# Patient Record
Sex: FEMALE | Race: WHITE | NOT HISPANIC OR LATINO | Employment: OTHER | URBAN - METROPOLITAN AREA
[De-identification: names, ages, dates, MRNs, and addresses within clinical notes are randomized per-mention and may not be internally consistent; named-entity substitution may affect disease eponyms.]

---

## 2017-01-30 ENCOUNTER — TRANSCRIBE ORDERS (OUTPATIENT)
Dept: GENERAL RADIOLOGY | Facility: HOSPITAL | Age: 52
End: 2017-01-30

## 2017-01-30 ENCOUNTER — HOSPITAL ENCOUNTER (OUTPATIENT)
Dept: GENERAL RADIOLOGY | Facility: HOSPITAL | Age: 52
Discharge: HOME | End: 2017-01-30
Admitting: FAMILY MEDICINE

## 2017-01-30 DIAGNOSIS — M54.2 NECK PAIN: Primary | ICD-10-CM

## 2017-01-30 PROCEDURE — 72072 X-RAY EXAM THORAC SPINE 3VWS: CPT

## 2017-01-30 PROCEDURE — 72100 X-RAY EXAM L-S SPINE 2/3 VWS: CPT

## 2017-01-30 PROCEDURE — 72040 X-RAY EXAM NECK SPINE 2-3 VW: CPT

## 2017-03-02 ENCOUNTER — OFFICE VISIT (OUTPATIENT)
Dept: PSYCHIATRY | Facility: CLINIC | Age: 52
End: 2017-03-02

## 2017-03-02 DIAGNOSIS — F33.2 SEVERE EPISODE OF RECURRENT MAJOR DEPRESSIVE DISORDER, WITHOUT PSYCHOTIC FEATURES (HCC): Primary | ICD-10-CM

## 2017-03-02 DIAGNOSIS — F41.9 ANXIETY DISORDER, UNSPECIFIED TYPE: ICD-10-CM

## 2017-03-02 PROCEDURE — 90791 PSYCH DIAGNOSTIC EVALUATION: CPT | Performed by: SOCIAL WORKER

## 2017-03-06 NOTE — PROGRESS NOTES
Subjective   Patient ID: Zhane Burnham is a 51 y.o. female is being seen for consultation today at the request of MARCE Mireles.  Patient was born in [facility] and raised in [facility].  Patient currently lives in [facility].  Patient is nonworking currently and working on disability.  Patient reports she has been working on disability for the past 2 years.  Patient is .  Patient has no children.  Patient has 2 dogs.  Patient is working on selling her home and moving to [facility].    Chief Complaint: Depression, anxiety, memory loss, word finding, lack of concentration and trouble staying on task.    History of Present Illness: patient reports a long history of depression that started in 1998 as evidenced by feelings of helplessness and hopelessness, depressed mood, tearfulness, lack of energy, lack of interest and isolation.  Patient rates depression on a 1-10 scale with 10 being the worst currently a 8.  Patient denies SI and HI.  Patient reports that her marriage was a trigger for her depression that she was working 3 jobs and her  would not work.  Patient is currently .  Patient discussed a long history of anxiety as evidenced by excessive worry, increased heart rate, flight of ideas, sweating and feeling the need to escape situations at times.  Patient rates anxiety on a 1-10 scale with 10 being the worst a 7.  Patient is currently trying to short sell her house which is causing increased stress and anxiety.  Patient discussed health conditions such as rheumatoid and osteoarthritis, lupus and fibromyalgia that causes daily pain and fatigue that interferes with her depression and anxiety.  Patient denies substance abuse.  Patient denies perceptual disturbances.  Patient was let go or asked to resign from the [employer] due to not being able to Through with her job duties from numerous medical appointments and patient is trying to get disability.       The following portions of the patient's history were reviewed and updated as appropriate: allergies, current medications, past family history, past medical history, past social history, past surgical history and problem list.    Past Psych History: Patient denies past psychiatric hospitalizations.  Patient denies past suicide attempts.  Patient reports past psychiatric medications as Cymbalta, Zoloft, Librax, Prozac.  Patient reports history of outpatient treatment has behavioral health and [facility] in [facility] on [address].    Substance Use History: patient has a social drinker couple times a year.  Patient denies all other substance use    Medical History: patient discussed fibromyalgia, rheumatoid and osteoarthritis, lupus, pain related to scoliosis, patient wears glasses, hearing loss of background noises, frequent urination,  Past Medical History   Diagnosis Date   • Abnormal ECG    • Fatigue    • Fibromyalgia    • Hearing loss    • Hyperlipidemia    • Hypertension    • Lower extremity edema    • Osteoarthritis         Past Surgical History   Procedure Laterality Date   • Knee arthroscopy Left 06/2016   • Breast reconstruction     • Mastectomy Bilateral 01/31/2013     bilat   • Hysterectomy     • Tonsillectomy     • Breast biopsy  12/27/2012       Medications:   Current Outpatient Prescriptions:   •  azelastine (ASTELIN) 0.1 % nasal spray, BID PRN, Disp: , Rfl:   •  BIOTIN PO, Take  by mouth 2 (Two) Times a Day. 10,000mcg QD , Disp: , Rfl:   •  busPIRone (BUSPAR) 15 MG tablet, 1 BID, Disp: , Rfl:   •  Cholecalciferol (VITAMIN D3) 2000 UNITS capsule, Daily., Disp: , Rfl: 0  •  cyclobenzaprine (FLEXERIL) 10 MG tablet, 1 TID PRN, Disp: , Rfl:   •  diphenhydrAMINE (BENADRYL) 25 mg capsule, Take 25 mg by mouth at night as needed for itching., Disp: , Rfl:   •  DULoxetine (CYMBALTA) 60 MG capsule, 1 BID, Disp: , Rfl:   •  fentaNYL (DURAGESIC) 50 MCG/HR patch, 1 patch q 72 hours, Disp: , Rfl:   •   fluconazole (DIFLUCAN) 150 MG tablet, 2 (Two) Times a Day., Disp: , Rfl: 1  •  fludrocortisone 0.1 MG tablet, Take 1/2 tablet daily., Disp: 15 tablet, Rfl: 11  •  fluticasone (FLONASE) 50 MCG/ACT nasal spray, As Needed., Disp: , Rfl: 3  •  gabapentin (NEURONTIN) 300 MG capsule, 1 TID, Disp: , Rfl:   •  lidocaine (XYLOCAINE) 5 % ointment, APPLY TO THE AFFECTED AREA(S) UP TO TWO GRAMS FOUR TIMES DAILY, Disp: , Rfl: 0  •  loratadine (CLARITIN) 10 MG tablet, 1 QD, Disp: , Rfl:   •  meloxicam (MOBIC) 15 MG tablet, 1 QD, Disp: , Rfl:   •  Omeprazole 20 MG tablet delayed-release, 2 (Two) Times a Day., Disp: , Rfl: 0  •  oxyCODONE-acetaminophen (PERCOCET)  MG per tablet, As Needed. 1 QD, Disp: , Rfl:   •  predniSONE (DELTASONE) 10 MG tablet, Take 10 mg by mouth As Needed. Use as directed , Disp: , Rfl:   •  simvastatin (ZOCOR) 40 MG tablet, 1 QHS, Disp: , Rfl:   •  traZODone (DESYREL) 100 MG tablet, TAKE ONE TABLET BY MOUTH AT BEDTIME, Disp: , Rfl: 3    Allergies   Allergen Reactions   • Celebrex [Celecoxib] Swelling   • Red Dye    • Shellfish-Derived Products        Review of Systems    Family History patient reports that dad with bipolar disorder and mother with depression  Family History   Problem Relation Age of Onset   • Heart disease Mother    • Heart failure Mother    • Heart disease Father    • Heart attack Father    • No Known Problems Sister    • No Known Problems Brother    • Hypertension Other        Social History: Patient denies any learning or behavior problems during childhood.  Patient reports she took over household duties around the age of 11 years of age.  Patient's parents  at 6 years of age.  Patient reports her mother raised her and saw her dad on weekends.  Patient reports her mother was mentally ill and unstable at times and would threaten to kill herself when she felt overwhelmed.  Patient overall feels that she had a good healthy childhood and has good memories minus her there's  instability. patient believes in a higher power in her higher power as God.  Patient goes to a [employer] in [place].  Patient was raised [employer]ism.  Patient has been supporting herself financially by taking out her correction and living off of that.  Patient's work history consists of [employer] for 10 years and that was the longest job held.  Patient denies serving in the  or ever being arrested.  Patient does not know what she likes to do but has a few friends that she is helping them cook and clean.  Patient is not sexually active and her sexual preferences man and identifies herself as heterosexual.  Patient has 1 biological brother and a half-brother and a half-sister.  Patient's brother and her have been in a legal brown since 2008 over her mother's home.      Objective   Mental Status Exam  Hygiene:  good  Dress:  casual  Attitude:  Cooperative  Motor Activity:  Appropriate  Speech:  Normal  Mood:  anxious and depressed  Affect:  depressed and anxious  Thought Processes:  Goal directed  Thought Content:  normal  Suicidal Thoughts:  denies  Homicidal Thoughts:  denies  Crisis Safety Plan: yes, to come to the emergency room.  Hallucinations:  denies      Strengths: Motivated for treatment    Weaknesses:Lack of motivation, Unemployed and Poor coping skills    Anxiety, depression, legal problems, pain problems and unresolved grief or loss      Short term goals: Develop rapport and encourage compliance with treatment plan and follow ups.  The patient to contact this office, call 911 or present to the nearest emergency room should suicidal or homicidal ideations occur.    Long term goals: The patient will have complete cessation of symptoms and be able to function at optimal levels without the need for continued treatment..      Lab Review:   not applicable  Assessment/Plan   Diagnoses and all orders for this visit:    Severe episode of recurrent major depressive disorder, without  psychotic features    Generalized anxiety disorder    Return in about 3 weeks (around 3/23/2017), or if symptoms worsen or fail to improve.    Plan: The patient will be seen at least monthly for outpatient psychotherapy sessions and follow all recommendations. The patient will follow safety plan if suicidal or homicidal ideations occur. The patient will make appointment with Shelly Thornton to be assessed for medication management to assist with symptoms of depression and anxiety.  Will work with therapist on coping skills using solution focused and CB.

## 2017-04-10 ENCOUNTER — OFFICE VISIT (OUTPATIENT)
Dept: PSYCHIATRY | Facility: CLINIC | Age: 52
End: 2017-04-10

## 2017-04-10 VITALS — BODY MASS INDEX: 39.71 KG/M2 | WEIGHT: 253 LBS | HEIGHT: 67 IN

## 2017-04-10 DIAGNOSIS — F51.05 INSOMNIA DUE TO MENTAL CONDITION: ICD-10-CM

## 2017-04-10 DIAGNOSIS — G89.29 OTHER CHRONIC PAIN: ICD-10-CM

## 2017-04-10 DIAGNOSIS — F41.9 ANXIETY DISORDER, UNSPECIFIED TYPE: ICD-10-CM

## 2017-04-10 DIAGNOSIS — F33.2 SEVERE EPISODE OF RECURRENT MAJOR DEPRESSIVE DISORDER, WITHOUT PSYCHOTIC FEATURES (HCC): Primary | ICD-10-CM

## 2017-04-10 PROCEDURE — 99214 OFFICE O/P EST MOD 30 MIN: CPT | Performed by: NURSE PRACTITIONER

## 2017-04-10 RX ORDER — LISINOPRIL 5 MG/1
TABLET ORAL
Refills: 3 | COMMUNITY
Start: 2017-03-22 | End: 2017-06-21

## 2017-04-10 RX ORDER — BUSPIRONE HYDROCHLORIDE 15 MG/1
15 TABLET ORAL 3 TIMES DAILY
Qty: 90 TABLET | Refills: 2 | Status: SHIPPED | OUTPATIENT
Start: 2017-04-10 | End: 2017-06-01 | Stop reason: SDUPTHER

## 2017-04-10 RX ORDER — BUPROPION HYDROCHLORIDE 100 MG/1
TABLET, EXTENDED RELEASE ORAL
Qty: 60 TABLET | Refills: 0 | Status: SHIPPED | OUTPATIENT
Start: 2017-04-10 | End: 2017-05-06 | Stop reason: SDUPTHER

## 2017-04-10 NOTE — PROGRESS NOTES
Subjective    Zhane Burnham is a 51 y.o. female is being seen for consultation today at the request of MARCE Mireles.  Patient was born in [facility] and raised in [facility]. Patient currently lives in [facility]note 3]. Patient is nonworking currently and working on disability. Patient reports she has been working on disability for the past 2 years. Patient is . Patient has no children. Patient has 2 dogs. Patient is working on selling her home and moving to [facility].    Chief Complaint:    History of Present Illness patient reports a long history of depression that started in 1998 as evidenced by feelings of helplessness and hopelessness, depressed mood, tearfulness, lack of energy, lack of interest and isolation. Patient rates depression on a 1-10 scale with 10 being the worst currently a 8. Patient denies SI and HI. Patient reports that her marriage was a trigger for her depression that she was working 3 jobs and her  would not work. Patient is currently . Patient discussed a long history of anxiety as evidenced by excessive worry, increased heart rate, flight of ideas, sweating and feeling the need to escape situations at times. Patient rates anxiety on a 1-10 scale with 10 being the worst a 7. Patient is currently trying to short sell her house which is causing increased stress and anxiety. Patient discussed health conditions such as rheumatoid and osteoarthritis, lupus and fibromyalgia that causes daily pain and fatigue that interferes with her depression and anxiety. Patient denies substance abuse. Patient denies perceptual disturbances. Denies OCD, justus, PTSD sx's.  Patient was let go or asked to resign from the [employer] due to not being able to follow through with her job duties from numerous medical appointments.  Patient is trying to get disability this is her third time. She has a lot of physical complaints.       Stressors she reports  "are physical chronic pain, major financial, filing for bankruptsy. She does have contract on her current house and moved into rental in [place].      Past Psych History: Patient denies past psychiatric hospitalizations. Patient denies past suicide attempts. Patient reports past psychiatric medications as  Zoloft, Librax, Prozac. Is currently on buspar 15mg BID and Cymbalta 120mg PO total daily, she has been on both for years.  Patient reports history of outpatient treatment has Behavioral Health and [facility] in [facility] on [address].      Substance Use History: patient has a social drink couple times a year. Patient denies all other substance use, quit smoking past three years.     (Scales based on 0 - 10 with 10 being the worst)    UDS today + opiates is prescribed  STEPHANIE: no red flags    The following portions of the patient's history were reviewed and updated as appropriate: allergies, current medications, past family history, past medical history, past social history, past surgical history and problem list.    Review of Systems   Constitutional: Positive for fatigue.   HENT: Negative.    Eyes: Negative.    Respiratory: Negative.    Cardiovascular: Positive for palpitations (seeing a cardiologist and working on B/P management with PCP).   Gastrointestinal: Negative.    Endocrine: Positive for cold intolerance.   Genitourinary: Negative.    Musculoskeletal: Positive for arthralgias, back pain, joint swelling and neck pain.   Skin: Negative.    Allergic/Immunologic: Positive for food allergies (some red dyes in food).   Neurological: Positive for light-headedness.   Hematological: Negative.    Psychiatric/Behavioral: Positive for dysphoric mood and sleep disturbance. The patient is nervous/anxious.    denies fever, cough, s/s’s of infection, denies GI/ problems, denies new medical issues     Objective   Physical Exam  Height 67\" (170.2 cm), weight 253 lb (115 kg).    Allergies   Allergen Reactions   • " Celebrex [Celecoxib] Swelling   • Red Dye    • Shellfish-Derived Products      MEDICAL HISTORY:   postmenopausal (surgical cause (stage I triple negative node-negative left breast cancer, originally diagnosed 12/27/12  Osteoarthritis     Fibromyalgia     Fatigue     Hearing loss     Hypertension     Hyperlipidemia     Abnormal ECG     Lower extremity edema       Surgical History          KNEE ARTHROSCOPY 06/2016 Left   BREAST RECONSTRUCTION     MASTECTOMY 01/31/2013 Bilateral   bilat     HYSTERECTOMY     TONSILLECTOMY     BREAST BIOPSY     SENTINAL LYMPHNODE NEGATIVE,  1/31/2013    Current Medications:   Current Outpatient Prescriptions   Medication Sig Dispense Refill   • azelastine (ASTELIN) 0.1 % nasal spray BID PRN     • BIOTIN PO Take  by mouth 2 (Two) Times a Day. 10,000mcg QD      • buPROPion SR (WELLBUTRIN SR) 100 MG 12 hr tablet Take one in am daily x 14 days then increase to one in am and one at 4pm daily 60 tablet 0   • busPIRone (BUSPAR) 15 MG tablet Take 1 tablet by mouth 3 (Three) Times a Day. 1 BID 90 tablet 2   • Cholecalciferol (VITAMIN D3) 2000 UNITS capsule Daily.  0   • cyclobenzaprine (FLEXERIL) 10 MG tablet 1 TID PRN     • diphenhydrAMINE (BENADRYL) 25 mg capsule Take 25 mg by mouth at night as needed for itching.     • DULoxetine (CYMBALTA) 60 MG capsule 1 BID     • fentaNYL (DURAGESIC) 50 MCG/HR patch 1 patch q 72 hours     • fluconazole (DIFLUCAN) 150 MG tablet 2 (Two) Times a Day.  1   • fludrocortisone 0.1 MG tablet Take 1/2 tablet daily. 15 tablet 11   • fluticasone (FLONASE) 50 MCG/ACT nasal spray As Needed.  3   • gabapentin (NEURONTIN) 300 MG capsule 1 TID     • lidocaine (XYLOCAINE) 5 % ointment APPLY TO THE AFFECTED AREA(S) UP TO TWO GRAMS FOUR TIMES DAILY  0   • lisinopril (PRINIVIL,ZESTRIL) 5 MG tablet TAKE ONE TABLET BY MOUTH EVERY DAY  3   • loratadine (CLARITIN) 10 MG tablet 1 QD     • meloxicam (MOBIC) 15 MG tablet 1 QD     • Omeprazole 20 MG tablet delayed-release 2 (Two)  Times a Day.  0   • oxyCODONE-acetaminophen (PERCOCET)  MG per tablet As Needed. 1 QD     • predniSONE (DELTASONE) 10 MG tablet Take 10 mg by mouth As Needed. Use as directed      • simvastatin (ZOCOR) 40 MG tablet 1 QHS     • traZODone (DESYREL) 100 MG tablet TAKE ONE TABLET BY MOUTH AT BEDTIME  3     No current facility-administered medications for this visit.        Mental Status Exam:   Appearance: appropriate  Hygiene:   good  Cooperation:  Cooperative  Eye Contact:  Good  Psychomotor Behavior:  Appropriate  Mood:  Dysthymic, anxiety  Affect:  Appropriate  Hopelessness: Denies  Speech:  Normal  Thought Process:  Linear  Thought Content:  Normal  Suicidal:  None  Homicidal:  None  Hallucinations:  None  Delusion:  None  Memory:  Intact  Orientation:  Person, Place, Time and Situation  Reliability:  fair  Insight:  Fair  Judgement:  Fair  Impulse Control:  Fair  Estimated Intelligence: average range   Physical/Medical Issues:  No     Assessment/Plan   Diagnoses and all orders for this visit:    Severe episode of recurrent major depressive disorder, without psychotic features    Anxiety disorder, unspecified type    Insomnia due to mental condition    Other chronic pain    Other orders  -     buPROPion SR (WELLBUTRIN SR) 100 MG 12 hr tablet; Take one in am daily x 14 days then increase to one in am and one at 4pm daily  -     busPIRone (BUSPAR) 15 MG tablet; Take 1 tablet by mouth 3 (Three) Times a Day. 1 BID      Will continue Cymbalta  Continue Trazodone  Buspar 15mg increase TID   Wellbutrin 100mg Po once a day in morning x 14 days then increase to twice one in am and one at 4pm and then see in 4 weeks.   ·   Encouraged continuation of therapy. Reframing negative thoughts about world and herself, poor body image. Work on increasing healthy social support.   A psychological evaluation was conducted in order to assess past and current level of functioning. Areas assessed included, but were not limited to:  perception of social support, perception of ability to face and deal with challenges in life (positive functioning), anxiety symptoms, depressive symptoms, perspective on beliefs/belief system, coping skills for stress, intelligence level,  Therapeutic rapport was established. Interventions conducted today were geared towards incorporating medication management along with support for continued therapy. Education was also provided as to the med management with this provider and what to expect in subsequent sessions.    We discussed risks, benefits, and side effects of the above medications and the patient was agreeable with the plan. Patient was educated on the importance of compliance with treatment and follow-up appointments.     Instructed to call for questions or concerns and return early if necessary. Crisis plan reviewed including going to the Emergency department.    Return in about 4 weeks (around 5/8/2017) for Next scheduled follow up.

## 2017-04-27 ENCOUNTER — OFFICE VISIT (OUTPATIENT)
Dept: CARDIOLOGY | Facility: CLINIC | Age: 52
End: 2017-04-27

## 2017-04-27 VITALS
SYSTOLIC BLOOD PRESSURE: 90 MMHG | HEART RATE: 90 BPM | DIASTOLIC BLOOD PRESSURE: 64 MMHG | WEIGHT: 253.8 LBS | BODY MASS INDEX: 40.79 KG/M2 | HEIGHT: 66 IN

## 2017-04-27 DIAGNOSIS — I95.1 ORTHOSTATIC HYPOTENSION: Primary | ICD-10-CM

## 2017-04-27 PROCEDURE — 99213 OFFICE O/P EST LOW 20 MIN: CPT | Performed by: INTERNAL MEDICINE

## 2017-04-27 NOTE — PROGRESS NOTES
Zhane Burnham  1965  [phone]      04/27/2017    Lynn Aceves MD    Chief Complaint   Patient presents with   • Dizziness     Problem List:    1. abnormal ECG 8/11/2016: shows possible inferior MI  2. Hypotension   3. Rheumatoid arthritis   4. Osteoarthritis   5. Breat cancer, triple negative    A. S/p double mastectomy   6. Asthma (childhood)  7. Surgical Hx:    A. Tonsillectomy    B. Hysterectomy: 2008    C. Double mastectomy    D. Reconstructive breast surgery    E. Knee surgery 6/21/16    Allergies   Allergen Reactions   • Celebrex [Celecoxib] Swelling   • Red Dye    • Shellfish-Derived Products        Current Medications:      Current Outpatient Prescriptions:   •  azelastine (ASTELIN) 0.1 % nasal spray, BID PRN, Disp: , Rfl:   •  BIOTIN PO, Take  by mouth 2 (Two) Times a Day. 10,000mcg QD , Disp: , Rfl:   •  buPROPion SR (WELLBUTRIN SR) 100 MG 12 hr tablet, Take one in am daily x 14 days then increase to one in am and one at 4pm daily, Disp: 60 tablet, Rfl: 0  •  busPIRone (BUSPAR) 15 MG tablet, Take 1 tablet by mouth 3 (Three) Times a Day. 1 BID, Disp: 90 tablet, Rfl: 2  •  Cholecalciferol (VITAMIN D3) 2000 UNITS capsule, 3,000 Units Daily., Disp: , Rfl: 0  •  cyclobenzaprine (FLEXERIL) 10 MG tablet, 1 TID PRN, Disp: , Rfl:   •  diphenhydrAMINE (BENADRYL) 25 mg capsule, Take 25 mg by mouth at night as needed for itching., Disp: , Rfl:   •  DULoxetine (CYMBALTA) 60 MG capsule, 1 BID, Disp: , Rfl:   •  fentaNYL (DURAGESIC) 50 MCG/HR patch, 1 patch q 72 hours, Disp: , Rfl:   •  fludrocortisone 0.1 MG tablet, Take 1/2 tablet daily., Disp: 15 tablet, Rfl: 11  •  fluticasone (FLONASE) 50 MCG/ACT nasal spray, As Needed., Disp: , Rfl: 3  •  gabapentin (NEURONTIN) 300 MG capsule, 1 TID, Disp: , Rfl:   •  lidocaine (XYLOCAINE) 5 % ointment, APPLY TO THE AFFECTED AREA(S) UP TO TWO GRAMS PRN, Disp: , Rfl: 0  •  lisinopril (PRINIVIL,ZESTRIL) 5 MG tablet, TAKE ONE TABLET BY MOUTH EVERY DAY, Disp: ,  "Rfl: 3  •  loratadine (CLARITIN) 10 MG tablet, 1 QD, Disp: , Rfl:   •  meloxicam (MOBIC) 15 MG tablet, 1 QD, Disp: , Rfl:   •  Omeprazole 20 MG tablet delayed-release, 2 (Two) Times a Day., Disp: , Rfl: 0  •  oxyCODONE-acetaminophen (PERCOCET)  MG per tablet, As Needed. 1 QD, Disp: , Rfl:   •  predniSONE (DELTASONE) 10 MG tablet, Take 10 mg by mouth As Needed. Use as directed , Disp: , Rfl:   •  simvastatin (ZOCOR) 40 MG tablet, 1 QHS, Disp: , Rfl:   •  traZODone (DESYREL) 100 MG tablet, TAKE ONE TABLET BY MOUTH AT BEDTIME, Disp: , Rfl: 3    HPI    Ms. Burnham presents today for 6 month follow up with complaints of dizziness, weakness, and lightheadedness.  Since she was here last her blood pressure had become elevated on the 0.5 of Florinef and had gotten as high as 150/99.  As a result Dr. Aceves added 5 mg of lisinopril.  She's been on that dose for approximately one month.  She recounts that she suffered a fall yesterday at 9-10 am after mowing her yard. She collapsed \"without any control of her body\", though she denies true syncope (she did not \"pass out\"). She is in significant pain and feels she may have fractured her ankle as a result of the fall. Patient denies chest pain, palpitations, shortness of breath, edema, PND, orthopnea, and syncope.     The following portions of the patient's history were reviewed and updated as appropriate: allergies, current medications and problem list.    Pertinent positives as listed in the HPI.  All other systems reviewed are negative.    Vitals:    04/27/17 1616   BP: 90/64   BP Location: Left arm   Patient Position: Sitting   Pulse: 90   Weight: 253 lb 12.8 oz (115 kg)   Height: 66\" (167.6 cm)       Physical Exam:    General: Alert and oriented  Neck: Jugular venous pressure is within normal limits. Carotids have normal upstrokes without bruits.   Cardiovascular: Heart has a nondisplaced focal PMI. Regular rate and rhythm without murmur, gallop or rub.  Lungs: " Clear without rales or wheezes. Equal expansion is noted.   Extremities: Show no edema.  Skin: warm and dry.  Neurologic: nonfocal    Diagnostic Data:    Procedures    Assessment:      ICD-10-CM ICD-9-CM   1. Orthostatic hypotension I95.1 458.0       Plan:    1. Take florinef 0.5 mg every other day.    2. Stop lisinopril.  3. Continue current medications.  4. F/up in 1 month or sooner if needed.    Scribed for Oumou Merrill MD by Makayla Dean. 4/27/2017  4:37 PM    I Oumou Merrill MD personally performed the services described in this documentation as scribed by the above individual in my presence, and it is both accurate and complete.    Oumou Merrill MD, FACC

## 2017-05-08 ENCOUNTER — OFFICE VISIT (OUTPATIENT)
Dept: PSYCHIATRY | Facility: CLINIC | Age: 52
End: 2017-05-08

## 2017-05-08 DIAGNOSIS — F41.9 ANXIETY DISORDER, UNSPECIFIED TYPE: ICD-10-CM

## 2017-05-08 DIAGNOSIS — F33.2 SEVERE EPISODE OF RECURRENT MAJOR DEPRESSIVE DISORDER, WITHOUT PSYCHOTIC FEATURES (HCC): Primary | ICD-10-CM

## 2017-05-08 DIAGNOSIS — F51.05 INSOMNIA DUE TO MENTAL CONDITION: ICD-10-CM

## 2017-05-08 PROCEDURE — 90834 PSYTX W PT 45 MINUTES: CPT | Performed by: SOCIAL WORKER

## 2017-05-08 RX ORDER — BUPROPION HYDROCHLORIDE 100 MG/1
100 TABLET, EXTENDED RELEASE ORAL 2 TIMES DAILY
Qty: 60 TABLET | Refills: 0 | Status: SHIPPED | OUTPATIENT
Start: 2017-05-08 | End: 2017-05-11 | Stop reason: SDUPTHER

## 2017-05-11 ENCOUNTER — OFFICE VISIT (OUTPATIENT)
Dept: PSYCHIATRY | Facility: CLINIC | Age: 52
End: 2017-05-11

## 2017-05-11 VITALS — HEIGHT: 66 IN | WEIGHT: 259 LBS | BODY MASS INDEX: 41.62 KG/M2

## 2017-05-11 DIAGNOSIS — F51.05 INSOMNIA DUE TO MENTAL CONDITION: ICD-10-CM

## 2017-05-11 DIAGNOSIS — F33.2 SEVERE EPISODE OF RECURRENT MAJOR DEPRESSIVE DISORDER, WITHOUT PSYCHOTIC FEATURES (HCC): Primary | ICD-10-CM

## 2017-05-11 DIAGNOSIS — F41.9 ANXIETY DISORDER, UNSPECIFIED TYPE: ICD-10-CM

## 2017-05-11 DIAGNOSIS — G89.29 OTHER CHRONIC PAIN: ICD-10-CM

## 2017-05-11 PROCEDURE — 99213 OFFICE O/P EST LOW 20 MIN: CPT | Performed by: NURSE PRACTITIONER

## 2017-05-11 RX ORDER — BUPROPION HYDROCHLORIDE 100 MG/1
TABLET, EXTENDED RELEASE ORAL
Qty: 60 TABLET | Refills: 1 | Status: SHIPPED | OUTPATIENT
Start: 2017-05-11 | End: 2017-07-03 | Stop reason: SDUPTHER

## 2017-05-11 RX ORDER — TRAZODONE HYDROCHLORIDE 100 MG/1
100 TABLET ORAL NIGHTLY
Qty: 30 TABLET | Refills: 3 | Status: SHIPPED | OUTPATIENT
Start: 2017-05-11 | End: 2017-12-13 | Stop reason: SDUPTHER

## 2017-05-23 ENCOUNTER — TRANSCRIBE ORDERS (OUTPATIENT)
Dept: ADMINISTRATIVE | Facility: HOSPITAL | Age: 52
End: 2017-05-23

## 2017-05-23 DIAGNOSIS — C50.919 MALIGNANT NEOPLASM OF OTHER SPECIFIED SITES OF FEMALE BREAST: Primary | ICD-10-CM

## 2017-06-01 RX ORDER — BUSPIRONE HYDROCHLORIDE 15 MG/1
TABLET ORAL
Qty: 90 TABLET | Refills: 0 | Status: SHIPPED | OUTPATIENT
Start: 2017-06-01 | End: 2017-07-26 | Stop reason: SDUPTHER

## 2017-06-05 ENCOUNTER — OFFICE VISIT (OUTPATIENT)
Dept: PSYCHIATRY | Facility: CLINIC | Age: 52
End: 2017-06-05

## 2017-06-05 DIAGNOSIS — F51.05 INSOMNIA DUE TO MENTAL CONDITION: ICD-10-CM

## 2017-06-05 DIAGNOSIS — F41.9 ANXIETY DISORDER, UNSPECIFIED TYPE: ICD-10-CM

## 2017-06-05 DIAGNOSIS — G89.29 OTHER CHRONIC PAIN: ICD-10-CM

## 2017-06-05 DIAGNOSIS — F33.2 SEVERE EPISODE OF RECURRENT MAJOR DEPRESSIVE DISORDER, WITHOUT PSYCHOTIC FEATURES (HCC): Primary | ICD-10-CM

## 2017-06-05 PROCEDURE — 90834 PSYTX W PT 45 MINUTES: CPT | Performed by: SOCIAL WORKER
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
## 2017-06-21 ENCOUNTER — OFFICE VISIT (OUTPATIENT)
Dept: CARDIOLOGY | Facility: CLINIC | Age: 52
End: 2017-06-21

## 2017-06-21 VITALS
BODY MASS INDEX: 39.76 KG/M2 | WEIGHT: 247.4 LBS | HEIGHT: 66 IN | DIASTOLIC BLOOD PRESSURE: 66 MMHG | SYSTOLIC BLOOD PRESSURE: 102 MMHG | HEART RATE: 91 BPM

## 2017-06-21 DIAGNOSIS — R60.0 BILATERAL EDEMA OF LOWER EXTREMITY: ICD-10-CM

## 2017-06-21 DIAGNOSIS — I95.1 ORTHOSTATIC HYPOTENSION: Primary | ICD-10-CM

## 2017-06-21 PROCEDURE — 99213 OFFICE O/P EST LOW 20 MIN: CPT | Performed by: NURSE PRACTITIONER

## 2017-06-21 RX ORDER — HYDROCHLOROTHIAZIDE 25 MG/1
25 TABLET ORAL DAILY
COMMUNITY
End: 2019-05-16

## 2017-06-21 RX ORDER — GUAIFENESIN 400 MG/1
400 TABLET ORAL
COMMUNITY
End: 2019-05-16
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
## 2017-07-05 RX ORDER — BUPROPION HYDROCHLORIDE 100 MG/1
TABLET, EXTENDED RELEASE ORAL
Qty: 60 TABLET | Refills: 2 | Status: SHIPPED | OUTPATIENT
Start: 2017-07-05 | End: 2017-09-12

## 2017-07-11 ENCOUNTER — DOCUMENTATION (OUTPATIENT)
Dept: ONCOLOGY | Facility: CLINIC | Age: 52
End: 2017-07-11

## 2017-07-11 ENCOUNTER — LAB (OUTPATIENT)
Dept: LAB | Facility: HOSPITAL | Age: 52
End: 2017-07-11

## 2017-07-11 ENCOUNTER — OFFICE VISIT (OUTPATIENT)
Dept: ONCOLOGY | Facility: CLINIC | Age: 52
End: 2017-07-11

## 2017-07-11 VITALS
WEIGHT: 248 LBS | RESPIRATION RATE: 20 BRPM | TEMPERATURE: 97.4 F | BODY MASS INDEX: 39.86 KG/M2 | HEART RATE: 100 BPM | OXYGEN SATURATION: 98 % | HEIGHT: 66 IN | SYSTOLIC BLOOD PRESSURE: 136 MMHG | DIASTOLIC BLOOD PRESSURE: 86 MMHG

## 2017-07-11 DIAGNOSIS — C50.212 MALIGNANT NEOPLASM OF UPPER-INNER QUADRANT OF LEFT FEMALE BREAST (HCC): Primary | ICD-10-CM

## 2017-07-11 DIAGNOSIS — C50.212 MALIGNANT NEOPLASM OF UPPER-INNER QUADRANT OF LEFT FEMALE BREAST (HCC): ICD-10-CM

## 2017-07-11 LAB
ALBUMIN SERPL-MCNC: 4.3 G/DL (ref 3.2–4.8)
ALBUMIN/GLOB SERPL: 1.6 G/DL (ref 1.5–2.5)
ALP SERPL-CCNC: 79 U/L (ref 25–100)
ALT SERPL W P-5'-P-CCNC: 19 U/L (ref 7–40)
ANION GAP SERPL CALCULATED.3IONS-SCNC: 8 MMOL/L (ref 3–11)
AST SERPL-CCNC: 27 U/L (ref 0–33)
BILIRUB SERPL-MCNC: 0.4 MG/DL (ref 0.3–1.2)
BUN BLD-MCNC: 18 MG/DL (ref 9–23)
BUN/CREAT SERPL: 18 (ref 7–25)
CALCIUM SPEC-SCNC: 10 MG/DL (ref 8.7–10.4)
CHLORIDE SERPL-SCNC: 103 MMOL/L (ref 99–109)
CO2 SERPL-SCNC: 31 MMOL/L (ref 20–31)
CREAT BLD-MCNC: 1 MG/DL (ref 0.6–1.3)
ERYTHROCYTE [DISTWIDTH] IN BLOOD BY AUTOMATED COUNT: 14.3 % (ref 11.3–14.5)
GFR SERPL CREATININE-BSD FRML MDRD: 59 ML/MIN/1.73
GLOBULIN UR ELPH-MCNC: 2.7 GM/DL
GLUCOSE BLD-MCNC: 68 MG/DL (ref 70–100)
HCT VFR BLD AUTO: 35.4 % (ref 34.5–44)
HGB BLD-MCNC: 11.5 G/DL (ref 11.5–15.5)
LYMPHOCYTES # BLD AUTO: 1.4 10*3/MM3 (ref 0.6–4.8)
LYMPHOCYTES NFR BLD AUTO: 23.2 % (ref 24–44)
MCH RBC QN AUTO: 26.6 PG (ref 27–31)
MCHC RBC AUTO-ENTMCNC: 32.4 G/DL (ref 32–36)
MCV RBC AUTO: 82.1 FL (ref 80–99)
MONOCYTES # BLD AUTO: 0.2 10*3/MM3 (ref 0–1)
MONOCYTES NFR BLD AUTO: 3.5 % (ref 0–12)
NEUTROPHILS # BLD AUTO: 4.4 10*3/MM3 (ref 1.5–8.3)
NEUTROPHILS NFR BLD AUTO: 73.3 % (ref 41–71)
PLATELET # BLD AUTO: 293 10*3/MM3 (ref 150–450)
PMV BLD AUTO: 7.4 FL (ref 6–12)
POTASSIUM BLD-SCNC: 3.8 MMOL/L (ref 3.5–5.5)
PROT SERPL-MCNC: 7 G/DL (ref 5.7–8.2)
RBC # BLD AUTO: 4.32 10*6/MM3 (ref 3.89–5.14)
SODIUM BLD-SCNC: 142 MMOL/L (ref 132–146)
WBC NRBC COR # BLD: 6 10*3/MM3 (ref 3.5–10.8)

## 2017-07-11 PROCEDURE — 36415 COLL VENOUS BLD VENIPUNCTURE: CPT

## 2017-07-11 PROCEDURE — 99213 OFFICE O/P EST LOW 20 MIN: CPT | Performed by: INTERNAL MEDICINE

## 2017-07-11 PROCEDURE — 85025 COMPLETE CBC W/AUTO DIFF WBC: CPT

## 2017-07-11 PROCEDURE — 80053 COMPREHEN METABOLIC PANEL: CPT | Performed by: INTERNAL MEDICINE

## 2017-07-11 NOTE — PROGRESS NOTES
To Whom It May Concern,    I have known this patient, Ms Catherine Burnham, for 4 years, due to her left breast cancer.  It is my understanding that she is applying for disability.    I saw her in the office just now.  Although I typically do not provide letters  for patients  supporting disability, other than those with active cancer and side effects due to their cancer and/or treatment, I am providing this letter in support of her request for disability.    In my observation today, the patient has significant cognitive compromise.  He has a great deal of difficulty maintaining a train of thought and I cannot imagine her being able to complete work related tasks when these are needed.  In addition, I think she is terribly physically limited in terms of her muscle strength and ability to repetitively complete a physical task after observing her here in the office.  In addition, her ability to ambulate is severely compromised.  She has had several falls in the last year or so associated with a chronic dis- equilibrium syndrome.    I realize the above is not highly specific, but I cannot imagine this woman seeking gainful employment.    Sincerely,  Juanpablo Bassett M.D.

## 2017-07-11 NOTE — PROGRESS NOTES
Chief Complaint   Follow-up postmenopausal stage I triple negative node-negative left breast cancer originally diagnosed December 2012    PROBLEM LIST   Patient Active Problem List   Diagnosis   • Hypertension   • Hyperlipidemia   • Abnormal ECG   • Lower extremity edema   • Breast cancer, left   • Rheumatoid arthritis   • Chronic pain syndrome   • Dizziness   • Orthostatic hypotension       HISTORY OF PRESENT ILLNESS:   Interim follow-up for this very nice 52-year-old woman who is getting close to 5 years from her diagnosis of breast cancer.    She continues to have difficulty with orthostatic hypotension.  Cardiology is seen her regarding this.  She's had several falls.  She's had no loss of consciousness.  She's had no head injuries.  She recently sold her house.  She is living in [place].  She has problems with memory retention.  She also has issues with chronic pain and with disequilibrium.  She gets very dizzy when she stands.  She uses a cane frequently when she ambulates.    Past Medical History, Past Surgical History, Social History, Family History have been reviewed and are without significant changes except as mentioned.      Medications:      Current Outpatient Prescriptions:   •  azelastine (ASTELIN) 0.1 % nasal spray, BID PRN, Disp: , Rfl:   •  BIOTIN PO, Take  by mouth 2 (Two) Times a Day. 10,000mcg QD , Disp: , Rfl:   •  buPROPion SR (WELLBUTRIN SR) 100 MG 12 hr tablet, TAKE ONE TABLET BY MOUTH TWICE DAILY NOT AFTER FOUR P.M., Disp: 60 tablet, Rfl: 2  •  busPIRone (BUSPAR) 15 MG tablet, TAKE ONE TABLET BY MOUTH THREE TIMES DAILY, Disp: 90 tablet, Rfl: 0  •  Cholecalciferol (VITAMIN D3) 2000 UNITS capsule, 3,000 Units Daily., Disp: , Rfl: 0  •  cyclobenzaprine (FLEXERIL) 10 MG tablet, As Needed., Disp: , Rfl:   •  diphenhydrAMINE (BENADRYL) 25 mg capsule, Take 25 mg by mouth Every Night., Disp: , Rfl:   •  DULoxetine (CYMBALTA) 60 MG capsule, 1 BID, Disp: , Rfl:   •  fentaNYL (DURAGESIC) 50 MCG/HR  patch, 1 patch q 72 hours, Disp: , Rfl:   •  fludrocortisone 0.1 MG tablet, Take 1/2 tablet daily. (Patient taking differently: Every Other Day. Take 1/2 tablet daily. ), Disp: 15 tablet, Rfl: 11  •  fluticasone (FLONASE) 50 MCG/ACT nasal spray, As Needed., Disp: , Rfl: 3  •  gabapentin (NEURONTIN) 300 MG capsule, 1 TID, Disp: , Rfl:   •  guaiFENesin (MUCUS RELIEF) 400 MG tablet, Take 400 mg by mouth Every 4 (Four) Hours., Disp: , Rfl:   •  hydrochlorothiazide (HYDRODIURIL) 25 MG tablet, Take 25 mg by mouth Daily., Disp: , Rfl:   •  lidocaine (XYLOCAINE) 5 % ointment, APPLY TO THE AFFECTED AREA(S) UP TO TWO GRAMS PRN, Disp: , Rfl: 0  •  loratadine (CLARITIN) 10 MG tablet, 1 QD, Disp: , Rfl:   •  meloxicam (MOBIC) 15 MG tablet, 1 QD, Disp: , Rfl:   •  Omeprazole 20 MG tablet delayed-release, 2 (Two) Times a Day., Disp: , Rfl: 0  •  oxyCODONE-acetaminophen (PERCOCET)  MG per tablet, As Needed. 1 QD, Disp: , Rfl:   •  predniSONE (DELTASONE) 10 MG tablet, Take 10 mg by mouth As Needed. Use as directed , Disp: , Rfl:   •  simvastatin (ZOCOR) 40 MG tablet, 1 QHS, Disp: , Rfl:   •  traZODone (DESYREL) 100 MG tablet, Take 1 tablet by mouth Every Night., Disp: 30 tablet, Rfl: 3    ALLERGIES:    Allergies   Allergen Reactions   • Celebrex [Celecoxib] Swelling   • Red Dye    • Shellfish-Derived Products        ROS:  Review of Systems   Constitutional: Negative for activity change and appetite change.        Overall suffers from chronic fatigue.   HENT: Negative for mouth sores, sinus pressure and voice change.         Has chronic intermittent headaches but these are not new   Eyes: Negative for visual disturbance.   Respiratory: Negative for shortness of breath.         Has chronic dyspnea on exertion   Cardiovascular: Negative for chest pain.   Gastrointestinal: Negative for abdominal pain and vomiting.   Genitourinary: Negative for dysuria.   Musculoskeletal: Negative for arthralgias and myalgias.        Has chronic  "muscle aches and pains as well as chronic joint aches and pains which have not changed   Skin: Negative for color change.   Neurological: Negative for dizziness, syncope and headaches.   Hematological: Negative for adenopathy.   Psychiatric/Behavioral: Negative for confusion, sleep disturbance and suicidal ideas. The patient is not nervous/anxious.         Chronic depressive symptoms about the same           Objective:    /86  Pulse 100  Temp 97.4 °F (36.3 °C)  Resp 20  Ht 66\" (167.6 cm)  Wt 248 lb (112 kg)  SpO2 98%  BMI 40.03 kg/m2    Physical Exam   Constitutional: She is oriented to person, place, and time. She appears well-developed and well-nourished. No distress.   Alert oriented and in no acute distress.  In talking with her, she really has a hard time maintaining her train of thought.  This is not new but seems a little bit more pronounced today than it did 8 months ago.   HENT:   Head: Normocephalic.   Mouth/Throat: Oropharynx is clear and moist.   Eyes: Conjunctivae and EOM are normal. No scleral icterus.   Neck: Normal range of motion. Neck supple. No thyromegaly present.   Cardiovascular: Normal rate, regular rhythm and normal heart sounds.    No murmur heard.  Pulmonary/Chest: Effort normal and breath sounds normal. She has no wheezes. She has no rales.   Bilateral breast implants in place with good cosmesis.  No worrisome mass palpable.    Abdominal: Soft. Bowel sounds are normal. She exhibits no distension and no mass. There is no tenderness.   Musculoskeletal: She exhibits no edema or tenderness.   She gets up and down from the exam table in a very very guarded fashion area and I don't have to help her but she cannot walk more than 8 or 10 steps without having to stop and seemingly maintain her balance   Lymphadenopathy:     She has no cervical adenopathy.   Neurological: She is alert and oriented to person, place, and time. She displays normal reflexes. No cranial nerve deficit. "   Reflexes blunted throughout.  In talking with her, she has a real problem maintaining her train of thought.  She does know where she is and she knows the day and the day.   Skin: Skin is warm and dry. No rash noted.   Psychiatric: She has a normal mood and affect. Judgment normal.   Vitals reviewed.             RECENT LABS:  Hematology WBC   Date Value Ref Range Status   07/11/2017 6.00 3.50 - 10.80 10*3/mm3 Final     Hemoglobin   Date Value Ref Range Status   07/11/2017 11.5 11.5 - 15.5 g/dL Final     Hematocrit   Date Value Ref Range Status   07/11/2017 35.4 34.5 - 44.0 % Final     MCV   Date Value Ref Range Status   07/11/2017 82.1 80.0 - 99.0 fL Final     RDW   Date Value Ref Range Status   07/11/2017 14.3 11.3 - 14.5 % Final     MPV   Date Value Ref Range Status   07/11/2017 7.4 6.0 - 12.0 fL Final     Platelets   Date Value Ref Range Status   07/11/2017 293 150 - 450 10*3/mm3 Final     Neutrophils, Absolute   Date Value Ref Range Status   07/11/2017 4.40 1.50 - 8.30 10*3/mm3 Final     Lymphocytes, Absolute   Date Value Ref Range Status   07/11/2017 1.40 0.60 - 4.80 10*3/mm3 Final     Monocytes, Absolute   Date Value Ref Range Status   07/11/2017 0.20 0.00 - 1.00 10*3/mm3 Final       Glucose   Date Value Ref Range Status   07/11/2017 68 (L) 70 - 100 mg/dL Final     Sodium   Date Value Ref Range Status   07/11/2017 142 132 - 146 mmol/L Final     Potassium   Date Value Ref Range Status   07/11/2017 3.8 3.5 - 5.5 mmol/L Final     CO2   Date Value Ref Range Status   07/11/2017 31.0 20.0 - 31.0 mmol/L Final     Chloride   Date Value Ref Range Status   07/11/2017 103 99 - 109 mmol/L Final     Anion Gap   Date Value Ref Range Status   07/11/2017 8.0 3.0 - 11.0 mmol/L Final     Creatinine   Date Value Ref Range Status   07/11/2017 1.00 0.60 - 1.30 mg/dL Final     BUN   Date Value Ref Range Status   07/11/2017 18 9 - 23 mg/dL Final     BUN/Creatinine Ratio   Date Value Ref Range Status   07/11/2017 18.0 7.0 - 25.0  Final     Calcium   Date Value Ref Range Status   07/11/2017 10.0 8.7 - 10.4 mg/dL Final     eGFR Non  Amer   Date Value Ref Range Status   07/11/2017 59 (L) >60 mL/min/1.73 Final     Alkaline Phosphatase   Date Value Ref Range Status   07/11/2017 79 25 - 100 U/L Final     Total Protein   Date Value Ref Range Status   07/11/2017 7.0 5.7 - 8.2 g/dL Final     ALT (SGPT)   Date Value Ref Range Status   07/11/2017 19 7 - 40 U/L Final     AST (SGOT)   Date Value Ref Range Status   07/11/2017 27 0 - 33 U/L Final     Total Bilirubin   Date Value Ref Range Status   07/11/2017 0.4 0.3 - 1.2 mg/dL Final     Albumin   Date Value Ref Range Status   07/11/2017 4.30 3.20 - 4.80 g/dL Final     Globulin   Date Value Ref Range Status   07/11/2017 2.7 gm/dL Final     A/G Ratio   Date Value Ref Range Status   07/11/2017 1.6 1.5 - 2.5 g/dL Final          Perf Status: 1    Assessment/Plan    Diagnoses and all orders for this visit:    Malignant neoplasm of upper-inner quadrant of left female breast      Patient has no evidence of disease recurrence.    [place] status patient's performance status is severely affected by her apparent cardiac condition that has led her  to suffer from symptomatic orthostatic hypotension.    I'll plan on seeing her back in about 6 months.  I told her that I would dictate a separate letter supporting her T4 disability.  I cannot imagine in any way shape or form that this very nice woman can pursue any meaningful occupation      Juanpablo Bassett MD , 7/11/2017    CC:

## 2017-07-12 DIAGNOSIS — C50.912 MALIGNANT NEOPLASM OF LEFT FEMALE BREAST, UNSPECIFIED SITE OF BREAST: Primary | ICD-10-CM

## 2017-07-12 DIAGNOSIS — C50.212 MALIGNANT NEOPLASM OF UPPER-INNER QUADRANT OF LEFT FEMALE BREAST (HCC): ICD-10-CM

## 2017-07-13 ENCOUNTER — OFFICE VISIT (OUTPATIENT)
Dept: PSYCHIATRY | Facility: CLINIC | Age: 52
End: 2017-07-13

## 2017-07-13 DIAGNOSIS — F51.05 INSOMNIA DUE TO MENTAL CONDITION: ICD-10-CM

## 2017-07-13 DIAGNOSIS — F33.1 MAJOR DEPRESSIVE DISORDER, RECURRENT EPISODE, MODERATE (HCC): ICD-10-CM

## 2017-07-13 DIAGNOSIS — F41.9 ANXIETY DISORDER, UNSPECIFIED TYPE: Primary | ICD-10-CM

## 2017-07-13 DIAGNOSIS — G89.29 OTHER CHRONIC PAIN: ICD-10-CM

## 2017-07-13 PROCEDURE — 90834 PSYTX W PT 45 MINUTES: CPT | Performed by: SOCIAL WORKER

## 2017-07-17 NOTE — PROGRESS NOTES
"    PROGRESS NOTE  Data:  Zhane Burnham came in 7/13/17 for her regularly scheduled therapy session, with Rosemary Pacheco LCSW .  Pt. Reports depression is about the same     (Scales based on 0 - 10 with 10 being the worst)  Depression: 7.5 Anxiety: 7.5   Distress: 7 Sleep:    Tasks Completed on Time: 4 Mood: 4   Number of Panic Attacks: 0 Appetite: 7   [id number]  Patient started individual psychotherapy session by reporting \"I brought boomer today\" and \"I have to go to the house today and work\" and \"its been a month a half and I'm still getting stuff out\". patient discussed the new owners of her house has split her remaining belongings and states \"they told me I took to long\" and \"that they are not going to let me have my stuff out\". Patient discussed that they had agreed to  her belongings but have backed out. Patient discussed being upset and discouraged and hope she can make some phone calls today to see if she can work with them and states \"Im going to ask them to help me still\". Patient discussed she was supposed to being going on vacation to her friends parents anniversary party and stats \"my friend called me and told me that I couldn't go now because of the bed bugs\". Patient discussed that not going has increased her depression for a few weeks but has leveled out now. Patient discussed on July 28th she has her disability hearing and on August 3rd bankruptcy hearing. Patient discussed her knees hurt her and really unable to do what she has been doing at the house and states \"my pain has been bad\". Patient discussed gathering information for her bankruptcy.     Clinical Maneuvering/Intervention:   Assisted patient in processing above session content; acknowledged and normalized patient’s thoughts, feelings, and concerns. Assisted patient in processing her feelings of depression and anxiety and overwhelmed by trying to move her belonging out of previous house, still having bed bugs and not being able to " go on the trip with her friends due to bed bugs.  Assisted patient in processing thoughts and feelings increased pain due to medical conditions i.e. fibromyalgia and arthritis, i depression, the process of bankruptcy and disability hearing.  Assisted patient in processing her thoughts and feelings on having to move out of her home. Encouraged pt the importance of keeping all appointments and taking medications as prescribed and calling with any questions or concerns.  Assisted patient in understanding the importance of seeing the interdisciplinary team for continuity of care. Attempted to build rapport and trust with patient during psychotherapy session and reviewed safety plan for patient to call her friend, present to the ER or call 911 is SI or HI occurs. Patient denies SI and HI.  Patient is agreeable to her safety plan. Encouraged patient to contact the new owner of the house to see if there is anyway she can get the suff out of her home and if not call her . Explained to be careful because safety comes first and the house has been closed on so to abide by their request and handle things legally. Encouraged patient to work to get the bed bugs out of her current residence that she can feel comfortable in her new space and patient explained she will make that a priority once her previous house is finished. Encouraged patient to ask for help moving by calling churches and friends to get help since patient feels she lacks support during this time of moving and transitioning.     Allowed patient to freely discuss issues without interruption or judgment. Provided safe, confidential environment to facilitate the development of positive therapeutic relationship and encourage open, honest communication. Assisted patient in identifying risk factors which would indicate the need for higher level of care including thoughts to harm self or others and/or self-harming behavior and encouraged patient to contact this  office, call 911, or present to the nearest emergency room should any of these events occur. Discussed crisis intervention services and means to access.  Patient adamantly and convincingly denies current suicidal or homicidal ideation or perceptual disturbance.    Assessment     Diagnoses and all orders for this visit:    Anxiety disorder, unspecified type    Insomnia due to mental condition    Other chronic pain    Major depressive disorder, recurrent episode, moderate      Patient presents for session on time, clean and casually dressed with depressed/anxious mood and congruent affect. No evidence of intoxication, withdrawal, or perceptual disturbance. Association’s intact, abstraction intact. Thought process is linear and logical. Speech is clear and coherent. Patient is oriented to person, place, and time. Attention and concentration fair. Insight and judgment fair. Patient reports no current suicidal or homicidal ideation. Patient appears cooperative and agreeable to treatment and appears to begin to develop rapport. Patient does not appear to be malingering.          Mental Status Exam  Hygiene:  good  Dress:  casual  Attitude:  Cooperative  Motor Activity:  Appropriate  Speech:  Normal  Mood:  depressed and anxious  Affect:  depressed and anxious  Thought Processes:  Goal directed  Thought Content:  normal  Suicidal Thoughts:  denies  Homicidal Thoughts:  denies  Crisis Safety Plan: yes, to come to the emergency room.  Hallucinations:  denies    Patient's Support Network Includes:  extended family and friends    Plan     Patient will have at least monthly outpatient psychotherapy sessions and pharmacotherapy as scheduled. Patient to continue to be assessed by Shelly SCHNEIDER or Dr. Rodriguez for medication management.  Patient will adhere to medication regimen as prescribed and report any side effects. Patient will contact this office, call 911 or present to the nearest emergency room should suicidal or  homicidal ideations occur. Provide Cognitive Behavioral Therapy and Solution Focused Therapy to improve functioning, maintain stability, and avoid decompensation and the need for higher level of care.          Return in about 3 weeks (around 8/3/2017), or if symptoms worsen or fail to improve.

## 2017-07-26 RX ORDER — BUSPIRONE HYDROCHLORIDE 15 MG/1
15 TABLET ORAL 2 TIMES DAILY
Qty: 60 TABLET | Refills: 2 | Status: SHIPPED | OUTPATIENT
Start: 2017-07-26 | End: 2017-09-19 | Stop reason: SDUPTHER

## 2017-08-10 ENCOUNTER — OFFICE VISIT (OUTPATIENT)
Dept: PSYCHIATRY | Facility: CLINIC | Age: 52
End: 2017-08-10

## 2017-08-10 DIAGNOSIS — F51.05 INSOMNIA DUE TO MENTAL CONDITION: ICD-10-CM

## 2017-08-10 DIAGNOSIS — F41.9 ANXIETY DISORDER, UNSPECIFIED TYPE: Primary | ICD-10-CM

## 2017-08-10 DIAGNOSIS — G89.29 OTHER CHRONIC PAIN: ICD-10-CM

## 2017-08-10 DIAGNOSIS — F33.2 SEVERE EPISODE OF RECURRENT MAJOR DEPRESSIVE DISORDER, WITHOUT PSYCHOTIC FEATURES (HCC): ICD-10-CM

## 2017-08-10 PROCEDURE — 90834 PSYTX W PT 45 MINUTES: CPT | Performed by: SOCIAL WORKER

## 2017-08-10 NOTE — PROGRESS NOTES
"    PROGRESS NOTE  Data:  Zhane Burnham came in 8/10/17 for her regularly scheduled therapy session, with Rosemary Pacheco LCSW .  Pt. Reports depression has worsened   (Scales based on 0 - 10 with 10 being the worst)  Depression: 8 Anxiety: 8   Distress: 9 Sleep: Interrupted    Tasks Completed on Time: Poor due to depressed mood and pain  Mood: Sad and overwhelmed    Number of Panic Attacks: 0 Appetite: Good    [id number]  Patient started individual psychotherapy session by reporting \"I had the stomach bug last week\", not feeling good today\", \"my  has been backed up at the house and taking me 2 days to clean after to plumbers came to fix it\".  Patient discussed she missed her appointment with Heidy SCHNEIDER and was tearful about missing that appointment on this past Monday.  Patient discussed she was discharged by her pain management doctor by stating \"my pain management doctor dismissed me because I missed an appointment\".  Patient discussed she had her disability hearing and states \"the ruled in my favor but it has to go someone above so not for sure\".   patient discussed she was upset at her  and states \"my  hadn't even cracked my file and everyone could tell\"and  \"he said that I was tearful a lot but that's all he could say\".  Patient discussed the  had done his homework and reviewing of the case.  Patient discussed still need to file bankruptcy and states the \"first I need to get the money to file bankruptcy and to get the paperwork together\" .  Patient continues to discuss her estranged relationship with her brother whom lives in her mother's house stating \"I hired 3 different  and still nothing was done\"and \"we were supposed to split the house\".  patient asked if this therapist to contact her pain management physician that she is being referred to about getting a sooner appointment and this therapist explained that she should contact her primary care physician about her " "concern relating pain management.  Patient discussed she is using her walker more often due to pain and states \"I'm having to stretch my pain medication out so I'm hurting really bad\".  patient continues to grieve her mother and discussed her celebration of life/.  Patient says she can't get rid of the bed bugs in her home and her landlord paid for 3 treatments and will not pay for any more.  Patient states \"I don't have the money to do so about something at the store and it worked for a little while\".  Patient states \"the babies are still biting me\".     Clinical Maneuvering/Intervention:   Assisted patient in processing above session content; acknowledged and normalized patient’s thoughts, feelings, and concerns. Assisted patient in processing her feelings of depression and anxiety losing her pain management doctor by discharge, awaiting 6 weeks to be seen by her new pain management doctor and having to stretch out her medication and admitted pain management currently.assisted patient in processing her thoughts and feelings about still having bed bugs in her apartment/home.  Assisted patient in processing thoughts and feelings increased pain due to medical conditions i.e. fibromyalgia and arthritis,. Assisted patient in processing her thoughts and feelings of being able to get her final belongings out of the house since last session.   Encouraged pt the importance of keeping all appointments and taking medications as prescribed and calling with any questions or concerns.  Assisted patient in understanding the importance of seeing the interdisciplinary team for continuity of care. Attempted to build rapport and trust with patient during psychotherapy session and reviewed safety plan for patient to call her friend, present to the ER or call 911 is SI or HI occurs. Patient denies SI and HI.  Patient is agreeable to her safety plan. Encouraged patient to work to get the bed bugs out of her current residence " that she can feel comfortable in her new space.  educated on the importance of self-care and making attainable goals due to continued lack stressors, lack of progress with adequate self-care, bankruptcy, and packing and settling in her home and working to get rid of the bed bugs.  Patient made a goal with the help of this therapist to get outside every day even if it sitting on the porch or planting a flower to have time outside of her apartment/home.  Patient made a goal of starting to gather documentation for bankruptcy a few days a week to prepare for filing bankruptcy once she has enough money to apply.  Encouraged patient to spend one hour 2-3 days a week and packing to prevent increased pain and decompensation.  Encouraged patient to contact her landlord about the bedbugs and if he denies intent contact [facility] to see if there are resources to assist with bedbugs treatment.  Educated on the importance of not sleeping all day and only napping for short period time with an alarm clock set to prevent excessive sleeping.  Educated patient on putting all her appointments in her phone with an alarm to remember appointments as she has missed 2 appointments with this office.      Allowed patient to freely discuss issues without interruption or judgment. Provided safe, confidential environment to facilitate the development of positive therapeutic relationship and encourage open, honest communication. Assisted patient in identifying risk factors which would indicate the need for higher level of care including thoughts to harm self or others and/or self-harming behavior and encouraged patient to contact this office, call 911, or present to the nearest emergency room should any of these events occur. Discussed crisis intervention services and means to access.  Patient adamantly and convincingly denies current suicidal or homicidal ideation or perceptual disturbance.    Assessment     Diagnoses and all orders for this  visit:    Anxiety disorder, unspecified type    Insomnia due to mental condition    Other chronic pain    Severe episode of recurrent major depressive disorder, without psychotic features      Patient presents for session on time, clean and casually dressed with depressed/anxious mood and congruent affect. No evidence of intoxication, withdrawal, or perceptual disturbance. Association’s intact, abstraction intact. Thought process is linear and logical. Speech is clear and coherent. Patient is oriented to person, place, and time. Attention and concentration fair. Insight and judgment fair. Patient reports no current suicidal or homicidal ideation. Patient appears cooperative and agreeable to treatment and appears to begin to develop rapport. Patient does not appear to be malingering.          Mental Status Exam  Hygiene:  good  Dress:  casual  Attitude:  Cooperative  Motor Activity:  Appropriate  Speech:  Normal  Mood:  depressed and anxious  Affect:  depressed and anxious  Thought Processes:  Goal directed  Thought Content:  normal  Suicidal Thoughts:  denies  Homicidal Thoughts:  denies  Crisis Safety Plan: yes, to come to the emergency room.  Hallucinations:  denies    Patient's Support Network Includes:  extended family and friends    Plan     Patient will have at least monthly outpatient psychotherapy sessions and pharmacotherapy as scheduled. Patient to continue to be assessed by Shelly SCHNEIDER or Dr. Rodriguez for medication management.  Patient will adhere to medication regimen as prescribed and report any side effects. Patient will contact this office, call 911 or present to the nearest emergency room should suicidal or homicidal ideations occur. Provide Cognitive Behavioral Therapy and Solution Focused Therapy to improve functioning, maintain stability, and avoid decompensation and the need for higher level of care.          No Follow-up on file.

## 2017-09-12 ENCOUNTER — OFFICE VISIT (OUTPATIENT)
Dept: PSYCHIATRY | Facility: CLINIC | Age: 52
End: 2017-09-12

## 2017-09-12 VITALS — WEIGHT: 235 LBS | BODY MASS INDEX: 37.77 KG/M2 | HEIGHT: 66 IN

## 2017-09-12 DIAGNOSIS — G89.29 OTHER CHRONIC PAIN: ICD-10-CM

## 2017-09-12 DIAGNOSIS — F51.05 INSOMNIA DUE TO MENTAL CONDITION: ICD-10-CM

## 2017-09-12 DIAGNOSIS — F41.9 ANXIETY DISORDER, UNSPECIFIED TYPE: ICD-10-CM

## 2017-09-12 DIAGNOSIS — F33.1 MAJOR DEPRESSIVE DISORDER, RECURRENT EPISODE, MODERATE (HCC): Primary | ICD-10-CM

## 2017-09-12 PROCEDURE — 99213 OFFICE O/P EST LOW 20 MIN: CPT | Performed by: NURSE PRACTITIONER

## 2017-09-12 RX ORDER — BUPROPION HYDROCHLORIDE 300 MG/1
300 TABLET ORAL EVERY MORNING
Qty: 30 TABLET | Refills: 2 | Status: SHIPPED | OUTPATIENT
Start: 2017-09-12 | End: 2017-12-13 | Stop reason: SDUPTHER

## 2017-09-12 NOTE — PROGRESS NOTES
"      Subjective   Zhane Burnham is a 52 y.o. female who is here today for medication management follow up.    Chief Complaint: Diagnoses and all orders for this visit:    Major depressive disorder, recurrent episode, moderate    Anxiety disorder, unspecified type    Insomnia due to mental condition    Other chronic pain      History of Present Illness Patient reports she is doing some better. Tolerating medications. She is working on losing weight and working with friends who need some social support. Patient is also working on settling into her relocation of residence in [place] from [place]. She reports still having low motivation, some improvement but still having to really talk herself into action. She is sleeping and appetite is good. Denies SI/HI or AVH. She goes to therapy working on goals. Scores depression 5-6. Anxiety a 3-4.     (Scales based on 0 - 10 with 10 being the worst)        The following portions of the patient's history were reviewed and updated as appropriate: allergies, current medications, past family history, past medical history, past social history, past surgical history and problem list.    Review of Systemsdenies fever, cough, s/s’s of infection, denies GI/ problems, denies new medical issues     Objective   Physical Exam  Height 66\" (167.6 cm), weight 235 lb (107 kg).    Allergies   Allergen Reactions   • Celebrex [Celecoxib] Swelling   • Red Dye    • Shellfish-Derived Products        Current Medications:   Current Outpatient Prescriptions   Medication Sig Dispense Refill   • azelastine (ASTELIN) 0.1 % nasal spray BID PRN     • BIOTIN PO Take  by mouth 2 (Two) Times a Day. 10,000mcg QD      • buPROPion XL (WELLBUTRIN XL) 300 MG 24 hr tablet Take 1 tablet by mouth Every Morning. 30 tablet 2   • busPIRone (BUSPAR) 15 MG tablet Take 1 tablet by mouth 2 (Two) Times a Day. 60 tablet 2   • Cholecalciferol (VITAMIN D3) 2000 UNITS capsule 3,000 Units Daily.  0   • cyclobenzaprine (FLEXERIL) " 10 MG tablet As Needed.     • diphenhydrAMINE (BENADRYL) 25 mg capsule Take 25 mg by mouth Every Night.     • DULoxetine (CYMBALTA) 60 MG capsule 1 BID     • fentaNYL (DURAGESIC) 50 MCG/HR patch 1 patch q 72 hours     • fludrocortisone 0.1 MG tablet Take 1/2 tablet daily. (Patient taking differently: Every Other Day. Take 1/2 tablet daily. ) 15 tablet 11   • fluticasone (FLONASE) 50 MCG/ACT nasal spray As Needed.  3   • gabapentin (NEURONTIN) 300 MG capsule 1 TID     • guaiFENesin (MUCUS RELIEF) 400 MG tablet Take 400 mg by mouth Every 4 (Four) Hours.     • hydrochlorothiazide (HYDRODIURIL) 25 MG tablet Take 25 mg by mouth Daily.     • lidocaine (XYLOCAINE) 5 % ointment APPLY TO THE AFFECTED AREA(S) UP TO TWO GRAMS PRN  0   • loratadine (CLARITIN) 10 MG tablet 1 QD     • meloxicam (MOBIC) 15 MG tablet 1 QD     • Omeprazole 20 MG tablet delayed-release 2 (Two) Times a Day.  0   • oxyCODONE-acetaminophen (PERCOCET)  MG per tablet As Needed. 1 QD     • predniSONE (DELTASONE) 10 MG tablet Take 10 mg by mouth As Needed. Use as directed      • simvastatin (ZOCOR) 40 MG tablet 1 QHS     • traZODone (DESYREL) 100 MG tablet Take 1 tablet by mouth Every Night. 30 tablet 3     No current facility-administered medications for this visit.        Mental Status Exam:   Appearance: appropriate  Hygiene:   good  Cooperation:  Cooperative  Eye Contact:  Good  Psychomotor Behavior:  Appropriate  Mood:  within normal limits  Affect:  Appropriate  Hopelessness: Denies  Speech:  Normal  Thought Process:  Linear  Thought Content:  Normal  Suicidal:  None  Homicidal:  None  Hallucinations:  None  Delusion:  None  Memory:  Intact  Orientation:  Person, Place, Time and Situation  Reliability:  fair  Insight:  Fair  Judgement:  Fair  Impulse Control:  Fair  Estimated Intelligence: average range       Assessment/Plan   Diagnoses and all orders for this visit:    Major depressive disorder, recurrent episode, moderate    Anxiety disorder,  unspecified type    Insomnia due to mental condition    Other chronic pain    Other orders  -     buPROPion XL (WELLBUTRIN XL) 300 MG 24 hr tablet; Take 1 tablet by mouth Every Morning.    Improvement in mood however   Increase Wellbutrin XL 300mg PO one QAM for residual depression and it will be easier taking once a day instead of twice  Cont therapy  Cont buspar and cymbalta she gets through her PCP    We discussed risks, benefits, and side effects of the above medications and the patient was agreeable with the plan. Patient was educated on the importance of compliance with treatment and follow-up appointments.     Instructed to call for questions or concerns and return early if necessary. Crisis plan reviewed including going to the Emergency department.    Return in about 3 months (around 12/12/2017).

## 2017-09-14 ENCOUNTER — OFFICE VISIT (OUTPATIENT)
Dept: PSYCHIATRY | Facility: CLINIC | Age: 52
End: 2017-09-14

## 2017-09-14 DIAGNOSIS — G89.29 OTHER CHRONIC PAIN: ICD-10-CM

## 2017-09-14 DIAGNOSIS — F51.05 INSOMNIA DUE TO MENTAL CONDITION: ICD-10-CM

## 2017-09-14 DIAGNOSIS — F41.9 ANXIETY DISORDER, UNSPECIFIED TYPE: ICD-10-CM

## 2017-09-14 DIAGNOSIS — F33.1 MAJOR DEPRESSIVE DISORDER, RECURRENT EPISODE, MODERATE (HCC): Primary | ICD-10-CM

## 2017-09-14 PROCEDURE — 90832 PSYTX W PT 30 MINUTES: CPT | Performed by: SOCIAL WORKER

## 2017-09-14 NOTE — PROGRESS NOTES
"    PROGRESS NOTE  Data:[id number]  Zhane Burnham came in 9/12/17 for her regularly scheduled therapy session, with Rosemary Pacheco LCSW .  Pt. Reports depression is unchanged.   (Scales based on 0 - 10 with 10 being the worst)  Depression: 8 Anxiety: 8   Distress: 8 Sleep: Interrupted    Tasks Completed on Time: Poor due to depressed mood and pain  Mood: Sad and overwhelmed    Number of Panic Attacks: 0 Appetite: Good      Patient started individual psychotherapy session by reporting \"sorry I am late\".  Patient discussed she was late due to having a pain management appointment in [facility].  Patient discussed she has found a new pain management doctor and states \"he's name is Dr. Tellez\".  patient discussed she has been going to her friend Monse and her 's house a few days a week to get out of the house.  Patient engaged in feeling overwhelmed and unchanged depression.  Patient feels hopeful about having a new pain management doctor and finding relief with new pain control and states \"he is talking to me about of pain pump\".  patient discussed she has to see a pain psychologist Dr. Roberts.  Patient discussed she is still not unpacked removing in February stating \"I'm living out of baskets and boxes\".  Patient discussed she took Pat to a doctor's appointment yesterday in [facility].  Patient discussed she went to talk to [address] [employer] about the mold in her house and house affecting her health.  Patient states \"my friends tell me not to say too much because he may kick me out\".  Patient referring to her landlord.  Patient discussed her traumatic childhood and her mother threatening to commit suicide and states \"my mother would get a  knife or gun and threatened to commit suicide\".  Patient discussed having lupus and fibromyalgia and being a breast cancer survivor and how it is related to her depression and anxiety.  Patient discussed having no energy or motivation.  Patient denies SI or HI.  Patient " denies self-harm.      Clinical Maneuvering/Intervention:   Assisted patient in processing above session content; acknowledged and normalized patient’s thoughts, feelings, and concerns. Assisted patient in processing her feelings of depression and anxiety and pain related to health condition. A assisted patient in processing her thoughts and feelings of getting a new pain management doctor in [place] and then discussing a pain, during their initial visit today.Assisted patient in processing thoughts and feelings increased pain due to medical conditions i.e. fibromyalgia and arthritis and weather.  Encouraged pt the importance of keeping all appointments and taking medications as prescribed and calling with any questions or concerns.  Assisted patient in understanding the importance of seeing the interdisciplinary team for continuity of care. Attempted to build rapport and trust with patient during psychotherapy session and reviewed safety plan for patient to call her friend, present to the ER or call 911 is SI or HI occurs. Patient denies SI and HI.  Patient is agreeable to her safety plan. educated on the importance of self-care and making attainable goals due to continued lack stressors, lack of progress with adequate self-care, bankruptcy, and packing and settling in her home.  Patient made a goal with the help of this therapist to get outside every day even if it sitting on the porch and to continue this goal until weather turns cold.  Patient made a goal of starting to gather documentation for bankruptcy a few days a week to prepare for filing bankruptcy once she has enough money to apply and she hasn't started to do this but encouraged her to work towards this goal.  Made one new goal and patient is to spend 30 minutes a day that is timed unpacking a day until next session. Patient is agreeable.     Allowed patient to freely discuss issues without interruption or judgment. Provided safe, confidential  environment to facilitate the development of positive therapeutic relationship and encourage open, honest communication. Assisted patient in identifying risk factors which would indicate the need for higher level of care including thoughts to harm self or others and/or self-harming behavior and encouraged patient to contact this office, call 911, or present to the nearest emergency room should any of these events occur. Discussed crisis intervention services and means to access.  Patient adamantly and convincingly denies current suicidal or homicidal ideation or perceptual disturbance.    Assessment     Diagnoses and all orders for this visit:    Major depressive disorder, recurrent episode, moderate    Anxiety disorder, unspecified type    Insomnia due to mental condition    Other chronic pain      Patient presents for session on time, clean and casually dressed with depressed/anxious mood and congruent affect. No evidence of intoxication, withdrawal, or perceptual disturbance. Association’s intact, abstraction intact. Thought process is linear and logical. Speech is clear and coherent. Patient is oriented to person, place, and time. Attention and concentration fair. Insight and judgment fair. Patient reports no current suicidal or homicidal ideation. Patient appears cooperative and agreeable to treatment and appears to begin to develop rapport. Patient does not appear to be malingering.          Mental Status Exam  Hygiene:  good  Dress:  casual  Attitude:  Cooperative  Motor Activity:  Appropriate  Speech:  Normal  Mood:  depressed and anxious  Affect:  depressed and anxious  Thought Processes:  Goal directed  Thought Content:  normal  Suicidal Thoughts:  denies  Homicidal Thoughts:  denies  Crisis Safety Plan: yes, to come to the emergency room.  Hallucinations:  denies    Patient's Support Network Includes:  extended family and friends    Plan     Patient will have at least monthly outpatient psychotherapy  sessions and pharmacotherapy as scheduled. Patient to continue to be assessed by Shelly SCHNEIDER or Dr. Rodriguez for medication management.  Patient will adhere to medication regimen as prescribed and report any side effects. Patient will contact this office, call 911 or present to the nearest emergency room should suicidal or homicidal ideations occur. Provide Cognitive Behavioral Therapy and Solution Focused Therapy to improve functioning, maintain stability, and avoid decompensation and the need for higher level of care.          Return in about 3 weeks (around 10/5/2017), or if symptoms worsen or fail to improve.

## 2017-09-20 RX ORDER — BUSPIRONE HYDROCHLORIDE 15 MG/1
TABLET ORAL
Qty: 60 TABLET | Refills: 0 | Status: SHIPPED | OUTPATIENT
Start: 2017-09-20 | End: 2017-12-13 | Stop reason: SDUPTHER

## 2017-09-26 RX ORDER — BUPROPION HYDROCHLORIDE 100 MG/1
TABLET, EXTENDED RELEASE ORAL
Qty: 60 TABLET | Refills: 2 | OUTPATIENT
Start: 2017-09-26

## 2017-11-14 ENCOUNTER — OFFICE VISIT (OUTPATIENT)
Dept: PSYCHIATRY | Facility: CLINIC | Age: 52
End: 2017-11-14

## 2017-11-14 DIAGNOSIS — F51.05 INSOMNIA DUE TO MENTAL CONDITION: ICD-10-CM

## 2017-11-14 DIAGNOSIS — F33.1 MAJOR DEPRESSIVE DISORDER, RECURRENT EPISODE, MODERATE (HCC): Primary | ICD-10-CM

## 2017-11-14 DIAGNOSIS — G89.29 OTHER CHRONIC PAIN: ICD-10-CM

## 2017-11-14 DIAGNOSIS — F41.9 ANXIETY DISORDER, UNSPECIFIED TYPE: ICD-10-CM

## 2017-11-14 PROCEDURE — 90834 PSYTX W PT 45 MINUTES: CPT | Performed by: SOCIAL WORKER

## 2017-11-14 NOTE — PROGRESS NOTES
"Date of Service: November 14, 2017  Time In: 1120  Time Out: 1200      PROGRESS NOTE  Data:  Zhane Burnham is a 52 y.o. female who met with the undersigned for a regularly scheduled individual outpatient psychotherapy session at  [facility]. Patient starts psychotherapy session by reporting \"I had friends in from [place]\". Patient states \"Im hanging in there\". Patient discussed she went to [facility] on her way here to this appointment today.  Patient discussed today the pretty good day.  Patient discussed her dog has Cushing's disease and having to take it to the vet numerous times since finding that out.  Patient discussed she is spending time with her surrogate parents or that is what she refers to them as 3 mornings a week for 3-4 hours hoping them do things around the house and cooking for them to give her a little extra money and to get her out of the house.  Patient discussed she is still trying to read for enjoyment.  Patient discussed she has been exhausted and having fatigue.  Patient discussed she didn't get her social security disability but has to wait for 2 years before she gets Medicare so she is paying out of pocket for her health insurance.  Patient discussed that/she had to miss some appointments.  Patient discussed she has a $40 co-pay and needs to decrease her visits office due to budget.  Patient discussed her chronic pain and fibromyalgia.  Patient discussed that her pain doctor is wanting her to have a pain pump.  Patient discussed that causes fear and anxiety of having shots and having a procedure.     HPI: Depression, Anxiety, Medical conditions      Clinical Maneuvering/Intervention:  Assisted patient in processing above session content; acknowledged and normalized patient’s thoughts, feelings, and concerns. assisted patient in processing her feelings of depression and anxiety and pain related to health condition. assisted patient in processing her " "thoughts and feelings of weighing out the pros and cons of the pain pump that her pain mgmt doctor is recommending. Validated patients thoughts and feelings related to fears and anxiety of shots and pain that will come from the shots. Assisted patient in processing thoughts and feelings increased pain due to medical conditions i.e. fibromyalgia and arthritis.  Encouraged pt the importance of keeping all appointments and taking medications as prescribed and calling with any questions or concerns.  Assisted patient in understanding the importance of seeing the interdisciplinary team for continuity of care. Attempted to build rapport and trust with patient during psychotherapy session and reviewed safety plan for patient to call her friend, present to the ER or call 911 is SI or HI occurs. Patient denies SI and HI.  Patient is agreeable to her safety plan. educated on the importance of self-care and making attainable goals due to continued lack stressors, lack of progress with adequate self-care, bankruptcy, and packing and settling in her home.  Patient to continue to see her \"surrogate parents\" the 3 days a week, educated on types of relaxation skills and will practice in futures session to prepare for the pain pump to decrease stress and anxiety and hope decrease pain ratings. Patient to continue to unload 2 boxes 2-3 times a week. Educated on a holistic lifestyle and encouraged patient to ask PCP if there are certain foods to stay away from that cause inflammation or ask if she would benefit from a dietitian consult to find out what foods will help decrease inflammation and pain in her body.     Allowed patient to freely discuss issues without interruption or judgment. Provided safe, confidential environment to facilitate the development of positive therapeutic relationship and encourage open, honest communication. Assisted patient in identifying risk factors which would indicate the need for higher level of care " including thoughts to harm self or others and/or self-harming behavior and encouraged patient to contact this office, call 911, or present to the nearest emergency room should any of these events occur. Discussed crisis intervention services and means to access.  Patient adamantly and convincingly denies current suicidal or homicidal ideation or perceptual disturbance.    Assessment     Diagnoses and all orders for this visit:    Major depressive disorder, recurrent episode, moderate    Anxiety disorder, unspecified type    Insomnia due to mental condition    Other chronic pain               Mental Status Exam  Hygiene:  good  Dress:  casual  Attitude:  Cooperative  Motor Activity:  Appropriate  Speech:  Normal  Mood:  depressed  Affect:  depressed  Thought Processes:  Goal directed  Thought Content:  normal  Suicidal Thoughts:  denies  Homicidal Thoughts:  denies  Crisis Safety Plan: yes, to come to the emergency room.  Hallucinations:  denies    Patient's Support Network Includes:  extended family and friends    Progress toward goal: Not at goal    Functional Status: Moderate impairment     Prognosis: Fair with Ongoing Treatment     Plan         Patient will adhere to medication regimen as prescribed and report any side effects. Patient will contact this office, call 911 or present to the nearest emergency room should suicidal or homicidal ideations occur. Provide Cognitive Behavioral Therapy and Integrative Therapy to improve functioning, maintain stability, and avoid decompensation and the need for higher level of care.          Return in about 1 week (around 11/21/2017), or if symptoms worsen or fail to improve, for up to 6 weeks per pt due to increased copay and incurance .      This document signed by Rosemary Pacheco LCSW, November 14, 2017 12:29 PM

## 2017-12-13 ENCOUNTER — OFFICE VISIT (OUTPATIENT)
Dept: PSYCHIATRY | Facility: CLINIC | Age: 52
End: 2017-12-13

## 2017-12-13 VITALS — BODY MASS INDEX: 40.34 KG/M2 | WEIGHT: 251 LBS | HEIGHT: 66 IN

## 2017-12-13 DIAGNOSIS — G89.29 OTHER CHRONIC PAIN: ICD-10-CM

## 2017-12-13 DIAGNOSIS — F33.1 MAJOR DEPRESSIVE DISORDER, RECURRENT EPISODE, MODERATE (HCC): Primary | ICD-10-CM

## 2017-12-13 DIAGNOSIS — F51.05 INSOMNIA DUE TO MENTAL CONDITION: ICD-10-CM

## 2017-12-13 DIAGNOSIS — F41.9 ANXIETY DISORDER, UNSPECIFIED TYPE: ICD-10-CM

## 2017-12-13 PROCEDURE — 99214 OFFICE O/P EST MOD 30 MIN: CPT | Performed by: NURSE PRACTITIONER

## 2017-12-13 RX ORDER — DULOXETIN HYDROCHLORIDE 60 MG/1
60 CAPSULE, DELAYED RELEASE ORAL 2 TIMES DAILY
Qty: 60 CAPSULE | Refills: 2 | Status: SHIPPED | OUTPATIENT
Start: 2017-12-13 | End: 2018-02-15 | Stop reason: SDUPTHER

## 2017-12-13 RX ORDER — BUPROPION HYDROCHLORIDE 300 MG/1
300 TABLET ORAL EVERY MORNING
Qty: 30 TABLET | Refills: 2 | Status: SHIPPED | OUTPATIENT
Start: 2017-12-13 | End: 2018-02-15 | Stop reason: SDUPTHER

## 2017-12-13 RX ORDER — BUSPIRONE HYDROCHLORIDE 15 MG/1
TABLET ORAL
Qty: 90 TABLET | Refills: 2 | Status: SHIPPED | OUTPATIENT
Start: 2017-12-13 | End: 2018-02-15 | Stop reason: SDUPTHER

## 2017-12-13 RX ORDER — ARIPIPRAZOLE 2 MG/1
2 TABLET ORAL DAILY
Qty: 30 TABLET | Refills: 1 | Status: SHIPPED | OUTPATIENT
Start: 2017-12-13 | End: 2018-01-09 | Stop reason: SDUPTHER

## 2017-12-13 RX ORDER — TRAZODONE HYDROCHLORIDE 100 MG/1
100 TABLET ORAL NIGHTLY
Qty: 30 TABLET | Refills: 2 | Status: SHIPPED | OUTPATIENT
Start: 2017-12-13 | End: 2018-02-15 | Stop reason: SDUPTHER

## 2017-12-13 NOTE — PROGRESS NOTES
"      Subjective   Zhane Burnham is a 52 y.o. female who is here today for medication management follow up.    Chief Complaint: Diagnoses and all orders for this visit:    Major depressive disorder, recurrent episode, moderate    Anxiety disorder, unspecified type    Insomnia due to mental condition    Other chronic pain      History of Present Illness Patient reports by herself for f/u. She reports she has continued depression with poor energy, poor motivation and interest. She doesn't like where she lives and has mold wanting to move. She tries to socialize and once she forces herself to do it she is rewarded with good time but takes her \" a lot of effort to get myself to go\". She declines offers and will stay home alone for days. She does try to help a friend \"[place] Pat\" who is like a mom to her and Pat has some paralysis so she tries to help her. Patient will sleep too much some days and then feels cont tired. Depression scored a 7 without suicidal ideation. She feels anxious about her health and chronic pain. She got disability but then lost her medicaid because of it so has to pay for insurance and it doesn't want to pay for her Cymbalta but her pharm has worked something out for now. She adamantly denies AVH, SI/or HI. Denies illicit drugs. Denies adverse reaction from medications, and states she is compliant with taking them. Patient scores anxiety 5. She denies panic. She sleeps about 6-8 hours not always at one time during night, denies nightmares.     (Scales based on 0 - 10 with 10 being the worst)        The following portions of the patient's history were reviewed and updated as appropriate: allergies, current medications, past family history, past medical history, past social history, past surgical history and problem list.    Review of Systemsdenies fever, cough, s/s’s of infection, denies GI/ problems, denies new medical issues     Objective   Physical Exam  Height 167.6 cm (66\"), weight 114 kg " (251 lb).    Allergies   Allergen Reactions   • Celebrex [Celecoxib] Swelling   • Red Dye    • Shellfish-Derived Products        Current Medications:   Current Outpatient Prescriptions   Medication Sig Dispense Refill   • ARIPiprazole (ABILIFY) 2 MG tablet Take 1 tablet by mouth Daily. 30 tablet 1   • azelastine (ASTELIN) 0.1 % nasal spray BID PRN     • BIOTIN PO Take  by mouth 2 (Two) Times a Day. 10,000mcg QD      • buPROPion XL (WELLBUTRIN XL) 300 MG 24 hr tablet Take 1 tablet by mouth Every Morning. 30 tablet 2   • busPIRone (BUSPAR) 15 MG tablet Take one tablet three times a day 90 tablet 2   • Cholecalciferol (VITAMIN D3) 2000 UNITS capsule 3,000 Units Daily.  0   • cyclobenzaprine (FLEXERIL) 10 MG tablet As Needed.     • diphenhydrAMINE (BENADRYL) 25 mg capsule Take 25 mg by mouth Every Night.     • DULoxetine (CYMBALTA) 60 MG capsule Take 1 capsule by mouth 2 (Two) Times a Day. 60 capsule 2   • fentaNYL (DURAGESIC) 50 MCG/HR patch 1 patch q 72 hours     • fludrocortisone 0.1 MG tablet Take 1/2 tablet daily. (Patient taking differently: Every Other Day. Take 1/2 tablet daily. ) 15 tablet 11   • fluticasone (FLONASE) 50 MCG/ACT nasal spray As Needed.  3   • gabapentin (NEURONTIN) 300 MG capsule 1 TID     • guaiFENesin (MUCUS RELIEF) 400 MG tablet Take 400 mg by mouth Every 4 (Four) Hours.     • hydrochlorothiazide (HYDRODIURIL) 25 MG tablet Take 25 mg by mouth Daily.     • lidocaine (XYLOCAINE) 5 % ointment APPLY TO THE AFFECTED AREA(S) UP TO TWO GRAMS PRN  0   • loratadine (CLARITIN) 10 MG tablet 1 QD     • meloxicam (MOBIC) 15 MG tablet 1 QD     • Omeprazole 20 MG tablet delayed-release 2 (Two) Times a Day.  0   • oxyCODONE-acetaminophen (PERCOCET)  MG per tablet As Needed. 1 QD     • predniSONE (DELTASONE) 10 MG tablet Take 10 mg by mouth As Needed. Use as directed      • simvastatin (ZOCOR) 40 MG tablet 1 QHS     • traZODone (DESYREL) 100 MG tablet Take 1 tablet by mouth Every Night. 30 tablet 2      No current facility-administered medications for this visit.    reviewed most recent labs in [facility]    Ref Range & Units 5mo ago     Glucose 70 - 100 mg/dL 68 (L)   BUN 9 - 23 mg/dL 18   Creatinine 0.60 - 1.30 mg/dL 1.00   Sodium 132 - 146 mmol/L 142   Potassium 3.5 - 5.5 mmol/L 3.8   Chloride 99 - 109 mmol/L 103   CO2 20.0 - 31.0 mmol/L 31.0   Calcium 8.7 - 10.4 mg/dL 10.0   Total Protein 5.7 - 8.2 g/dL 7.0   Albumin 3.20 - 4.80 g/dL 4.30   ALT (SGPT) 7 - 40 U/L 19   AST (SGOT) 0 - 33 U/L 27   Alkaline Phosphatase 25 - 100 U/L 79   Total Bilirubin 0.3 - 1.2 mg/dL 0.4   eGFR Non African Amer >60 mL/min/1.73 59 (L)   Globulin gm/dL 2.7   A/G Ratio 1.5 - 2.5 g/dL 1.6   BUN/Creatinine Ratio 7.0 - 25.0 18.0   Anion Gap 3.0 - 11.0 mmol/L 8.0   Resulting Agency  [facility]   Narrative   National Kid[employer] Guidelines    Stage     Description        GFR  1         Normal or High     90+  2         Mild decrease      60-89  3         Moderate decrease  30-59  4         Severe decrease    15-29  5         Kidney failure     <15      Specimen Collected: 07/11/17 10:27 AM   Last Resulted: 07/11/17 10:59 AM         WBC 3.50 - 10.80 10*3/mm3 6.00 5.90R 4.50R 4.28R 5.60R 5.35R 5.10R    RBC 3.89 - 5.14 10*6/mm3 4.32 4.60R 4.33R 4.73R 4.72R 4.68R 4.85R    Hemoglobin 11.5 - 15.5 g/dL 11.5 12.6 12.4 13.2 13.6 13.0 13.2    Hematocrit 34.5 - 44.0 % 35.4 38.3 37.8 41.3 40.4 39.9 40.0    RDW 11.3 - 14.5 % 14.3 14.0 14.1  15.0 (H)  15.7 (H)    MCV 80.0 - 99.0 fL 82.1 83.2 87.3 87.3 85.5 85.3 82.6    MCH 27.0 - 31.0 pg 26.6 (L) 27.3 28.7 27.9 28.7 27.8 27.3    MCHC 32.0 - 36.0 g/dL 32.4 32.9 32.9 32.0 33.6 32.6 33.0    MPV 6.0 - 12.0 fL 7.4 7.0R 7.4R  7.5R  6.6R    Platelets 150 - 450 10*3/mm3 293 315R 270R 282R 313R 310R 291R    Neutrophil % 41.0 - 71.0 % 73.3 (H) 64.3 67.0  62.0  75.4 (H)    Lymphocyte % 24.0 - 44.0 % 23.2 (L) 28.7 28.4  33.1  21.3 (L)    Monocyte % 0.0 - 12.0 % 3.5 7.0 4.6  4.9  3.3    Neutrophils, Absolute  1.50 - 8.30 10*3/mm3 4.40 3.80R 3.00R  3.50R  3.80R    Lymphocytes, Absolute 0.60 - 4.80 10*3/mm3 1.40 1.70R 1.30R  1.90R  1.10R    Monocytes, Absolute 0.00 - 1.00 10*3/mm3 0.20 0.40R 0.20R  0.30R  0.20R   Resulting Agency  [facility]  [facility]  [facility]  [facility]  [facility]      Specimen Collected: 07/11/17 10:27 AM Last Resulted: 07/11/17 10:33 AM              R=Reference range differs from displayed range            Mental Status Exam:   Appearance: appropriate  Hygiene:   good  Cooperation:  Cooperative  Eye Contact:  Good  Psychomotor Behavior:  Appropriate  Mood:  within normal limits  Affect:  Appropriate  Hopelessness: Denies  Speech:  Normal  Thought Process:  Linear  Thought Content:  Normal  Suicidal:  None  Homicidal:  None  Hallucinations:  None  Delusion:  None  Memory:  Intact  Orientation:  Person, Place, Time and Situation  Reliability:  fair  Insight:  Fair  Judgement:  Fair  Impulse Control:  Fair  Estimated Intelligence: average range   Physical/Medical Issues:  No     Assessment/Plan   Diagnoses and all orders for this visit:    Major depressive disorder, recurrent episode, moderate    Anxiety disorder, unspecified type    Insomnia due to mental condition    Other chronic pain    Other orders  -     busPIRone (BUSPAR) 15 MG tablet; Take one tablet three times a day  -     DULoxetine (CYMBALTA) 60 MG capsule; Take 1 capsule by mouth 2 (Two) Times a Day.  -     traZODone (DESYREL) 100 MG tablet; Take 1 tablet by mouth Every Night.  -     buPROPion XL (WELLBUTRIN XL) 300 MG 24 hr tablet; Take 1 tablet by mouth Every Morning.  -     ARIPiprazole (ABILIFY) 2 MG tablet; Take 1 tablet by mouth Daily.    25 minutes face to face   Pt has continued persistent depression rating it a 7 without SI/HI or AVH    Cont buspar 15mg TID for anxiety  Cont Cymbalta for depression 60mg BID  Cont trazodone 100mg nightly  for sleep  Cont Wellbutrin XL 300mg daily for depression  ADD: Abilify  2mg PO one QD for persistent residual depression   We discussed risks, benefits, and side effects of the above medications and the patient was agreeable with the plan. Patient was educated on the importance of compliance with treatment and follow-up appointments.     To cont therapy with Rosemary MENDEZW   Discussed mental health navigator in ED if she has unsafe thoughts, thoughts of suicide to go there or may call here during open hours.  Discussed with pt practicing reframing of negative thoughts and goals she is working on, supportive, motivational interviewing performed.   Patient has chronic pain, discussed energy, and physical abilities and tolerance.     Allowed patient to freely discuss issues without interruption or judgment. Provided safe, confidential environment to facilitate the development of positive therapeutic relationship and encourage open, honest communication. Assisted patient in identifying risk factors which would indicate the need for higher level of care including thoughts to harm self or others and/or self-harming behavior and encouraged patient to contact this office, call 911, or present to the nearest emergency room should any of these events occur. Discussed crisis intervention services and means to access.  Patient adamantly and convincingly denies current suicidal or homicidal ideation or perceptual disturbance.    Reviewed most recent labs in [facility] from July this year, will contact Dr. Aceves's office and get most recent from her, if no TSH will order     Controlled substance prescriptions are either  printed off for patient, telephoned in  or ordered through [facility] by this provider  Instructed to call for questions or concerns and return early if necessary. Crisis plan reviewed including going to the Emergency department.    Return in about 4 weeks (around 1/10/2018).

## 2018-01-09 ENCOUNTER — OFFICE VISIT (OUTPATIENT)
Dept: PSYCHIATRY | Facility: CLINIC | Age: 53
End: 2018-01-09

## 2018-01-09 DIAGNOSIS — F51.05 INSOMNIA DUE TO MENTAL CONDITION: ICD-10-CM

## 2018-01-09 DIAGNOSIS — F41.9 ANXIETY DISORDER, UNSPECIFIED TYPE: ICD-10-CM

## 2018-01-09 DIAGNOSIS — F33.1 MAJOR DEPRESSIVE DISORDER, RECURRENT EPISODE, MODERATE (HCC): Primary | ICD-10-CM

## 2018-01-09 PROCEDURE — 99213 OFFICE O/P EST LOW 20 MIN: CPT | Performed by: NURSE PRACTITIONER

## 2018-01-09 RX ORDER — ARIPIPRAZOLE 5 MG/1
5 TABLET ORAL DAILY
Qty: 30 TABLET | Refills: 2 | Status: SHIPPED | OUTPATIENT
Start: 2018-01-09 | End: 2018-05-17 | Stop reason: SDUPTHER

## 2018-01-09 RX ORDER — ARIPIPRAZOLE 2 MG/1
TABLET ORAL
Qty: 30 TABLET | Refills: 0 | Status: SHIPPED | OUTPATIENT
Start: 2018-01-09 | End: 2018-01-09 | Stop reason: SDUPTHER

## 2018-01-09 NOTE — PROGRESS NOTES
"    Subjective   Zhane Burnham is a 52 y.o. female who is here today for medication management follow up.    Chief Complaint:   Major depressive disorder, recurrent episode, moderate    Anxiety disorder, unspecified type    Insomnia due to mental condition      History of Present Illness Patient presents by herself for f/u. She reports better holidays then last year and tolerating adjuvant Abilify 2mg but not feeling much improvement in mood, rating depression 6 with poor motivation, interest, ability to truly enjoy \"things\". Patient is not isolating, denies SI or AVH. She does socialize and has a dog she cares for and friend. She is sleeping and denies panic but does worry and can stress over finances, a house she rents that has mold rates anxiety 3-4.  Denies adverse effects from medications. She reports compliance with medications.     (Scales based on 0 - 10 with 10 being the worst)        The following portions of the patient's history were reviewed and updated as appropriate: allergies, current medications, past family history, past medical history, past social history, past surgical history and problem list.    Review of Systemsdenies fever, cough, s/s’s of infection, denies GI/ problems, denies new medical issues     Objective   Physical Exam  There were no vitals taken for this visit.    Allergies   Allergen Reactions   • Celebrex [Celecoxib] Swelling   • Red Dye    • Shellfish-Derived Products        Current Medications:   Current Outpatient Prescriptions   Medication Sig Dispense Refill   • ARIPiprazole (ABILIFY) 5 MG tablet Take 1 tablet by mouth Daily. 30 tablet 2   • azelastine (ASTELIN) 0.1 % nasal spray BID PRN     • BIOTIN PO Take  by mouth 2 (Two) Times a Day. 10,000mcg QD      • buPROPion XL (WELLBUTRIN XL) 300 MG 24 hr tablet Take 1 tablet by mouth Every Morning. 30 tablet 2   • busPIRone (BUSPAR) 15 MG tablet Take one tablet three times a day 90 tablet 2   • Cholecalciferol (VITAMIN D3) 2000 " UNITS capsule 3,000 Units Daily.  0   • cyclobenzaprine (FLEXERIL) 10 MG tablet As Needed.     • diphenhydrAMINE (BENADRYL) 25 mg capsule Take 25 mg by mouth Every Night.     • DULoxetine (CYMBALTA) 60 MG capsule Take 1 capsule by mouth 2 (Two) Times a Day. 60 capsule 2   • fentaNYL (DURAGESIC) 50 MCG/HR patch 1 patch q 72 hours     • fludrocortisone 0.1 MG tablet Take 1/2 tablet daily. (Patient taking differently: Every Other Day. Take 1/2 tablet daily. ) 15 tablet 11   • fluticasone (FLONASE) 50 MCG/ACT nasal spray As Needed.  3   • gabapentin (NEURONTIN) 300 MG capsule 1 TID     • guaiFENesin (MUCUS RELIEF) 400 MG tablet Take 400 mg by mouth Every 4 (Four) Hours.     • hydrochlorothiazide (HYDRODIURIL) 25 MG tablet Take 25 mg by mouth Daily.     • lidocaine (XYLOCAINE) 5 % ointment APPLY TO THE AFFECTED AREA(S) UP TO TWO GRAMS PRN  0   • loratadine (CLARITIN) 10 MG tablet 1 QD     • meloxicam (MOBIC) 15 MG tablet 1 QD     • Omeprazole 20 MG tablet delayed-release 2 (Two) Times a Day.  0   • oxyCODONE-acetaminophen (PERCOCET)  MG per tablet As Needed. 1 QD     • predniSONE (DELTASONE) 10 MG tablet Take 10 mg by mouth As Needed. Use as directed      • simvastatin (ZOCOR) 40 MG tablet 1 QHS     • traZODone (DESYREL) 100 MG tablet Take 1 tablet by mouth Every Night. 30 tablet 2     No current facility-administered medications for this visit.        Mental Status Exam:   Appearance: appropriate  Hygiene:   good  Cooperation:  Cooperative  Eye Contact:  Good  Psychomotor Behavior:  Appropriate  Mood:  within normal limits  Affect:  Appropriate  Hopelessness: Denies  Speech:  Normal  Thought Process:  Linear  Thought Content:  Normal  Suicidal:  None  Homicidal:  None  Hallucinations:  None  Delusion:  None  Memory:  Intact  Orientation:  Person, Place, Time and Situation  Reliability:  fair  Insight:  Fair  Judgement:  Fair  Impulse Control:  Fair  Estimated Intelligence: average range   Physical/Medical Issues:   No     Assessment/Plan   Diagnoses and all orders for this visit:    Major depressive disorder, recurrent episode, moderate    Anxiety disorder, unspecified type    Insomnia due to mental condition    Other orders  -     ARIPiprazole (ABILIFY) 5 MG tablet; Take 1 tablet by mouth Daily.    tolerating Abilify 2mg but still has residual depression, will increase to 5mg daily  Cont buspar for anxiety  Wellbutrin XL for depression   Cymbalta for depression (SNRI)    · Cont therapy      We discussed risks, benefits, and side effects of the above medications and the patient was agreeable with the plan. Patient was educated on the importance of compliance with treatment and follow-up appointments.   Controlled substance prescriptions are either  printed off for patient, telephoned in  or ordered through [facility] by this provider  Instructed to call for questions or concerns and return early if necessary. Crisis plan reviewed including going to the Emergency department.    Return in about 4 weeks (around 2/6/2018).         Please note that portions of this note were completed with a voice recognition program.  Efforts were made to edit dictation, but occasionally words are mistranscribed.

## 2018-01-16 ENCOUNTER — LAB (OUTPATIENT)
Dept: LAB | Facility: HOSPITAL | Age: 53
End: 2018-01-16

## 2018-01-16 ENCOUNTER — OFFICE VISIT (OUTPATIENT)
Dept: ONCOLOGY | Facility: CLINIC | Age: 53
End: 2018-01-16

## 2018-01-16 VITALS
SYSTOLIC BLOOD PRESSURE: 173 MMHG | WEIGHT: 254 LBS | HEIGHT: 66 IN | DIASTOLIC BLOOD PRESSURE: 72 MMHG | BODY MASS INDEX: 40.82 KG/M2 | TEMPERATURE: 97.9 F | HEART RATE: 105 BPM | RESPIRATION RATE: 20 BRPM

## 2018-01-16 DIAGNOSIS — C50.912 MALIGNANT NEOPLASM OF LEFT FEMALE BREAST (HCC): ICD-10-CM

## 2018-01-16 DIAGNOSIS — C50.912 MALIGNANT NEOPLASM OF LEFT FEMALE BREAST, UNSPECIFIED ESTROGEN RECEPTOR STATUS, UNSPECIFIED SITE OF BREAST (HCC): Primary | ICD-10-CM

## 2018-01-16 LAB
ALBUMIN SERPL-MCNC: 4.6 G/DL (ref 3.2–4.8)
ALBUMIN/GLOB SERPL: 1.7 G/DL (ref 1.5–2.5)
ALP SERPL-CCNC: 95 U/L (ref 25–100)
ALT SERPL W P-5'-P-CCNC: 27 U/L (ref 7–40)
ANION GAP SERPL CALCULATED.3IONS-SCNC: 6 MMOL/L (ref 3–11)
AST SERPL-CCNC: 28 U/L (ref 0–33)
BILIRUB SERPL-MCNC: 0.4 MG/DL (ref 0.3–1.2)
BUN BLD-MCNC: 16 MG/DL (ref 9–23)
BUN/CREAT SERPL: 20 (ref 7–25)
CALCIUM SPEC-SCNC: 9.7 MG/DL (ref 8.7–10.4)
CHLORIDE SERPL-SCNC: 101 MMOL/L (ref 99–109)
CO2 SERPL-SCNC: 31 MMOL/L (ref 20–31)
CREAT BLD-MCNC: 0.8 MG/DL (ref 0.6–1.3)
ERYTHROCYTE [DISTWIDTH] IN BLOOD BY AUTOMATED COUNT: 14.9 % (ref 11.3–14.5)
GFR SERPL CREATININE-BSD FRML MDRD: 76 ML/MIN/1.73
GLOBULIN UR ELPH-MCNC: 2.7 GM/DL
GLUCOSE BLD-MCNC: 87 MG/DL (ref 70–100)
HCT VFR BLD AUTO: 41.8 % (ref 34.5–44)
HGB BLD-MCNC: 13.3 G/DL (ref 11.5–15.5)
LYMPHOCYTES # BLD AUTO: 1.5 10*3/MM3 (ref 0.6–4.8)
LYMPHOCYTES NFR BLD AUTO: 24.1 % (ref 24–44)
MCH RBC QN AUTO: 26.6 PG (ref 27–31)
MCHC RBC AUTO-ENTMCNC: 31.8 G/DL (ref 32–36)
MCV RBC AUTO: 83.7 FL (ref 80–99)
MONOCYTES # BLD AUTO: 0.5 10*3/MM3 (ref 0–1)
MONOCYTES NFR BLD AUTO: 8.1 % (ref 0–12)
NEUTROPHILS # BLD AUTO: 4.1 10*3/MM3 (ref 1.5–8.3)
NEUTROPHILS NFR BLD AUTO: 67.8 % (ref 41–71)
PLATELET # BLD AUTO: 387 10*3/MM3 (ref 150–450)
PMV BLD AUTO: 6.6 FL (ref 6–12)
POTASSIUM BLD-SCNC: 4.6 MMOL/L (ref 3.5–5.5)
PROT SERPL-MCNC: 7.3 G/DL (ref 5.7–8.2)
RBC # BLD AUTO: 5 10*6/MM3 (ref 3.89–5.14)
SODIUM BLD-SCNC: 138 MMOL/L (ref 132–146)
WBC NRBC COR # BLD: 6.1 10*3/MM3 (ref 3.5–10.8)

## 2018-01-16 PROCEDURE — 80053 COMPREHEN METABOLIC PANEL: CPT

## 2018-01-16 PROCEDURE — 99212 OFFICE O/P EST SF 10 MIN: CPT | Performed by: INTERNAL MEDICINE

## 2018-01-16 PROCEDURE — 85025 COMPLETE CBC W/AUTO DIFF WBC: CPT

## 2018-01-16 PROCEDURE — 36415 COLL VENOUS BLD VENIPUNCTURE: CPT

## 2018-01-16 NOTE — PROGRESS NOTES
Chief Complaint   Follow-up postmenopausal stage I triple negative node-negative left breast cancer originally diagnosed in December 2012    PROBLEM LIST   Patient Active Problem List   Diagnosis   • Hypertension   • Hyperlipidemia   • Abnormal ECG   • Lower extremity edema   • Breast cancer, left   • Rheumatoid arthritis   • Chronic pain syndrome   • Dizziness   • Orthostatic hypotension       HISTORY OF PRESENT ILLNESS:   Just over 5 years out from diagnosis.  She's doing well in general.  She has some discomfort underlying the medial aspect of both breasts prostheses that's fleeting and nonprogressive in terms of severity or frequency.  It might happen every week or so.  She has underlying fibromyalgia and wonders if this might be related to that.  She's had no fevers.  She's had no cough or shortness of breath at all.    Past Medical History, Past Surgical History, Social History, Family History have been reviewed and are without significant changes except as mentioned.      Medications:      Current Outpatient Prescriptions:   •  Cholecalciferol (VITAMIN D3) 2000 UNITS capsule, 3,000 Units Daily., Disp: , Rfl: 0  •  ARIPiprazole (ABILIFY) 5 MG tablet, Take 1 tablet by mouth Daily., Disp: 30 tablet, Rfl: 2  •  azelastine (ASTELIN) 0.1 % nasal spray, BID PRN, Disp: , Rfl:   •  BIOTIN PO, Take  by mouth 2 (Two) Times a Day. 10,000mcg QD , Disp: , Rfl:   •  buPROPion XL (WELLBUTRIN XL) 300 MG 24 hr tablet, Take 1 tablet by mouth Every Morning., Disp: 30 tablet, Rfl: 2  •  busPIRone (BUSPAR) 15 MG tablet, Take one tablet three times a day, Disp: 90 tablet, Rfl: 2  •  cyclobenzaprine (FLEXERIL) 10 MG tablet, As Needed., Disp: , Rfl:   •  diphenhydrAMINE (BENADRYL) 25 mg capsule, Take 25 mg by mouth Every Night., Disp: , Rfl:   •  DULoxetine (CYMBALTA) 60 MG capsule, Take 1 capsule by mouth 2 (Two) Times a Day., Disp: 60 capsule, Rfl: 2  •  fentaNYL (DURAGESIC) 50 MCG/HR patch, 1 patch q 72 hours, Disp: , Rfl:   •   fludrocortisone 0.1 MG tablet, Take 1/2 tablet daily. (Patient taking differently: Every Other Day. Take 1/2 tablet daily. ), Disp: 15 tablet, Rfl: 11  •  fluticasone (FLONASE) 50 MCG/ACT nasal spray, As Needed., Disp: , Rfl: 3  •  gabapentin (NEURONTIN) 300 MG capsule, 1 TID, Disp: , Rfl:   •  guaiFENesin (MUCUS RELIEF) 400 MG tablet, Take 400 mg by mouth Every 4 (Four) Hours., Disp: , Rfl:   •  hydrochlorothiazide (HYDRODIURIL) 25 MG tablet, Take 25 mg by mouth Daily., Disp: , Rfl:   •  lidocaine (XYLOCAINE) 5 % ointment, APPLY TO THE AFFECTED AREA(S) UP TO TWO GRAMS PRN, Disp: , Rfl: 0  •  loratadine (CLARITIN) 10 MG tablet, 1 QD, Disp: , Rfl:   •  meloxicam (MOBIC) 15 MG tablet, 1 QD, Disp: , Rfl:   •  Omeprazole 20 MG tablet delayed-release, 2 (Two) Times a Day., Disp: , Rfl: 0  •  oxyCODONE-acetaminophen (PERCOCET)  MG per tablet, 4 (Four) Times a Day. 1 QD, Disp: , Rfl:   •  predniSONE (DELTASONE) 10 MG tablet, Take 10 mg by mouth As Needed. Use as directed , Disp: , Rfl:   •  simvastatin (ZOCOR) 40 MG tablet, 1 QHS, Disp: , Rfl:   •  traZODone (DESYREL) 100 MG tablet, Take 1 tablet by mouth Every Night., Disp: 30 tablet, Rfl: 2    ALLERGIES:    Allergies   Allergen Reactions   • Celebrex [Celecoxib] Swelling   • Red Dye    • Shellfish-Derived Products        ROS:  Review of Systems   Constitutional: Negative for activity change and appetite change.        Energy level remains poor compared to prechemotherapy standards   HENT: Negative for mouth sores, sinus pressure and voice change.    Eyes: Negative for visual disturbance.   Respiratory: Negative for shortness of breath.    Cardiovascular: Negative for chest pain.   Gastrointestinal: Negative for abdominal pain and vomiting.   Genitourinary: Negative for dysuria.   Musculoskeletal: Negative for arthralgias and myalgias.        She remains symptomatic in terms of her neuropathic discomfort and her fibromyalgia   Skin: Negative for color change.  "  Neurological: Negative for dizziness, syncope and headaches.   Hematological: Negative for adenopathy.   Psychiatric/Behavioral: Negative for confusion, sleep disturbance and suicidal ideas. The patient is not nervous/anxious.            Objective:    /72 Comment: SUZIE  Pulse 105  Temp 97.9 °F (36.6 °C) (Temporal Artery )   Resp 20  Ht 167.6 cm (66\")  Wt 115 kg (254 lb)  BMI 41 kg/m2    Physical Exam   Constitutional: She is oriented to person, place, and time. She appears well-developed and well-nourished. No distress.   Alert oriented and in no distress at all   HENT:   Head: Normocephalic.   Mouth/Throat: Oropharynx is clear and moist.   Eyes: Conjunctivae and EOM are normal. No scleral icterus.   Neck: Normal range of motion. Neck supple. No thyromegaly present.   Cardiovascular: Normal rate, regular rhythm and normal heart sounds.    No murmur heard.  Pulmonary/Chest: Effort normal and breath sounds normal. She has no wheezes. She has no rales.   Bilateral prostheses in place with good symmetry.  Absolutely no elicitable tenderness or any palpable masses present whatsoever.  Her prostheses are easy to move when I examine her.   Abdominal: Soft. Bowel sounds are normal. She exhibits no distension and no mass. There is no tenderness.   Musculoskeletal: She exhibits no edema or tenderness.   Lymphadenopathy:     She has no cervical adenopathy.   Neurological: She is alert and oriented to person, place, and time. She displays normal reflexes. No cranial nerve deficit.   Skin: Skin is warm and dry. No rash noted.   Psychiatric: She has a normal mood and affect. Judgment normal.   Vitals reviewed.             RECENT LABS:  Hematology WBC   Date Value Ref Range Status   01/16/2018 6.10 3.50 - 10.80 10*3/mm3 Final     Hemoglobin   Date Value Ref Range Status   01/16/2018 13.3 11.5 - 15.5 g/dL Final     Hematocrit   Date Value Ref Range Status   01/16/2018 41.8 34.5 - 44.0 % Final     MCV   Date Value Ref " Range Status   01/16/2018 83.7 80.0 - 99.0 fL Final     RDW   Date Value Ref Range Status   01/16/2018 14.9 (H) 11.3 - 14.5 % Final     MPV   Date Value Ref Range Status   01/16/2018 6.6 6.0 - 12.0 fL Final     Platelets   Date Value Ref Range Status   01/16/2018 387 150 - 450 10*3/mm3 Final     Neutrophils, Absolute   Date Value Ref Range Status   01/16/2018 4.10 1.50 - 8.30 10*3/mm3 Final     Lymphocytes, Absolute   Date Value Ref Range Status   01/16/2018 1.50 0.60 - 4.80 10*3/mm3 Final     Monocytes, Absolute   Date Value Ref Range Status   01/16/2018 0.50 0.00 - 1.00 10*3/mm3 Final       Glucose   Date Value Ref Range Status   07/11/2017 68 (L) 70 - 100 mg/dL Final     Sodium   Date Value Ref Range Status   07/11/2017 142 132 - 146 mmol/L Final     Potassium   Date Value Ref Range Status   07/11/2017 3.8 3.5 - 5.5 mmol/L Final     CO2   Date Value Ref Range Status   07/11/2017 31.0 20.0 - 31.0 mmol/L Final     Chloride   Date Value Ref Range Status   07/11/2017 103 99 - 109 mmol/L Final     Anion Gap   Date Value Ref Range Status   07/11/2017 8.0 3.0 - 11.0 mmol/L Final     Creatinine   Date Value Ref Range Status   07/11/2017 1.00 0.60 - 1.30 mg/dL Final     BUN   Date Value Ref Range Status   07/11/2017 18 9 - 23 mg/dL Final     BUN/Creatinine Ratio   Date Value Ref Range Status   07/11/2017 18.0 7.0 - 25.0 Final     Calcium   Date Value Ref Range Status   07/11/2017 10.0 8.7 - 10.4 mg/dL Final     eGFR Non  Amer   Date Value Ref Range Status   07/11/2017 59 (L) >60 mL/min/1.73 Final     Alkaline Phosphatase   Date Value Ref Range Status   07/11/2017 79 25 - 100 U/L Final     Total Protein   Date Value Ref Range Status   07/11/2017 7.0 5.7 - 8.2 g/dL Final     ALT (SGPT)   Date Value Ref Range Status   07/11/2017 19 7 - 40 U/L Final     AST (SGOT)   Date Value Ref Range Status   07/11/2017 27 0 - 33 U/L Final     Total Bilirubin   Date Value Ref Range Status   07/11/2017 0.4 0.3 - 1.2 mg/dL Final      Albumin   Date Value Ref Range Status   07/11/2017 4.30 3.20 - 4.80 g/dL Final     Globulin   Date Value Ref Range Status   07/11/2017 2.7 gm/dL Final     A/G Ratio   Date Value Ref Range Status   07/11/2017 1.6 1.5 - 2.5 g/dL Final          Perf Status: 1    Assessment/Plan    Diagnoses and all orders for this visit:    Malignant neoplasm of left female breast, unspecified estrogen receptor status, unspecified site of breast      In terms of her breast cancer, the patient is doing well.  She has no obvious evidence of recurrence.  She's now just over 5 years out from diagnosis.  I last her she like to return in a year and she would like me to suspend follow-up at this point.      Juanpablo Bassett MD , 1/16/2018    CC:

## 2018-01-17 ENCOUNTER — OFFICE VISIT (OUTPATIENT)
Dept: CARDIOLOGY | Facility: CLINIC | Age: 53
End: 2018-01-17

## 2018-01-17 VITALS
HEIGHT: 66 IN | WEIGHT: 256 LBS | BODY MASS INDEX: 41.14 KG/M2 | SYSTOLIC BLOOD PRESSURE: 140 MMHG | DIASTOLIC BLOOD PRESSURE: 88 MMHG | HEART RATE: 104 BPM

## 2018-01-17 DIAGNOSIS — I95.1 ORTHOSTATIC HYPOTENSION: Primary | ICD-10-CM

## 2018-01-17 PROBLEM — I95.9 HYPOTENSION: Status: ACTIVE | Noted: 2017-06-21

## 2018-01-17 PROCEDURE — 99213 OFFICE O/P EST LOW 20 MIN: CPT | Performed by: NURSE PRACTITIONER

## 2018-01-17 NOTE — PROGRESS NOTES
Zhane Burnham  1965  [phone]      01/17/2018    Lynn Aceves MD    Chief Complaint: Hypotension      PROBLEM LIST:  1. Abnormal ECG 8/11/2016: shows possible inferior MI  2. Hypotension    A. Echocardiogram, 9/26/2016. EF 60%. Trace MR. Mild TR. No source for syncope or presyncope is seen.  3. Rheumatoid arthritis   4. Osteoarthritis   5. Breat cancer, triple negative    A. S/p double mastectomy   6. Asthma (childhood)  7. Surgical Hx:    A. Tonsillectomy    B. Hysterectomy: 2008    C. Double mastectomy    D. Reconstructive breast surgery    E. Knee surgery 6/21/16    Allergies   Allergen Reactions   • Celebrex [Celecoxib] Swelling   • Red Dye    • Shellfish-Derived Products        Current Medications:    Current Outpatient Prescriptions:   •  ARIPiprazole (ABILIFY) 5 MG tablet, Take 1 tablet by mouth Daily., Disp: 30 tablet, Rfl: 2  •  azelastine (ASTELIN) 0.1 % nasal spray, BID PRN, Disp: , Rfl:   •  BIOTIN PO, Take  by mouth 2 (Two) Times a Day. 10,000mcg QD , Disp: , Rfl:   •  buPROPion XL (WELLBUTRIN XL) 300 MG 24 hr tablet, Take 1 tablet by mouth Every Morning., Disp: 30 tablet, Rfl: 2  •  busPIRone (BUSPAR) 15 MG tablet, Take one tablet three times a day, Disp: 90 tablet, Rfl: 2  •  Cholecalciferol (VITAMIN D3) 2000 UNITS capsule, 3,000 Units Daily., Disp: , Rfl: 0  •  cyclobenzaprine (FLEXERIL) 10 MG tablet, As Needed., Disp: , Rfl:   •  diphenhydrAMINE (BENADRYL) 25 mg capsule, Take 25 mg by mouth Every Night., Disp: , Rfl:   •  DULoxetine (CYMBALTA) 60 MG capsule, Take 1 capsule by mouth 2 (Two) Times a Day., Disp: 60 capsule, Rfl: 2  •  fentaNYL (DURAGESIC) 50 MCG/HR patch, 1 patch q 72 hours, Disp: , Rfl:   •  fludrocortisone 0.1 MG tablet, Take 1/2 tablet daily. (Patient taking differently: Every Other Day. Take 1/2 tablet daily. ), Disp: 15 tablet, Rfl: 11  •  fluticasone (FLONASE) 50 MCG/ACT nasal spray, As Needed., Disp: , Rfl: 3  •  gabapentin (NEURONTIN) 300 MG capsule, 1  TID, Disp: , Rfl:   •  guaiFENesin (MUCUS RELIEF) 400 MG tablet, Take 400 mg by mouth Every 4 (Four) Hours., Disp: , Rfl:   •  hydrochlorothiazide (HYDRODIURIL) 25 MG tablet, Take 25 mg by mouth Daily., Disp: , Rfl:   •  lidocaine (XYLOCAINE) 5 % ointment, APPLY TO THE AFFECTED AREA(S) UP TO TWO GRAMS PRN, Disp: , Rfl: 0  •  loratadine (CLARITIN) 10 MG tablet, 1 QD, Disp: , Rfl:   •  meloxicam (MOBIC) 15 MG tablet, 1 QD, Disp: , Rfl:   •  Omeprazole 20 MG tablet delayed-release, 2 (Two) Times a Day., Disp: , Rfl: 0  •  oxyCODONE-acetaminophen (PERCOCET)  MG per tablet, 4 (Four) Times a Day. 1 QD, Disp: , Rfl:   •  predniSONE (DELTASONE) 10 MG tablet, Take 10 mg by mouth As Needed. Use as directed , Disp: , Rfl:   •  simvastatin (ZOCOR) 40 MG tablet, 1 QHS, Disp: , Rfl:   •  traZODone (DESYREL) 100 MG tablet, Take 1 tablet by mouth Every Night., Disp: 30 tablet, Rfl: 2    HPI  Zhane Burnham returns today for follow up with a history of an abnormal ECG and hypotension. Since last visit, patient has been doing well up until the past couple of weeks.  She states she has not been sleeping well due to worsening carpal tunnel pains despite wearing braces at night.  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  She does not check her blood pressures frequently at home but notes that yesterday at her oncologist office it was 173 systolic and today it was 140 systolic.  She continues to take only a half tablet of her Florinef every other day with that dose being today.  She complains of feeling fatigued with no energy and states she is just not feeling well.  She does seem a little flushed the face today but has no accompanying symptoms to make me think this may be flu related.  It seems that her blood pressure may be starting to run higher more than usual and have recommended that she cut her dose of Florinef either to once every  "third day or only on an as-needed basis.  She is going to start monitoring her blood pressures more frequently at home for this reason.  She denies any complaints of chest pain, shortness of breath, dyspnea on exertion, edema, palpitations, dizziness and syncope.    The following portions of the patient's history were reviewed and updated as appropriate: allergies, current medications and problem list.    Pertinent positives as listed in the HPI.  All other systems reviewed are negative.    Vitals:    01/17/18 1503   BP: 140/88   BP Location: Right arm   Patient Position: Sitting   Pulse: 104   Weight: 116 kg (256 lb)   Height: 167.6 cm (66\")       GENERAL: well-developed, well-nourished; in no acute distress.   NECK:  There is no jugular venous distention at 30°.  Carotid upstrokes are 2+ and  symmetrical without bruits.   LUNGS: Clear  to auscultation bilaterally without wheezing, rhonchi, or rales noted.   CARDIOVASCULAR: The heart has a regular rate with a normal S1 and S2. There is no murmur, gallop, rub, or click appreciated. The PMI is nondisplaced.   ABDOMEN: Soft and nontender  NEUROLOGICAL: Nonfocal; Alert and oriented  PERIPHERAL VASCULAR:  Posterior tibial and dorsalis pedis pulses are 2+ and symmetrical. There is no peripheral edema.   MUSCULOSKELETAL:  Normal ROM  SKIN:  Warm and dry  PSYCHIATRIC: normal mood and affect; behavior appropriate    Diagnostic Data:  Lab Results   Component Value Date    GLUCOSE 87 01/16/2018    BUN 16 01/16/2018    CREATININE 0.80 01/16/2018    EGFRIFNONA 76 01/16/2018    BCR 20.0 01/16/2018    K 4.6 01/16/2018    CO2 31.0 01/16/2018    CALCIUM 9.7 01/16/2018    ALBUMIN 4.60 01/16/2018    LABIL2 1.7 01/16/2018    AST 28 01/16/2018    ALT 27 01/16/2018     Lab Results   Component Value Date    WBC 6.10 01/16/2018    HGB 13.3 01/16/2018    HCT 41.8 01/16/2018    MCV 83.7 01/16/2018     01/16/2018       Procedures    Assessment:    ICD-10-CM ICD-9-CM   1. Orthostatic " hypotension-now seems to be more hypertensive I95.1 458.0       Plan:    1. Encouraged routine exercise regimen; 30 minutes per day, 4-5 days per week   2. Trial off of florinef and take only if systolic <100; or can take 1/2 tab every 3rd day  3. Continue all other current medications.  4. F/up in 12 months or sooner if needed.      Seen independently by JENNIE Rubi on  1/17/2018  4:51 PM

## 2018-02-15 ENCOUNTER — OFFICE VISIT (OUTPATIENT)
Dept: PSYCHIATRY | Facility: CLINIC | Age: 53
End: 2018-02-15

## 2018-02-15 VITALS — HEIGHT: 66 IN | BODY MASS INDEX: 40.82 KG/M2 | WEIGHT: 254 LBS

## 2018-02-15 DIAGNOSIS — F41.9 ANXIETY DISORDER, UNSPECIFIED TYPE: ICD-10-CM

## 2018-02-15 DIAGNOSIS — F33.1 MAJOR DEPRESSIVE DISORDER, RECURRENT EPISODE, MODERATE (HCC): Primary | ICD-10-CM

## 2018-02-15 DIAGNOSIS — F51.05 INSOMNIA DUE TO MENTAL CONDITION: ICD-10-CM

## 2018-02-15 DIAGNOSIS — G89.29 OTHER CHRONIC PAIN: ICD-10-CM

## 2018-02-15 PROCEDURE — 99213 OFFICE O/P EST LOW 20 MIN: CPT | Performed by: NURSE PRACTITIONER

## 2018-02-15 RX ORDER — BUSPIRONE HYDROCHLORIDE 15 MG/1
TABLET ORAL
Qty: 90 TABLET | Refills: 2 | Status: SHIPPED | OUTPATIENT
Start: 2018-02-15 | End: 2018-06-28 | Stop reason: SDUPTHER

## 2018-02-15 RX ORDER — DULOXETIN HYDROCHLORIDE 60 MG/1
CAPSULE, DELAYED RELEASE ORAL
Qty: 60 CAPSULE | Refills: 2 | Status: SHIPPED | OUTPATIENT
Start: 2018-02-15 | End: 2018-11-12 | Stop reason: SDUPTHER

## 2018-02-15 RX ORDER — BUPROPION HYDROCHLORIDE 300 MG/1
300 TABLET ORAL EVERY MORNING
Qty: 30 TABLET | Refills: 2 | Status: SHIPPED | OUTPATIENT
Start: 2018-02-15 | End: 2018-06-28 | Stop reason: SDUPTHER

## 2018-02-15 RX ORDER — TRAZODONE HYDROCHLORIDE 100 MG/1
100 TABLET ORAL NIGHTLY
Qty: 30 TABLET | Refills: 2 | Status: SHIPPED | OUTPATIENT
Start: 2018-02-15 | End: 2018-06-28 | Stop reason: SDUPTHER

## 2018-02-15 NOTE — PROGRESS NOTES
"    Subjective   Zhane Burnham is a 52 y.o. female who is here today for medication management follow up.    Chief Complaint: Diagnoses and all orders for this visit:    Major depressive disorder, recurrent episode, moderate    Anxiety disorder, unspecified type    Insomnia due to mental condition    Other chronic pain    Other orders  -     busPIRone (BUSPAR) 15 MG tablet; Take one tablet three times a day  -     traZODone (DESYREL) 100 MG tablet; Take 1 tablet by mouth Every Night.  -     buPROPion XL (WELLBUTRIN XL) 300 MG 24 hr tablet; Take 1 tablet by mouth Every Morning.  -     DULoxetine (CYMBALTA) 60 MG capsule; Take one capsule twice a day        History of Present Illness Patient presents for follow up. She reports fibromyalgia and  arthritis is causing pain. Patient rates pain an 8. patient is going to a pain management clinic in [place] Dr. Federico Tellez within [facility]. Patient discussed she had a section 8 inspection and states \"they found mold\" and discussed they will be coming monthly to see her improvement in unpacking boxes but her landlord resents it and blames her for the mold \"I had nothing to do with it\". Patient discussed she will be seeing a pain psychologist at the pain clinic since she is refusing a pain pump. Patient discussed she continues helping her friends 3 days a week and they are requesting more days, helps her neighbors by driving them to the stores and has a vacation planned to a beach in [place] in April with a group of friends. Denies adverse effects from medications. Scores anxiety a 4-5 because of pain she states and house mold issues, rates depression 3. She reports sleeping well and eating well on current med management.   (Scales based on 0 - 10 with 10 being the worst)        The following portions of the patient's history were reviewed and updated as appropriate: allergies, current medications, past family history, past medical history, past social " "history, past surgical history and problem list.    Review of Systemsdenies fever, cough, s/s’s of infection, denies GI/ problems, denies new medical issues     Objective   Physical Exam  Height 167.6 cm (66\"), weight 115 kg (254 lb).    Allergies   Allergen Reactions   • Celebrex [Celecoxib] Swelling   • Red Dye    • Shellfish-Derived Products         RECENT LABS:  Hematology       WBC   Date Value Ref Range Status   01/16/2018 6.10 3.50 - 10.80 10*3/mm3 Final            Hemoglobin   Date Value Ref Range Status   01/16/2018 13.3 11.5 - 15.5 g/dL Final            Hematocrit   Date Value Ref Range Status   01/16/2018 41.8 34.5 - 44.0 % Final            MCV   Date Value Ref Range Status   01/16/2018 83.7 80.0 - 99.0 fL Final            RDW   Date Value Ref Range Status   01/16/2018 14.9 (H) 11.3 - 14.5 % Final            MPV   Date Value Ref Range Status   01/16/2018 6.6 6.0 - 12.0 fL Final            Platelets   Date Value Ref Range Status   01/16/2018 387 150 - 450 10*3/mm3 Final            Neutrophils, Absolute   Date Value Ref Range Status   01/16/2018 4.10 1.50 - 8.30 10*3/mm3 Final            Lymphocytes, Absolute   Date Value Ref Range Status   01/16/2018 1.50 0.60 - 4.80 10*3/mm3 Final            Monocytes, Absolute   Date Value Ref Range Status   01/16/2018 0.50 0.00 - 1.00 10*3/mm3 Final         Glucose   Date Value Ref Range Status   07/11/2017 68 (L) 70 - 100 mg/dL Final            Sodium   Date Value Ref Range Status   07/11/2017 142 132 - 146 mmol/L Final            Potassium   Date Value Ref Range Status   07/11/2017 3.8 3.5 - 5.5 mmol/L Final            CO2   Date Value Ref Range Status   07/11/2017 31.0 20.0 - 31.0 mmol/L Final            Chloride   Date Value Ref Range Status   07/11/2017 103 99 - 109 mmol/L Final            Anion Gap   Date Value Ref Range Status   07/11/2017 8.0 3.0 - 11.0 mmol/L Final            Creatinine   Date Value Ref Range Status   07/11/2017 1.00 0.60 - 1.30 mg/dL Final "            BUN   Date Value Ref Range Status   07/11/2017 18 9 - 23 mg/dL Final            BUN/Creatinine Ratio   Date Value Ref Range Status   07/11/2017 18.0 7.0 - 25.0 Final            Calcium   Date Value Ref Range Status   07/11/2017 10.0 8.7 - 10.4 mg/dL Final            eGFR Non  Amer   Date Value Ref Range Status   07/11/2017 59 (L) >60 mL/min/1.73 Final            Alkaline Phosphatase   Date Value Ref Range Status   07/11/2017 79 25 - 100 U/L Final            Total Protein   Date Value Ref Range Status   07/11/2017 7.0 5.7 - 8.2 g/dL Final      ALT (SGPT)   Date Value Ref Range Status   07/11/2017 19 7 - 40 U/L Final            AST (SGOT)   Date Value Ref Range Status   07/11/2017 27 0 - 33 U/L Final            Total Bilirubin   Date Value Ref Range Status   07/11/2017 0.4 0.3 - 1.2 mg/dL Final            Albumin   Date Value Ref Range Status   07/11/2017 4.30 3.20 - 4.80 g/dL Final            Globulin   Date Value Ref Range Status   07/11/2017 2.7 gm/dL Final            A/G Ratio   Date Value Ref Range Status   07/11/2017 1.6 1.5 - 2.5 g/dL Final           Current Medications:   Current Outpatient Prescriptions   Medication Sig Dispense Refill   • ARIPiprazole (ABILIFY) 5 MG tablet Take 1 tablet by mouth Daily. 30 tablet 2   • azelastine (ASTELIN) 0.1 % nasal spray BID PRN     • BIOTIN PO Take  by mouth 2 (Two) Times a Day. 10,000mcg QD      • buPROPion XL (WELLBUTRIN XL) 300 MG 24 hr tablet Take 1 tablet by mouth Every Morning. 30 tablet 2   • busPIRone (BUSPAR) 15 MG tablet Take one tablet three times a day 90 tablet 2   • Cholecalciferol (VITAMIN D3) 2000 UNITS capsule 3,000 Units Daily.  0   • cyclobenzaprine (FLEXERIL) 10 MG tablet As Needed.     • diphenhydrAMINE (BENADRYL) 25 mg capsule Take 25 mg by mouth Every Night.     • DULoxetine (CYMBALTA) 60 MG capsule Take one capsule twice a day 60 capsule 2   • fentaNYL (DURAGESIC) 50 MCG/HR patch 1 patch q 72 hours     • fludrocortisone 0.1 MG  tablet Take 1/2 tablet daily. (Patient taking differently: Every Other Day. Take 1/2 tablet daily. ) 15 tablet 11   • fluticasone (FLONASE) 50 MCG/ACT nasal spray As Needed.  3   • gabapentin (NEURONTIN) 300 MG capsule 1 TID     • guaiFENesin (MUCUS RELIEF) 400 MG tablet Take 400 mg by mouth Every 4 (Four) Hours.     • hydrochlorothiazide (HYDRODIURIL) 25 MG tablet Take 25 mg by mouth Daily.     • lidocaine (XYLOCAINE) 5 % ointment APPLY TO THE AFFECTED AREA(S) UP TO TWO GRAMS PRN  0   • loratadine (CLARITIN) 10 MG tablet 1 QD     • meloxicam (MOBIC) 15 MG tablet 1 QD     • Omeprazole 20 MG tablet delayed-release 2 (Two) Times a Day.  0   • oxyCODONE-acetaminophen (PERCOCET)  MG per tablet 4 (Four) Times a Day. 1 QD     • predniSONE (DELTASONE) 10 MG tablet Take 10 mg by mouth As Needed. Use as directed      • simvastatin (ZOCOR) 40 MG tablet 1 QHS     • traZODone (DESYREL) 100 MG tablet Take 1 tablet by mouth Every Night. 30 tablet 2     No current facility-administered medications for this visit.      Appearance: appropriate  Hygiene:   good  Cooperation:  Cooperative  Eye Contact:  Good  Psychomotor Behavior:  Appropriate  Mood:  within normal limits  Affect:  Appropriate  Hopelessness: Denies  Speech:  Normal  Thought Process:  Linear  Thought Content:  Normal  Suicidal:  None  Homicidal:  None  Hallucinations:  None  Delusion:  None  Memory:  Intact  Orientation:  Person, Place, Time and Situation  Reliability:  fair  Insight:  Fair  Judgement:  Fair  Impulse Control:  Fair  Estimated Intelligence: average range   Physical/Medical Issues:  No     STEPHANIE REVIEWED NO RED FLAGS      Assessment/Plan   Diagnoses and all orders for this visit:    Major depressive disorder, recurrent episode, moderate    Anxiety disorder, unspecified type    Insomnia due to mental condition    Other chronic pain    Other orders  -     busPIRone (BUSPAR) 15 MG tablet; Take one tablet three times a day  -     traZODone (DESYREL)  100 MG tablet; Take 1 tablet by mouth Every Night.  -     buPROPion XL (WELLBUTRIN XL) 300 MG 24 hr tablet; Take 1 tablet by mouth Every Morning.  -     DULoxetine (CYMBALTA) 60 MG capsule; Take one capsule twice a day      Stable on current med management and therapy  Cont current medications   Trazodone 100mg  for sleep  Buspirone 15 mg TID for anxiety  Wellbutrin XL 300mg for depression   Cymbalta 60mg BID for depression anxiety and is on for fibromyalgia   Cont therapy for coping measures phase of life issues, chronic pain     We discussed risks, benefits, and side effects of the above medications and the patient was agreeable with the plan. Patient was educated on the importance of compliance with treatment and follow-up appointments.   Controlled substance prescriptions are either  printed off for patient, telephoned in  or ordered through [facility] by this provider  Instructed to call for questions or concerns and return early if necessary. Crisis plan reviewed including going to the Emergency department.    Return in about 3 months (around 5/15/2018).         Please note that portions of this note were completed with a voice recognition program.  Efforts were made to edit dictation, but occasionally words are mistranscribed.

## 2018-02-20 ENCOUNTER — OFFICE VISIT (OUTPATIENT)
Dept: PSYCHIATRY | Facility: CLINIC | Age: 53
End: 2018-02-20

## 2018-02-20 DIAGNOSIS — G89.29 OTHER CHRONIC PAIN: ICD-10-CM

## 2018-02-20 DIAGNOSIS — F33.1 MAJOR DEPRESSIVE DISORDER, RECURRENT EPISODE, MODERATE (HCC): Primary | ICD-10-CM

## 2018-02-20 DIAGNOSIS — F51.05 INSOMNIA DUE TO MENTAL CONDITION: ICD-10-CM

## 2018-02-20 DIAGNOSIS — F41.9 ANXIETY DISORDER, UNSPECIFIED TYPE: ICD-10-CM

## 2018-02-20 PROCEDURE — 90834 PSYTX W PT 45 MINUTES: CPT | Performed by: SOCIAL WORKER

## 2018-02-20 NOTE — PROGRESS NOTES
"Date of Service: February 20, 2018  Time In: 1100  Time Out: 1145      PROGRESS NOTE  Data:  Zhane Burnham is a 52 y.o. female who met with the undersigned for a regularly scheduled individual outpatient psychotherapy session at  [facility]. Patient starts psychotherapy session by reporting \"my fibro is acting up\" and discussed her arthritic is causing pain. Patient rates pain on a 1-10 scale with 10 being the worst a 8. patient is going to a pain management clinic in [place] Dr. Federico Tellez within [facility]. Patient discussed she had a section 8 inspection and states \"they found mold\" and discussed they will be coming monthly to see her improvement in unpacking boxes. Patient discussed she will be seeing a pain psychologist at the pain clinic since she is refusing a pain pump. Patient discussed she has been helping her friends Monse and Aram 3 days a week and they are requesting more days, helps her neighbors by driving them to the stores and has a vacation planned to a beach in [place] in April with a group of friends. Patient discussed she is looking into a getting a supplemental insurance as she will be transitioning to medicare soon.      HPI: Depression, Anxiety, Medical conditions. Depression 7.5 and Anxiety 8      Clinical Maneuvering/Intervention:  Assisted patient in processing above session content; acknowledged and normalized patient’s thoughts, feelings, and concerns. assisted patient in processing her feelings of depression and anxiety and pain related to health condition. assisted patient in processing her thoughts and feelings of weighing out the pros and cons of the pain pump that her pain mgmt doctor is recommending and has decided against it at this time and trying medication mgmt for pain and meeting with a pain psychologist. Validated patients thoughts and feelings related to her many worries and fears. Assisted patient in processing " "thoughts and feelings pain due to medical conditions i.e. fibromyalgia and arthritis.  Encouraged pt the importance of keeping all appointments and taking medications as prescribed and calling with any questions or concerns.  Assisted patient in understanding the importance of seeing the interdisciplinary team for continuity of care. Attempted to build rapport and trust with patient during psychotherapy session and reviewed safety plan for patient to call her friend, present to the ER or call 911 is SI or HI occurs. Patient denies SI and HI.  Patient is agreeable to her safety plan. educated on the importance of self-care and making attainable goals due to continued lack stressors, lack of progress with adequate self-care, bankruptcy, and packing and settling in her home.  Patient to continue to see her \"surrogate parents\" the 3 days a week and to not increase as she has to go through boxes at her house to meet inspection. Educated on balance  And encouraged her to not do so much for other that she doesn't have any energy to do for herself. Encouraged patient to think and journal on what is quality of life is for her and to continue to work on grounding and relaxation techniques.     Allowed patient to freely discuss issues without interruption or judgment. Provided safe, confidential environment to facilitate the development of positive therapeutic relationship and encourage open, honest communication. Assisted patient in identifying risk factors which would indicate the need for higher level of care including thoughts to harm self or others and/or self-harming behavior and encouraged patient to contact this office, call 911, or present to the nearest emergency room should any of these events occur. Discussed crisis intervention services and means to access.  Patient adamantly and convincingly denies current suicidal or homicidal ideation or perceptual disturbance.    Assessment     Diagnoses and all orders for " this visit:    Major depressive disorder, recurrent episode, moderate    Anxiety disorder, unspecified type    Insomnia due to mental condition    Other chronic pain             Mental Status Exam  Hygiene:  good  Dress:  casual  Attitude:  Cooperative  Motor Activity:  Appropriate  Speech:  Normal  Mood:  Sad and anxious  Affect:  Depressed and anxious  Thought Processes:  Goal directed  Thought Content:  normal  Suicidal Thoughts:  denies  Homicidal Thoughts:  denies  Crisis Safety Plan: yes, to come to the emergency room.  Hallucinations:  denies    Patient's Support Network Includes:  extended family and friends    Progress toward goal: Not at goal    Functional Status: Moderate impairment     Prognosis: Fair with Ongoing Treatment     Plan         Patient will adhere to medication regimen as prescribed and report any side effects. Patient will contact this office, call 911 or present to the nearest emergency room should suicidal or homicidal ideations occur. Provide Cognitive Behavioral Therapy and Integrative Therapy to improve functioning, maintain stability, and avoid decompensation and the need for higher level of care.          Return in about 2 weeks (around 3/6/2018), or if symptoms worsen or fail to improve, for patient wants it to be every 4 weeks while she is cleaning her home for inspection.      This document signed by Rosemary Pacheco LCSW, February 20, 2018 1:03 PM

## 2018-04-19 ENCOUNTER — OFFICE VISIT (OUTPATIENT)
Dept: PSYCHIATRY | Facility: CLINIC | Age: 53
End: 2018-04-19

## 2018-04-19 DIAGNOSIS — F51.05 INSOMNIA DUE TO MENTAL CONDITION: ICD-10-CM

## 2018-04-19 DIAGNOSIS — G89.29 OTHER CHRONIC PAIN: ICD-10-CM

## 2018-04-19 DIAGNOSIS — F33.1 MAJOR DEPRESSIVE DISORDER, RECURRENT EPISODE, MODERATE (HCC): Primary | ICD-10-CM

## 2018-04-19 DIAGNOSIS — F41.9 ANXIETY DISORDER, UNSPECIFIED TYPE: ICD-10-CM

## 2018-04-19 PROCEDURE — 90834 PSYTX W PT 45 MINUTES: CPT | Performed by: SOCIAL WORKER

## 2018-04-19 NOTE — PROGRESS NOTES
"Date of Service: April 19, 2018  Time In: 300  Time Out: 345      PROGRESS NOTE  Data:  Zhane Burnham is a 52 y.o. female who met with the undersigned for a regularly scheduled individual outpatient psychotherapy session at  [facility]. Patient starts psychotherapy session by reporting \"not feeling good today\". Patient engaged in a discussion that she feels bad mentally and physically and states \"its for multiple reasons\". patient discussed she wasn't able to pin point her life stressors. Patient explained she has had 2 other inspections for her home and they have been going well. Patient discussed early Saturday morning she is going to [place] with her girlfriends for 7 days. patient discussed she is hoping to have a yard sale to sell some of her unneeded belongings. patient discussed an incident where she is feeling bad about hurting a friends feelings.      HPI: Depression, Anxiety, Medical conditions. Depression 7 and Anxiety 7      Clinical Maneuvering/Intervention:  Assisted patient in processing above session content; acknowledged and normalized patient’s thoughts, feelings, and concerns. assisted patient in processing her feelings of depression and anxiety and pain related to health condition. assisted patient in processing her thoughts and feelings of going on vacation. Validated patients thoughts and feelings related to the incident with her friend and how she thought she hurt her feelings. Assisted patient in processing thoughts and feelings pain due to medical conditions i.e. fibromyalgia and arthritis.  Encouraged pt the importance of keeping all appointments and taking medications as prescribed and calling with any questions or concerns.  Assisted patient in understanding the importance of seeing the interdisciplinary team for continuity of care. Attempted to build rapport and trust with patient during psychotherapy session and reviewed safety plan for " patient to call her friend, present to the ER or call 911 is SI or HI occurs. Patient denies SI and HI.  Patient is agreeable to her safety plan. educated on the importance of self-care and making attainable goals due to continued lack stressors, lack of progress with adequate self-care, bankruptcy, and packing and settling in her home.  Encouraged patient to process this conversation with her friend and she did and to work on forgiveness. Encouraged patient to work on confidence and self esteem and self love exercise.     Allowed patient to freely discuss issues without interruption or judgment. Provided safe, confidential environment to facilitate the development of positive therapeutic relationship and encourage open, honest communication. Assisted patient in identifying risk factors which would indicate the need for higher level of care including thoughts to harm self or others and/or self-harming behavior and encouraged patient to contact this office, call 911, or present to the nearest emergency room should any of these events occur. Discussed crisis intervention services and means to access.  Patient adamantly and convincingly denies current suicidal or homicidal ideation or perceptual disturbance.    Assessment     Diagnoses and all orders for this visit:    Major depressive disorder, recurrent episode, moderate    Anxiety disorder, unspecified type    Insomnia due to mental condition    Other chronic pain               Mental Status Exam  Hygiene:  good  Dress:  casual  Attitude:  Cooperative  Motor Activity:  Appropriate  Speech:  Normal  Mood:  Sad and anxious  Affect:  Depressed and anxious  Thought Processes:  Goal directed  Thought Content:  normal  Suicidal Thoughts:  denies  Homicidal Thoughts:  denies  Crisis Safety Plan: yes, to come to the emergency room.  Hallucinations:  denies    Patient's Support Network Includes:  extended family and friends    Progress toward goal: Not at goal    Functional  Status: Moderate impairment     Prognosis: Fair with Ongoing Treatment     Plan         Patient will adhere to medication regimen as prescribed and report any side effects. Patient will contact this office, call 911 or present to the nearest emergency room should suicidal or homicidal ideations occur. Provide Cognitive Behavioral Therapy and Integrative Therapy to improve functioning, maintain stability, and avoid decompensation and the need for higher level of care.          Return in about 1 month (around 5/19/2018), or if symptoms worsen or fail to improve.      This document signed by Rosemary Pacheco LCSW, April 19, 2018 3:50 PM

## 2018-05-16 ENCOUNTER — OFFICE VISIT (OUTPATIENT)
Dept: PSYCHIATRY | Facility: CLINIC | Age: 53
End: 2018-05-16

## 2018-05-16 DIAGNOSIS — F41.9 ANXIETY DISORDER, UNSPECIFIED TYPE: ICD-10-CM

## 2018-05-16 DIAGNOSIS — G89.29 OTHER CHRONIC PAIN: ICD-10-CM

## 2018-05-16 DIAGNOSIS — F51.05 INSOMNIA DUE TO MENTAL CONDITION: ICD-10-CM

## 2018-05-16 DIAGNOSIS — F33.1 MAJOR DEPRESSIVE DISORDER, RECURRENT EPISODE, MODERATE (HCC): Primary | ICD-10-CM

## 2018-05-16 PROCEDURE — 90837 PSYTX W PT 60 MINUTES: CPT | Performed by: SOCIAL WORKER

## 2018-05-16 RX ORDER — ARIPIPRAZOLE 5 MG/1
TABLET ORAL
Qty: 30 TABLET | OUTPATIENT
Start: 2018-05-16

## 2018-05-17 ENCOUNTER — OFFICE VISIT (OUTPATIENT)
Dept: PSYCHIATRY | Facility: CLINIC | Age: 53
End: 2018-05-17

## 2018-05-17 VITALS — BODY MASS INDEX: 42.45 KG/M2 | WEIGHT: 263 LBS

## 2018-05-17 DIAGNOSIS — F41.9 ANXIETY DISORDER, UNSPECIFIED TYPE: ICD-10-CM

## 2018-05-17 DIAGNOSIS — F33.1 MAJOR DEPRESSIVE DISORDER, RECURRENT EPISODE, MODERATE (HCC): Primary | ICD-10-CM

## 2018-05-17 DIAGNOSIS — G89.29 OTHER CHRONIC PAIN: ICD-10-CM

## 2018-05-17 DIAGNOSIS — F51.05 INSOMNIA DUE TO MENTAL CONDITION: ICD-10-CM

## 2018-05-17 PROCEDURE — 99213 OFFICE O/P EST LOW 20 MIN: CPT | Performed by: NURSE PRACTITIONER

## 2018-05-17 RX ORDER — ARIPIPRAZOLE 5 MG/1
5 TABLET ORAL DAILY
Qty: 30 TABLET | Refills: 2 | Status: SHIPPED | OUTPATIENT
Start: 2018-05-17 | End: 2018-06-28 | Stop reason: SDUPTHER

## 2018-05-17 NOTE — PROGRESS NOTES
"    Subjective   Zhane Burnham is a 53 y.o. female who is here today for medication management follow up.    Chief Complaint:     Major depressive disorder, recurrent episode, moderate    Anxiety disorder, unspecified type    Insomnia due to mental condition    Other chronic pain    History of Present Illness Patient presents stating she has poor motivation, poor interest, doesn't have the drive to do things like she would like \"I just feel more depressed\" denies SI /HI  . Patient reports not sleeping as well and has chronic pain .  Denies adverse effects from medications. 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 Bought fabric to do quilting .   (Scales based on 0 - 10 with 10 being the worst)        The following portions of the patient's history were reviewed and updated as appropriate: allergies, current medications, past family history, past medical history, past social history, past surgical history and problem list.    Review of Systems denies fever, cough, s/s’s of infection, denies GI/ problems, denies new medical issues     Objective   Physical Exam  Weight 119 kg (263 lb).    Allergies   Allergen Reactions   • Celebrex [Celecoxib] Swelling   • Red Dye    • Shellfish-Derived Products        Current Medications:   Current Outpatient Prescriptions   Medication Sig Dispense Refill   • ARIPiprazole (ABILIFY) 5 MG tablet Take 1 tablet by mouth Daily. 30 tablet 2   • azelastine (ASTELIN) 0.1 % nasal spray BID PRN     • BIOTIN PO Take  by mouth 2 (Two) Times a Day. 10,000mcg QD      • buPROPion XL (WELLBUTRIN XL) 300 MG 24 hr tablet Take 1 tablet by mouth Every Morning. 30 tablet 2   • busPIRone (BUSPAR) 15 MG tablet Take one tablet three times a day 90 tablet 2   • Cholecalciferol (VITAMIN D3) 2000 UNITS capsule 3,000 Units Daily.  0   • cyclobenzaprine (FLEXERIL) 10 MG tablet As Needed.     • diphenhydrAMINE (BENADRYL) 25 mg capsule Take 25 mg by mouth " Every Night.     • DULoxetine (CYMBALTA) 60 MG capsule Take one capsule twice a day 60 capsule 2   • fentaNYL (DURAGESIC) 50 MCG/HR patch 1 patch q 72 hours     • fludrocortisone 0.1 MG tablet Take 1/2 tablet daily. (Patient taking differently: Every Other Day. Take 1/2 tablet daily. ) 15 tablet 11   • fluticasone (FLONASE) 50 MCG/ACT nasal spray As Needed.  3   • gabapentin (NEURONTIN) 300 MG capsule 1 TID     • guaiFENesin (MUCUS RELIEF) 400 MG tablet Take 400 mg by mouth Every 4 (Four) Hours.     • hydrochlorothiazide (HYDRODIURIL) 25 MG tablet Take 25 mg by mouth Daily.     • lidocaine (XYLOCAINE) 5 % ointment APPLY TO THE AFFECTED AREA(S) UP TO TWO GRAMS PRN  0   • loratadine (CLARITIN) 10 MG tablet 1 QD     • meloxicam (MOBIC) 15 MG tablet 1 QD     • Omeprazole 20 MG tablet delayed-release 2 (Two) Times a Day.  0   • oxyCODONE-acetaminophen (PERCOCET)  MG per tablet 4 (Four) Times a Day. 1 QD     • simvastatin (ZOCOR) 40 MG tablet 1 QHS     • traZODone (DESYREL) 100 MG tablet Take 1 tablet by mouth Every Night. 30 tablet 2     No current facility-administered medications for this visit.      Appearance: appropriate  Hygiene:   good  Cooperation:  Cooperative  Eye Contact:  Good  Psychomotor Behavior:  Appropriate  Mood:  Depression   Affect:  Appropriate  Hopelessness: Denies  Speech:  Normal  Thought Process:  Linear  Thought Content:  Normal  Concentration: Normal   Suicidal:  None  Homicidal:  None  Hallucinations:  None  Delusion:  None  Memory:  Intact  Orientation:  Person, Place, Time and Situation  Reliability:  fair  Insight:  Fair  Judgement:  Fair  Impulse Control:  Fair  Estimated Intelligence: average range   Physical/Medical Issues:  No     Assessment/Plan   Diagnoses and all orders for this visit:    Major depressive disorder, recurrent episode, moderate    Anxiety disorder, unspecified type    Insomnia due to mental condition    Other chronic pain    Other orders  -     ARIPiprazole  Patient evaluated for bleeding from right SC PermaCath insertion site bleeding. RN notified that patient with continuos oozing of blood from B/l arms after receiving heparin Sq.   Patent s/p HD today, Permacath site was bleeding POST DIALYSIS per dialysis report, hb trended down gradually from 06/01/17.   vitals stable, patient otherwise asymptomatic  heparin SQ d/cd  Compression devices ordered  cbc/pt/ptt stat and in am  Transfuse fo hb< 7.0  patient signed off by vascular per vascular team  vacular consult in am if bleeding continues  discussed with attending  Will continue to monitor  will update with primary team. (ABILIFY) 5 MG tablet; Take 1 tablet by mouth Daily.        IMPRESSION: increased depressive symptoms     Treatment Plan: stabilize mood, patient will stay out of the hospital and be at optimal level of functioning, take all medication as prescribed. Patient verbalized  understanding and agreement to plan.    ENCOURAGED TO TAKE TRAZODONE FOR SLEEP  CONT THERAPY  CONT OTHER MED MANAGEMENT   Abilify 5mg PO one QD  Cymbalta 60mg PO BID  Wellbutrin XL 300mg daiy  Buspar 15mg BID         We discussed risks, benefits, and side effects of the above medications and the patient was agreeable with the plan. Patient was educated on the importance of compliance with treatment and follow-up appointments.     Sleep hygiene reviewed, encouraged more whole foods, less processed foods, fruits   Coping skills reviewed and encouraged positive framing of thoughts    Assisted patient in processing above session content; acknowledged and normalized patient’s thoughts, feelings, and concerns.  Applied  positive coping skills and behavior management in session.  Allowed patient to freely discuss issues without interruption or judgment. Provided safe, confidential environment to facilitate the development of positive therapeutic relationship and encourage open, honest communication. Assisted patient in identifying risk factors which would indicate the need for higher level of care including thoughts to harm self or others and/or self-harming behavior and encouraged patient to contact this office, call 911, or present to the nearest emergency room should any of these events occur. Discussed crisis intervention services and means to access.  Patient adamantly and convincingly denies current suicidal or homicidal ideation or perceptual disturbance.    Instructed to call for questions or concerns and return early if necessary. Crisis plan reviewed including going to the Emergency department.    Return in about 8 weeks (around 7/12/2018).      Patient evaluated for bleeding from right SC PermaCath insertion site bleeding. RN notified that patient with continuos oozing of blood from B/l arms after receiving heparin Sq.   Patent s/p HD today, Permacath site was bleeding POST DIALYSIS per dialysis report, hb trended down gradually from 06/01/17.   vitals stable, patient otherwise asymptomatic  heparin SQ d/cd  Compression devices ordered  cbc/pt/ptt stat and in am  Transfuse fo hb< 7.0  patient signed off by vascular per vascular team  vacular consult in am if bleeding continues  Surgicel dressing applied    discussed with attending  Will continue to monitor  will update with primary team.

## 2018-05-17 NOTE — PROGRESS NOTES
"Date of Service: March 16, 2019  Time In: 303  Time Out: 355      PROGRESS NOTE  Data:  Zhane Burnham is a 53 y.o. female who met with the undersigned for a regularly scheduled individual outpatient psychotherapy session at  [facility]. Patient starts psychotherapy session by reporting \"vacation was good\", patient went on vacation with a group of friends recently.  Patient discussed the vacation went well besides a few friends not getting along the entire vacation.  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  Patient discussed increasing pain, tired and not feeling well today.  Patient discussed she has been helping her friends with caregiver duties.  Patient discussion episode of spending too much money and buying excessive amount fabric during a fabrics out.  Patient discussed working in her flower bed.    HPI: Depression, Anxiety, Medical conditions. Depression 7 and Anxiety 6      Clinical Maneuvering/Intervention:  Assisted patient in processing above session content; acknowledged and normalized patient’s thoughts, feelings, and concerns. assisted patient in processing her feelings of depression and anxiety and pain related to health condition. assisted patient in processing her thoughts and feelings of recent vacation. Validated patients thoughts and feelings related to her friends not getting along on vacation. Assisted patient in processing thoughts and feelings pain due to medical conditions i.e. fibromyalgia and arthritis.  Encouraged pt the importance of keeping all appointments and taking medications as prescribed and calling with any questions or concerns.  Assisted patient in understanding the importance of seeing the interdisciplinary team for continuity of care. Attempted to build rapport and trust with patient during psychotherapy session " and reviewed safety plan for patient to call her friend, present to the ER or call 911 is SI or HI occurs. Patient denies SI and HI.  Patient admits to caring for others but not caring for herself as much and not fitting herself as a high priority since made a goal for decreasing her time spent with others doing things for them spending more time doing nice things for herself to feel accomplished in goals and self-care.  Patient is to bring a schedule back to this therapist at next session    Allowed patient to freely discuss issues without interruption or judgment. Provided safe, confidential environment to facilitate the development of positive therapeutic relationship and encourage open, honest communication. Assisted patient in identifying risk factors which would indicate the need for higher level of care including thoughts to harm self or others and/or self-harming behavior and encouraged patient to contact this office, call 911, or present to the nearest emergency room should any of these events occur. Discussed crisis intervention services and means to access.  Patient adamantly and convincingly denies current suicidal or homicidal ideation or perceptual disturbance.    Assessment     Diagnoses and all orders for this visit:    Major depressive disorder, recurrent episode, moderate    Anxiety disorder, unspecified type    Insomnia due to mental condition    Other chronic pain               Mental Status Exam  Hygiene:  good  Dress:  casual  Attitude:  Cooperative  Motor Activity:  Appropriate  Speech:  Normal  Mood:  Sad and anxious  Affect:  Depressed and anxious  Thought Processes:  Goal directed  Thought Content:  normal  Suicidal Thoughts:  denies  Homicidal Thoughts:  denies  Crisis Safety Plan: yes, to come to the emergency room.  Hallucinations:  denies    Patient's Support Network Includes:  extended family and friends    Progress toward goal: Not at goal    Functional Status: Moderate impairment      Prognosis: Fair with Ongoing Treatment     Plan         Patient will adhere to medication regimen as prescribed and report any side effects. Patient will contact this office, call 911 or present to the nearest emergency room should suicidal or homicidal ideations occur. Provide Cognitive Behavioral Therapy and Integrative Therapy to improve functioning, maintain stability, and avoid decompensation and the need for higher level of care.          Return in about 2 weeks (around 5/30/2018), or if symptoms worsen or fail to improve.      This document signed by Rosemary Pacheco LCSW, May 17, 2018 8:23 AM

## 2018-06-28 ENCOUNTER — OFFICE VISIT (OUTPATIENT)
Dept: PSYCHIATRY | Facility: CLINIC | Age: 53
End: 2018-06-28

## 2018-06-28 VITALS — HEIGHT: 66 IN | BODY MASS INDEX: 43.71 KG/M2 | WEIGHT: 272 LBS

## 2018-06-28 DIAGNOSIS — F51.05 INSOMNIA DUE TO MENTAL CONDITION: ICD-10-CM

## 2018-06-28 DIAGNOSIS — G89.29 OTHER CHRONIC PAIN: ICD-10-CM

## 2018-06-28 DIAGNOSIS — F41.9 ANXIETY DISORDER, UNSPECIFIED TYPE: ICD-10-CM

## 2018-06-28 DIAGNOSIS — F33.1 MAJOR DEPRESSIVE DISORDER, RECURRENT EPISODE, MODERATE (HCC): Primary | ICD-10-CM

## 2018-06-28 PROCEDURE — 99213 OFFICE O/P EST LOW 20 MIN: CPT | Performed by: NURSE PRACTITIONER

## 2018-06-28 RX ORDER — TRAZODONE HYDROCHLORIDE 50 MG/1
TABLET ORAL
Qty: 30 TABLET | Refills: 2 | Status: SHIPPED | OUTPATIENT
Start: 2018-06-28 | End: 2018-09-15 | Stop reason: SDUPTHER

## 2018-06-28 RX ORDER — BUPROPION HYDROCHLORIDE 300 MG/1
300 TABLET ORAL EVERY MORNING
Qty: 30 TABLET | Refills: 2 | Status: SHIPPED | OUTPATIENT
Start: 2018-06-28 | End: 2018-09-15 | Stop reason: SDUPTHER

## 2018-06-28 RX ORDER — ARIPIPRAZOLE 5 MG/1
5 TABLET ORAL DAILY
Qty: 30 TABLET | Refills: 2 | Status: SHIPPED | OUTPATIENT
Start: 2018-06-28 | End: 2018-09-23 | Stop reason: SDUPTHER

## 2018-06-28 RX ORDER — BUSPIRONE HYDROCHLORIDE 15 MG/1
TABLET ORAL
Qty: 90 TABLET | Refills: 2 | Status: SHIPPED | OUTPATIENT
Start: 2018-06-28 | End: 2018-09-15 | Stop reason: SDUPTHER

## 2018-06-28 NOTE — PROGRESS NOTES
"  Subjective   Zhane Burnham is a 53 y.o. female who is here today for medication management follow up.    Chief Complaint: MDD, anxiety , insomnia     History of Present Illness Patient presents by herself for f/u. Patient reports she is doing well, helping her 'adoptive parents\" they are in their 80's and 90 in mornings getting the lady ready for her day. She states it helps her with meaning. She has her friends and is enjoying going out and sharing time with them. She has been having more difficulty with sleep, the trazodone makes her too tired for early mornings getting the lady ready so hasn't taken it. She likes it on weekends when she doesn't have to go over there so early. Appetite good, rates anxiety 3 and depression about the same.  Denies adverse effects from medications.   (Scales based on 0 - 10 with 10 being the worst)        The following portions of the patient's history were reviewed and updated as appropriate: allergies, current medications, past family history, past medical history, past social history, past surgical history and problem list.    Review of Systemsdenies fever, cough, s/s’s of infection, denies GI/ problems, denies new medical issues     Objective   Physical Exam  Height 167.6 cm (66\"), weight 123 kg (272 lb).    Allergies   Allergen Reactions   • Celebrex [Celecoxib] Swelling   • Red Dye    • Shellfish-Derived Products        Current Medications:   Current Outpatient Prescriptions   Medication Sig Dispense Refill   • ARIPiprazole (ABILIFY) 5 MG tablet Take 1 tablet by mouth Daily. 30 tablet 2   • azelastine (ASTELIN) 0.1 % nasal spray BID PRN     • BIOTIN PO Take  by mouth 2 (Two) Times a Day. 10,000mcg QD      • buPROPion XL (WELLBUTRIN XL) 300 MG 24 hr tablet Take 1 tablet by mouth Every Morning. 30 tablet 2   • busPIRone (BUSPAR) 15 MG tablet Take one tablet three times a day 90 tablet 2   • Cholecalciferol (VITAMIN D3) 2000 UNITS capsule 3,000 Units Daily.  0   • " cyclobenzaprine (FLEXERIL) 10 MG tablet As Needed.     • diphenhydrAMINE (BENADRYL) 25 mg capsule Take 25 mg by mouth Every Night.     • DULoxetine (CYMBALTA) 60 MG capsule Take one capsule twice a day 60 capsule 2   • fentaNYL (DURAGESIC) 50 MCG/HR patch 1 patch q 72 hours     • fludrocortisone 0.1 MG tablet Take 1/2 tablet daily. (Patient taking differently: Every Other Day. Take 1/2 tablet daily. ) 15 tablet 11   • fluticasone (FLONASE) 50 MCG/ACT nasal spray As Needed.  3   • gabapentin (NEURONTIN) 300 MG capsule 1 TID     • guaiFENesin (MUCUS RELIEF) 400 MG tablet Take 400 mg by mouth Every 4 (Four) Hours.     • hydrochlorothiazide (HYDRODIURIL) 25 MG tablet Take 25 mg by mouth Daily.     • lidocaine (XYLOCAINE) 5 % ointment APPLY TO THE AFFECTED AREA(S) UP TO TWO GRAMS PRN  0   • loratadine (CLARITIN) 10 MG tablet 1 QD     • meloxicam (MOBIC) 15 MG tablet 1 QD     • Omeprazole 20 MG tablet delayed-release 2 (Two) Times a Day.  0   • oxyCODONE-acetaminophen (PERCOCET)  MG per tablet 4 (Four) Times a Day. 1 QD     • simvastatin (ZOCOR) 40 MG tablet 1 QHS     • traZODone (DESYREL) 50 MG tablet Take 1/2 -1 tablet at bedtime for sleep 30 tablet 2     No current facility-administered medications for this visit.        Appearance: appropriate  Hygiene:   good  Cooperation:  Cooperative  Eye Contact:  Good  Psychomotor Behavior:  Appropriate  Mood:  within normal limits  Affect:  Appropriate  Hopelessness: Denies  Speech:  Normal  Thought Process:  Linear  Thought Content:  Normal  Concentration: Normal   Suicidal:  None  Homicidal:  None  Hallucinations:  None  Delusion:  None  Memory:  Intact  Orientation:  Person, Place, Time and Situation  Reliability:  fair  Insight:  Fair  Judgement:  Fair  Impulse Control:  Fair  Estimated Intelligence: average range   Physical/Medical Issues:  No     Assessment/Plan   Diagnoses and all orders for this visit:    Major depressive disorder, recurrent episode,  moderate    Anxiety disorder, unspecified type    Insomnia due to mental condition    Other chronic pain    Other orders  -     ARIPiprazole (ABILIFY) 5 MG tablet; Take 1 tablet by mouth Daily.  -     buPROPion XL (WELLBUTRIN XL) 300 MG 24 hr tablet; Take 1 tablet by mouth Every Morning.  -     busPIRone (BUSPAR) 15 MG tablet; Take one tablet three times a day  -     traZODone (DESYREL) 50 MG tablet; Take 1/2 -1 tablet at bedtime for sleep      IMPRESSION: stable  Refill med management,   Decreased the trazodone as 100 mg was too sedating when she woke up, will try on 50mg tablet 1/2 to 1 tab as needed for sleep      Treatment Plan: stabilize mood, patient will stay out of the hospital and be at optimal level of functioning, take all medication as prescribed. Patient verbalized  understanding and agreement to plan.      We discussed risks, benefits, and side effects of the above medications and the patient was agreeable with the plan. Patient was educated on the importance of compliance with treatment and follow-up appointments.     Sleep hygiene reviewed, encouraged more whole foods, less processed foods, fruits   Coping skills reviewed and encouraged positive framing of thoughts    Assisted patient in processing above session content; acknowledged and normalized patient’s thoughts, feelings, and concerns.  Applied  positive coping skills and behavior management in session.  Allowed patient to freely discuss issues without interruption or judgment. Provided safe, confidential environment to facilitate the development of positive therapeutic relationship and encourage open, honest communication. Assisted patient in identifying risk factors which would indicate the need for higher level of care including thoughts to harm self or others and/or self-harming behavior and encouraged patient to contact this office, call 911, or present to the nearest emergency room should any of these events occur. Discussed crisis  intervention services and means to access.  Patient adamantly and convincingly denies current suicidal or homicidal ideation or perceptual disturbance.    Instructed to call for questions or concerns and return early if necessary. Crisis plan reviewed including going to the Emergency department.    Return in about 3 months (around 9/28/2018).

## 2018-07-12 ENCOUNTER — OFFICE VISIT (OUTPATIENT)
Dept: PSYCHIATRY | Facility: CLINIC | Age: 53
End: 2018-07-12

## 2018-07-12 DIAGNOSIS — G89.29 OTHER CHRONIC PAIN: ICD-10-CM

## 2018-07-12 DIAGNOSIS — F51.05 INSOMNIA DUE TO MENTAL CONDITION: ICD-10-CM

## 2018-07-12 DIAGNOSIS — F41.9 ANXIETY DISORDER, UNSPECIFIED TYPE: ICD-10-CM

## 2018-07-12 DIAGNOSIS — F33.1 MAJOR DEPRESSIVE DISORDER, RECURRENT EPISODE, MODERATE (HCC): Primary | ICD-10-CM

## 2018-07-12 PROCEDURE — 90834 PSYTX W PT 45 MINUTES: CPT | Performed by: SOCIAL WORKER

## 2018-07-12 NOTE — PROGRESS NOTES
Date of Service: July 12, 2019  Time In: 230  Time Out: 310      PROGRESS NOTE  Data:  Zhane Burnham is a 53 y.o. female who met with the undersigned for a regularly scheduled individual outpatient psychotherapy session at  [facility]. Patient starts psychotherapy session by reporting she brought her dog to session today.  Patient discussed to still struggling with depression but has okay days.  Patient discussed having trouble financially and she pays a $40 co-pay for this office and any office visits within our clinic.  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  Patient states that she is trying to work outside in her yard when feeling able and being outdoors helps improve her mood.  Patient discussed chronic pain in seeing a pain management and discussed medication regimen that takes the edge off of her pain.  Patient discussed she is going out of town this weekend to [place] for 1 night with her group of friends.  Patient is that she is hoping that this helps improve her mood.  Patient says she tries to go visit her best friend's parents to get out of the house numerous times per week and takes the dog over there to visit him as well.    HPI: Depression, Anxiety, Medical conditions. Depression 7 and Anxiety 6      Clinical Maneuvering/Intervention:  Assisted patient in processing above session content; acknowledged and normalized patient’s thoughts, feelings, and concerns. assisted patient in processing her feelings of depression and anxiety and pain related to health condition. assisted patient in processing her thoughts and feelings passing her section 8 inspection.  Validated patients thoughts and feelings of realizing her dog/ is 15 years old and worries about it and aging. Assisted patient in  processing thoughts and feelings pain due to medical conditions i.e. fibromyalgia and arthritis.  Encouraged pt the importance of keeping all appointments and taking medications as prescribed and calling with any questions or concerns.  Assisted patient in understanding the importance of seeing the interdisciplinary team for continuity of care. Attempted to build rapport and trust with patient during psychotherapy session and reviewed safety plan for patient to call her friend, present to the ER or call 911 is SI or HI occurs. Patient denies SI and HI.  Patient admits to caring for others but not caring for herself as much and not fitting herself as a high priority since made a goal for decreasing her time spent with others doing things for them spending more time doing nice things for herself to feel accomplished in goals and self-care.  Educated on problem-solving skills using solution focused therapy.  Educated to work on balance in her life and focusing on balancing her well-being using mind body and spirit and trying to work on managing each part of her equally to promote healthy lifestyle.  Patient to continue to work on self-love exercises and revisited those from previous session.    Allowed patient to freely discuss issues without interruption or judgment. Provided safe, confidential environment to facilitate the development of positive therapeutic relationship and encourage open, honest communication. Assisted patient in identifying risk factors which would indicate the need for higher level of care including thoughts to harm self or others and/or self-harming behavior and encouraged patient to contact this office, call 911, or present to the nearest emergency room should any of these events occur. Discussed crisis intervention services and means to access.  Patient adamantly and convincingly denies current suicidal or homicidal ideation or perceptual disturbance.    Assessment     Diagnoses and all orders  for this visit:    Major depressive disorder, recurrent episode, moderate (CMS/HCC)    Anxiety disorder, unspecified type    Insomnia due to mental condition    Other chronic pain               Mental Status Exam  Hygiene:  good  Dress:  casual  Attitude:  Cooperative  Motor Activity:  Appropriate  Speech:  Normal  Mood:  Sad and anxious  Affect:  Depressed and anxious  Thought Processes:  Goal directed  Thought Content:  normal  Suicidal Thoughts:  denies  Homicidal Thoughts:  denies  Crisis Safety Plan: yes, to come to the emergency room.  Hallucinations:  denies    Patient's Support Network Includes:  extended family and friends    Progress toward goal: Not at goal    Functional Status: Moderate impairment     Prognosis: Fair with Ongoing Treatment     Plan         Patient will adhere to medication regimen as prescribed and report any side effects. Patient will contact this office, call 911 or present to the nearest emergency room should suicidal or homicidal ideations occur. Provide Cognitive Behavioral Therapy and Integrative Therapy to improve functioning, maintain stability, and avoid decompensation and the need for higher level of care.          Return in about 3 weeks (around 8/2/2018), or if symptoms worsen or fail to improve.      This document signed by Rosemary Pacheco LCSW, July 12, 2018 3:30 PM

## 2018-08-06 RX ORDER — ARIPIPRAZOLE 5 MG/1
TABLET ORAL
Qty: 30 TABLET | Refills: 0 | Status: SHIPPED | OUTPATIENT
Start: 2018-08-06 | End: 2018-11-12 | Stop reason: SDUPTHER

## 2018-09-06 ENCOUNTER — OFFICE VISIT (OUTPATIENT)
Dept: PSYCHIATRY | Facility: CLINIC | Age: 53
End: 2018-09-06

## 2018-09-06 DIAGNOSIS — G89.29 OTHER CHRONIC PAIN: ICD-10-CM

## 2018-09-06 DIAGNOSIS — F33.1 MAJOR DEPRESSIVE DISORDER, RECURRENT EPISODE, MODERATE (HCC): Primary | ICD-10-CM

## 2018-09-06 DIAGNOSIS — F51.05 INSOMNIA DUE TO MENTAL CONDITION: ICD-10-CM

## 2018-09-06 DIAGNOSIS — F41.9 ANXIETY DISORDER, UNSPECIFIED TYPE: ICD-10-CM

## 2018-09-06 PROCEDURE — 90837 PSYTX W PT 60 MINUTES: CPT | Performed by: SOCIAL WORKER
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
## 2018-09-17 RX ORDER — TRAZODONE HYDROCHLORIDE 50 MG/1
TABLET ORAL
Qty: 30 TABLET | Refills: 0 | Status: SHIPPED | OUTPATIENT
Start: 2018-09-17 | End: 2018-11-12 | Stop reason: SDUPTHER

## 2018-09-17 RX ORDER — BUPROPION HYDROCHLORIDE 300 MG/1
TABLET ORAL
Qty: 30 TABLET | Refills: 0 | Status: SHIPPED | OUTPATIENT
Start: 2018-09-17 | End: 2018-11-12 | Stop reason: SDUPTHER

## 2018-09-17 RX ORDER — BUSPIRONE HYDROCHLORIDE 15 MG/1
TABLET ORAL
Qty: 90 TABLET | Refills: 0 | Status: SHIPPED | OUTPATIENT
Start: 2018-09-17 | End: 2018-11-12 | Stop reason: SDUPTHER

## 2018-09-23 RX ORDER — ARIPIPRAZOLE 5 MG/1
TABLET ORAL
Qty: 30 TABLET | Refills: 0 | Status: SHIPPED | OUTPATIENT
Start: 2018-09-23 | End: 2018-11-12

## 2018-10-26 ENCOUNTER — TELEPHONE (OUTPATIENT)
Dept: ONCOLOGY | Facility: CLINIC | Age: 53
End: 2018-10-26

## 2018-10-26 NOTE — TELEPHONE ENCOUNTER
KENROY on Ms. Carla Jarrett .  Ms. Jarrett is listed as patients emergency contact.  The phone # listed for patient is answered by someone stating they do not know anyone listed by patients name.  I requested Ms. Jarrett ask patient to call me (Sofia) at our office if she would like to follow up with anther one of our physicians due to Dr. Bassett retiring.  Patients 1/22/19 appt is cancelled.

## 2018-11-12 ENCOUNTER — OFFICE VISIT (OUTPATIENT)
Dept: PSYCHIATRY | Facility: CLINIC | Age: 53
End: 2018-11-12

## 2018-11-12 VITALS — BODY MASS INDEX: 44.29 KG/M2 | WEIGHT: 275.6 LBS | HEIGHT: 66 IN

## 2018-11-12 DIAGNOSIS — F33.1 MAJOR DEPRESSIVE DISORDER, RECURRENT EPISODE, MODERATE (HCC): Primary | ICD-10-CM

## 2018-11-12 DIAGNOSIS — F41.1 GENERALIZED ANXIETY DISORDER: ICD-10-CM

## 2018-11-12 DIAGNOSIS — F51.05 INSOMNIA DUE TO MENTAL CONDITION: ICD-10-CM

## 2018-11-12 PROCEDURE — 99213 OFFICE O/P EST LOW 20 MIN: CPT | Performed by: NURSE PRACTITIONER

## 2018-11-12 RX ORDER — DULOXETIN HYDROCHLORIDE 60 MG/1
CAPSULE, DELAYED RELEASE ORAL
Qty: 60 CAPSULE | Refills: 2 | Status: SHIPPED | OUTPATIENT
Start: 2018-11-12 | End: 2019-02-14 | Stop reason: SDUPTHER

## 2018-11-12 RX ORDER — TRAZODONE HYDROCHLORIDE 50 MG/1
50 TABLET ORAL NIGHTLY PRN
Qty: 60 TABLET | Refills: 2 | Status: SHIPPED | OUTPATIENT
Start: 2018-11-12 | End: 2019-02-14 | Stop reason: SDUPTHER

## 2018-11-12 RX ORDER — BUPROPION HYDROCHLORIDE 300 MG/1
300 TABLET ORAL EVERY MORNING
Qty: 30 TABLET | Refills: 2 | Status: SHIPPED | OUTPATIENT
Start: 2018-11-12 | End: 2019-02-14 | Stop reason: SDUPTHER

## 2018-11-12 RX ORDER — BUSPIRONE HYDROCHLORIDE 15 MG/1
15 TABLET ORAL 3 TIMES DAILY
Qty: 90 TABLET | Refills: 2 | Status: SHIPPED | OUTPATIENT
Start: 2018-11-12 | End: 2019-02-14 | Stop reason: SDUPTHER

## 2018-11-12 RX ORDER — BUPROPION HYDROCHLORIDE 150 MG/1
150 TABLET ORAL DAILY
Qty: 30 TABLET | Refills: 2 | Status: SHIPPED | OUTPATIENT
Start: 2018-11-12 | End: 2019-02-14 | Stop reason: SDUPTHER

## 2018-11-12 RX ORDER — ARIPIPRAZOLE 5 MG/1
5 TABLET ORAL DAILY
Qty: 30 TABLET | Refills: 2 | Status: SHIPPED | OUTPATIENT
Start: 2018-11-12 | End: 2019-05-16

## 2018-12-20 ENCOUNTER — OFFICE VISIT (OUTPATIENT)
Dept: PSYCHIATRY | Facility: CLINIC | Age: 53
End: 2018-12-20

## 2018-12-20 VITALS — WEIGHT: 277 LBS | BODY MASS INDEX: 44.52 KG/M2 | HEIGHT: 66 IN

## 2018-12-20 DIAGNOSIS — F33.1 MAJOR DEPRESSIVE DISORDER, RECURRENT EPISODE, MODERATE (HCC): Primary | ICD-10-CM

## 2018-12-20 DIAGNOSIS — G89.29 OTHER CHRONIC PAIN: ICD-10-CM

## 2018-12-20 DIAGNOSIS — F41.1 GENERALIZED ANXIETY DISORDER: ICD-10-CM

## 2018-12-20 DIAGNOSIS — F51.05 INSOMNIA DUE TO MENTAL CONDITION: ICD-10-CM

## 2018-12-20 PROCEDURE — 99213 OFFICE O/P EST LOW 20 MIN: CPT | Performed by: NURSE PRACTITIONER

## 2018-12-20 NOTE — PROGRESS NOTES
"    Subjective   Zhane Burnham is a 53 y.o. female who is here today for medication management follow up.    Chief Complaint:     Major depressive disorder, recurrent episode, moderate     Generalized anxiety disorder    Insomnia due to mental condition    Other chronic pain      History of Present Illness Patient presents by herself reporting she believes the increase in Wellbutrin was helpful but she ran out and didn't  until today so feels some down. Will take a dose when she gets home, sleeping, eating well. Denies SI/HI AVH. She stays busy with friends and caring for elderly couple. Denies new issues, going to therapy  .  Denies adverse effects from medications.   (Scales based on 0 - 10 with 10 being the worst)        The following portions of the patient's history were reviewed and updated as appropriate: allergies, current medications, past family history, past medical history, past social history, past surgical history and problem list.    Review of Systemsdenies fever, cough, s/s’s of infection, denies GI/ problems, denies new medical issues     Objective   Physical Exam  Height 167.6 cm (66\"), weight 126 kg (277 lb).    Allergies   Allergen Reactions   • Celebrex [Celecoxib] Swelling   • Red Dye    • Shellfish-Derived Products        Current Medications:   Current Outpatient Medications   Medication Sig Dispense Refill   • ARIPiprazole (ABILIFY) 5 MG tablet Take 1 tablet by mouth Daily. 30 tablet 2   • azelastine (ASTELIN) 0.1 % nasal spray BID PRN     • BIOTIN PO Take  by mouth 2 (Two) Times a Day. 10,000mcg QD      • buPROPion XL (WELLBUTRIN XL) 150 MG 24 hr tablet Take 1 tablet by mouth Daily. Take with the 300mg bupropion xl 30 tablet 2   • buPROPion XL (WELLBUTRIN XL) 300 MG 24 hr tablet Take 1 tablet by mouth Every Morning. 30 tablet 2   • busPIRone (BUSPAR) 15 MG tablet Take 1 tablet by mouth 3 (Three) Times a Day. 90 tablet 2   • Cholecalciferol (VITAMIN D3) 2000 UNITS capsule 3,000 " Units Daily.  0   • cyclobenzaprine (FLEXERIL) 10 MG tablet As Needed.     • diphenhydrAMINE (BENADRYL) 25 mg capsule Take 25 mg by mouth Every Night.     • DULoxetine (CYMBALTA) 60 MG capsule Take one capsule twice a day 60 capsule 2   • fentaNYL (DURAGESIC) 50 MCG/HR patch 1 patch q 72 hours     • fludrocortisone 0.1 MG tablet Take 1/2 tablet daily. (Patient taking differently: Every Other Day. Take 1/2 tablet daily. ) 15 tablet 11   • fluticasone (FLONASE) 50 MCG/ACT nasal spray As Needed.  3   • gabapentin (NEURONTIN) 300 MG capsule 1 TID     • guaiFENesin (MUCUS RELIEF) 400 MG tablet Take 400 mg by mouth Every 4 (Four) Hours.     • hydrochlorothiazide (HYDRODIURIL) 25 MG tablet Take 25 mg by mouth Daily.     • lidocaine (XYLOCAINE) 5 % ointment APPLY TO THE AFFECTED AREA(S) UP TO TWO GRAMS PRN  0   • loratadine (CLARITIN) 10 MG tablet 1 QD     • meloxicam (MOBIC) 15 MG tablet 1 QD     • Omeprazole 20 MG tablet delayed-release 2 (Two) Times a Day.  0   • oxyCODONE-acetaminophen (PERCOCET)  MG per tablet 4 (Four) Times a Day. 1 QD     • simvastatin (ZOCOR) 40 MG tablet 1 QHS     • traZODone (DESYREL) 50 MG tablet Take 1 tablet by mouth At Night As Needed for Sleep. Take one to two tablets at bedtime as needed for sleep 60 tablet 2     No current facility-administered medications for this visit.        Appearance: appropriate  Hygiene:   good  Cooperation:  Cooperative  Eye Contact:  Good  Psychomotor Behavior:  No psychomotor agitation/retardation, No EPS, No motor tics  Mood:  within normal limits  Affect:  Appropriate  Hopelessness: Denies  Speech:  Normal  Thought Process:  Linear  Thought Content:  Normal  Concentration: Normal   Suicidal:  None  Homicidal:  None  Hallucinations:  None  Delusion:  None  Memory:  Intact  Orientation:  Person, Place, Time and Situation  Reliability:  fair  Insight:  Fair  Judgement:  Fair  Impulse Control:  Fair  Estimated Intelligence: average range      Assessment/Plan    Diagnoses and all orders for this visit:    Major depressive disorder, recurrent episode, moderate (CMS/HCC)    Generalized anxiety disorder    Insomnia due to mental condition    Other chronic pain      IMPRESSION: improved mood   PLAN:   Cont current med management and therapy   Cont Wellbutrin XL 450mg total , she ran out and didn't refill, got it refilled to day will restart it      We discussed risks, benefits, and side effects of the above medications and the patient was agreeable with the plan. Patient was educated on the importance of compliance with treatment and follow-up appointments.     Counseled patient regarding multimodal approach with healthy nutrition, healthy sleep, regular physical activity, social activities, counseling, and medications. Encouraged to practice lateral sleep position. Avoid alcohol and stimulants ie caffeine close to bedtime.      Coping skills reviewed and encouraged positive framing of thoughts    Assisted patient in processing above session content; acknowledged and normalized patient’s thoughts, feelings, and concerns.  Applied  positive coping skills and behavior management in session.  Allowed patient to freely discuss issues without interruption or judgment. Provided safe, confidential environment to facilitate the development of positive therapeutic relationship and encourage open, honest communication. Assisted patient in identifying risk factors which would indicate the need for higher level of care including thoughts to harm self or others and/or self-harming behavior and encouraged patient to contact this office, call 911, or present to the nearest emergency room should any of these events occur. Discussed crisis intervention services and means to access.  Patient adamantly and convincingly denies current suicidal or homicidal ideation or perceptual disturbance.    Treatment Plan: stabilize mood, patient will stay out of the hospital and be at optimal level of  functioning, take all medication as prescribed. Patient verbalized  understanding and agreement to plan.    Instructed to call for questions or concerns and return early if necessary. Crisis plan reviewed including going to the Emergency department.    Return in about 8 weeks (around 2/14/2019).

## 2019-02-08 RX ORDER — DULOXETIN HYDROCHLORIDE 60 MG/1
CAPSULE, DELAYED RELEASE ORAL
Qty: 60 CAPSULE | Refills: 2 | Status: CANCELLED | OUTPATIENT
Start: 2019-02-08

## 2019-02-14 ENCOUNTER — OFFICE VISIT (OUTPATIENT)
Dept: PSYCHIATRY | Facility: CLINIC | Age: 54
End: 2019-02-14

## 2019-02-14 VITALS — WEIGHT: 272 LBS | BODY MASS INDEX: 43.71 KG/M2 | HEIGHT: 66 IN

## 2019-02-14 DIAGNOSIS — G89.29 OTHER CHRONIC PAIN: ICD-10-CM

## 2019-02-14 DIAGNOSIS — F41.1 GENERALIZED ANXIETY DISORDER: ICD-10-CM

## 2019-02-14 DIAGNOSIS — F33.1 MAJOR DEPRESSIVE DISORDER, RECURRENT EPISODE, MODERATE (HCC): Primary | ICD-10-CM

## 2019-02-14 DIAGNOSIS — F51.05 INSOMNIA DUE TO MENTAL CONDITION: ICD-10-CM

## 2019-02-14 PROCEDURE — 99213 OFFICE O/P EST LOW 20 MIN: CPT | Performed by: NURSE PRACTITIONER

## 2019-02-14 RX ORDER — BUPROPION HYDROCHLORIDE 300 MG/1
300 TABLET ORAL EVERY MORNING
Qty: 30 TABLET | Refills: 5 | Status: SHIPPED | OUTPATIENT
Start: 2019-02-14 | End: 2019-05-16 | Stop reason: SDUPTHER

## 2019-02-14 RX ORDER — DULOXETIN HYDROCHLORIDE 60 MG/1
CAPSULE, DELAYED RELEASE ORAL
Qty: 60 CAPSULE | Refills: 5 | Status: SHIPPED | OUTPATIENT
Start: 2019-02-14 | End: 2019-09-24 | Stop reason: SDUPTHER

## 2019-02-14 RX ORDER — TRAZODONE HYDROCHLORIDE 50 MG/1
50 TABLET ORAL NIGHTLY PRN
Qty: 60 TABLET | Refills: 5 | Status: SHIPPED | OUTPATIENT
Start: 2019-02-14 | End: 2019-05-16 | Stop reason: SDUPTHER

## 2019-02-14 RX ORDER — BUSPIRONE HYDROCHLORIDE 15 MG/1
15 TABLET ORAL 3 TIMES DAILY
Qty: 90 TABLET | Refills: 5 | Status: SHIPPED | OUTPATIENT
Start: 2019-02-14 | End: 2019-05-16 | Stop reason: SDUPTHER

## 2019-02-14 RX ORDER — BUPROPION HYDROCHLORIDE 150 MG/1
150 TABLET ORAL DAILY
Qty: 30 TABLET | Refills: 5 | Status: SHIPPED | OUTPATIENT
Start: 2019-02-14 | End: 2019-05-16 | Stop reason: SDUPTHER

## 2019-02-14 NOTE — PROGRESS NOTES
"    Subjective   Zhane Burnham is a 53 y.o. female who is here today for medication management follow up.    Chief Complaint:    Major depressive disorder, recurrent episode, moderate (CMS/HCC)    Generalized anxiety disorder    Insomnia due to mental condition    Other chronic pain      History of Present Illness Patient presents by herself for f/u. She states she has a lot of chronic pain still and fibromyalgia and just not a good day. Otherwise, sleeping well, mood ok, gets with friends. Her energy is low when she hurts and looking forward to spring. She helps her neighbors who are legally blind. Anxiety about a 3.  .  Denies adverse effects from medications.   (Scales based on 0 - 10 with 10 being the worst)        The following portions of the patient's history were reviewed and updated as appropriate: allergies, current medications, past family history, past medical history, past social history, past surgical history and problem list.    Review of Systemsdenies fever, cough, s/s’s of infection, denies GI/ problems, denies new medical issues     Objective   Physical Exam  Height 167.6 cm (66\"), weight 123 kg (272 lb).    Allergies   Allergen Reactions   • Celebrex [Celecoxib] Swelling   • Red Dye    • Shellfish-Derived Products        Current Medications:   Current Outpatient Medications   Medication Sig Dispense Refill   • ARIPiprazole (ABILIFY) 5 MG tablet Take 1 tablet by mouth Daily. 30 tablet 2   • azelastine (ASTELIN) 0.1 % nasal spray BID PRN     • BIOTIN PO Take  by mouth 2 (Two) Times a Day. 10,000mcg QD      • buPROPion XL (WELLBUTRIN XL) 150 MG 24 hr tablet Take 1 tablet by mouth Daily. Take with the 300mg bupropion xl 30 tablet 5   • buPROPion XL (WELLBUTRIN XL) 300 MG 24 hr tablet Take 1 tablet by mouth Every Morning. 30 tablet 5   • busPIRone (BUSPAR) 15 MG tablet Take 1 tablet by mouth 3 (Three) Times a Day. 90 tablet 5   • Cholecalciferol (VITAMIN D3) 2000 UNITS capsule 3,000 Units Daily.  0 "   • cyclobenzaprine (FLEXERIL) 10 MG tablet As Needed.     • diphenhydrAMINE (BENADRYL) 25 mg capsule Take 25 mg by mouth Every Night.     • DULoxetine (CYMBALTA) 60 MG capsule Take one capsule twice a day 60 capsule 5   • fludrocortisone 0.1 MG tablet Take 1/2 tablet daily. (Patient taking differently: Every Other Day. Take 1/2 tablet daily. ) 15 tablet 11   • fluticasone (FLONASE) 50 MCG/ACT nasal spray As Needed.  3   • gabapentin (NEURONTIN) 300 MG capsule 1 TID     • guaiFENesin (MUCUS RELIEF) 400 MG tablet Take 400 mg by mouth Every 4 (Four) Hours.     • hydrochlorothiazide (HYDRODIURIL) 25 MG tablet Take 25 mg by mouth Daily.     • lidocaine (XYLOCAINE) 5 % ointment APPLY TO THE AFFECTED AREA(S) UP TO TWO GRAMS PRN  0   • loratadine (CLARITIN) 10 MG tablet 1 QD     • meloxicam (MOBIC) 15 MG tablet 1 QD     • Omeprazole 20 MG tablet delayed-release 2 (Two) Times a Day.  0   • oxyCODONE-acetaminophen (PERCOCET)  MG per tablet 4 (Four) Times a Day. 1 QD     • simvastatin (ZOCOR) 40 MG tablet 1 QHS     • traZODone (DESYREL) 50 MG tablet Take 1 tablet by mouth At Night As Needed for Sleep. Take one to two tablets at bedtime as needed for sleep 60 tablet 5     No current facility-administered medications for this visit.        Appearance: appropriate  Hygiene:   good  Cooperation:  Cooperative  Eye Contact:  Good  Psychomotor Behavior:  No psychomotor agitation/retardation, No EPS, No motor tics  Mood:  within normal limits  Affect:  Appropriate  Hopelessness: Denies  Speech:  Normal  Thought Process:  Linear  Thought Content:  Normal  Concentration: Normal   Suicidal:  None  Homicidal:  None  Hallucinations:  None  Delusion:  None  Memory:  Intact  Orientation:  Person, Place, Time and Situation  Reliability:  fair  Insight:  Fair  Judgement:  Fair  Impulse Control:  Fair  Estimated Intelligence: average range    Assessment/Plan   Diagnoses and all orders for this visit:    Major depressive disorder,  recurrent episode, moderate (CMS/HCC)    Generalized anxiety disorder    Insomnia due to mental condition    Other chronic pain    Other orders  -     traZODone (DESYREL) 50 MG tablet; Take 1 tablet by mouth At Night As Needed for Sleep. Take one to two tablets at bedtime as needed for sleep  -     DULoxetine (CYMBALTA) 60 MG capsule; Take one capsule twice a day  -     buPROPion XL (WELLBUTRIN XL) 150 MG 24 hr tablet; Take 1 tablet by mouth Daily. Take with the 300mg bupropion xl  -     buPROPion XL (WELLBUTRIN XL) 300 MG 24 hr tablet; Take 1 tablet by mouth Every Morning.  -     busPIRone (BUSPAR) 15 MG tablet; Take 1 tablet by mouth 3 (Three) Times a Day.        IMPRESSION: stable in light of chronic pain  PLAN:   Refill meds as stated above         We discussed risks, benefits, and side effects of the above medications and the patient was agreeable with the plan. Patient was educated on the importance of compliance with treatment and follow-up appointments.     Counseled patient regarding multimodal approach with healthy nutrition, healthy sleep, regular physical activity, social activities, counseling, and medications. Encouraged to practice lateral sleep position. Avoid alcohol and stimulants ie caffeine close to bedtime.      Coping skills reviewed and encouraged positive framing of thoughts    Assisted patient in processing above session content; acknowledged and normalized patient’s thoughts, feelings, and concerns.  Applied  positive coping skills and behavior management in session.  Allowed patient to freely discuss issues without interruption or judgment. Provided safe, confidential environment to facilitate the development of positive therapeutic relationship and encourage open, honest communication. Assisted patient in identifying risk factors which would indicate the need for higher level of care including thoughts to harm self or others and/or self-harming behavior and encouraged patient to contact  this office, call 911, or present to the nearest emergency room should any of these events occur. Discussed crisis intervention services and means to access.  Patient adamantly and convincingly denies current suicidal or homicidal ideation or perceptual disturbance.    Treatment Plan: stabilize mood, patient will stay out of the hospital and be at optimal level of functioning, take all medication as prescribed. Patient verbalized  understanding and agreement to plan.    Instructed to call for questions or concerns and return early if necessary. Crisis plan reviewed including going to the Emergency department.    Return in about 3 months (around 5/14/2019).

## 2019-05-16 ENCOUNTER — OFFICE VISIT (OUTPATIENT)
Dept: PSYCHIATRY | Facility: CLINIC | Age: 54
End: 2019-05-16

## 2019-05-16 VITALS — HEIGHT: 66 IN | WEIGHT: 273 LBS | BODY MASS INDEX: 43.87 KG/M2

## 2019-05-16 DIAGNOSIS — F41.1 GENERALIZED ANXIETY DISORDER: ICD-10-CM

## 2019-05-16 DIAGNOSIS — F33.1 MAJOR DEPRESSIVE DISORDER, RECURRENT EPISODE, MODERATE (HCC): Primary | ICD-10-CM

## 2019-05-16 PROCEDURE — 99214 OFFICE O/P EST MOD 30 MIN: CPT | Performed by: NURSE PRACTITIONER

## 2019-05-16 RX ORDER — TRAZODONE HYDROCHLORIDE 50 MG/1
50 TABLET ORAL NIGHTLY PRN
Qty: 60 TABLET | Refills: 5 | Status: SHIPPED | OUTPATIENT
Start: 2019-05-16 | End: 2020-09-29 | Stop reason: DRUGHIGH

## 2019-05-16 RX ORDER — BUPROPION HYDROCHLORIDE 150 MG/1
150 TABLET ORAL DAILY
Qty: 30 TABLET | Refills: 5 | Status: SHIPPED | OUTPATIENT
Start: 2019-05-16 | End: 2019-09-24 | Stop reason: SDUPTHER

## 2019-05-16 RX ORDER — BUSPIRONE HYDROCHLORIDE 15 MG/1
15 TABLET ORAL 3 TIMES DAILY
Qty: 90 TABLET | Refills: 5 | Status: SHIPPED | OUTPATIENT
Start: 2019-05-16 | End: 2019-09-09 | Stop reason: SDUPTHER

## 2019-05-16 RX ORDER — BUPROPION HYDROCHLORIDE 300 MG/1
300 TABLET ORAL EVERY MORNING
Qty: 30 TABLET | Refills: 5 | Status: SHIPPED | OUTPATIENT
Start: 2019-05-16 | End: 2019-09-24 | Stop reason: SDUPTHER

## 2019-05-16 RX ORDER — DULOXETIN HYDROCHLORIDE 60 MG/1
CAPSULE, DELAYED RELEASE ORAL
Qty: 60 CAPSULE | Refills: 5 | Status: SHIPPED | OUTPATIENT
Start: 2019-05-16 | End: 2020-09-16 | Stop reason: SDUPTHER

## 2019-05-16 RX ORDER — DICLOFENAC SODIUM 75 MG/1
75 TABLET, DELAYED RELEASE ORAL 2 TIMES DAILY
Refills: 1 | COMMUNITY
Start: 2019-05-02 | End: 2019-09-24

## 2019-05-16 RX ORDER — OMEPRAZOLE 40 MG/1
CAPSULE, DELAYED RELEASE ORAL
COMMUNITY
Start: 2019-05-15 | End: 2019-09-24

## 2019-05-16 RX ORDER — GABAPENTIN 400 MG/1
CAPSULE ORAL
Refills: 1 | COMMUNITY
Start: 2019-05-02 | End: 2019-10-14

## 2019-05-16 RX ORDER — ARIPIPRAZOLE 10 MG/1
10 TABLET ORAL DAILY
Qty: 30 TABLET | Refills: 0 | Status: SHIPPED | OUTPATIENT
Start: 2019-05-16 | End: 2019-09-24

## 2019-05-16 NOTE — PROGRESS NOTES
Zhane Burnham is a 54 y.o. female is here today for medication management follow-up.    Chief Complaint:      ICD-10-CM ICD-9-CM   1. Major depressive disorder, recurrent episode, moderate (CMS/HCC) F33.1 296.32   2. Generalized anxiety disorder F41.1 300.02       History of Present Illness:  Pt presents for follow up visit and medication management of depressive and anxiety symptoms. Pt reports daily depressive symptoms and caregiver stress related to here friend who is on hospice care. Denies anxiety and insomnia symptoms.    Pt reports the presence/absence of the following depressive symptoms: (+) depressed mood, (-) reduced appetite, (-) increased appetite, (-) poor concentration, (-) hopelessness, (-) worthlessness, (-) insomnia, (-) hypersomnia, (-) psychomotor agitation, (-) irritability, (+) anhedonia, (+) amotivation, (+) fatigue, (-) suicidal ideation, (-) suicidal plan, (-) suicidal intent, (-) recurrent thoughts about death, and (-) homicidal ideation.    The following portions of the patient's history were reviewed and updated as appropriate: allergies, current medications, past family history, past medical history, past social history, past surgical history and problem list.    Review of Systems;;  Review of Systems   Constitutional: Negative.  Negative for activity change, appetite change, unexpected weight gain and unexpected weight loss.   Respiratory: Negative.    Cardiovascular: Negative.  Negative for chest pain.   Gastrointestinal: Negative.  Negative for diarrhea, nausea and vomiting.   Musculoskeletal: Positive for arthralgias.   Skin: Negative for rash and bruise.   Neurological: Negative for dizziness, seizures, speech difficulty, headache and confusion.   Psychiatric/Behavioral: Positive for dysphoric mood and stress. Negative for agitation, behavioral problems, decreased concentration, hallucinations, self-injury, sleep disturbance, negative for hyperactivity and depressed mood. The  "patient is not nervous/anxious.        Physical Exam;;  Physical Exam  Height 167.6 cm (66\"), weight 124 kg (273 lb).    Current Medications;;    Current Outpatient Medications:   •  azelastine (ASTELIN) 0.1 % nasal spray, BID PRN, Disp: , Rfl:   •  BIOTIN PO, Take  by mouth 2 (Two) Times a Day. 10,000mcg QD , Disp: , Rfl:   •  buPROPion XL (WELLBUTRIN XL) 150 MG 24 hr tablet, Take 1 tablet by mouth Daily. Take with the 300mg bupropion xl, Disp: 30 tablet, Rfl: 5  •  buPROPion XL (WELLBUTRIN XL) 300 MG 24 hr tablet, Take 1 tablet by mouth Every Morning., Disp: 30 tablet, Rfl: 5  •  busPIRone (BUSPAR) 15 MG tablet, Take 1 tablet by mouth 3 (Three) Times a Day., Disp: 90 tablet, Rfl: 5  •  cyclobenzaprine (FLEXERIL) 10 MG tablet, As Needed., Disp: , Rfl:   •  diclofenac (VOLTAREN) 75 MG EC tablet, Take 75 mg by mouth 2 (Two) Times a Day., Disp: , Rfl: 1  •  DULoxetine (CYMBALTA) 60 MG capsule, Take one capsule twice a day, Disp: 60 capsule, Rfl: 5  •  loratadine (CLARITIN) 10 MG tablet, 1 QD, Disp: , Rfl:   •  omeprazole (priLOSEC) 40 MG capsule, , Disp: , Rfl:   •  oxyCODONE-acetaminophen (PERCOCET)  MG per tablet, 4 (Four) Times a Day. 1 QD, Disp: , Rfl:   •  simvastatin (ZOCOR) 40 MG tablet, 1 QHS, Disp: , Rfl:   •  traZODone (DESYREL) 50 MG tablet, Take 1 tablet by mouth At Night As Needed for Sleep. Take one to two tablets at bedtime as needed for sleep, Disp: 60 tablet, Rfl: 5  •  ARIPiprazole (ABILIFY) 10 MG tablet, Take 1 tablet by mouth Daily., Disp: 30 tablet, Rfl: 0  •  DULoxetine (CYMBALTA) 60 MG capsule, Take 2 capsules PO daily, Disp: 60 capsule, Rfl: 5  •  gabapentin (NEURONTIN) 400 MG capsule, TAKE ONE CAPSULE BY MOUTH EVERY MORNING AND EVERY EVENING AND TWO AT BEDTIME, Disp: , Rfl: 1    Lab Results:       Mental Status Exam:   Hygiene:   good  Cooperation:  Cooperative  Eye Contact:  Good  Psychomotor Behavior:  Appropriate  Mood:sad  Affect:  Appropriate  Hopelessness: Denies  Speech:  " Normal  Thought Process:  Goal directed  Thought Content:  Normal  Suicidal:  None  Homicidal:  None  Hallucinations:  None  Delusion:  None  Memory:  Intact  Orientation:  Person, place, time, and situation  Reliability:  good  Insight:  Good  Judgement:  Good  Impulse Control:  Good  Physical/Medical Issues:  No       Assessment Plan;;  Diagnoses and all orders for this visit:    Major depressive disorder, recurrent episode, moderate (CMS/HCC)  -     buPROPion XL (WELLBUTRIN XL) 150 MG 24 hr tablet; Take 1 tablet by mouth Daily. Take with the 300mg bupropion xl  -     buPROPion XL (WELLBUTRIN XL) 300 MG 24 hr tablet; Take 1 tablet by mouth Every Morning.  -     DULoxetine (CYMBALTA) 60 MG capsule; Take 2 capsules PO daily  -     ARIPiprazole (ABILIFY) 10 MG tablet; Take 1 tablet by mouth Daily.  -     traZODone (DESYREL) 50 MG tablet; Take 1 tablet by mouth At Night As Needed for Sleep. Take one to two tablets at bedtime as needed for sleep    Generalized anxiety disorder  -     busPIRone (BUSPAR) 15 MG tablet; Take 1 tablet by mouth 3 (Three) Times a Day.    Continue present medications    Increase Abilify     A psychological evaluation was conducted in order to assess past and current level of functioning. Areas assessed included, but were not limited to: perception of social support, perception of ability to face and deal with challenges in life (positive functioning), anxiety symptoms, depressive symptoms, perspective on beliefs/belief system, coping skills for stress, intelligence level,  Therapeutic rapport was established. Interventions conducted today were geared towards incorporating medication management along with support for continued therapy. Education was also provided as to the med management with this provider and what to expect in subsequent sessions.    We discussed risks, benefits,goals and side effects of the above medication and the patient was agreeable with the plan.Patient was educated on  the importance of compliance with treatment and follow-up appointments. Patient is aware to contact the [facility] with any worsening of symptoms. To call for questions or concerns and return early if necessary. Patent is agreeable to go to the Emergency Department or call 911 should they begin SI/HI.     Treatment Plan: stabilize mood,  patient will stay out of the hospital and be at optimal level of functioning, take all medication as prescribed. Patient verbalized  understanding and agreement to plan.    Return in about 4 weeks (around 6/13/2019).    Anat Shankar, DNP, APRN, PMHNP-BC, FNP-C

## 2019-08-05 RX ORDER — BUPROPION HYDROCHLORIDE 300 MG/1
TABLET ORAL
Qty: 30 TABLET | Refills: 5 | OUTPATIENT
Start: 2019-08-05

## 2019-09-05 RX ORDER — BUSPIRONE HYDROCHLORIDE 15 MG/1
TABLET ORAL
Qty: 90 TABLET | Refills: 5 | OUTPATIENT
Start: 2019-09-05

## 2019-09-09 DIAGNOSIS — F41.1 GENERALIZED ANXIETY DISORDER: ICD-10-CM

## 2019-09-09 RX ORDER — BUSPIRONE HYDROCHLORIDE 15 MG/1
15 TABLET ORAL 3 TIMES DAILY
Qty: 90 TABLET | Refills: 5 | Status: SHIPPED | OUTPATIENT
Start: 2019-09-09 | End: 2021-08-09

## 2019-09-09 RX ORDER — ARIPIPRAZOLE 5 MG/1
TABLET ORAL
Qty: 30 TABLET | Refills: 2 | OUTPATIENT
Start: 2019-09-09

## 2019-09-09 NOTE — TELEPHONE ENCOUNTER
Ray will you please check who has last seen pt , I am no longer in [place] and pts have been reassigned and most have now seen their new provider, This pt saw BRIANNA SCHNEIDER, thanks

## 2019-09-24 ENCOUNTER — OFFICE VISIT (OUTPATIENT)
Dept: INTERNAL MEDICINE | Facility: CLINIC | Age: 54
End: 2019-09-24

## 2019-09-24 VITALS
HEIGHT: 65 IN | WEIGHT: 277 LBS | OXYGEN SATURATION: 95 % | SYSTOLIC BLOOD PRESSURE: 126 MMHG | HEART RATE: 97 BPM | DIASTOLIC BLOOD PRESSURE: 84 MMHG | TEMPERATURE: 97.8 F | BODY MASS INDEX: 46.15 KG/M2

## 2019-09-24 DIAGNOSIS — I10 ESSENTIAL HYPERTENSION: ICD-10-CM

## 2019-09-24 DIAGNOSIS — F32.1 CURRENT MODERATE EPISODE OF MAJOR DEPRESSIVE DISORDER WITHOUT PRIOR EPISODE (HCC): ICD-10-CM

## 2019-09-24 DIAGNOSIS — Z12.11 ENCOUNTER FOR SCREENING COLONOSCOPY: ICD-10-CM

## 2019-09-24 DIAGNOSIS — M81.0 AGE-RELATED OSTEOPOROSIS WITHOUT CURRENT PATHOLOGICAL FRACTURE: ICD-10-CM

## 2019-09-24 DIAGNOSIS — M79.7 FIBROMYALGIA: ICD-10-CM

## 2019-09-24 DIAGNOSIS — Z76.89 ENCOUNTER TO ESTABLISH CARE: Primary | ICD-10-CM

## 2019-09-24 DIAGNOSIS — E78.2 MIXED HYPERLIPIDEMIA: ICD-10-CM

## 2019-09-24 DIAGNOSIS — K21.9 GASTROESOPHAGEAL REFLUX DISEASE WITHOUT ESOPHAGITIS: ICD-10-CM

## 2019-09-24 PROBLEM — I95.9 HYPOTENSION: Status: RESOLVED | Noted: 2017-06-21 | Resolved: 2019-09-24

## 2019-09-24 PROCEDURE — G0008 ADMIN INFLUENZA VIRUS VAC: HCPCS | Performed by: INTERNAL MEDICINE

## 2019-09-24 PROCEDURE — 90674 CCIIV4 VAC NO PRSV 0.5 ML IM: CPT | Performed by: INTERNAL MEDICINE

## 2019-09-24 PROCEDURE — 99204 OFFICE O/P NEW MOD 45 MIN: CPT | Performed by: INTERNAL MEDICINE

## 2019-09-24 RX ORDER — PANTOPRAZOLE SODIUM 40 MG/1
40 TABLET, DELAYED RELEASE ORAL DAILY
COMMUNITY
End: 2020-05-12 | Stop reason: SDUPTHER

## 2019-09-24 RX ORDER — HYDROCHLOROTHIAZIDE 25 MG/1
25 TABLET ORAL DAILY
COMMUNITY
End: 2020-02-13 | Stop reason: SDUPTHER

## 2019-09-24 RX ORDER — BUPROPION HYDROCHLORIDE 75 MG/1
75 TABLET ORAL DAILY
COMMUNITY
End: 2020-02-13

## 2019-09-24 RX ORDER — MELOXICAM 15 MG/1
15 TABLET ORAL DAILY
COMMUNITY
End: 2020-01-10

## 2019-09-24 NOTE — PROGRESS NOTES
Chief Complaint   Patient presents with   • Establish Care     with Dr. Vermeesch today. Pt states she's had burning with urination .     Michael Burnham is a 54 y.o. female.     Pt is here to be established today.   PMH of HTN, HLD, RA with chronic pain, GERD, depression, FM, DJD, allergies and history of breast cancer.   PSH, FH, SH, meds and allergies reviewed.   HTN/HLD-  Her BP is well controlled today.  She does not check it at home.  She denies CP, palpitations.  She does have some SOA usually at home and is concerned she has mold.  She does have some wheezing.  She does have some edema.  Admits she eats a lot of salt.  She does take her zocor every PM.  She does not go out to eat often, she tends to snack rather than cook.  She does eat fried and fast food.  She does not exercise, says she has poor balance  RA/DJD/FM- she has DJD in left knee.  She has been told she has RA but is not on any meds for this.  She has seen Dr Merino in the past and was given mobic.  She goes to pain clinic, DR Tellez,  and is on gabapentin for her FM and is on percocet for scoliosis and DDD of her back.   GERD-  She has been on protonix and it works fair, but she takes it with all other meds  Depression-  She is seeing a psychiatrist and therapist for this.  Her doctor is working on med adjustments at this time.  The trazodone helps her sleep  Allergies- not too bothersome for her  H/O breast cancer- she has had bilateral mastectomy and she had chemo.  She has had partial reconstruction, no nipple construction  HCM- colonoscopy several yrs ago-she had no polyps.  Last mammogram 2012.  It is time for a pap.  It is time for flu shot today.  She got Hep A #1 last yr.   She is currently complaining of burning on urination.         The following portions of the patient's history were reviewed and updated as appropriate: allergies, current medications, past family history, past medical history, past social history, past  "surgical history and problem list.    Review of Systems   Constitutional: Positive for fatigue.   HENT: Positive for hearing loss and trouble swallowing.    Eyes: Positive for itching.   Respiratory: Positive for shortness of breath and wheezing.    Cardiovascular: Positive for leg swelling.   Gastrointestinal: Positive for constipation.        Occasional GERD symptoms, however patient does not take Protonix properly   Endocrine: Negative.    Genitourinary: Positive for dysuria and frequency.   Musculoskeletal: Positive for arthralgias, back pain and myalgias.   Allergic/Immunologic: Positive for environmental allergies.   Neurological: Positive for dizziness, weakness and numbness.   Hematological: Bruises/bleeds easily.   Psychiatric/Behavioral: Positive for dysphoric mood and sleep disturbance. The patient is nervous/anxious.        Objective   /84   Pulse 97   Temp 97.8 °F (36.6 °C)   Ht 165.1 cm (65\")   Wt 126 kg (277 lb)   SpO2 95%   BMI 46.10 kg/m²   Body mass index is 46.1 kg/m².  Physical Exam   Constitutional: She is oriented to person, place, and time. She appears well-developed and well-nourished. No distress.   Very pleasant female who appears older than her stated age, she is in no obvious distress today   HENT:   Head: Normocephalic and atraumatic.   Right Ear: External ear normal.   Left Ear: External ear normal.   Mouth/Throat: Oropharynx is clear and moist.   Tympanic membranes normal   Eyes: Conjunctivae and EOM are normal. Pupils are equal, round, and reactive to light. Right eye exhibits no discharge. Left eye exhibits no discharge.   Neck: Normal range of motion. Neck supple. No thyromegaly present.   Cardiovascular: Normal rate, regular rhythm, normal heart sounds and intact distal pulses.   No murmur heard.  No carotid bruits   Pulmonary/Chest: Effort normal and breath sounds normal. No respiratory distress. She has no wheezes. She has no rales.   Abdominal: Soft. Bowel sounds are " normal. She exhibits no distension and no mass. There is no tenderness.   Obesity limits exam, however no palpable masses or hepatosplenomegaly noted   Musculoskeletal: Normal range of motion. She exhibits no edema.   Lymphadenopathy:     She has no cervical adenopathy.   Neurological: She is alert and oriented to person, place, and time. She displays normal reflexes. No cranial nerve deficit. Coordination normal.   Psychiatric: Her behavior is normal. Judgment and thought content normal.   Slightly flat affect noted, however overall mood was good with no obvious depression or anxiety   Nursing note and vitals reviewed.      Assessment/Plan   Zhane Burnham is here today and the following problems have been addressed:      Zhane was seen today for establish care.    Diagnoses and all orders for this visit:    Encounter to establish care  -     CBC & Differential  -     Comprehensive Metabolic Panel  -     Lipid Panel With / Chol / HDL Ratio  -     TSH  -     Vitamin D 25 Hydroxy    Essential hypertension  -     CBC & Differential  -     Comprehensive Metabolic Panel    Mixed hyperlipidemia  -     Comprehensive Metabolic Panel  -     Lipid Panel With / Chol / HDL Ratio    Gastroesophageal reflux disease without esophagitis  -     CBC & Differential    Current moderate episode of major depressive disorder without prior episode (CMS/HCC)  -     TSH    Age-related osteoporosis without current pathological fracture  -     Vitamin D 25 Hydroxy    Encounter for screening colonoscopy  -     Ambulatory Referral to General Surgery    Fibromyalgia    Other orders  -     Flucelvax Quad=>4Years ([id number])    labs as noted  FLu vaccine today  Refer to Dr Mejia for colonoscopy  Explained to patient Protonix should be taken with water only half hour before all other medications and food  Continue to see psychiatrist and counselor for underlying depression disorder  Continue to see pain clinic for underlying fibromyalgia and  degenerative arthritis  Recommend second hepatitis A vaccine be done at health department  Follow heart healthy/low cholesterol diet  Avoid processed/fried foods/fast food  Exercise as tolerated up to 30 minutes 5 days a week  Take all medications as prescribed  Patient was unable to provide urinalysis today due to dysuria symptoms.  Told her to come back with urine sample tomorrow if possible    Return to clinic in 2 months for breast and Pap exam    45 minutes of face-to-face time spent with patient today, 30 minutes spent reviewing history and discussing plan of care with patient today        Please note that portions of this note were completed with a voice recognition program.  Efforts were made to edit dictation, but occasionally words are mistranscribed.

## 2019-09-25 ENCOUNTER — TELEPHONE (OUTPATIENT)
Dept: SURGERY | Facility: CLINIC | Age: 54
End: 2019-09-25

## 2019-09-25 LAB
25(OH)D3+25(OH)D2 SERPL-MCNC: 23.3 NG/ML (ref 30–100)
ALBUMIN SERPL-MCNC: 4.4 G/DL (ref 3.5–5.2)
ALBUMIN/GLOB SERPL: 1.8 G/DL
ALP SERPL-CCNC: 89 U/L (ref 39–117)
ALT SERPL-CCNC: 36 U/L (ref 1–33)
AST SERPL-CCNC: 29 U/L (ref 1–32)
BASOPHILS # BLD AUTO: 0.04 10*3/MM3 (ref 0–0.2)
BASOPHILS NFR BLD AUTO: 0.7 % (ref 0–1.5)
BILIRUB SERPL-MCNC: 0.4 MG/DL (ref 0.2–1.2)
BUN SERPL-MCNC: 16 MG/DL (ref 6–20)
BUN/CREAT SERPL: 18.2 (ref 7–25)
CALCIUM SERPL-MCNC: 9.4 MG/DL (ref 8.6–10.5)
CHLORIDE SERPL-SCNC: 94 MMOL/L (ref 98–107)
CHOLEST SERPL-MCNC: 203 MG/DL (ref 0–200)
CHOLEST/HDLC SERPL: 2.45 {RATIO}
CO2 SERPL-SCNC: 29.1 MMOL/L (ref 22–29)
CREAT SERPL-MCNC: 0.88 MG/DL (ref 0.57–1)
EOSINOPHIL # BLD AUTO: 0.13 10*3/MM3 (ref 0–0.4)
EOSINOPHIL NFR BLD AUTO: 2.1 % (ref 0.3–6.2)
ERYTHROCYTE [DISTWIDTH] IN BLOOD BY AUTOMATED COUNT: 14.3 % (ref 12.3–15.4)
GLOBULIN SER CALC-MCNC: 2.5 GM/DL
GLUCOSE SERPL-MCNC: 90 MG/DL (ref 65–99)
HCT VFR BLD AUTO: 38.5 % (ref 34–46.6)
HDLC SERPL-MCNC: 83 MG/DL (ref 40–60)
HGB BLD-MCNC: 12.1 G/DL (ref 12–15.9)
IMM GRANULOCYTES # BLD AUTO: 0.03 10*3/MM3 (ref 0–0.05)
IMM GRANULOCYTES NFR BLD AUTO: 0.5 % (ref 0–0.5)
LDLC SERPL CALC-MCNC: 100 MG/DL (ref 0–100)
LYMPHOCYTES # BLD AUTO: 1.8 10*3/MM3 (ref 0.7–3.1)
LYMPHOCYTES NFR BLD AUTO: 29.5 % (ref 19.6–45.3)
MCH RBC QN AUTO: 25.9 PG (ref 26.6–33)
MCHC RBC AUTO-ENTMCNC: 31.4 G/DL (ref 31.5–35.7)
MCV RBC AUTO: 82.4 FL (ref 79–97)
MONOCYTES # BLD AUTO: 0.41 10*3/MM3 (ref 0.1–0.9)
MONOCYTES NFR BLD AUTO: 6.7 % (ref 5–12)
NEUTROPHILS # BLD AUTO: 3.7 10*3/MM3 (ref 1.7–7)
NEUTROPHILS NFR BLD AUTO: 60.5 % (ref 42.7–76)
NRBC BLD AUTO-RTO: 0 /100 WBC (ref 0–0.2)
PLATELET # BLD AUTO: 293 10*3/MM3 (ref 140–450)
POTASSIUM SERPL-SCNC: 3.7 MMOL/L (ref 3.5–5.2)
PROT SERPL-MCNC: 6.9 G/DL (ref 6–8.5)
RBC # BLD AUTO: 4.67 10*6/MM3 (ref 3.77–5.28)
SODIUM SERPL-SCNC: 137 MMOL/L (ref 136–145)
TRIGL SERPL-MCNC: 99 MG/DL (ref 0–150)
TSH SERPL DL<=0.005 MIU/L-ACNC: 0.94 UIU/ML (ref 0.27–4.2)
VLDLC SERPL CALC-MCNC: 19.8 MG/DL
WBC # BLD AUTO: 6.11 10*3/MM3 (ref 3.4–10.8)

## 2019-09-26 ENCOUNTER — CLINICAL SUPPORT (OUTPATIENT)
Dept: INTERNAL MEDICINE | Facility: CLINIC | Age: 54
End: 2019-09-26

## 2019-09-26 ENCOUNTER — PREP FOR SURGERY (OUTPATIENT)
Dept: OTHER | Facility: HOSPITAL | Age: 54
End: 2019-09-26

## 2019-09-26 DIAGNOSIS — Z12.11 SCREENING FOR COLON CANCER: Primary | ICD-10-CM

## 2019-09-26 DIAGNOSIS — R30.0 DYSURIA: Primary | ICD-10-CM

## 2019-09-26 LAB
BILIRUB BLD-MCNC: NEGATIVE MG/DL
CLARITY, POC: ABNORMAL
COLOR UR: ABNORMAL
GLUCOSE UR STRIP-MCNC: NEGATIVE MG/DL
KETONES UR QL: NEGATIVE
LEUKOCYTE EST, POC: NEGATIVE
NITRITE UR-MCNC: NEGATIVE MG/ML
PH UR: 6 [PH] (ref 5–8)
PROT UR STRIP-MCNC: NEGATIVE MG/DL
RBC # UR STRIP: NEGATIVE /UL
SP GR UR: 1.02 (ref 1–1.03)
UROBILINOGEN UR QL: NORMAL

## 2019-09-26 PROCEDURE — 81003 URINALYSIS AUTO W/O SCOPE: CPT | Performed by: INTERNAL MEDICINE

## 2019-09-26 RX ORDER — POLYETHYLENE GLYCOL 1450
POWDER (GRAM) MISCELLANEOUS
Qty: 250 G | Refills: 0 | Status: SHIPPED | OUTPATIENT
Start: 2019-09-26 | End: 2020-01-10

## 2019-09-26 NOTE — TELEPHONE ENCOUNTER
"Pt scheduled @  on 10/16/2019.PRESCREENING FOR OPEN ACCESS SCHEDULING    Zhane Burnham, 1965  [id number]    09/26/19    If, the patient answers yes to any of the following questions the provider will be informed prior to scheduling open access for approval and documented in the chart.    [x]  Yes  [] No    1. Have you ever had a colonoscopy in the past?      When:  \"a long time ago.:      Where:       Polyps or other: yes    []  Yes  [x] No    2. Family history of colon cancer?      Relation:       Age of onset:       Do you currently have any of the following?    []  Yes  [x] No  Rectal bleeding, if so, how long?     []  Yes  [x] No  Abdominal pain, if so, how long?    []  Yes  [x] No  Constipation, if so, how long?    []  Yes  [x] No  Diarrhea, if so, how long?    []  Yes  [x] No  Weight loss, is so, how much?    [] Yes  [x] No  Small caliber stool, if so, how long?      Have you ever had any of the following conditions?    [] Yes  [x] No  Heart attack?      When?       Last cardiac workup?     Blood thinners?    [] Yes  [x] No   Lung problems, asthma or COPD?  [] Yes  [x] No  Oxygen required?       [] Yes  [x] No  Stroke?     [] Yes  [x] No  Have you ever had a reaction to anesthesia?           "

## 2019-10-01 PROBLEM — Z12.11 SCREENING FOR COLON CANCER: Status: ACTIVE | Noted: 2019-10-01

## 2019-10-14 RX ORDER — GABAPENTIN 600 MG/1
800 TABLET ORAL 2 TIMES DAILY
COMMUNITY
End: 2023-02-23

## 2019-10-15 ENCOUNTER — TELEPHONE (OUTPATIENT)
Dept: SURGERY | Facility: CLINIC | Age: 54
End: 2019-10-15

## 2019-10-15 NOTE — TELEPHONE ENCOUNTER
Called the patient to remind them of an upcoming appointment with general surgery. I was able to reach to reach the patient. Left voice message to call office and confirm procedure.

## 2019-10-16 ENCOUNTER — HOSPITAL ENCOUNTER (OUTPATIENT)
Facility: HOSPITAL | Age: 54
Setting detail: HOSPITAL OUTPATIENT SURGERY
Discharge: HOME | End: 2019-10-16
Attending: SURGERY | Admitting: SURGERY

## 2019-10-16 ENCOUNTER — ANESTHESIA EVENT (OUTPATIENT)
Dept: GASTROENTEROLOGY | Facility: HOSPITAL | Age: 54
End: 2019-10-16

## 2019-10-16 ENCOUNTER — ANESTHESIA (OUTPATIENT)
Dept: GASTROENTEROLOGY | Facility: HOSPITAL | Age: 54
End: 2019-10-16

## 2019-10-16 VITALS
BODY MASS INDEX: 44.15 KG/M2 | SYSTOLIC BLOOD PRESSURE: 136 MMHG | RESPIRATION RATE: 18 BRPM | OXYGEN SATURATION: 98 % | DIASTOLIC BLOOD PRESSURE: 79 MMHG | HEART RATE: 84 BPM | TEMPERATURE: 97.9 F | WEIGHT: 265 LBS | HEIGHT: 65 IN

## 2019-10-16 PROCEDURE — 25010000002 PROPOFOL 200 MG/20ML EMULSION: Performed by: NURSE ANESTHETIST, CERTIFIED REGISTERED

## 2019-10-16 PROCEDURE — S0260 H&P FOR SURGERY: HCPCS | Performed by: SURGERY

## 2019-10-16 RX ORDER — SODIUM CHLORIDE, SODIUM LACTATE, POTASSIUM CHLORIDE, CALCIUM CHLORIDE 600; 310; 30; 20 MG/100ML; MG/100ML; MG/100ML; MG/100ML
1000 INJECTION, SOLUTION INTRAVENOUS CONTINUOUS
Status: DISCONTINUED | OUTPATIENT
Start: 2019-10-16 | End: 2019-10-16 | Stop reason: HOSPADM

## 2019-10-16 RX ORDER — MAGNESIUM HYDROXIDE 1200 MG/15ML
LIQUID ORAL AS NEEDED
Status: DISCONTINUED | OUTPATIENT
Start: 2019-10-16 | End: 2019-10-16 | Stop reason: HOSPADM

## 2019-10-16 RX ORDER — PROPOFOL 10 MG/ML
INJECTION, EMULSION INTRAVENOUS AS NEEDED
Status: DISCONTINUED | OUTPATIENT
Start: 2019-10-16 | End: 2019-10-16 | Stop reason: SURG

## 2019-10-16 RX ORDER — SODIUM CHLORIDE 0.9 % (FLUSH) 0.9 %
10 SYRINGE (ML) INJECTION AS NEEDED
Status: DISCONTINUED | OUTPATIENT
Start: 2019-10-16 | End: 2019-10-16 | Stop reason: HOSPADM

## 2019-10-16 RX ORDER — KETAMINE HYDROCHLORIDE 50 MG/ML
INJECTION, SOLUTION, CONCENTRATE INTRAMUSCULAR; INTRAVENOUS AS NEEDED
Status: DISCONTINUED | OUTPATIENT
Start: 2019-10-16 | End: 2019-10-16 | Stop reason: SURG

## 2019-10-16 RX ADMIN — SODIUM CHLORIDE, POTASSIUM CHLORIDE, SODIUM LACTATE AND CALCIUM CHLORIDE 1000 ML: 600; 310; 30; 20 INJECTION, SOLUTION INTRAVENOUS at 08:52

## 2019-10-16 RX ADMIN — PROPOFOL 100 MG: 10 INJECTION, EMULSION INTRAVENOUS at 09:26

## 2019-10-16 RX ADMIN — KETAMINE HYDROCHLORIDE 10 MG: 50 INJECTION, SOLUTION INTRAMUSCULAR; INTRAVENOUS at 09:26

## 2019-10-16 RX ADMIN — PROPOFOL 50 MG: 10 INJECTION, EMULSION INTRAVENOUS at 09:31

## 2019-10-16 NOTE — DISCHARGE INSTRUCTIONS
Rest today  No pushing,pulling,tugging,heavy lifting, or strenuous activity   No major decision making,driving,or drinking alcoholic beverages for 24 hours due to the sedation you received  Always use good hand hygiene/washing technique  No driving on pain medication.    To assist you in voiding:  Drink plenty of fluids  Listen to running water while attempting to void.    If you are unable to urinate and you have an uncomfortable urge to void or it has been   6 hours since you were discharged, return to the Emergency Room.

## 2019-10-16 NOTE — ANESTHESIA POSTPROCEDURE EVALUATION
Patient: Zhane Burnham    Procedure Summary     Date:  10/16/19 Room / Location:  [facility] / [facility]    Anesthesia Start:  0920 Anesthesia Stop:  0937    Procedure:  COLONOSCOPY (N/A ) Diagnosis:       Screening for colon cancer      (Screening for colon cancer [Z12.11])    Surgeon:  Ck Mejia MD Provider:  Norman Gaona CRNA    Anesthesia Type:  MAC ASA Status:  3          Anesthesia Type: MAC  Last vitals  BP   136/79 (10/16/19 1008)   Temp   97.9 °F (36.6 °C) (10/16/19 0943)   Pulse   84 (10/16/19 1008)   Resp   18 (10/16/19 1008)     SpO2   98 % (10/16/19 1008)     Post Anesthesia Care and Evaluation    Patient location during evaluation: bedside  Patient participation: complete - patient participated  Level of consciousness: awake and alert  Pain score: 0  Pain management: satisfactory to patient  Airway patency: patent  Anesthetic complications: No anesthetic complications  PONV Status: none  Cardiovascular status: acceptable and hemodynamically stable  Respiratory status: acceptable  Hydration status: acceptable

## 2019-10-16 NOTE — ANESTHESIA PREPROCEDURE EVALUATION
Anesthesia Evaluation     Patient summary reviewed and Nursing notes reviewed   history of anesthetic complications: PONV  NPO Solid Status: > 8 hours  NPO Liquid Status: > 8 hours           Airway   Mallampati: I  TM distance: >3 FB  Neck ROM: full  no difficulty expected  Dental - normal exam     Pulmonary - normal exam   (+) asthma,   Cardiovascular - normal exam    (+) hypertension, valvular problems/murmurs murmur, hyperlipidemia,       Neuro/Psych  (+) headaches, psychiatric history Anxiety and Depression,     GI/Hepatic/Renal/Endo    (+) morbid obesity, GERD well controlled,      Musculoskeletal     (+) arthralgias, chronic pain, myalgias,   Abdominal    Substance History - negative use     OB/GYN negative ob/gyn ROS         Other   (+) arthritis   history of cancer                  Anesthesia Plan    ASA 3     MAC     intravenous induction   Anesthetic plan, all risks, benefits, and alternatives have been provided, discussed and informed consent has been obtained with: patient.

## 2019-10-16 NOTE — H&P
Reason for Consultation:  Screening colonoscopy / hx of colon polyps    Chief complaint :  Screening colonoscopy \  hx of colon polyps    SUBJECTIVE:    History of present illness:  I did see the patient today as a consultation for evaluation and treatment of a need for screening colonoscopy.  There are no other complaints of other than a previous history of colon polyps.    Review of Systems:    Review of Systems - General ROS: negative for - chills, fatigue, fever, hot flashes, malaise or night sweats  Psychological ROS: negative for - behavioral disorder, disorientation, hallucinations, hostility or mood swings  ENT ROS: negative for - nasal polyps, oral lesions, sinus pain, sneezing or sore throat  Breast ROS: negative for - galactorrhea or new or changing breast lumps  Respiratory ROS: negative for - hemoptysis, orthopnea, pleuritic pain, sputum changes or stridor  Cardiovascular ROS: negative for - dyspnea on exertion, edema, irregular heartbeat, murmur, orthopnea, palpitations or rapid heart rate  Gastrointestinal ROS: negative for - change in stools, gas/bloating, hematemesis, melena or stool incontinence.  Genito-Urinary ROS: negative for - dysuria, genital ulcers, nocturia or pelvic pain  Musculoskeletal ROS: negative for - gait disturbance or muscle pain  Neurological ROS: negative for - dizziness, gait disturbance, memory loss, numbness/tingling or seizures      Allergies:  Allergies   Allergen Reactions   • Celebrex [Celecoxib] Swelling   • Red Dye Anaphylaxis     Reported reaction started with rash/itching and progressed to swelling    • Shellfish-Derived Products Anaphylaxis       Medications:    Current Facility-Administered Medications:   •  lactated ringers infusion 1,000 mL, 1,000 mL, Intravenous, Continuous, Ck Mejia MD, Last Rate: 25 mL/hr at 10/16/19 0852, 1,000 mL at 10/16/19 0852    History:  Past Medical History:   Diagnosis Date   • Abnormal ECG    • Acid reflux    • Anemia    •  "Anxiety    • Asthma     reported \"childhood\"   • Body piercing     ears   • Breast cancer (CMS/HCC)     Cancer of left breast, patient had a bilateral mastectomy   • Chronic bronchitis (CMS/HCC)    • Colon polyp    • Constipation    • Depression    • Dysphagia     Reported difficulty swallowing pills   • Elevated cholesterol    • Fatigue    • Fibromyalgia    • Fractures     Bilateral arms and left ankle, multiple toes   • H/O cardiovascular stress test     Patient reported done apx. 2004 and that all was wnl's at that time   • Headache    • Hearing loss     Patient reported difficulty only when there is a lot of back ground noise. No use of hearing devices.    • Heart murmur    • History of bronchitis    • History of pneumonia    • History of transfusion     Reported no reaction to transfusion   • Hyperlipidemia    • Hypertension    • Lower extremity edema    • Migraines    • Osteoarthritis    • Osteoporosis    • Ovarian cyst    • Peptic ulceration    • PONV (postoperative nausea and vomiting)    • Restless leg    • Seasonal allergies    • Urinary tract infection    • Wears glasses        Past Surgical History:   Procedure Laterality Date   • BREAST BIOPSY Left 12/27/2012   • BREAST RECONSTRUCTION Bilateral    • COLONOSCOPY     • ENDOSCOPY     • EXTERNAL EAR SURGERY Left     Extraction of an extra lobe    • HYSTERECTOMY     • KNEE ARTHROSCOPY Left 06/2016    Patient reported \"bone tumor removed\"   • MASTECTOMY Bilateral 01/31/2013    bilat   • TONSILLECTOMY     • WISDOM TOOTH EXTRACTION         Family History   Problem Relation Age of Onset   • Heart disease Mother    • Heart failure Mother    • Arthritis Mother    • Diabetes Mother    • Heart attack Mother    • Hyperlipidemia Mother    • Mental illness Mother    • Obesity Mother    • Osteoporosis Mother    • Stroke Mother    • Thyroid disease Mother    • Heart disease Father    • Heart attack Father    • Hypertension Father    • Hyperlipidemia Father    • Mental " illness Father    • Osteoporosis Father    • Stroke Father    • No Known Problems Sister    • No Known Problems Brother    • Hypertension Other    • Ovarian cancer Paternal Aunt    • Breast cancer Paternal Aunt    • Tuberculosis Paternal Grandmother        Social History     Tobacco Use   • Smoking status: Never Smoker   • Smokeless tobacco: Never Used   Substance Use Topics   • Alcohol use: Yes     Comment: light occasional use, no history of abuse reported   • Drug use: No        OBJECTIVE:    Vital Signs   Temp:  [98.9 °F (37.2 °C)] 98.9 °F (37.2 °C)  Heart Rate:  [95] 95  Resp:  [17] 17  BP: (133)/(98) 133/98    Physical Exam:     General Appearance:    Alert, cooperative, in no acute distress   Head:    Normocephalic, without obvious abnormality, atraumatic   Eyes:            Lids and lashes normal, conjunctivae and sclerae normal, no   icterus, no pallor, corneas clear, PERRLA   Ears:    Ears appear intact with no abnormalities noted   Throat:   No oral lesions, no thrush, oral mucosa moist   Neck:   No adenopathy, supple, trachea midline, no thyromegaly, no   carotid bruit, no JVD   Back:     No kyphosis present, no scoliosis present, no skin lesions,      erythema or scars, no tenderness to percussion or                   palpation,   range of motion normal   Lungs:     Clear to auscultation,respirations regular, even and                  unlabored    Heart:    Regular rhythm and normal rate, normal S1 and S2, no            murmur, no gallop, no rub, no click   Chest Wall:    No abnormalities observed   Abdomen:     Normal bowel sounds, no masses, no organomegaly, soft        non-tender, non-distended, no guarding, there is no evidence of tenderness   Extremities:   Moves all extremities well, no edema, no cyanosis, no             redness   Pulses:   Pulses palpable and equal bilaterally   Skin:   No bleeding, bruising or rash   Lymph nodes:   No palpable adenopathy   Neurologic:   Cranial nerves 2 - 12  grossly intact, sensation intact, DTR       present and equal bilaterally           ASSESSMENT/PLAN:    Screening colonoscopy  History of colon polyps      I did have a detailed and extensive discussion with the patient in the office today.  The full risks and benefits of operative versus nonoperative intervention were discussed with the paient, they understand, agree, and wish to proceed with the surgical treatment plan of colonoscopy.      Ck Mejia MD

## 2020-01-10 ENCOUNTER — OFFICE VISIT (OUTPATIENT)
Dept: INTERNAL MEDICINE | Facility: CLINIC | Age: 55
End: 2020-01-10

## 2020-01-10 VITALS
WEIGHT: 273 LBS | SYSTOLIC BLOOD PRESSURE: 132 MMHG | TEMPERATURE: 97.6 F | OXYGEN SATURATION: 98 % | HEART RATE: 103 BPM | BODY MASS INDEX: 45.48 KG/M2 | HEIGHT: 65 IN | DIASTOLIC BLOOD PRESSURE: 90 MMHG

## 2020-01-10 DIAGNOSIS — R68.89 FLU-LIKE SYMPTOMS: Primary | ICD-10-CM

## 2020-01-10 DIAGNOSIS — I10 ESSENTIAL HYPERTENSION: ICD-10-CM

## 2020-01-10 DIAGNOSIS — R10.9 RIGHT FLANK PAIN: ICD-10-CM

## 2020-01-10 DIAGNOSIS — J40 BRONCHITIS: ICD-10-CM

## 2020-01-10 LAB
BILIRUB BLD-MCNC: ABNORMAL MG/DL
CLARITY, POC: CLEAR
COLOR UR: ABNORMAL
EXPIRATION DATE: NORMAL
FLUAV AG NPH QL: NEGATIVE
FLUBV AG NPH QL: NEGATIVE
GLUCOSE UR STRIP-MCNC: NEGATIVE MG/DL
INTERNAL CONTROL: NORMAL
KETONES UR QL: ABNORMAL
LEUKOCYTE EST, POC: NEGATIVE
Lab: NORMAL
NITRITE UR-MCNC: NEGATIVE MG/ML
PH UR: 6 [PH] (ref 5–8)
PROT UR STRIP-MCNC: ABNORMAL MG/DL
RBC # UR STRIP: NEGATIVE /UL
SP GR UR: 1.02 (ref 1–1.03)
UROBILINOGEN UR QL: NORMAL

## 2020-01-10 PROCEDURE — 99214 OFFICE O/P EST MOD 30 MIN: CPT | Performed by: NURSE PRACTITIONER

## 2020-01-10 PROCEDURE — 87804 INFLUENZA ASSAY W/OPTIC: CPT | Performed by: NURSE PRACTITIONER

## 2020-01-10 PROCEDURE — 81003 URINALYSIS AUTO W/O SCOPE: CPT | Performed by: NURSE PRACTITIONER

## 2020-01-10 RX ORDER — PREDNISONE 20 MG/1
20 TABLET ORAL DAILY
Qty: 7 TABLET | Refills: 0 | Status: SHIPPED | OUTPATIENT
Start: 2020-01-10 | End: 2020-01-17

## 2020-01-10 RX ORDER — DOXYCYCLINE HYCLATE 100 MG/1
100 CAPSULE ORAL 2 TIMES DAILY
Qty: 20 CAPSULE | Refills: 0 | Status: SHIPPED | OUTPATIENT
Start: 2020-01-10 | End: 2020-01-20

## 2020-01-10 RX ORDER — FLUCONAZOLE 150 MG/1
150 TABLET ORAL ONCE
Qty: 1 TABLET | Refills: 0 | Status: SHIPPED | OUTPATIENT
Start: 2020-01-10 | End: 2020-01-10

## 2020-01-10 NOTE — PROGRESS NOTES
"Date: 01/10/2020    Name: Zhane Burnham  : 1965    Chief Complaint:   Chief Complaint   Patient presents with   • Generalized Body Aches     fever, fatigue, fell Thursday night and hit head       HPI:  Zhane Burnham is a 54 y.o. female presents with fever, body aches, cough for almost a week.  She fell the night before symptoms started, hit her head against a humidifier. No loss of consciousness after fall.   She had a \"goose egg\" on the right side of her forehead for a few days after her fall.  She has a h/o fibromyalgia, has been working on home improvement projects for the past 2 weeks and is not sure if body aches are due to fall, fibromyalgia, increased physical activity or a combination of the three.  Her cough has been productive for the past 5 days, with yellow/brown sputum.  She has also noted a sore throat, headache, wheezing, SOA, chills, decreased appetite, bilateral earache, increased fatigue.  She has taken OTC benadryl & cough/cold remedies, which have not been effective.  She has been exposed to a friend with similar symptoms in the past week.    Additionally reports right lower abdominal pain that radiates into her groin and right lower back.  Denies change in bowel habits, dysuria, frequent or urgent urination, decrease in urine output.  She has had similar pain in the past.  Pain today is more intense than before, rates it as 8/10 & describes pain as deep ache that becomes sharp with movement.    She is currently being treated for HTN, takes hctz 25 mg daily without side effects.  Does not monitor BP at home.  Does not eat a heart healthy diet, nor is she regularly physically active.  Denies chest pain, orthopnea, palpitations, lower extremity edema, weakness, visual disturbances or confusion.    History:  The following portions of the patient's history were reviewed and updated as appropriate: allergies, current medications, past medical history, family history, surgical history, social " "history and problem list.     ROS:  Review of Systems   Constitutional: Negative for diaphoresis.   HENT: Positive for voice change. Negative for congestion, sneezing, tinnitus and trouble swallowing.    Eyes: Negative for photophobia and visual disturbance.   Respiratory: Negative for wheezing.    Genitourinary: Negative for difficulty urinating, hematuria and pelvic pressure.   Skin: Negative for pallor and rash.       VS:  Vitals:    01/10/20 1116   BP: 132/90   Pulse: 103   Temp: 97.6 °F (36.4 °C)   TempSrc: Temporal   SpO2: 98%   Weight: 124 kg (273 lb)   Height: 165.1 cm (65\")     Body mass index is 45.43 kg/m².    PE:  Physical Exam   Constitutional: She is oriented to person, place, and time. She appears well-developed and well-nourished. She appears ill.   HENT:   Head: Normocephalic.   Right Ear: Tympanic membrane, external ear and ear canal normal.   Left Ear: Tympanic membrane, external ear and ear canal normal.   Nose: Mucosal edema present. No sinus tenderness.   Mouth/Throat: Uvula is midline and mucous membranes are normal. Posterior oropharyngeal erythema present.   Eyes: Pupils are equal, round, and reactive to light. Conjunctivae are normal.   Neck: Normal range of motion. Neck supple.   Cardiovascular: Regular rhythm, normal heart sounds and intact distal pulses. Tachycardia present.   Pulmonary/Chest: Effort normal. She has wheezes. She has rhonchi. She has no rales.   Abdominal: Soft. Bowel sounds are normal. There is tenderness in the right lower quadrant and suprapubic area. There is no rigidity, no guarding, no CVA tenderness, no tenderness at McBurney's point and negative Hays's sign.   Lymphadenopathy:     She has no cervical adenopathy.   Neurological: She is alert and oriented to person, place, and time. She has normal strength. No sensory deficit. Coordination and gait normal.   Cranial nerves II through XII normal   Skin: Skin is warm. Capillary refill takes less than 2 seconds. "   Purple ecchymoses right upper eyelid. Right forehead tender to palpation, very slight edema noted immediately above right eyebrow.   Psychiatric: Her speech is normal and behavior is normal. Her mood appears anxious.     Office Visit on 01/10/2020   Component Date Value Ref Range Status   • Rapid Influenza A Ag 01/10/2020 Negative  Negative Final   • Rapid Influenza B Ag 01/10/2020 Negative  Negative Final   • Internal Control 01/10/2020 Passed  Passed Final   • Lot Number 01/10/2020 [id number]   Final   • Expiration Date 01/10/2020 12/11/2021   Final   • Color 01/10/2020 Sydney  Yellow, Straw, Dark Yellow, Sydney Final   • Clarity, UA 01/10/2020 Clear  Clear Final   • Specific Gravity  01/10/2020 1.025  1.005 - 1.030 Final   • pH, Urine 01/10/2020 6.0  5.0 - 8.0 Final   • Leukocytes 01/10/2020 Negative  Negative Final   • Nitrite, UA 01/10/2020 Negative  Negative Final   • Protein, POC 01/10/2020 Trace* Negative mg/dL Final   • Glucose, UA 01/10/2020 Negative  Negative, 1000 mg/dL (3+) mg/dL Final   • Ketones, UA 01/10/2020 15 mg/dL* Negative Final   • Urobilinogen, UA 01/10/2020 Normal  Normal Final   • Bilirubin 01/10/2020 1 mg/dL* Negative Final   • Blood, UA 01/10/2020 Negative  Negative Final        Assessment/Plan:  Zhane was seen today for generalized body aches.    Diagnoses and all orders for this visit:    Flu-like symptoms  -     POC Influenza A / B  Essential hypertension        - Follow heart healthy diet.  Advised to reduce daily sodium intake to < 1500 mg per day.  Avoid processed & fast foods.        - Exercise as tolerated, with a goal of 30 minutes of moderate exercise most days, when acute illness resolved.         - Take medications as prescribed.  Right flank pain  -     POCT urinalysis dipstick, automated; Future  -     POCT urinalysis dipstick, automated  Bronchitis  -     doxycycline (VIBRAMYCIN) 100 MG capsule; Take 1 capsule by mouth 2 (Two) Times a Day for 10 days.  -     predniSONE  (DELTASONE) 20 MG tablet; Take 1 tablet by mouth Daily for 7 days.  - Rest, prn, for the next 3-5 days  - Drink plenty of clear, decaffeinated fluids, as tolerated.  Other orders  -     fluconazole (DIFLUCAN) 150 MG tablet; Take 1 tablet by mouth 1 (One) Time for 1 dose. Patient reports a history of vaginal yeast infections with all antibiotics and has required diflucan in the past. Patient requested dose of diflucan to have on hand.  Advised to use over-the-counter Monistat cream prior to using Diflucan.    Return for Next scheduled follow up.

## 2020-01-20 ENCOUNTER — TELEPHONE (OUTPATIENT)
Dept: INTERNAL MEDICINE | Facility: CLINIC | Age: 55
End: 2020-01-20

## 2020-01-20 RX ORDER — LORATADINE 10 MG/1
10 TABLET ORAL DAILY
Qty: 90 TABLET | Refills: 3 | Status: SHIPPED | OUTPATIENT
Start: 2020-01-20 | End: 2020-12-21 | Stop reason: SDUPTHER

## 2020-01-20 NOTE — TELEPHONE ENCOUNTER
Pt called and stated that pharmacy was to fax a request for loratadine (CLARITIN) 10 MG tablet. Pt called to verify that the request was received.

## 2020-01-21 RX ORDER — SIMVASTATIN 40 MG
40 TABLET ORAL NIGHTLY
Qty: 90 TABLET | Refills: 1 | Status: SHIPPED | OUTPATIENT
Start: 2020-01-21 | End: 2020-04-23 | Stop reason: SDUPTHER

## 2020-02-13 ENCOUNTER — OFFICE VISIT (OUTPATIENT)
Dept: INTERNAL MEDICINE | Facility: CLINIC | Age: 55
End: 2020-02-13

## 2020-02-13 VITALS
BODY MASS INDEX: 45.15 KG/M2 | DIASTOLIC BLOOD PRESSURE: 80 MMHG | WEIGHT: 271 LBS | HEIGHT: 65 IN | OXYGEN SATURATION: 96 % | TEMPERATURE: 97.3 F | HEART RATE: 98 BPM | SYSTOLIC BLOOD PRESSURE: 122 MMHG

## 2020-02-13 DIAGNOSIS — Z00.00 ANNUAL PHYSICAL EXAM: Primary | ICD-10-CM

## 2020-02-13 DIAGNOSIS — K21.9 GASTROESOPHAGEAL REFLUX DISEASE WITHOUT ESOPHAGITIS: ICD-10-CM

## 2020-02-13 DIAGNOSIS — F32.1 CURRENT MODERATE EPISODE OF MAJOR DEPRESSIVE DISORDER WITHOUT PRIOR EPISODE (HCC): ICD-10-CM

## 2020-02-13 DIAGNOSIS — E78.2 MIXED HYPERLIPIDEMIA: ICD-10-CM

## 2020-02-13 DIAGNOSIS — M17.12 ARTHRITIS OF LEFT KNEE: ICD-10-CM

## 2020-02-13 DIAGNOSIS — I10 ESSENTIAL HYPERTENSION: ICD-10-CM

## 2020-02-13 PROBLEM — Z76.89 ENCOUNTER TO ESTABLISH CARE: Status: RESOLVED | Noted: 2019-09-24 | Resolved: 2020-02-13

## 2020-02-13 PROCEDURE — 99396 PREV VISIT EST AGE 40-64: CPT | Performed by: INTERNAL MEDICINE

## 2020-02-13 RX ORDER — HYDROCHLOROTHIAZIDE 25 MG/1
25 TABLET ORAL DAILY
Qty: 90 TABLET | Refills: 1 | Status: SHIPPED | OUTPATIENT
Start: 2020-02-13 | End: 2020-07-20 | Stop reason: SDUPTHER

## 2020-02-13 NOTE — PROGRESS NOTES
Subjective   History of Present Illness    Zhane Burnham is a 54 y.o. female who presents for annual exam.  PMH of HTN, HLD, GERD, FM, RA, OP, breast cancer, depression.  Her BP is well controlled today.   Her last labs in September were all good except low vit D.  She has had a hysterectomy in  due to endometriosis.   She continues to see her psychiatrist and therapist.   She is on duloxetine, buspar and trazodone.  She uses her buspar 2-3 times a day.   She got a dog last night to help her for support. She continues to see DR Tellez at pain clinic and says her pain is about 50-60% controlled. She is complaining of left knee DJD, but right is bad too.  She was told she may need left TKR.  She wants to see Dr Bowser as her last ortho doc is retired.   She sees eye doctor annually.  Sees dentist twice a yr.   She wears her seatbelt.  Does not text and drive.    Had her colonoscopy last yr.  Does not need mammogram anymore.     Obstetric History:      Menstrual History:     No LMP recorded (lmp unknown). Patient has had a hysterectomy.        Sexual History:     Not active    Current contraception: none  History of abnormal Pap smear: no    Perform regular self breast exam: no  Family history of uterine or ovarian cancer: yes - paternal GM  Family History of cervical cancer: no  Family History of colon cancer/colon polyps: no  History of abnormal mammogram: no  Family history of breast cancer: yes - paternal aunt  History of abnormal lipids: no    The following portions of the patient's history were reviewed and updated as appropriate: allergies, current medications, past family history, past medical history, past social history, past surgical history and problem list.    Review of Systems   Constitutional: Negative for activity change, appetite change and unexpected weight change.   HENT: Negative.    Eyes: Negative for visual disturbance.   Respiratory: Positive for shortness of breath.     Cardiovascular: Negative for chest pain, palpitations and leg swelling.   Gastrointestinal: Negative for abdominal pain.   Endocrine: Negative.    Genitourinary: Negative.    Musculoskeletal: Positive for arthralgias and myalgias.   Skin: Negative.    Allergic/Immunologic: Negative.    Neurological: Negative for headaches.   Psychiatric/Behavioral: Positive for sleep disturbance. Negative for dysphoric mood. The patient is not nervous/anxious.        Objective   Physical Exam   Constitutional: She is oriented to person, place, and time. She appears well-developed and well-nourished.   Pleasant female, appears her age, NAD   HENT:   Head: Normocephalic and atraumatic.   Right Ear: External ear normal.   Left Ear: External ear normal.   Mouth/Throat: Oropharynx is clear and moist.   TMs normal bilaterally   Eyes: Pupils are equal, round, and reactive to light. EOM are normal.   Neck: Normal range of motion. Neck supple.   Cardiovascular: Normal rate, regular rhythm, normal heart sounds and intact distal pulses.   No murmur heard.  No carotid bruits   Pulmonary/Chest: Effort normal and breath sounds normal. Right breast exhibits skin change. Right breast exhibits no tenderness. Left breast exhibits skin change. Left breast exhibits no tenderness.   Bilateral mastectomy, no palpable masses, well healed surgical scars/changes vertically at midline breast, absent nipples, palpable implants        Abdominal: Soft. Bowel sounds are normal. She exhibits no distension and no mass. There is no tenderness.   Genitourinary: Vagina normal. No vaginal discharge found.   Genitourinary Comments: No uterus/cervix due to hysterectomy   Musculoskeletal: She exhibits no edema.   Lymphadenopathy:     She has no cervical adenopathy.   Neurological: She is alert and oriented to person, place, and time. She displays normal reflexes. No cranial nerve deficit.   Skin: No rash noted.   Psychiatric: She has a normal mood and affect. Her  "behavior is normal. Judgment and thought content normal.   Nursing note and vitals reviewed.      /80   Pulse 98   Temp 97.3 °F (36.3 °C)   Ht 165.1 cm (65\")   Wt 123 kg (271 lb)   LMP  (LMP Unknown)   SpO2 96%   BMI 45.10 kg/m²         Zhane was seen today for annual exam.    Diagnoses and all orders for this visit:    Annual physical exam    Essential hypertension    Mixed hyperlipidemia    Arthritis of left knee  -     Ambulatory Referral to Orthopedic Surgery    Gastroesophageal reflux disease without esophagitis    Current moderate episode of major depressive disorder without prior episode (CMS/[facility])    Other orders  -     hydroCHLOROthiazide (HYDRODIURIL) 25 MG tablet; Take 1 tablet by mouth Daily.      Recommend annual eye doctor appointment, or as necessary  See your dentist twice a year.  Recommend that you brush teeth twice daily and floss minimum of once daily  Recommend regular seatbelt use  Do not text or use phone while driving  Will contact with Pap results when available-Pap in future unless she becomes sexually active, she will only need pelvic exam every 5 years  Recommend monthly self-breast exam  Refer to ortho for left knee pain  Continue to see psychiatrist for depression and insomnia  Her GERD is controlled with OTC meds  Follow heart healthy/low salt diet  Avoid processed foods  Monitor blood pressure as discussed  Exercise as tolerated up to 30 minutes 5 days per week  Take all medications as prescribed        Return in about 6 months (around 8/13/2020) for Next scheduled follow up.      Marilyn K. Vermeesch, MD      "

## 2020-04-23 RX ORDER — CYCLOBENZAPRINE HCL 10 MG
10 TABLET ORAL 3 TIMES DAILY PRN
Qty: 30 TABLET | Refills: 1 | Status: SHIPPED | OUTPATIENT
Start: 2020-04-23 | End: 2020-05-12 | Stop reason: SDUPTHER

## 2020-04-23 RX ORDER — SIMVASTATIN 40 MG
40 TABLET ORAL NIGHTLY
Qty: 90 TABLET | Refills: 1 | Status: SHIPPED | OUTPATIENT
Start: 2020-04-23 | End: 2020-12-21 | Stop reason: SDUPTHER

## 2020-04-23 NOTE — TELEPHONE ENCOUNTER
Pt requesting med refill on cyclobenzaprine (FLEXERIL) 10 MG tablet simvastatin (ZOCOR) 40 MG tablet to be sent to [facility] [place] [place] - [facility] Sherrie [facility] [phone] [facility] [phone]

## 2020-05-12 RX ORDER — PANTOPRAZOLE SODIUM 40 MG/1
40 TABLET, DELAYED RELEASE ORAL DAILY
Qty: 90 TABLET | Refills: 1 | Status: SHIPPED | OUTPATIENT
Start: 2020-05-12 | End: 2020-09-29 | Stop reason: ALTCHOICE

## 2020-05-12 RX ORDER — CYCLOBENZAPRINE HCL 10 MG
TABLET ORAL
Qty: 90 TABLET | Refills: 0 | Status: SHIPPED | OUTPATIENT
Start: 2020-05-12 | End: 2020-07-07 | Stop reason: SDUPTHER

## 2020-05-12 RX ORDER — CYCLOBENZAPRINE HCL 10 MG
10 TABLET ORAL 3 TIMES DAILY PRN
Qty: 30 TABLET | Refills: 1 | Status: SHIPPED | OUTPATIENT
Start: 2020-05-12 | End: 2020-05-12 | Stop reason: SDUPTHER

## 2020-05-12 NOTE — TELEPHONE ENCOUNTER
Protonix not filled by you before. Ok to send in?    Pt wants 90 tablets for 30 days worth of flexeril, ok to send in?

## 2020-05-12 NOTE — TELEPHONE ENCOUNTER
You may call in Protonix and Flexeril.  Please tell patient to try only a half a tab of Flexeril 3 times daily as it often works just as well as a full tablet.

## 2020-05-12 NOTE — TELEPHONE ENCOUNTER
Patient is calling requesting refills for her cyclobenzaprine (Flexeril) 10mg and pantoprazole. Patient stated that her cyclobenzaprine is to be taken 3x a day and her last prescription there was only a quantity of 30 which is not enough it needs to be 90 tablets for a month. Please advise    Confirmed pharmacy  [facility]

## 2020-07-07 RX ORDER — CYCLOBENZAPRINE HCL 10 MG
TABLET ORAL
Qty: 90 TABLET | Refills: 0 | Status: SHIPPED | OUTPATIENT
Start: 2020-07-07 | End: 2020-08-26 | Stop reason: SDUPTHER

## 2020-07-20 RX ORDER — HYDROCHLOROTHIAZIDE 25 MG/1
25 TABLET ORAL DAILY
Qty: 90 TABLET | Refills: 1 | Status: SHIPPED | OUTPATIENT
Start: 2020-07-20 | End: 2020-12-21 | Stop reason: SDUPTHER

## 2020-08-13 ENCOUNTER — OFFICE VISIT (OUTPATIENT)
Dept: INTERNAL MEDICINE | Facility: CLINIC | Age: 55
End: 2020-08-13

## 2020-08-13 VITALS
HEART RATE: 96 BPM | SYSTOLIC BLOOD PRESSURE: 128 MMHG | DIASTOLIC BLOOD PRESSURE: 70 MMHG | TEMPERATURE: 97.1 F | OXYGEN SATURATION: 97 %

## 2020-08-13 DIAGNOSIS — M79.7 FIBROMYALGIA: ICD-10-CM

## 2020-08-13 DIAGNOSIS — R53.82 CHRONIC FATIGUE: ICD-10-CM

## 2020-08-13 DIAGNOSIS — I10 ESSENTIAL HYPERTENSION: Primary | ICD-10-CM

## 2020-08-13 DIAGNOSIS — C50.912 MALIGNANT NEOPLASM OF LEFT FEMALE BREAST, UNSPECIFIED ESTROGEN RECEPTOR STATUS, UNSPECIFIED SITE OF BREAST (HCC): ICD-10-CM

## 2020-08-13 DIAGNOSIS — M06.9 RHEUMATOID ARTHRITIS INVOLVING MULTIPLE SITES, UNSPECIFIED RHEUMATOID FACTOR PRESENCE: ICD-10-CM

## 2020-08-13 DIAGNOSIS — M81.0 AGE-RELATED OSTEOPOROSIS WITHOUT CURRENT PATHOLOGICAL FRACTURE: ICD-10-CM

## 2020-08-13 DIAGNOSIS — K21.9 GASTROESOPHAGEAL REFLUX DISEASE WITHOUT ESOPHAGITIS: ICD-10-CM

## 2020-08-13 DIAGNOSIS — E78.2 MIXED HYPERLIPIDEMIA: ICD-10-CM

## 2020-08-13 DIAGNOSIS — F32.1 CURRENT MODERATE EPISODE OF MAJOR DEPRESSIVE DISORDER WITHOUT PRIOR EPISODE (HCC): ICD-10-CM

## 2020-08-13 PROCEDURE — 99214 OFFICE O/P EST MOD 30 MIN: CPT | Performed by: INTERNAL MEDICINE

## 2020-08-13 NOTE — PROGRESS NOTES
Chief Complaint   Patient presents with   • Follow-up     6 months for depressive disorder, GERD, HLD, and HTN.      Subjective   Zhane Burnham is a 55 y.o. female.     Here today for follow-up of HTN, HLD, GERD, fibromyalgia, RA, OP, history of breast cancer, depression.  HTN/HLD-  Her BP is well controlled today.  No recent CP, SOA, edema or palpitations.    GERD- she misses protonix on occasion and does have some GERD sxs.  FM/RA/OP-  Her left knee is bothersome and she is going to have a TKR.  She has seen Dr Bowser for this.  She remains on gabapentin for her FM and knee pain and it is very helpful for her pain.  She takes her vit D regularly.  She tries to get plenty of calcium in her diet.   She sees Dr Tellez for her pain management of DDD in her back.   Depression-she remains on duloxetine and BuSpar with good control of symptoms.  She has been caring for a few elderly people and this helps her feel better.  Otherwise she would just sit at home.   History of breast cancer- her oncologist retired and she has not had follow up for a few yrs.  She was diagnosed in 2012.  She would like to see an oncologist  HCM-colonoscopy 2019.  She has had her hepatitis A vaccines but no shingles vaccine.       The following portions of the patient's history were reviewed and updated as appropriate: allergies, current medications, past family history, past medical history, past social history, past surgical history and problem list.    Review of Systems   Constitutional: Positive for fatigue. Negative for activity change, appetite change and unexpected weight change.   Eyes: Negative for visual disturbance.   Respiratory: Negative for shortness of breath.    Cardiovascular: Negative for chest pain, palpitations and leg swelling.   Gastrointestinal: Negative for abdominal pain.        Frequent GERD symptoms   Musculoskeletal: Positive for arthralgias.   Neurological: Negative for headaches.   Psychiatric/Behavioral:  Negative for dysphoric mood and sleep disturbance. The patient is not nervous/anxious.        Objective   /70   Pulse 96   Temp 97.1 °F (36.2 °C)   LMP  (LMP Unknown)   SpO2 97%   There is no height or weight on file to calculate BMI.  Physical Exam   Constitutional: She is oriented to person, place, and time. She appears well-developed and well-nourished. No distress.   Pleasant female, appears older than her age, wearing a mask, no distress, morbidly obese   HENT:   Head: Normocephalic and atraumatic.   Right Ear: External ear normal.   Left Ear: External ear normal.   Eyes: Pupils are equal, round, and reactive to light. Conjunctivae and EOM are normal.   Neck: Normal range of motion. Neck supple. No thyromegaly present.   Cardiovascular: Normal rate, regular rhythm, normal heart sounds and intact distal pulses.   No murmur heard.  No carotid bruits   Pulmonary/Chest: Effort normal and breath sounds normal. No respiratory distress. She has no wheezes.   Abdominal: Soft. Bowel sounds are normal. She exhibits no distension. There is no tenderness.   Obesity limits exam   Musculoskeletal: Normal range of motion. She exhibits no edema.   Back pain noted with changes in position from lying to sitting   Lymphadenopathy:     She has no cervical adenopathy.   Neurological: She is alert and oriented to person, place, and time. No cranial nerve deficit. Coordination normal.   Psychiatric: She has a normal mood and affect. Her behavior is normal. Judgment and thought content normal.   Nursing note and vitals reviewed.      Assessment/Plan   Zhane Burnham is here today and the following problems have been addressed:      Zhane was seen today for follow-up.    Diagnoses and all orders for this visit:    Essential hypertension  -     CBC & Differential  -     Comprehensive Metabolic Panel    Mixed hyperlipidemia  -     Comprehensive Metabolic Panel  -     Lipid Panel With / Chol / HDL Ratio    Gastroesophageal  reflux disease without esophagitis    Rheumatoid arthritis involving multiple sites, unspecified rheumatoid factor presence (CMS/HCC)    Age-related osteoporosis without current pathological fracture    Current moderate episode of major depressive disorder without prior episode (CMS/HCC)  -     TSH    Fibromyalgia    Malignant neoplasm of left female breast, unspecified estrogen receptor status, unspecified site of breast (CMS/[facility])  -     Ambulatory Referral to Oncology    Chronic fatigue  -     Vitamin B12        Follow heart healthy/low salt diet  Avoid processed foods  Monitor blood pressure as discussed  Exercise as tolerated up to 150 minutes per week  Take all medications as prescribed  Labs as noted  Refer to oncologist for follow-up of breast cancer per patient request  Continue duloxetine and BuSpar for underlying depression  Follow-up with rheumatologist as needed for underlying rheumatoid arthritis  She sees pain clinic for her chronic back pain and fibromyalgia  Continue vitamin D and calcium for osteoporosis  Encourage compliance with Protonix as she is complaining of worsening GERD symptoms and does not take her medication as directed    Return in about 6 months (around 2/13/2021) for Annual.      Marilyn K. Vermeesch, MD      Please note that portions of this note were completed with a voice recognition program.  Efforts were made to edit dictation, but occasionally words are mistranscribed.

## 2020-08-18 LAB
ALBUMIN SERPL-MCNC: 4.8 G/DL (ref 3.5–5.2)
ALBUMIN/GLOB SERPL: 2.4 G/DL
ALP SERPL-CCNC: 80 U/L (ref 39–117)
ALT SERPL-CCNC: 36 U/L (ref 1–33)
AST SERPL-CCNC: 28 U/L (ref 1–32)
BASOPHILS # BLD AUTO: 0.04 10*3/MM3 (ref 0–0.2)
BASOPHILS NFR BLD AUTO: 0.6 % (ref 0–1.5)
BILIRUB SERPL-MCNC: 0.4 MG/DL (ref 0–1.2)
BUN SERPL-MCNC: 11 MG/DL (ref 6–20)
BUN/CREAT SERPL: 11.6 (ref 7–25)
CALCIUM SERPL-MCNC: 9.8 MG/DL (ref 8.6–10.5)
CHLORIDE SERPL-SCNC: 100 MMOL/L (ref 98–107)
CHOLEST SERPL-MCNC: 219 MG/DL (ref 0–200)
CHOLEST/HDLC SERPL: 3.65 {RATIO}
CO2 SERPL-SCNC: 29.8 MMOL/L (ref 22–29)
CREAT SERPL-MCNC: 0.95 MG/DL (ref 0.57–1)
EOSINOPHIL # BLD AUTO: 0.07 10*3/MM3 (ref 0–0.4)
EOSINOPHIL NFR BLD AUTO: 1.1 % (ref 0.3–6.2)
ERYTHROCYTE [DISTWIDTH] IN BLOOD BY AUTOMATED COUNT: 14.1 % (ref 12.3–15.4)
GLOBULIN SER CALC-MCNC: 2 GM/DL
GLUCOSE SERPL-MCNC: 106 MG/DL (ref 65–99)
HCT VFR BLD AUTO: 42.1 % (ref 34–46.6)
HDLC SERPL-MCNC: 60 MG/DL (ref 40–60)
HGB BLD-MCNC: 14.1 G/DL (ref 12–15.9)
IMM GRANULOCYTES # BLD AUTO: 0.02 10*3/MM3 (ref 0–0.05)
IMM GRANULOCYTES NFR BLD AUTO: 0.3 % (ref 0–0.5)
LDLC SERPL CALC-MCNC: 129 MG/DL (ref 0–100)
LYMPHOCYTES # BLD AUTO: 1.43 10*3/MM3 (ref 0.7–3.1)
LYMPHOCYTES NFR BLD AUTO: 21.9 % (ref 19.6–45.3)
MCH RBC QN AUTO: 28.2 PG (ref 26.6–33)
MCHC RBC AUTO-ENTMCNC: 33.5 G/DL (ref 31.5–35.7)
MCV RBC AUTO: 84.2 FL (ref 79–97)
MONOCYTES # BLD AUTO: 0.32 10*3/MM3 (ref 0.1–0.9)
MONOCYTES NFR BLD AUTO: 4.9 % (ref 5–12)
NEUTROPHILS # BLD AUTO: 4.66 10*3/MM3 (ref 1.7–7)
NEUTROPHILS NFR BLD AUTO: 71.2 % (ref 42.7–76)
NRBC BLD AUTO-RTO: 0 /100 WBC (ref 0–0.2)
PLATELET # BLD AUTO: 316 10*3/MM3 (ref 140–450)
POTASSIUM SERPL-SCNC: 5 MMOL/L (ref 3.5–5.2)
PROT SERPL-MCNC: 6.8 G/DL (ref 6–8.5)
RBC # BLD AUTO: 5 10*6/MM3 (ref 3.77–5.28)
SODIUM SERPL-SCNC: 141 MMOL/L (ref 136–145)
TRIGL SERPL-MCNC: 152 MG/DL (ref 0–150)
TSH SERPL DL<=0.005 MIU/L-ACNC: 0.36 UIU/ML (ref 0.27–4.2)
VIT B12 SERPL-MCNC: 1450 PG/ML (ref 211–946)
VLDLC SERPL CALC-MCNC: 30.4 MG/DL
WBC # BLD AUTO: 6.54 10*3/MM3 (ref 3.4–10.8)

## 2020-08-26 RX ORDER — CYCLOBENZAPRINE HCL 10 MG
TABLET ORAL
Qty: 90 TABLET | Refills: 0 | Status: SHIPPED | OUTPATIENT
Start: 2020-08-26 | End: 2020-09-29 | Stop reason: SDUPTHER

## 2020-08-26 NOTE — TELEPHONE ENCOUNTER
Caller: Zhane Burnham    Relationship: Self    Best call back number: [phone]    Medication needed:   Requested Prescriptions     Pending Prescriptions Disp Refills   • cyclobenzaprine (FLEXERIL) 10 MG tablet 90 tablet 0     Sig: Take 1/2-1 tablet three times daily as needed for muscle spasms.       When do you need the refill by: TODAY    What details did the patient provide when requesting the medication: SHE IS COMPLETELY OUT    Does the patient have less than a 3 day supply:  [x] Yes  [] No    What is the patient's preferred pharmacy:    [facility] [place], [place] - [address] [place] [address] [phone] [facility] [phone]

## 2020-09-16 DIAGNOSIS — F33.1 MAJOR DEPRESSIVE DISORDER, RECURRENT EPISODE, MODERATE (HCC): ICD-10-CM

## 2020-09-16 NOTE — TELEPHONE ENCOUNTER
Caller: Zhane Burnham    Relationship: Self    Best call back number: [phone]       Medication needed:   Requested Prescriptions     Pending Prescriptions Disp Refills   • DULoxetine (Cymbalta) 60 MG capsule 60 capsule 5     Sig: Take 2 capsules PO daily       When do you need the refill by: 09/16/20     What details did the patient provide when requesting the medication: PATIENT IS OUT OF MEDICATION.     Does the patient have less than a 3 day supply:  [x] Yes  [] No    What is the patient's preferred pharmacy: [facility] [address] [phone] [facility] [phone]

## 2020-09-17 RX ORDER — DULOXETIN HYDROCHLORIDE 60 MG/1
CAPSULE, DELAYED RELEASE ORAL
Qty: 60 CAPSULE | Refills: 5 | Status: SHIPPED | OUTPATIENT
Start: 2020-09-17 | End: 2020-12-21 | Stop reason: SDUPTHER

## 2020-09-24 NOTE — PROGRESS NOTES
"  Subjective     PROBLEM LIST:  1. [id number] triple negative invasive ductal carcinoma of the left breast  A) bilateral mastectomy 1/31/13.  Pathology showed multifocal poorly differentiated IDC, largest tumor measuring 1.7 cm.  0/3 SLN involved.    B) received adjuvant chemotherapy with TAC x 6 cycles  2. Depression  3. GERD  4. Hypertension  5. Hyperlipidemia  6. LEIF  7. Migraines  8. Rheumatoid arthritis    CHIEF COMPLAINT: history of breast cancer      HISTORY OF PRESENT ILLNESS:  The patient is a 55 y.o. female, referred for a history of left sided triple negative breast cancer.  She received adjuvant chemotherapy with Dr. Bassett and last saw him in January 2018.      She reports that she has been feeling pretty tired recently.  She has struggled her whole life with depression and her mood has been down recently.  She has anxiety about the cancer.  She says she has a lot of confidence issues about her body because she has some asymmetry in her breast reconstruction.  She is interested in having nipple reconstruction as well.  She had labs recently with her primary care provider and brought these with her for review today.  She previously saw psychiatrist but this person left the area and she has not seen anybody specifically since then.    REVIEW OF SYSTEMS:  A 14 point review of systems was performed and is negative except as noted above.    Past Medical History:   Diagnosis Date   • Abnormal ECG    • Acid reflux    • Anemia    • Anxiety    • Asthma     reported \"childhood\"   • Body piercing     ears   • Breast cancer (CMS/HCC)     Cancer of left breast, patient had a bilateral mastectomy   • Chronic bronchitis (CMS/HCC)    • Colon polyp    • Constipation    • Depression    • Dysphagia     Reported difficulty swallowing pills   • Elevated cholesterol    • Fatigue    • Fibromyalgia    • Fractures     Bilateral arms and left ankle, multiple toes   • H/O cardiovascular stress test     Patient reported done apx. " 2004 and that all was wnl's at that time   • Headache    • Hearing loss     Patient reported difficulty only when there is a lot of back ground noise. No use of hearing devices.    • Heart murmur    • History of bronchitis    • History of pneumonia    • History of transfusion     Reported no reaction to transfusion   • Hyperlipidemia    • Hypertension    • Lower extremity edema    • Migraines    • Osteoarthritis    • Osteoporosis    • Ovarian cyst    • Peptic ulceration    • PONV (postoperative nausea and vomiting)    • Restless leg    • Seasonal allergies    • Urinary tract infection    • Wears glasses            Current Outpatient Medications on File Prior to Visit   Medication Sig Dispense Refill   • BIOTIN PO Take 10,000 mcg by mouth Daily.     • busPIRone (BUSPAR) 15 MG tablet Take 1 tablet by mouth 3 (Three) Times a Day. 90 tablet 5   • cyclobenzaprine (FLEXERIL) 10 MG tablet Take 1/2-1 tablet three times daily as needed for muscle spasms. 90 tablet 0   • DULoxetine (Cymbalta) 60 MG capsule Take 2 capsules PO daily 60 capsule 5   • etodolac (LODINE) 400 MG tablet TAKE 1/2 TO 1 TABLET BY MOUTH EVERY 8 HOURS     • gabapentin (NEURONTIN) 600 MG tablet Take 600 mg by mouth 4 (Four) Times a Day. 600mg every morning, 600mg with evening meal and 1200mg HS      • hydroCHLOROthiazide (HYDRODIURIL) 25 MG tablet Take 1 tablet by mouth Daily. 90 tablet 1   • loratadine (CLARITIN) 10 MG tablet Take 1 tablet by mouth Daily. 90 tablet 3   • Multiple Vitamin (MULTI-VITAMIN PO) Take  by mouth.     • oxyCODONE-acetaminophen (PERCOCET)  MG per tablet Take 1 tablet by mouth Every 6 (Six) Hours As Needed for Moderate Pain .     • simvastatin (ZOCOR) 40 MG tablet Take 1 tablet by mouth Every Night. 90 tablet 1   • traZODone (DESYREL) 100 MG tablet Take 100 mg by mouth Every Night.     • [DISCONTINUED] CALCIUM-VITAMIN D PO Take  by mouth.     • [DISCONTINUED] diclofenac (VOLTAREN) 75 MG EC tablet Take 75 mg by mouth Every 12  "(Twelve) Hours.     • [DISCONTINUED] Ginkgo Biloba (GINKOBA PO) Take  by mouth.     • [DISCONTINUED] Lactobacillus (PROBIOTIC ACIDOPHILUS PO) Take 1 tablet by mouth Daily.     • [DISCONTINUED] pantoprazole (PROTONIX) 40 MG EC tablet Take 1 tablet by mouth Daily. 90 tablet 1   • [DISCONTINUED] traZODone (DESYREL) 50 MG tablet Take 1 tablet by mouth At Night As Needed for Sleep. Take one to two tablets at bedtime as needed for sleep 60 tablet 5     No current facility-administered medications on file prior to visit.        Allergies   Allergen Reactions   • Celebrex [Celecoxib] Swelling   • Red Dye Anaphylaxis     Reported reaction started with rash/itching and progressed to swelling    • Shellfish-Derived Products Anaphylaxis       Past Surgical History:   Procedure Laterality Date   • BREAST BIOPSY Left 12/27/2012   • BREAST RECONSTRUCTION Bilateral    • COLONOSCOPY     • COLONOSCOPY N/A 10/16/2019    Procedure: COLONOSCOPY;  Surgeon: Ck Mejia MD;  Location: [facility] ENDOSCOPY;  Service: Gastroenterology   • ENDOSCOPY     • EXTERNAL EAR SURGERY Left     Extraction of an extra lobe    • HYSTERECTOMY     • KNEE ARTHROSCOPY Left 06/2016    Patient reported \"bone tumor removed\"   • MASTECTOMY Bilateral 01/31/2013    bilat   • TONSILLECTOMY     • WISDOM TOOTH EXTRACTION         Social History     Socioeconomic History   • Marital status:      Spouse name: Not on file   • Number of children: Not on file   • Years of education: Not on file   • Highest education level: Not on file   Tobacco Use   • Smoking status: Never Smoker   • Smokeless tobacco: Never Used   Substance and Sexual Activity   • Alcohol use: Yes     Frequency: 2-4 times a month     Drinks per session: 1 or 2     Binge frequency: Never     Comment: light occasional use, no history of abuse reported   • Drug use: No   • Sexual activity: Defer       Family History   Problem Relation Age of Onset   • Heart disease Mother    • Heart failure Mother  " "  • Arthritis Mother    • Diabetes Mother    • Heart attack Mother    • Hyperlipidemia Mother    • Mental illness Mother    • Obesity Mother    • Osteoporosis Mother    • Stroke Mother    • Thyroid disease Mother    • Heart disease Father    • Heart attack Father    • Hypertension Father    • Hyperlipidemia Father    • Mental illness Father    • Osteoporosis Father    • Stroke Father    • No Known Problems Sister    • No Known Problems Brother    • Hypertension Other    • Ovarian cancer Paternal Aunt    • Breast cancer Paternal Aunt    • Tuberculosis Paternal Grandmother        Objective     /89   Pulse 105   Temp 96.8 °F (36 °C) (Temporal)   Resp 16   Ht 165.1 cm (65\")   Wt 122 kg (270 lb)   LMP  (LMP Unknown)   SpO2 98%   BMI 44.93 kg/m²   Performance Status: 0  General: well appearing female in no acute distress  Neuro: alert and oriented  HEENT: sclerae anicteric, oropharynx clear  Lymphatics: no cervical, supraclavicular, or axillary adenopathy  Chest: Status post bilateral mastectomy with implant reconstruction no palpable masses  Cardiovascular: regular rate and rhythm, no murmurs  Lungs: clear to auscultation bilaterally  Abdomen: soft, nontender, nondistended.  No palpable organomegaly  Extremities: no lower extremity edema  Skin: no rashes, lesions, bruising, or petechiae  Psych: mood and affect appropriate    Lab Results   Component Value Date    WBC 6.54 08/17/2020    HGB 14.1 08/17/2020    HCT 42.1 08/17/2020    MCV 84.2 08/17/2020     08/17/2020     Lab Results   Component Value Date    GLUCOSE 87 01/16/2018    BUN 11 08/17/2020    CREATININE 0.95 08/17/2020    EGFRIFNONA 62 08/17/2020    EGFRIFAFRI 75 08/17/2020    BCR 11.6 08/17/2020    K 5.0 08/17/2020    CO2 29.8 (H) 08/17/2020    CALCIUM 9.8 08/17/2020    PROTENTOTREF 6.8 08/17/2020    ALBUMIN 4.80 08/17/2020    LABIL2 2.4 08/17/2020    AST 28 08/17/2020    ALT 36 (H) 08/17/2020     Lab Results   Component Value Date    TSH " 0.360 08/17/2020     Lab Results   Component Value Date    [id number] 1,450 (H) 08/17/2020             Assessment/Plan     Zhane Burnham is a 55 y.o. year old female with a history of stage I triple negative left breast cancer, now about 7 years out from her diagnosis and treatment.    We discussed the the risk of cancer recurrence at this point is extremely low.  Most triple negative breast cancers will refer in the first 5 years after diagnosis.  Her exam is unremarkable and lab work is unremarkable as well.    In terms of her fatigue I suspect this may be related to depression which seems to be an ongoing issue for her.  I offered referral to psychiatry but she said she will discuss this with her primary care doctor.    She would like to see plastic surgery to discuss nipple reconstruction and potentially addressing the asymmetry in her reconstructed breast.  I will make a referral for this.    At this point I do not think she needs routine oncology follow-up.  I am happy to see her at any point in the future if needed.           Sujata Pepper MD    9/29/2020

## 2020-09-29 ENCOUNTER — CONSULT (OUTPATIENT)
Dept: ONCOLOGY | Facility: CLINIC | Age: 55
End: 2020-09-29

## 2020-09-29 VITALS
WEIGHT: 270 LBS | DIASTOLIC BLOOD PRESSURE: 89 MMHG | TEMPERATURE: 96.8 F | HEIGHT: 65 IN | SYSTOLIC BLOOD PRESSURE: 138 MMHG | OXYGEN SATURATION: 98 % | RESPIRATION RATE: 16 BRPM | BODY MASS INDEX: 44.98 KG/M2 | HEART RATE: 105 BPM

## 2020-09-29 DIAGNOSIS — C50.912 MALIGNANT NEOPLASM OF LEFT FEMALE BREAST, UNSPECIFIED ESTROGEN RECEPTOR STATUS, UNSPECIFIED SITE OF BREAST (HCC): Primary | ICD-10-CM

## 2020-09-29 PROCEDURE — 99204 OFFICE O/P NEW MOD 45 MIN: CPT | Performed by: INTERNAL MEDICINE

## 2020-09-29 RX ORDER — CYCLOBENZAPRINE HCL 10 MG
TABLET ORAL
Qty: 90 TABLET | Refills: 3 | Status: SHIPPED | OUTPATIENT
Start: 2020-09-29 | End: 2021-01-19 | Stop reason: SDUPTHER

## 2020-09-29 RX ORDER — DICLOFENAC SODIUM 75 MG/1
75 TABLET, DELAYED RELEASE ORAL EVERY 12 HOURS
COMMUNITY
Start: 2020-08-26 | End: 2020-09-29 | Stop reason: ALTCHOICE

## 2020-09-29 RX ORDER — TRAZODONE HYDROCHLORIDE 100 MG/1
100 TABLET ORAL NIGHTLY
COMMUNITY
End: 2021-04-09 | Stop reason: SDUPTHER

## 2020-09-29 RX ORDER — ETODOLAC 400 MG/1
TABLET, FILM COATED ORAL
COMMUNITY
Start: 2020-09-22 | End: 2021-09-09 | Stop reason: SDUPTHER

## 2020-12-01 ENCOUNTER — TELEPHONE (OUTPATIENT)
Dept: INTERNAL MEDICINE | Facility: CLINIC | Age: 55
End: 2020-12-01

## 2020-12-01 ENCOUNTER — HOSPITAL ENCOUNTER (OUTPATIENT)
Dept: GENERAL RADIOLOGY | Facility: HOSPITAL | Age: 55
Discharge: HOME | End: 2020-12-01
Admitting: INTERNAL MEDICINE

## 2020-12-01 ENCOUNTER — OFFICE VISIT (OUTPATIENT)
Dept: INTERNAL MEDICINE | Facility: CLINIC | Age: 55
End: 2020-12-01

## 2020-12-01 VITALS
HEART RATE: 108 BPM | OXYGEN SATURATION: 98 % | SYSTOLIC BLOOD PRESSURE: 128 MMHG | DIASTOLIC BLOOD PRESSURE: 80 MMHG | HEIGHT: 65 IN | TEMPERATURE: 97 F | WEIGHT: 263 LBS | BODY MASS INDEX: 43.82 KG/M2

## 2020-12-01 DIAGNOSIS — Z01.818 PRE-OP EXAM: Primary | ICD-10-CM

## 2020-12-01 DIAGNOSIS — M79.672 PAIN IN LEFT FOOT: ICD-10-CM

## 2020-12-01 DIAGNOSIS — R73.9 HYPERGLYCEMIA, UNSPECIFIED: ICD-10-CM

## 2020-12-01 DIAGNOSIS — R60.0 LOCALIZED EDEMA: ICD-10-CM

## 2020-12-01 DIAGNOSIS — Z01.818 PRE-OP EXAM: ICD-10-CM

## 2020-12-01 LAB
BILIRUB BLD-MCNC: ABNORMAL MG/DL
CLARITY, POC: CLEAR
COLOR UR: YELLOW
GLUCOSE UR STRIP-MCNC: NEGATIVE MG/DL
KETONES UR QL: NEGATIVE
LEUKOCYTE EST, POC: NEGATIVE
NITRITE UR-MCNC: NEGATIVE MG/ML
PH UR: 7 [PH] (ref 5–8)
PROT UR STRIP-MCNC: ABNORMAL MG/DL
RBC # UR STRIP: NEGATIVE /UL
SP GR UR: 1.01 (ref 1–1.03)
UROBILINOGEN UR QL: NORMAL

## 2020-12-01 PROCEDURE — 71046 X-RAY EXAM CHEST 2 VIEWS: CPT

## 2020-12-01 PROCEDURE — 93005 ELECTROCARDIOGRAM TRACING: CPT | Performed by: INTERNAL MEDICINE

## 2020-12-01 PROCEDURE — 99214 OFFICE O/P EST MOD 30 MIN: CPT | Performed by: INTERNAL MEDICINE

## 2020-12-01 PROCEDURE — 81003 URINALYSIS AUTO W/O SCOPE: CPT | Performed by: INTERNAL MEDICINE

## 2020-12-01 NOTE — TELEPHONE ENCOUNTER
Liz from [facility] called to follow up on the pre op clearance.      Ph [phone]  Fax [phone]

## 2020-12-01 NOTE — PROGRESS NOTES
"Michael Burnham is a 55 y.o. female who presents to the office today for a preoperative consultation at the request of surgeon Dr. Bowser who plans on performing left TKR on December 9. This consultation is requested for the specific conditions prompting preoperative evaluation for general medical clearance. Planned anesthesia: general. The patient has the following known anesthesia issues: none. Patients bleeding risk: no remote history of abnormal bleeding. Patient does not have objections to receiving blood products if needed.    The following portions of the patient's history were reviewed and updated as appropriate: allergies, current medications, past family history, past medical history, past social history, past surgical history and problem list.    Review of Systems  A comprehensive review of systems was negative except for: Musculoskeletal: positive for arthralgias, myalgias and stiff joints    Objective   /80   Pulse 108   Temp 97 °F (36.1 °C)   Ht 165.1 cm (65\")   Wt 119 kg (263 lb)   LMP  (LMP Unknown)   SpO2 98%   BMI 43.77 kg/m²   General appearance: alert, appears stated age and cooperative  Eyes: conjunctivae/corneas clear. PERRL, EOM's intact. Fundi benign.  Ears: normal TM's and external ear canals both ears  Neck: no adenopathy, no carotid bruit, supple, symmetrical, trachea midline and thyroid not enlarged, symmetric, no tenderness/mass/nodules  Lungs: clear to auscultation bilaterally  Heart: regular rate and rhythm, S1, S2 normal, no murmur, click, rub or gallop  Abdomen: soft, non-tender; bowel sounds normal; no masses,  no organomegaly  Extremities: extremities normal, atraumatic, no cyanosis or edema and Significant crepitus of knees bilaterally  Pulses: 2+ and symmetric  Lymph nodes: Cervical, supraclavicular, and axillary nodes normal.  Neurologic: Grossly normal      Cardiographics    ECG 12 Lead    Date/Time: 12/1/2020 2:24 PM  Performed by: Vermeesch, " Anabel YEH MD  Authorized by: Vermeesch, Marilyn K, MD   Comparison: compared with previous ECG from 9/26/2016  Similar to previous ECG  Rhythm: sinus rhythm  Rate: normal  Conduction: conduction normal  Q waves: III and aVF    ST Segments: ST segments normal  T Waves: T waves normal  QRS axis: normal  Other findings: poor R wave progression    Clinical impression: abnormal EKG  Comments: Inferior Q waves in lead III noted on prior EKG.  Mild LVH changes in V3 and V4.              Imaging  Chest x-ray: normal, see attached report    Lab Review   See attached    Assessment/Plan   55 y.o. female with planned surgery as above.    Known risk factors for perioperative complications: None    Difficulty with intubation is not anticipated.    Cardiac Risk Estimation: Low    Current medications which may produce withdrawal symptoms if withheld perioperatively: Duloxetine, gabapentin, percocet    1. Preoperative workup as follows : Labs, chest x-ray, EKG  2. Change in medication regimen before surgery: Discontinue all over-the-counter supplements as well as loading 1 week prior to surgery  3. Prophylaxis for cardiac events with perioperative beta-blockers: Not necessary  4. Invasive hemodynamic monitoring perioperatively: Strongly advised  5. Deep vein thrombosis prophylaxis postoperatively: Per surgical team  6. Surveillance for postoperative MI with ECG immediately postoperatively and on postoperative days 1 and 2 AND troponin levels 24 hours postoperatively and on day 4 or hospital discharge (whichever comes first): Advised if necessary

## 2020-12-02 LAB
ALBUMIN SERPL-MCNC: 4.6 G/DL (ref 3.5–5.2)
ALBUMIN/GLOB SERPL: 1.9 G/DL
ALP SERPL-CCNC: 90 U/L (ref 39–117)
ALT SERPL-CCNC: 29 U/L (ref 1–33)
APTT PPP: 27.1 SECONDS (ref 24.5–37.2)
AST SERPL-CCNC: 21 U/L (ref 1–32)
BASOPHILS # BLD AUTO: 0.04 10*3/MM3 (ref 0–0.2)
BASOPHILS NFR BLD AUTO: 0.7 % (ref 0–1.5)
BILIRUB SERPL-MCNC: 0.4 MG/DL (ref 0–1.2)
BUN SERPL-MCNC: 11 MG/DL (ref 6–20)
BUN/CREAT SERPL: 10.9 (ref 7–25)
CALCIUM SERPL-MCNC: 9.9 MG/DL (ref 8.6–10.5)
CHLORIDE SERPL-SCNC: 101 MMOL/L (ref 98–107)
CO2 SERPL-SCNC: 25.5 MMOL/L (ref 22–29)
CREAT SERPL-MCNC: 1.01 MG/DL (ref 0.57–1)
EOSINOPHIL # BLD AUTO: 0.08 10*3/MM3 (ref 0–0.4)
EOSINOPHIL NFR BLD AUTO: 1.3 % (ref 0.3–6.2)
ERYTHROCYTE [DISTWIDTH] IN BLOOD BY AUTOMATED COUNT: 14.2 % (ref 12.3–15.4)
GLOBULIN SER CALC-MCNC: 2.4 GM/DL
GLUCOSE SERPL-MCNC: 100 MG/DL (ref 65–99)
HBA1C MFR BLD: 5.5 % (ref 4.8–5.6)
HCT VFR BLD AUTO: 44.2 % (ref 34–46.6)
HGB BLD-MCNC: 14.7 G/DL (ref 12–15.9)
IMM GRANULOCYTES # BLD AUTO: 0.02 10*3/MM3 (ref 0–0.05)
IMM GRANULOCYTES NFR BLD AUTO: 0.3 % (ref 0–0.5)
INR PPP: 0.95 (ref 0.9–1.1)
LYMPHOCYTES # BLD AUTO: 1.24 10*3/MM3 (ref 0.7–3.1)
LYMPHOCYTES NFR BLD AUTO: 20.7 % (ref 19.6–45.3)
MCH RBC QN AUTO: 27.9 PG (ref 26.6–33)
MCHC RBC AUTO-ENTMCNC: 33.3 G/DL (ref 31.5–35.7)
MCV RBC AUTO: 84 FL (ref 79–97)
MONOCYTES # BLD AUTO: 0.32 10*3/MM3 (ref 0.1–0.9)
MONOCYTES NFR BLD AUTO: 5.3 % (ref 5–12)
NEUTROPHILS # BLD AUTO: 4.3 10*3/MM3 (ref 1.7–7)
NEUTROPHILS NFR BLD AUTO: 71.7 % (ref 42.7–76)
NRBC BLD AUTO-RTO: 0 /100 WBC (ref 0–0.2)
PLATELET # BLD AUTO: 323 10*3/MM3 (ref 140–450)
POTASSIUM SERPL-SCNC: 4.4 MMOL/L (ref 3.5–5.2)
PREALB SERPL-MCNC: 30 MG/DL (ref 10–36)
PROT SERPL-MCNC: 7 G/DL (ref 6–8.5)
PROTHROMBIN TIME: 13.1 SECONDS (ref 12–15.1)
RBC # BLD AUTO: 5.26 10*6/MM3 (ref 3.77–5.28)
SODIUM SERPL-SCNC: 138 MMOL/L (ref 136–145)
WBC # BLD AUTO: 6 10*3/MM3 (ref 3.4–10.8)

## 2020-12-02 NOTE — PROGRESS NOTES
Please attach copy of labs, chest x-ray, EKG to preop form and forward to Dr. Hernandez.  Thank you.  please let patient know her labs are all in acceptable range.

## 2020-12-21 DIAGNOSIS — F33.1 MAJOR DEPRESSIVE DISORDER, RECURRENT EPISODE, MODERATE (HCC): ICD-10-CM

## 2020-12-21 RX ORDER — HYDROCHLOROTHIAZIDE 25 MG/1
25 TABLET ORAL DAILY
Qty: 90 TABLET | Refills: 1 | Status: SHIPPED | OUTPATIENT
Start: 2020-12-21 | End: 2021-06-10 | Stop reason: SDUPTHER

## 2020-12-21 RX ORDER — SIMVASTATIN 40 MG
40 TABLET ORAL NIGHTLY
Qty: 90 TABLET | Refills: 1 | Status: SHIPPED | OUTPATIENT
Start: 2020-12-21 | End: 2021-06-10 | Stop reason: SDUPTHER

## 2020-12-21 RX ORDER — LORATADINE 10 MG/1
10 TABLET ORAL DAILY
Qty: 90 TABLET | Refills: 3 | Status: SHIPPED | OUTPATIENT
Start: 2020-12-21 | End: 2022-04-06 | Stop reason: SDUPTHER

## 2020-12-21 RX ORDER — DULOXETIN HYDROCHLORIDE 60 MG/1
CAPSULE, DELAYED RELEASE ORAL
Qty: 180 CAPSULE | Refills: 1 | Status: SHIPPED | OUTPATIENT
Start: 2020-12-21 | End: 2021-06-10 | Stop reason: SDUPTHER

## 2021-01-19 RX ORDER — CYCLOBENZAPRINE HCL 10 MG
TABLET ORAL
Qty: 90 TABLET | Refills: 3 | Status: SHIPPED | OUTPATIENT
Start: 2021-01-19 | End: 2021-07-16 | Stop reason: SDUPTHER

## 2021-02-17 ENCOUNTER — OFFICE VISIT (OUTPATIENT)
Dept: INTERNAL MEDICINE | Facility: CLINIC | Age: 56
End: 2021-02-17

## 2021-02-17 VITALS — HEIGHT: 65 IN | BODY MASS INDEX: 42.65 KG/M2 | WEIGHT: 256 LBS

## 2021-02-17 DIAGNOSIS — F32.1 CURRENT MODERATE EPISODE OF MAJOR DEPRESSIVE DISORDER WITHOUT PRIOR EPISODE (HCC): ICD-10-CM

## 2021-02-17 DIAGNOSIS — Z00.00 ENCOUNTER FOR MEDICARE ANNUAL WELLNESS EXAM: Primary | ICD-10-CM

## 2021-02-17 DIAGNOSIS — M06.9 RHEUMATOID ARTHRITIS INVOLVING MULTIPLE SITES, UNSPECIFIED WHETHER RHEUMATOID FACTOR PRESENT (HCC): ICD-10-CM

## 2021-02-17 DIAGNOSIS — M79.7 FIBROMYALGIA: ICD-10-CM

## 2021-02-17 DIAGNOSIS — I10 ESSENTIAL HYPERTENSION: ICD-10-CM

## 2021-02-17 DIAGNOSIS — M81.0 AGE-RELATED OSTEOPOROSIS WITHOUT CURRENT PATHOLOGICAL FRACTURE: ICD-10-CM

## 2021-02-17 DIAGNOSIS — E66.01 CLASS 3 SEVERE OBESITY DUE TO EXCESS CALORIES WITH SERIOUS COMORBIDITY AND BODY MASS INDEX (BMI) OF 40.0 TO 44.9 IN ADULT (HCC): ICD-10-CM

## 2021-02-17 DIAGNOSIS — E78.2 MIXED HYPERLIPIDEMIA: ICD-10-CM

## 2021-02-17 DIAGNOSIS — R06.83 SNORING: ICD-10-CM

## 2021-02-17 DIAGNOSIS — K21.9 GASTROESOPHAGEAL REFLUX DISEASE WITHOUT ESOPHAGITIS: ICD-10-CM

## 2021-02-17 PROBLEM — Z01.818 PRE-OP EXAM: Status: RESOLVED | Noted: 2020-12-01 | Resolved: 2021-02-17

## 2021-02-17 PROCEDURE — G0439 PPPS, SUBSEQ VISIT: HCPCS | Performed by: INTERNAL MEDICINE

## 2021-02-17 NOTE — PROGRESS NOTES
The ABCs of the Annual Wellness Visit  Subsequent Medicare Wellness Visit    Chief Complaint   Patient presents with   • Medicare Wellness-subsequent       Subjective   History of Present Illness:  Zhane Burnham is a 55 y.o. female who presents for a telemedicine video subsequent Medicare Wellness Visit.  PMH of HTN, HLD, GERD, breast cancer, depression, rheumatoid arthritis/fibromyalgia, DJD.  Last colonoscopy 2019.  No longer requires mammograms due to mastectomy.  Has had hepatitis A and B vaccines.  She is behind on Pap.  She has left TKR in December, she has been going to PT.  She had a fall about 3 weeks ago, she will see ortho for follow up.  She landed on her left knee and had some worse swelling. She denies any CP, SOA, palpitations.  She is having some GERD even with protonix, uses prn TUMS. She has some depression daily, is not currently seeing a counselor. She is not seeing a rheumatologist at this time, she used to see Dr Tomas.  Her oxygen level was low during her knee surgery and she has been told she snores.   Labs are currently up-to-date and are in acceptable range.  Patient did not obtain vital signs for me for this visit other than her weight.    HEALTH RISK ASSESSMENT    Recent Hospitalizations:  No hospitalization(s) within the last year.    Current Medical Providers:  Patient Care Team:  Vermeesch, Marilyn K, MD as PCP - General (Internal Medicine)  Juanpablo Bassett MD (Inactive) as Consulting Physician (Hematology and Oncology)    Smoking Status:  Social History     Tobacco Use   Smoking Status Never Smoker   Smokeless Tobacco Never Used       Alcohol Consumption:  Social History     Substance and Sexual Activity   Alcohol Use Yes   • Frequency: 2-4 times a month   • Drinks per session: 1 or 2   • Binge frequency: Never    Comment: light occasional use, no history of abuse reported       Depression Screen:   PHQ-2/PHQ-9 Depression Screening 2/15/2021   Little interest or pleasure in doing  things 0   Feeling down, depressed, or hopeless 1   Total Score 1       Fall Risk Screen:  TAHMINA Fall Risk Assessment was completed, and patient is at HIGH risk for falls. Assessment completed on:2/15/2021    Health Habits and Functional and Cognitive Screening:  Functional & Cognitive Status 2/17/2021   Do you have difficulty preparing food and eating? No   Do you have difficulty bathing yourself, getting dressed or grooming yourself? Yes   Do you have difficulty using the toilet? No   Do you have difficulty moving around from place to place? Yes   Do you have trouble with steps or getting out of a bed or a chair? Yes   Current Diet Well Balanced Diet   Dental Exam Up to date   Eye Exam Up to date   Exercise (times per week) 0 times per week   Current Exercise Activities Include None   Do you need help using the phone?  No   Are you deaf or do you have serious difficulty hearing?  No   Do you need help with transportation? No   Do you need help shopping? No   Do you need help preparing meals?  No   Do you need help with housework?  No   Do you need help with laundry? No   Do you need help taking your medications? No   Do you need help managing money? No   Do you ever drive or ride in a car without wearing a seat belt? No   Have you felt unusual stress, anger or loneliness in the last month? Yes   Who do you live with? Alone   If you need help, do you have trouble finding someone available to you? No   Do you have difficulty concentrating, remembering or making decisions? Yes         Does the patient have evidence of cognitive impairment? No    Asprin use counseling:Does not need ASA (and currently is not on it)    Age-appropriate Screening Schedule:  Refer to the list below for future screening recommendations based on patient's age, sex and/or medical conditions. Orders for these recommended tests are listed in the plan section. The patient has been provided with a written plan.    Health Maintenance   Topic Date  Due   • ZOSTER VACCINE (1 of 2) 04/22/2017   • INFLUENZA VACCINE  08/01/2020   • LIPID PANEL  08/17/2021   • PAP SMEAR  02/13/2023   • COLONOSCOPY  10/16/2029   • MAMMOGRAM  Discontinued   • TDAP/TD VACCINES  Discontinued          The following portions of the patient's history were reviewed and updated as appropriate: allergies, current medications, past family history, past medical history, past social history, past surgical history and problem list.    Outpatient Medications Prior to Visit   Medication Sig Dispense Refill   • BIOTIN PO Take 10,000 mcg by mouth Daily.     • busPIRone (BUSPAR) 15 MG tablet Take 1 tablet by mouth 3 (Three) Times a Day. 90 tablet 5   • cyclobenzaprine (FLEXERIL) 10 MG tablet Take 1/2-1 tablet three times daily as needed for muscle spasms. 90 tablet 3   • DULoxetine (Cymbalta) 60 MG capsule Take 2 capsules PO daily 180 capsule 1   • etodolac (LODINE) 400 MG tablet TAKE 1/2 TO 1 TABLET BY MOUTH EVERY 8 HOURS     • gabapentin (NEURONTIN) 600 MG tablet Take 600 mg by mouth 4 (Four) Times a Day. 600mg every morning, 600mg with evening meal and 1200mg HS      • hydroCHLOROthiazide (HYDRODIURIL) 25 MG tablet Take 1 tablet by mouth Daily. 90 tablet 1   • loratadine (CLARITIN) 10 MG tablet Take 1 tablet by mouth Daily. 90 tablet 3   • Multiple Vitamin (MULTI-VITAMIN PO) Take  by mouth.     • oxyCODONE-acetaminophen (PERCOCET)  MG per tablet Take 1 tablet by mouth Every 6 (Six) Hours As Needed for Moderate Pain .     • pantoprazole (PROTONIX) 40 MG EC tablet Take 40 mg by mouth Daily.     • simvastatin (ZOCOR) 40 MG tablet Take 1 tablet by mouth Every Night. 90 tablet 1   • traZODone (DESYREL) 100 MG tablet Take 100 mg by mouth Every Night.       No facility-administered medications prior to visit.        Patient Active Problem List   Diagnosis   • Hypertension   • Hyperlipidemia   • Abnormal ECG   • Lower extremity edema   • Breast cancer, left (CMS/HCC)   • Rheumatoid arthritis  "(CMS/[facility])   • Chronic pain syndrome   • Gastroesophageal reflux disease without esophagitis   • Current moderate episode of major depressive disorder without prior episode (CMS/[facility])   • Fibromyalgia   • Age-related osteoporosis without current pathological fracture   • Encounter for screening colonoscopy   • Screening for colon cancer   • Arthritis of left knee   • Chronic fatigue   • Encounter for Medicare annual wellness exam   • Snoring       Advanced Care Planning:  ACP discussion was held with the patient during this visit. Patient has an advance directive (not in EMR), copy requested.    Review of Systems   Constitutional: Negative.    HENT: Negative.    Eyes: Negative.    Respiratory: Positive for shortness of breath.    Cardiovascular: Negative.    Genitourinary: Positive for difficulty urinating.        She has to concentrate on emptying her bladder   Musculoskeletal: Positive for arthralgias, gait problem and joint swelling.   Skin: Negative.    Allergic/Immunologic: Negative.    Hematological: Negative.    Psychiatric/Behavioral: Positive for dysphoric mood.       Compared to one year ago, the patient feels her physical health is the same.  Compared to one year ago, the patient feels her mental health is the same.    Reviewed chart for potential of high risk medication in the elderly: yes  Reviewed chart for potential of harmful drug interactions in the elderly:yes    Objective         Vitals:    02/17/21 1645   Weight: 116 kg (256 lb)   Height: 165.1 cm (65\")       Body mass index is 42.6 kg/m².  Discussed the patient's BMI with her. The BMI is above average; BMI management plan is completed.    Physical Exam  Vitals signs and nursing note reviewed.   Constitutional:       General: She is not in acute distress.     Appearance: Normal appearance. She is obese. She is not ill-appearing.      Comments: Kind and pleasant female, appears her age and in no distress today   HENT:      Head: Normocephalic and " atraumatic.      Right Ear: External ear normal.      Left Ear: External ear normal.   Eyes:      General:         Right eye: No discharge.         Left eye: No discharge.      Extraocular Movements: Extraocular movements intact.   Neck:      Musculoskeletal: Normal range of motion.   Pulmonary:      Effort: Pulmonary effort is normal. No respiratory distress.   Neurological:      Mental Status: She is alert and oriented to person, place, and time.   Psychiatric:         Mood and Affect: Mood normal.         Behavior: Behavior normal.         Thought Content: Thought content normal.         Judgment: Judgment normal.      Comments: Appears slightly anxious, she answers questions appropriately         Lab Results   Component Value Date     (H) 12/01/2020    HGBA1C 5.50 12/01/2020        Assessment/Plan   Medicare Risks and Personalized Health Plan  CMS Preventative Services Quick Reference  Advance Directive Discussion  Cardiovascular risk  Depression/Dysphoria  Immunizations Discussed/Encouraged (specific immunizations; Shingrix )  Inactivity/Sedentary  Obesity/Overweight   Osteoprorosis Risk    The above risks/problems have been discussed with the patient.  Pertinent information has been shared with the patient in the After Visit Summary.  Follow up plans and orders are seen below in the Assessment/Plan Section.    Diagnoses and all orders for this visit:    1. Encounter for Medicare annual wellness exam (Primary)  -     DEXA Bone Density Axial; Future  Last Pap 1 year ago, due again in 2023  Encourage patient to get shingles vaccine, however she is signed up for Covid vaccine and I recommend she wait 4 weeks after last Covid vaccine before her shingles shot  Request copy of living will for chart    2. Essential hypertension  Follow heart healthy/low salt diet  Avoid processed foods  Monitor blood pressure as discussed  Exercise as tolerated up to 30 minutes 5 days per week  Take all medications as  prescribed    3. Mixed hyperlipidemia  Follow heart healthy/low cholesterol diet  Avoid processed/fried foods/fast food  Exercise as tolerated up to 30 minutes 5 days a week  Take all medications as prescribed    4. Gastroesophageal reflux disease without esophagitis  Continue pantoprazole for reflux, may supplement with Pepcid with evening meal as she is still having some symptoms  Encouraged her to avoid large meals, late night meals within 2 hours of sleep, spicy foods, excessive amounts of carbonated or caffeinated drinks    5. Current moderate episode of major depressive disorder without prior episode (CMS/[facility])  -     Ambulatory Referral to Psychology  Continue duloxetine for depression and BuSpar for anxiety.  Referred patient to counselor to help with mood disorder    6. Rheumatoid arthritis involving multiple sites, unspecified whether rheumatoid factor present (CMS/[facility])  -     Ambulatory Referral to Rheumatology  Patient is currently on Lodine, gabapentin for her underlying rheumatoid arthritis  Refer to Dr. Merino for ongoing treatment    7. Fibromyalgia  Continue duloxetine and Flexeril  Recommend regular exercise and stretching also    8. Age-related osteoporosis without current pathological fracture  -     DEXA Bone Density Axial; Future    9. Snoring  -     Ambulatory Referral to Sleep Medicine    10. Class 3 severe obesity due to excess calories with serious comorbidity and body mass index (BMI) of 40.0 to 44.9 in adult (CMS/[facility])  Patient's Body mass index is 42.6 kg/m². BMI is above normal parameters. Recommendations include: exercise counseling and nutrition counseling.      Follow Up:  Return in about 6 months (around 8/17/2021) for Next scheduled follow up.     An After Visit Summary and PPPS were given to the patient.

## 2021-03-01 RX ORDER — PANTOPRAZOLE SODIUM 40 MG/1
40 TABLET, DELAYED RELEASE ORAL DAILY
Qty: 90 TABLET | Refills: 1 | Status: SHIPPED | OUTPATIENT
Start: 2021-03-01 | End: 2021-08-09 | Stop reason: SDUPTHER

## 2021-03-01 NOTE — TELEPHONE ENCOUNTER
Caller: Burnham Jeremieru    Relationship: Self    Best call back number: [phone]    Medication needed:   Requested Prescriptions     Pending Prescriptions Disp Refills   • pantoprazole (PROTONIX) 40 MG EC tablet        Sig: Take 1 tablet by mouth Daily.       When do you need the refill by: 3/1/21    What details did the patient provide when requesting the medication:     Does the patient have less than a 3 day supply:  [x] Yes  [] No    What is the patient's preferred pharmacy: [facility] [address] [phone] [facility] [phone]

## 2021-03-03 ENCOUNTER — APPOINTMENT (OUTPATIENT)
Dept: BONE DENSITY | Facility: HOSPITAL | Age: 56
End: 2021-03-03

## 2021-03-09 ENCOUNTER — APPOINTMENT (OUTPATIENT)
Dept: BONE DENSITY | Facility: HOSPITAL | Age: 56
End: 2021-03-09

## 2021-03-09 DIAGNOSIS — M81.0 AGE-RELATED OSTEOPOROSIS WITHOUT CURRENT PATHOLOGICAL FRACTURE: ICD-10-CM

## 2021-03-09 DIAGNOSIS — Z00.00 ENCOUNTER FOR MEDICARE ANNUAL WELLNESS EXAM: ICD-10-CM

## 2021-03-09 PROCEDURE — 77080 DXA BONE DENSITY AXIAL: CPT

## 2021-03-09 NOTE — PROGRESS NOTES
Please tell patient bone density reveals osteopenia for which I recommend vitamin D 2000 units daily.

## 2021-03-25 ENCOUNTER — OFFICE VISIT (OUTPATIENT)
Dept: BEHAVIORAL HEALTH | Facility: CLINIC | Age: 56
End: 2021-03-25

## 2021-03-25 VITALS
OXYGEN SATURATION: 98 % | TEMPERATURE: 97.9 F | HEART RATE: 120 BPM | WEIGHT: 269.9 LBS | SYSTOLIC BLOOD PRESSURE: 140 MMHG | BODY MASS INDEX: 44.97 KG/M2 | HEIGHT: 65 IN | DIASTOLIC BLOOD PRESSURE: 90 MMHG

## 2021-03-25 DIAGNOSIS — M79.7 FIBROMYALGIA: ICD-10-CM

## 2021-03-25 DIAGNOSIS — F32.1 CURRENT MODERATE EPISODE OF MAJOR DEPRESSIVE DISORDER, UNSPECIFIED WHETHER RECURRENT (HCC): Primary | ICD-10-CM

## 2021-03-25 PROCEDURE — 90791 PSYCH DIAGNOSTIC EVALUATION: CPT | Performed by: COUNSELOR
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
## 2021-04-06 ENCOUNTER — OFFICE VISIT (OUTPATIENT)
Dept: NEUROLOGY | Facility: CLINIC | Age: 56
End: 2021-04-06

## 2021-04-06 VITALS
WEIGHT: 269.4 LBS | HEIGHT: 65 IN | OXYGEN SATURATION: 96 % | HEART RATE: 102 BPM | SYSTOLIC BLOOD PRESSURE: 120 MMHG | DIASTOLIC BLOOD PRESSURE: 80 MMHG | BODY MASS INDEX: 44.89 KG/M2 | TEMPERATURE: 97.1 F

## 2021-04-06 DIAGNOSIS — G47.00 INSOMNIA, UNSPECIFIED TYPE: ICD-10-CM

## 2021-04-06 DIAGNOSIS — R06.83 SNORING: ICD-10-CM

## 2021-04-06 DIAGNOSIS — K21.9 GASTROESOPHAGEAL REFLUX DISEASE, UNSPECIFIED WHETHER ESOPHAGITIS PRESENT: ICD-10-CM

## 2021-04-06 DIAGNOSIS — E78.5 DYSLIPIDEMIA: ICD-10-CM

## 2021-04-06 DIAGNOSIS — G47.19 EXCESSIVE DAYTIME SLEEPINESS: Primary | ICD-10-CM

## 2021-04-06 DIAGNOSIS — F39 MOOD DISORDER (HCC): ICD-10-CM

## 2021-04-06 DIAGNOSIS — I10 ESSENTIAL HYPERTENSION: ICD-10-CM

## 2021-04-06 PROCEDURE — 99213 OFFICE O/P EST LOW 20 MIN: CPT | Performed by: NURSE PRACTITIONER

## 2021-04-06 NOTE — PATIENT INSTRUCTIONS
Sleep Apnea  Sleep apnea affects breathing during sleep. It causes breathing to stop for a short time or to become shallow. It can also increase the risk of:  · Heart attack.  · Stroke.  · Being very overweight (obese).  · Diabetes.  · Heart failure.  · Irregular heartbeat.  The goal of treatment is to help you breathe normally again.  What are the causes?  There are three kinds of sleep apnea:  · Obstructive sleep apnea. This is caused by a blocked or collapsed airway.  · Central sleep apnea. This happens when the brain does not send the right signals to the muscles that control breathing.  · Mixed sleep apnea. This is a combination of obstructive and central sleep apnea.  The most common cause of this condition is a collapsed or blocked airway. This can happen if:  · Your throat muscles are too relaxed.  · Your tongue and tonsils are too large.  · You are overweight.  · Your airway is too small.  What increases the risk?  · Being overweight.  · Smoking.  · Having a small airway.  · Being older.  · Being male.  · Drinking alcohol.  · Taking medicines to calm yourself (sedatives or tranquilizers).  · Having family members with the condition.  What are the signs or symptoms?  · Trouble staying asleep.  · Being sleepy or tired during the day.  · Getting angry a lot.  · Loud snoring.  · Headaches in the morning.  · Not being able to focus your mind (concentrate).  · Forgetting things.  · Less interest in sex.  · Mood swings.  · Personality changes.  · Feelings of sadness (depression).  · Waking up a lot during the night to pee (urinate).  · Dry mouth.  · Sore throat.  How is this diagnosed?  · Your medical history.  · A physical exam.  · A test that is done when you are sleeping (sleep study). The test is most often done in a sleep lab but may also be done at home.  How is this treated?    · Sleeping on your side.  · Using a medicine to get rid of mucus in your nose (decongestant).  · Avoiding the use of alcohol,  medicines to help you relax, or certain pain medicines (narcotics).  · Losing weight, if needed.  · Changing your diet.  · Not smoking.  · Using a machine to open your airway while you sleep, such as:  ? An oral appliance. This is a mouthpiece that shifts your lower jaw forward.  ? A CPAP device. This device blows air through a mask when you breathe out (exhale).  ? An EPAP device. This has valves that you put in each nostril.  ? A BPAP device. This device blows air through a mask when you breathe in (inhale) and breathe out.  · Having surgery if other treatments do not work.  It is important to get treatment for sleep apnea. Without treatment, it can lead to:  · High blood pressure.  · Coronary artery disease.  · In men, not being able to have an erection (impotence).  · Reduced thinking ability.  Follow these instructions at home:  Lifestyle  · Make changes that your doctor recommends.  · Eat a healthy diet.  · Lose weight if needed.  · Avoid alcohol, medicines to help you relax, and some pain medicines.  · Do not use any products that contain nicotine or tobacco, such as cigarettes, e-cigarettes, and chewing tobacco. If you need help quitting, ask your doctor.  General instructions  · Take over-the-counter and prescription medicines only as told by your doctor.  · If you were given a machine to use while you sleep, use it only as told by your doctor.  · If you are having surgery, make sure to tell your doctor you have sleep apnea. You may need to bring your device with you.  · Keep all follow-up visits as told by your doctor. This is important.  Contact a doctor if:  · The machine that you were given to use during sleep bothers you or does not seem to be working.  · You do not get better.  · You get worse.  Get help right away if:  · Your chest hurts.  · You have trouble breathing in enough air.  · You have an uncomfortable feeling in your back, arms, or stomach.  · You have trouble talking.  · One side of your  body feels weak.  · A part of your face is hanging down.  These symptoms may be an emergency. Do not wait to see if the symptoms will go away. Get medical help right away. Call your local emergency services (911 in the [place] Do not drive yourself to the hospital.  Summary  · This condition affects breathing during sleep.  · The most common cause is a collapsed or blocked airway.  · The goal of treatment is to help you breathe normally while you sleep.  This information is not intended to replace advice given to you by your health care provider. Make sure you discuss any questions you have with your health care provider.  Document Revised: 10/04/2019 Document Reviewed: 08/13/2019  [employer] Patient Education © 2021 [employer]

## 2021-04-06 NOTE — PROGRESS NOTES
"     New Sleep Patient Office Visit      Patient Name: Zhane Burnham  : 1965   MRN: [id number]     Referring Physician: Vermeesch, Marilyn K, MD    Chief Complaint:    Chief Complaint   Patient presents with   • Consult     NP, in office to [facility] care for chriss. Patient c/o snorning.        History of Present Illness: Zhane Burnham is a 55 y.o. female who is here today to establish care with Sleep Medicine.  Sleep questionnaire reviewed.  She has excessive daytime sleepiness, she takes naps on some days, she is sleepy when she increases her sleep time, the time it takes her to fall asleep varies, she wakes up 1-2 times during the night and some times has difficulty falling back to sleep, it takes her a while to \"get going\" after awakening, she sleeps 6-9 hours per night, she snores, she experiences disturbed or restless sleep, she wakes up with a very dry mouth, she experiences sore throats in the mornings some times, she experiences an aching feeling/crawling sensation/urge to keep moving legs, leg movements interfere with her sleep, she sweats excessively during sleep, she grinds her teeth, she has pain that interferes with sleep.  She is taking Trazodone and Benadryl to help her fall asleep.  Additional risk factors- BMI 44, HTN, mood disorder, dyslipidemia, peripheral neuropathy, chronic back pain, insomnia.     [place] Score: 6    Subjective      Review of Systems:   Review of Systems   Constitutional: Positive for fatigue. Negative for fever, unexpected weight gain and unexpected weight loss.   HENT: Negative for hearing loss, sore throat, swollen glands, tinnitus and trouble swallowing.    Eyes: Negative for blurred vision, double vision, photophobia and visual disturbance.   Respiratory: Positive for wheezing. Negative for cough, chest tightness and shortness of breath.    Cardiovascular: Negative for chest pain, palpitations and leg swelling.   Gastrointestinal: Negative for constipation, " "diarrhea and nausea.   Endocrine: Negative for cold intolerance and heat intolerance.   Musculoskeletal: Negative for gait problem, neck pain and neck stiffness.   Skin: Negative for color change and rash.   Allergic/Immunologic: Negative for environmental allergies and food allergies.   Neurological: Negative for dizziness, syncope, facial asymmetry, speech difficulty, weakness, headache, memory problem and confusion.   Psychiatric/Behavioral: Negative for agitation, behavioral problems and depressed mood. The patient is not nervous/anxious.        Past Medical History:   Past Medical History:   Diagnosis Date   • Abnormal ECG    • Acid reflux    • Anemia    • Anxiety    • Asthma     reported \"childhood\"   • Body piercing     ears   • Breast cancer (CMS/HCC)     Cancer of left breast, patient had a bilateral mastectomy   • Chronic bronchitis (CMS/HCC)    • Colon polyp    • Constipation    • Depression    • Dysphagia     Reported difficulty swallowing pills   • Elevated cholesterol    • Fatigue    • Fibromyalgia    • Fractures     Bilateral arms and left ankle, multiple toes   • H/O cardiovascular stress test     Patient reported done apx. 2004 and that all was wnl's at that time   • Headache    • Hearing loss     Patient reported difficulty only when there is a lot of back ground noise. No use of hearing devices.    • Heart murmur    • History of bronchitis    • History of pneumonia    • History of transfusion     Reported no reaction to transfusion   • Hyperlipidemia    • Hypertension    • Lower extremity edema    • Migraines    • Osteoarthritis    • Osteoporosis    • Ovarian cyst    • Peptic ulceration    • PONV (postoperative nausea and vomiting)    • Restless leg    • Seasonal allergies    • Urinary tract infection    • Wears glasses        Past Surgical History:   Past Surgical History:   Procedure Laterality Date   • BREAST BIOPSY Left 12/27/2012   • BREAST RECONSTRUCTION Bilateral    • COLONOSCOPY     • " "COLONOSCOPY N/A 10/16/2019    Procedure: COLONOSCOPY;  Surgeon: Ck Mejia MD;  Location: [facility] ENDOSCOPY;  Service: Gastroenterology   • ENDOSCOPY     • EXTERNAL EAR SURGERY Left     Extraction of an extra lobe    • HYSTERECTOMY     • KNEE ARTHROSCOPY Left 06/2016    Patient reported \"bone tumor removed\"   • MASTECTOMY Bilateral 01/31/2013    bilat   • TONSILLECTOMY     • WISDOM TOOTH EXTRACTION         Family History:   Family History   Problem Relation Age of Onset   • Heart disease Mother    • Heart failure Mother    • Arthritis Mother    • Diabetes Mother    • Heart attack Mother    • Hyperlipidemia Mother    • Mental illness Mother    • Obesity Mother    • Osteoporosis Mother    • Stroke Mother    • Thyroid disease Mother    • Heart disease Father    • Heart attack Father    • Hypertension Father    • Hyperlipidemia Father    • Mental illness Father    • Osteoporosis Father    • Stroke Father    • No Known Problems Sister    • No Known Problems Brother    • Hypertension Other    • Ovarian cancer Paternal Aunt    • Breast cancer Paternal Aunt    • Tuberculosis Paternal Grandmother        Social History:   Social History     Socioeconomic History   • Marital status:      Spouse name: Not on file   • Number of children: Not on file   • Years of education: Not on file   • Highest education level: Not on file   Tobacco Use   • Smoking status: Never Smoker   • Smokeless tobacco: Never Used   Substance and Sexual Activity   • Alcohol use: Yes     Comment: light occasional use, no history of abuse reported   • Drug use: No   • Sexual activity: Defer       Medications:     Current Outpatient Medications:   •  BIOTIN PO, Take 10,000 mcg by mouth Daily., Disp: , Rfl:   •  busPIRone (BUSPAR) 15 MG tablet, Take 1 tablet by mouth 3 (Three) Times a Day., Disp: 90 tablet, Rfl: 5  •  cyclobenzaprine (FLEXERIL) 10 MG tablet, Take 1/2-1 tablet three times daily as needed for muscle spasms., Disp: 90 tablet, Rfl: " "3  •  DULoxetine (Cymbalta) 60 MG capsule, Take 2 capsules PO daily, Disp: 180 capsule, Rfl: 1  •  etodolac (LODINE) 400 MG tablet, TAKE 1/2 TO 1 TABLET BY MOUTH EVERY 8 HOURS, Disp: , Rfl:   •  gabapentin (NEURONTIN) 600 MG tablet, Take 600 mg by mouth 4 (Four) Times a Day. 600mg every morning, 600mg with evening meal and 1200mg HS , Disp: , Rfl:   •  hydroCHLOROthiazide (HYDRODIURIL) 25 MG tablet, Take 1 tablet by mouth Daily., Disp: 90 tablet, Rfl: 1  •  loratadine (CLARITIN) 10 MG tablet, Take 1 tablet by mouth Daily., Disp: 90 tablet, Rfl: 3  •  Multiple Vitamin (MULTI-VITAMIN PO), Take  by mouth., Disp: , Rfl:   •  oxyCODONE-acetaminophen (PERCOCET)  MG per tablet, Take 1 tablet by mouth Every 6 (Six) Hours As Needed for Moderate Pain ., Disp: , Rfl:   •  pantoprazole (PROTONIX) 40 MG EC tablet, Take 1 tablet by mouth Daily., Disp: 90 tablet, Rfl: 1  •  simvastatin (ZOCOR) 40 MG tablet, Take 1 tablet by mouth Every Night., Disp: 90 tablet, Rfl: 1  •  traZODone (DESYREL) 100 MG tablet, Take 100 mg by mouth Every Night., Disp: , Rfl:     Allergies:   Allergies   Allergen Reactions   • Celebrex [Celecoxib] Swelling   • Red Dye Anaphylaxis     Reported reaction started with rash/itching and progressed to swelling    • Shellfish-Derived Products Anaphylaxis   • Other Other (See Comments)     STOOL SOFTENERS - tingly feeling/does not feel right       Objective     Physical Exam:  Vital Signs:   Vitals:    04/06/21 1130   BP: 120/80   BP Location: Right arm   Patient Position: Sitting   Cuff Size: Adult   Pulse: 102   Temp: 97.1 °F (36.2 °C)   SpO2: 96%   Weight: 122 kg (269 lb 6.4 oz)   Height: 165.1 cm (65\")   PainSc:   5   PainLoc: Generalized     BMI: Body mass index is 44.83 kg/m².  Neck Circumference: 14 1/2    Physical Exam  Vitals and nursing note reviewed.   Constitutional:       General: She is not in acute distress.     Appearance: She is well-developed. She is obese. She is not diaphoretic.   KARISHMA: "      Head: Normocephalic and atraumatic.      Comments: Mallampati 2-3  Eyes:      Conjunctiva/sclera: Conjunctivae normal.      Pupils: Pupils are equal, round, and reactive to light.   Neck:      Thyroid: No thyroid mass or thyromegaly.      Vascular: Normal carotid pulses.      Trachea: Trachea normal.   Cardiovascular:      Rate and Rhythm: Normal rate and regular rhythm.      Heart sounds: Normal heart sounds. No murmur heard.   No friction rub. No gallop.    Pulmonary:      Effort: Pulmonary effort is normal. No respiratory distress.      Breath sounds: Normal breath sounds. No wheezing or rales.   Musculoskeletal:         General: Normal range of motion.   Skin:     General: Skin is warm and dry.      Findings: No rash.   Neurological:      Mental Status: She is alert and oriented to person, place, and time.   Psychiatric:         Mood and Affect: Mood normal.         Behavior: Behavior normal.         Thought Content: Thought content normal.         Judgment: Judgment normal.         Assessment / Plan      Assessment/Plan:   Diagnoses and all orders for this visit:    1. Excessive daytime sleepiness (Primary)  -     Polysomnography 4 or More Parameters; Future. May do home sleep study if too expensive.   - Printed patient education on LEIF provided today.   - Advised patient to avoid driving if sleepy.   - Encouraged weight loss with BMI goal of 24.     2. Insomnia, unspecified type  -     Polysomnography 4 or More Parameters; Future    3. Essential hypertension  -     Polysomnography 4 or More Parameters; Future    4. Dyslipidemia  -     Polysomnography 4 or More Parameters; Future    5. Mood disorder (CMS/HCC)    6. Gastroesophageal reflux disease, unspecified whether esophagitis present    7. BMI 40.0-44.9, adult (CMS/HCC)  -     Polysomnography 4 or More Parameters; Future    8. Snoring  -     Polysomnography 4 or More Parameters; Future       Follow Up:   Return in about 3 months (around 7/6/2021) for  F/U Obstructive Sleep Apnea.    I have advised the patient the need to continue the use of CPAP.  Gold standard for treatment of sleep apnea includes weight loss, use of cpap and avoidance of alcohol.  Untreated LEIF may increase the risk for development of hypertension, stroke, myocardial infarction, diabetes, cardiovascular disease, work-related issues and driving accidents. I have counseled and advised the patient to avoid driving or operating heavy/dangerous equipment if feeling drowsy.     JENNIE Zuluaga, FNP-C  [facility] Neurology and [facility] Medicine       Please note that portions of this note may have been completed with a voice recognition program. Efforts were made to edit the dictations, but occasionally words are mistranscribed.

## 2021-04-09 RX ORDER — TRAZODONE HYDROCHLORIDE 100 MG/1
100 TABLET ORAL NIGHTLY
Qty: 90 TABLET | Refills: 1 | Status: SHIPPED | OUTPATIENT
Start: 2021-04-09 | End: 2021-08-09 | Stop reason: SDUPTHER

## 2021-04-09 NOTE — TELEPHONE ENCOUNTER
Caller: Tej Gurinderkristi    Relationship: Self    Best call back number: [phone]     Medication needed:   Requested Prescriptions     Pending Prescriptions Disp Refills   • traZODone (DESYREL) 100 MG tablet       Sig: Take 1 tablet by mouth Every Night.       When do you need the refill by: TODAY    What additional details did the patient provide when requesting the medication: COMPLETELY OUT    Does the patient have less than a 3 day supply:  [x] Yes  [] No    What is the patient's preferred pharmacy: [facility] [address] [phone] [facility] [phone]

## 2021-04-15 ENCOUNTER — HOSPITAL ENCOUNTER (OUTPATIENT)
Dept: SLEEP MEDICINE | Facility: HOSPITAL | Age: 56
Discharge: HOME | End: 2021-04-15
Admitting: NURSE PRACTITIONER

## 2021-04-15 DIAGNOSIS — G47.00 INSOMNIA, UNSPECIFIED TYPE: ICD-10-CM

## 2021-04-15 DIAGNOSIS — I10 ESSENTIAL HYPERTENSION: ICD-10-CM

## 2021-04-15 DIAGNOSIS — E78.5 DYSLIPIDEMIA: ICD-10-CM

## 2021-04-15 DIAGNOSIS — R06.83 SNORING: ICD-10-CM

## 2021-04-15 DIAGNOSIS — G47.19 EXCESSIVE DAYTIME SLEEPINESS: ICD-10-CM

## 2021-04-15 PROCEDURE — 95810 POLYSOM 6/> YRS 4/> PARAM: CPT

## 2021-04-15 PROCEDURE — 95810 POLYSOM 6/> YRS 4/> PARAM: CPT | Performed by: INTERNAL MEDICINE

## 2021-05-24 ENCOUNTER — OFFICE VISIT (OUTPATIENT)
Dept: BEHAVIORAL HEALTH | Facility: CLINIC | Age: 56
End: 2021-05-24

## 2021-05-24 VITALS
BODY MASS INDEX: 46.08 KG/M2 | DIASTOLIC BLOOD PRESSURE: 82 MMHG | HEIGHT: 65 IN | WEIGHT: 276.6 LBS | SYSTOLIC BLOOD PRESSURE: 132 MMHG

## 2021-05-24 DIAGNOSIS — F33.1 MODERATE EPISODE OF RECURRENT MAJOR DEPRESSIVE DISORDER (HCC): Primary | ICD-10-CM

## 2021-05-24 DIAGNOSIS — M79.7 FIBROMYALGIA: ICD-10-CM

## 2021-05-24 PROCEDURE — 90834 PSYTX W PT 45 MINUTES: CPT | Performed by: COUNSELOR

## 2021-06-10 DIAGNOSIS — F33.1 MAJOR DEPRESSIVE DISORDER, RECURRENT EPISODE, MODERATE (HCC): ICD-10-CM

## 2021-06-10 RX ORDER — DULOXETIN HYDROCHLORIDE 60 MG/1
CAPSULE, DELAYED RELEASE ORAL
Qty: 180 CAPSULE | Refills: 0 | Status: SHIPPED | OUTPATIENT
Start: 2021-06-10 | End: 2021-08-09 | Stop reason: SDUPTHER

## 2021-06-10 RX ORDER — SIMVASTATIN 40 MG
40 TABLET ORAL NIGHTLY
Qty: 90 TABLET | Refills: 0 | Status: SHIPPED | OUTPATIENT
Start: 2021-06-10 | End: 2021-08-09 | Stop reason: SDUPTHER

## 2021-06-10 RX ORDER — HYDROCHLOROTHIAZIDE 25 MG/1
25 TABLET ORAL DAILY
Qty: 90 TABLET | Refills: 0 | Status: SHIPPED | OUTPATIENT
Start: 2021-06-10 | End: 2021-07-02 | Stop reason: SDUPTHER

## 2021-07-02 RX ORDER — HYDROCHLOROTHIAZIDE 25 MG/1
25 TABLET ORAL DAILY
Qty: 30 TABLET | Refills: 0 | Status: SHIPPED | OUTPATIENT
Start: 2021-07-02 | End: 2022-03-14

## 2021-07-12 ENCOUNTER — OFFICE VISIT (OUTPATIENT)
Dept: BEHAVIORAL HEALTH | Facility: CLINIC | Age: 56
End: 2021-07-12

## 2021-07-12 VITALS
HEIGHT: 65 IN | DIASTOLIC BLOOD PRESSURE: 80 MMHG | SYSTOLIC BLOOD PRESSURE: 124 MMHG | HEART RATE: 96 BPM | OXYGEN SATURATION: 99 % | WEIGHT: 275 LBS | BODY MASS INDEX: 45.82 KG/M2

## 2021-07-12 DIAGNOSIS — F33.1 MODERATE EPISODE OF RECURRENT MAJOR DEPRESSIVE DISORDER (HCC): Primary | ICD-10-CM

## 2021-07-12 DIAGNOSIS — F43.9 TRAUMA AND STRESSOR-RELATED DISORDER: ICD-10-CM

## 2021-07-12 DIAGNOSIS — M79.7 FIBROMYALGIA: ICD-10-CM

## 2021-07-12 PROCEDURE — 90834 PSYTX W PT 45 MINUTES: CPT | Performed by: COUNSELOR
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
## 2021-07-16 RX ORDER — CYCLOBENZAPRINE HCL 10 MG
TABLET ORAL
Qty: 90 TABLET | Refills: 3 | Status: SHIPPED | OUTPATIENT
Start: 2021-07-16 | End: 2021-11-23 | Stop reason: SDUPTHER

## 2021-07-16 NOTE — TELEPHONE ENCOUNTER
Caller: Zhane Burnham    Relationship: Self    Best call back number: [phone]    Medication needed:   Requested Prescriptions     Pending Prescriptions Disp Refills   • cyclobenzaprine (FLEXERIL) 10 MG tablet 90 tablet 3     Sig: Take 1/2-1 tablet three times daily as needed for muscle spasms.       What additional details did the patient provide when requesting the medication: PATIENT OUT OF MEDICATION.    Does the patient have less than a 3 day supply:  [x] Yes  [] No    What is the patient's preferred pharmacy: [facility] [address] [phone] [facility] [phone]

## 2021-08-09 ENCOUNTER — OFFICE VISIT (OUTPATIENT)
Dept: INTERNAL MEDICINE | Facility: CLINIC | Age: 56
End: 2021-08-09

## 2021-08-09 ENCOUNTER — OFFICE VISIT (OUTPATIENT)
Dept: BEHAVIORAL HEALTH | Facility: CLINIC | Age: 56
End: 2021-08-09

## 2021-08-09 VITALS
HEIGHT: 65 IN | OXYGEN SATURATION: 98 % | SYSTOLIC BLOOD PRESSURE: 124 MMHG | DIASTOLIC BLOOD PRESSURE: 68 MMHG | HEART RATE: 107 BPM | TEMPERATURE: 97 F | BODY MASS INDEX: 45.65 KG/M2 | WEIGHT: 274 LBS

## 2021-08-09 VITALS
HEIGHT: 65 IN | WEIGHT: 273 LBS | BODY MASS INDEX: 45.48 KG/M2 | DIASTOLIC BLOOD PRESSURE: 84 MMHG | SYSTOLIC BLOOD PRESSURE: 118 MMHG

## 2021-08-09 DIAGNOSIS — Z11.59 ENCOUNTER FOR HEPATITIS C SCREENING TEST FOR LOW RISK PATIENT: ICD-10-CM

## 2021-08-09 DIAGNOSIS — M06.9 RHEUMATOID ARTHRITIS INVOLVING MULTIPLE SITES, UNSPECIFIED WHETHER RHEUMATOID FACTOR PRESENT (HCC): ICD-10-CM

## 2021-08-09 DIAGNOSIS — M81.0 AGE-RELATED OSTEOPOROSIS WITHOUT CURRENT PATHOLOGICAL FRACTURE: ICD-10-CM

## 2021-08-09 DIAGNOSIS — M79.7 FIBROMYALGIA: ICD-10-CM

## 2021-08-09 DIAGNOSIS — I10 ESSENTIAL HYPERTENSION: Primary | ICD-10-CM

## 2021-08-09 DIAGNOSIS — F51.04 PSYCHOPHYSIOLOGICAL INSOMNIA: Primary | ICD-10-CM

## 2021-08-09 DIAGNOSIS — E78.2 MIXED HYPERLIPIDEMIA: ICD-10-CM

## 2021-08-09 DIAGNOSIS — F41.1 GAD (GENERALIZED ANXIETY DISORDER): ICD-10-CM

## 2021-08-09 DIAGNOSIS — F33.1 MAJOR DEPRESSIVE DISORDER, RECURRENT EPISODE, MODERATE (HCC): ICD-10-CM

## 2021-08-09 DIAGNOSIS — F32.1 CURRENT MODERATE EPISODE OF MAJOR DEPRESSIVE DISORDER WITHOUT PRIOR EPISODE (HCC): ICD-10-CM

## 2021-08-09 DIAGNOSIS — K21.9 GASTROESOPHAGEAL REFLUX DISEASE WITHOUT ESOPHAGITIS: ICD-10-CM

## 2021-08-09 PROCEDURE — 90792 PSYCH DIAG EVAL W/MED SRVCS: CPT | Performed by: NURSE PRACTITIONER

## 2021-08-09 PROCEDURE — 99214 OFFICE O/P EST MOD 30 MIN: CPT | Performed by: INTERNAL MEDICINE

## 2021-08-09 RX ORDER — SIMVASTATIN 40 MG
40 TABLET ORAL NIGHTLY
Qty: 90 TABLET | Refills: 1 | Status: SHIPPED | OUTPATIENT
Start: 2021-08-09 | End: 2022-05-26 | Stop reason: SDUPTHER

## 2021-08-09 RX ORDER — BUSPIRONE HYDROCHLORIDE 30 MG/1
30 TABLET ORAL 2 TIMES DAILY
Qty: 180 TABLET | Refills: 2 | Status: SHIPPED | OUTPATIENT
Start: 2021-08-09 | End: 2022-03-21 | Stop reason: SDUPTHER

## 2021-08-09 RX ORDER — TRAZODONE HYDROCHLORIDE 100 MG/1
100 TABLET ORAL NIGHTLY
Qty: 90 TABLET | Refills: 1 | Status: SHIPPED | OUTPATIENT
Start: 2021-08-09 | End: 2022-03-21 | Stop reason: SDUPTHER

## 2021-08-09 RX ORDER — ARIPIPRAZOLE 5 MG/1
TABLET ORAL
Qty: 30 TABLET | Refills: 1 | Status: SHIPPED | OUTPATIENT
Start: 2021-08-09 | End: 2021-09-08

## 2021-08-09 RX ORDER — PANTOPRAZOLE SODIUM 40 MG/1
40 TABLET, DELAYED RELEASE ORAL DAILY
Qty: 90 TABLET | Refills: 1 | Status: SHIPPED | OUTPATIENT
Start: 2021-08-09 | End: 2022-11-14 | Stop reason: SDUPTHER

## 2021-08-09 RX ORDER — DULOXETIN HYDROCHLORIDE 60 MG/1
CAPSULE, DELAYED RELEASE ORAL
Qty: 180 CAPSULE | Refills: 3 | Status: SHIPPED | OUTPATIENT
Start: 2021-08-09 | End: 2022-07-27 | Stop reason: SDUPTHER

## 2021-08-09 NOTE — PROGRESS NOTES
"Patient Name: Zhane Burnham  MRN: [id number]   :  1965     Referring Physician: Vermeesch, Marilyn K, MD    Chief Complaint:     ICD-10-CM ICD-9-CM   1. Psychophysiological insomnia  F51.04 307.42   2. Major depressive disorder, recurrent episode, moderate (CMS/HCC)  F33.1 296.32   3. MANISHA (generalized anxiety disorder)  F41.1 300.02       HPI:   Zhane Burnham is a 56 y.o. female who is here today for initial evaluation of Anxiety , Depression and Insomnia .  Patient struggling with anxiety depression and insomnia.  Patient states it takes several hours before she is able to fall asleep.  Patient has fibromyalgia and scoliosis.  Also has bulging disks.  A lot of nerve pain and on gabapentin for this.  Patient feels she is severely depressed.  Has no energy or motivation to do anything.  Patient states she worries all the time.  Patient states her home is in disarray.    Past Medical History:   Past Medical History:   Diagnosis Date   • Abnormal ECG    • Acid reflux    • Anemia    • Anxiety    • Asthma     reported \"childhood\"   • Body piercing     ears   • Breast cancer (CMS/HCC)     Cancer of left breast, patient had a bilateral mastectomy   • Chronic bronchitis (CMS/HCC)    • Colon polyp    • Constipation    • Depression    • Dysphagia     Reported difficulty swallowing pills   • Elevated cholesterol    • Fatigue    • Fibromyalgia    • Fractures     Bilateral arms and left ankle, multiple toes   • H/O cardiovascular stress test     Patient reported done apx.  and that all was wnl's at that time   • Headache    • Hearing loss     Patient reported difficulty only when there is a lot of back ground noise. No use of hearing devices.    • Heart murmur    • History of bronchitis    • History of pneumonia    • History of transfusion     Reported no reaction to transfusion   • Hyperlipidemia    • Hypertension    • Lower extremity edema    • Migraines    • Osteoarthritis    • Osteoporosis    • Ovarian cyst  " "  • Peptic ulceration    • PONV (postoperative nausea and vomiting)    • Restless leg    • Seasonal allergies    • Urinary tract infection    • Wears glasses        Past Surgical History:   Past Surgical History:   Procedure Laterality Date   • BREAST BIOPSY Left 12/27/2012   • BREAST RECONSTRUCTION Bilateral    • COLONOSCOPY     • COLONOSCOPY N/A 10/16/2019    Procedure: COLONOSCOPY;  Surgeon: Ck Mejia MD;  Location: [facility] ENDOSCOPY;  Service: Gastroenterology   • ENDOSCOPY     • EXTERNAL EAR SURGERY Left     Extraction of an extra lobe    • HYSTERECTOMY     • KNEE ARTHROSCOPY Left 06/2016    Patient reported \"bone tumor removed\"   • MASTECTOMY Bilateral 01/31/2013    bilat   • TONSILLECTOMY     • WISDOM TOOTH EXTRACTION         Social History:   Social History     Socioeconomic History   • Marital status:      Spouse name: Not on file   • Number of children: Not on file   • Years of education: Not on file   • Highest education level: Not on file   Tobacco Use   • Smoking status: Never Smoker   • Smokeless tobacco: Never Used   Substance and Sexual Activity   • Alcohol use: Yes     Comment: light occasional use, no history of abuse reported   • Drug use: No   • Sexual activity: Defer       Family History:  Family History   Problem Relation Age of Onset   • Heart disease Mother    • Heart failure Mother    • Arthritis Mother    • Diabetes Mother    • Heart attack Mother    • Hyperlipidemia Mother    • Mental illness Mother    • Obesity Mother    • Osteoporosis Mother    • Stroke Mother    • Thyroid disease Mother    • Heart disease Father    • Heart attack Father    • Hypertension Father    • Hyperlipidemia Father    • Mental illness Father    • Osteoporosis Father    • Stroke Father    • No Known Problems Sister    • No Known Problems Brother    • Hypertension Other    • Ovarian cancer Paternal Aunt    • Breast cancer Paternal Aunt    • Tuberculosis Paternal Grandmother        Allergy:  Allergies "   Allergen Reactions   • Celebrex [Celecoxib] Swelling   • Red Dye Anaphylaxis     Reported reaction started with rash/itching and progressed to swelling    • Shellfish-Derived Products Anaphylaxis   • Other Other (See Comments)     STOOL SOFTENERS - tingly feeling/does not feel right       Current Medications:   Current Outpatient Medications   Medication Sig Dispense Refill   • cyclobenzaprine (FLEXERIL) 10 MG tablet Take 1/2-1 tablet three times daily as needed for muscle spasms. 90 tablet 3   • DULoxetine (Cymbalta) 60 MG capsule Take 2 capsules PO daily. MUST HAVE APPOINTMENT FOR FURTHER REFILLS. 180 capsule 3   • etodolac (LODINE) 400 MG tablet TAKE 1/2 TO 1 TABLET BY MOUTH EVERY 8 HOURS     • gabapentin (NEURONTIN) 600 MG tablet Take 600 mg by mouth 4 (Four) Times a Day. 600mg every morning, 600mg with evening meal and 1200mg HS      • hydroCHLOROthiazide (HYDRODIURIL) 25 MG tablet Take 1 tablet by mouth Daily. MUST HAVE APPOINTMENT FOR FURTHER REFILLS. 30 tablet 0   • loratadine (CLARITIN) 10 MG tablet Take 1 tablet by mouth Daily. 90 tablet 3   • Multiple Vitamin (MULTI-VITAMIN PO) Take  by mouth.     • oxyCODONE-acetaminophen (PERCOCET)  MG per tablet Take 1 tablet by mouth Every 6 (Six) Hours As Needed for Moderate Pain .     • traZODone (DESYREL) 100 MG tablet Take 1 tablet by mouth Every Night. 90 tablet 1   • ARIPiprazole (Abilify) 5 MG tablet 1/2-1 po daily 30 tablet 1   • busPIRone (BUSPAR) 30 MG tablet Take 1 tablet by mouth 2 (two) times a day. 180 tablet 2   • pantoprazole (PROTONIX) 40 MG EC tablet Take 1 tablet by mouth Daily. 90 tablet 1   • simvastatin (ZOCOR) 40 MG tablet Take 1 tablet by mouth Every Night. 90 tablet 1     No current facility-administered medications for this visit.       Lab Results:   No visits with results within 3 Month(s) from this visit.   Latest known visit with results is:   Office Visit on 12/01/2020   Component Date Value Ref Range Status   • WBC 12/01/2020  6.00  3.40 - 10.80 10*3/mm3 Final   • RBC 12/01/2020 5.26  3.77 - 5.28 10*6/mm3 Final   • Hemoglobin 12/01/2020 14.7  12.0 - 15.9 g/dL Final   • Hematocrit 12/01/2020 44.2  34.0 - 46.6 % Final   • MCV 12/01/2020 84.0  79.0 - 97.0 fL Final   • MCH 12/01/2020 27.9  26.6 - 33.0 pg Final   • MCHC 12/01/2020 33.3  31.5 - 35.7 g/dL Final   • RDW 12/01/2020 14.2  12.3 - 15.4 % Final   • Platelets 12/01/2020 323  140 - 450 10*3/mm3 Final   • Neutrophil Rel % 12/01/2020 71.7  42.7 - 76.0 % Final   • Lymphocyte Rel % 12/01/2020 20.7  19.6 - 45.3 % Final   • Monocyte Rel % 12/01/2020 5.3  5.0 - 12.0 % Final   • Eosinophil Rel % 12/01/2020 1.3  0.3 - 6.2 % Final   • Basophil Rel % 12/01/2020 0.7  0.0 - 1.5 % Final   • Neutrophils Absolute 12/01/2020 4.30  1.70 - 7.00 10*3/mm3 Final   • Lymphocytes Absolute 12/01/2020 1.24  0.70 - 3.10 10*3/mm3 Final   • Monocytes Absolute 12/01/2020 0.32  0.10 - 0.90 10*3/mm3 Final   • Eosinophils Absolute 12/01/2020 0.08  0.00 - 0.40 10*3/mm3 Final   • Basophils Absolute 12/01/2020 0.04  0.00 - 0.20 10*3/mm3 Final   • Immature Granulocyte Rel % 12/01/2020 0.3  0.0 - 0.5 % Final   • Immature Grans Absolute 12/01/2020 0.02  0.00 - 0.05 10*3/mm3 Final   • nRBC 12/01/2020 0.0  0.0 - 0.2 /100 WBC Final   • Glucose 12/01/2020 100* 65 - 99 mg/dL Final   • BUN 12/01/2020 11  6 - 20 mg/dL Final   • Creatinine 12/01/2020 1.01* 0.57 - 1.00 mg/dL Final   • eGFR Non African Am 12/01/2020 57* >60 mL/min/1.73 Final   • eGFR African Am 12/01/2020 69  >60 mL/min/1.73 Final   • BUN/Creatinine Ratio 12/01/2020 10.9  7.0 - 25.0 Final   • Sodium 12/01/2020 138  136 - 145 mmol/L Final   • Potassium 12/01/2020 4.4  3.5 - 5.2 mmol/L Final   • Chloride 12/01/2020 101  98 - 107 mmol/L Final   • Total CO2 12/01/2020 25.5  22.0 - 29.0 mmol/L Final   • Calcium 12/01/2020 9.9  8.6 - 10.5 mg/dL Final   • Total Protein 12/01/2020 7.0  6.0 - 8.5 g/dL Final   • Albumin 12/01/2020 4.60  3.50 - 5.20 g/dL Final   • Globulin  12/01/2020 2.4  gm/dL Final   • A/G Ratio 12/01/2020 1.9  g/dL Final   • Total Bilirubin 12/01/2020 0.4  0.0 - 1.2 mg/dL Final   • Alkaline Phosphatase 12/01/2020 90  39 - 117 U/L Final   • AST (SGOT) 12/01/2020 21  1 - 32 U/L Final   • ALT (SGPT) 12/01/2020 29  1 - 33 U/L Final   • Hemoglobin A1C 12/01/2020 5.50  4.80 - 5.60 % Final    Comment: Hemoglobin A1C Ranges:  Increased Risk for Diabetes  5.7% to 6.4%  Diabetes                     >= 6.5%  Diabetic Goal                < 7.0%     • aPTT 12/01/2020 27.1  24.5 - 37.2 seconds Final   • INR 12/01/2020 0.95  0.90 - 1.10 Final    Comment: Suggested INR therapeutic range for stable oral  anticoagulant therapy:  Low Intensity therapy:   1.5-2.0  Moderate Intensity therapy:   2.0-3.0  High Intensity therapy:   2.5-4.0     • Protime 12/01/2020 13.1  12.0 - 15.1 Seconds Final   • Prealbumin 12/01/2020 30  10 - 36 mg/dL Final   • Color 12/01/2020 Yellow  Yellow, Straw, Dark Yellow, [place] Final   • Clarity, UA 12/01/2020 Clear  Clear Final   • Specific Gravity  12/01/2020 1.015  1.005 - 1.030 Final   • pH, Urine 12/01/2020 7.0  5.0 - 8.0 Final   • Leukocytes 12/01/2020 Negative  Negative Final   • Nitrite, UA 12/01/2020 Negative  Negative Final   • Protein, POC 12/01/2020 1+* Negative mg/dL Final   • Glucose, UA 12/01/2020 Negative  Negative, 1000 mg/dL (3+) mg/dL Final   • Ketones, UA 12/01/2020 Negative  Negative Final   • Urobilinogen, UA 12/01/2020 Normal  Normal Final   • Bilirubin 12/01/2020 Large (3+)* Negative Final   • Blood, UA 12/01/2020 Negative  Negative Final       Review of Symptoms:   Review of Systems   Constitutional: Negative for activity change, appetite change, fatigue, unexpected weight gain and unexpected weight loss.   Respiratory: Negative for shortness of breath and wheezing.    Gastrointestinal: Negative for constipation, diarrhea, nausea and vomiting.   Musculoskeletal: Negative for gait problem.   Skin: Negative for dry skin and rash.  "  Neurological: Negative for dizziness, speech difficulty, weakness, light-headedness, headache, memory problem and confusion.   Psychiatric/Behavioral: Positive for sleep disturbance, depressed mood and stress. Negative for agitation, behavioral problems, decreased concentration, dysphoric mood, hallucinations, self-injury, suicidal ideas and negative for hyperactivity. The patient is nervous/anxious.        Physical Exam:   Physical Exam  Vitals and nursing note reviewed.   Constitutional:       General: She is not in acute distress.     Appearance: She is well-developed. She is not diaphoretic.   HENT:      Head: Normocephalic and atraumatic.   Eyes:      Conjunctiva/sclera: Conjunctivae normal.   Cardiovascular:      Rate and Rhythm: Normal rate.   Pulmonary:      Effort: Pulmonary effort is normal. No respiratory distress.   Musculoskeletal:         General: Normal range of motion.      Cervical back: Full passive range of motion without pain and normal range of motion.   Skin:     General: Skin is warm and dry.   Neurological:      Mental Status: She is alert and oriented to person, place, and time.   Psychiatric:         Mood and Affect: Mood is anxious and depressed. Affect is not labile, blunt, angry or inappropriate.         Speech: Speech is not rapid and pressured or tangential.         Behavior: Behavior normal. Behavior is not agitated, slowed, aggressive, withdrawn, hyperactive or combative. Behavior is cooperative.         Thought Content: Thought content normal. Thought content is not paranoid or delusional. Thought content does not include homicidal or suicidal ideation. Thought content does not include homicidal or suicidal plan.         Judgment: Judgment normal.       Blood pressure 118/84, height 165.1 cm (65\"), weight 124 kg (273 lb), not currently breastfeeding.  Body mass index is 45.43 kg/m².     Mental Status Exam:   Appearance: appropriate  Hygiene:   good  Cooperation:  Cooperative  Eye " Contact:  Good  Psychomotor Behavior:  Appropriate  Mood:anxious and depressed  Affect:  Appropriate  Hopelessness: Denies  Speech:  Normal  Thought Process:  Goal directed  Thought Content:  Normal  Suicidal:  None  Homicidal:  None  Hallucinations:  None  Delusion:  None  Memory:  Intact  Orientation:  Person, Place, Time and Situation  Reliability:  good  Insight:  Good  Judgement:  Good  Impulse Control:  Good  Physical/Medical Issues:  No     PHQ-9 Depression Screening  Little interest or pleasure in doing things? 3   Feeling down, depressed, or hopeless? 3   Trouble falling or staying asleep, or sleeping too much? 3 (falling asleep)   Feeling tired or having little energy? 3   Poor appetite or overeating? 0   Feeling bad about yourself - or that you are a failure or have let yourself or your family down? 3   Trouble concentrating on things, such as reading the newspaper or watching television? 3   Moving or speaking so slowly that other people could have noticed? Or the opposite - being so fidgety or restless that you have been moving around a lot more than usual? 0   Thoughts that you would be better off dead, or of hurting yourself in some way? 0   PHQ-9 Total Score 18   If you checked off any problems, how difficult have these problems made it for you to do your work, take care of things at home, or get along with other people? Extremely dIfficult      Assessment/Plan:   Diagnoses and all orders for this visit:    1. Psychophysiological insomnia (Primary)  -     traZODone (DESYREL) 100 MG tablet; Take 1 tablet by mouth Every Night.  Dispense: 90 tablet; Refill: 1    2. Major depressive disorder, recurrent episode, moderate (CMS/HCC)  -     DULoxetine (Cymbalta) 60 MG capsule; Take 2 capsules PO daily. MUST HAVE APPOINTMENT FOR FURTHER REFILLS.  Dispense: 180 capsule; Refill: 3  -     ARIPiprazole (Abilify) 5 MG tablet; 1/2-1 po daily  Dispense: 30 tablet; Refill: 1    3. MANISHA (generalized anxiety  disorder)  -     DULoxetine (Cymbalta) 60 MG capsule; Take 2 capsules PO daily. MUST HAVE APPOINTMENT FOR FURTHER REFILLS.  Dispense: 180 capsule; Refill: 3  -     busPIRone (BUSPAR) 30 MG tablet; Take 1 tablet by mouth 2 (two) times a day.  Dispense: 180 tablet; Refill: 2    Patient has been on Cymbalta for many years.  Patient states it does not do much for her mood however it is hard to tell how much is helping her nerve pain.  We will increase BuSpar.  We will add Abilify.  Instructed to take half a pill for 2 weeks and if no improvement take a whole pill.  Patient verbalized understanding.    A psychological evaluation was conducted in order to assess past and current level of functioning. Areas assessed included, but were not limited to: perception of social support, perception of ability to face and deal with challenges in life (positive functioning), anxiety symptoms, depressive symptoms, perspective on beliefs/belief system, coping skills for stress, intelligence level,  Therapeutic rapport was established. Interventions conducted today were geared towards incorporating medication management along with support for continued therapy. Education was also provided as to the med management with this provider and what to expect in subsequent sessions.    We discussed risks, benefits,goals and side effects of the above medication and the patient was agreeable with the plan.Patient was educated on the importance of compliance with treatment and follow-up appointments. Patient is aware to contact the [facility] with any worsening of symptoms. To call for questions or concerns and return early if necessary. Patent is agreeable to go to the Emergency Department or call 911 should they begin SI/HI.     Treatment Plan:   Discussed risks, benefits, and alternatives of medication. Encouraged healthy habits (eating, exercise and sleep). Call if any questions or problems arise. Medication reconciled. Controlled  substance monitoring report reviewed. Provided psychoeducation.. Discussed coping strategies and current stressors. Set appropriate boundaries and limits for patient's well-being. Use distraction techniques to improve symptoms. Access support networks.      Return in about 4 weeks (around 9/6/2021) for Follow Up 30 min.    Freya Padilla, APRN

## 2021-08-09 NOTE — PROGRESS NOTES
Chief Complaint   Patient presents with   • Follow-up     for GERD, HLD, and HTN.     Subjective   Zhane Burnham is a 56 y.o. female.     Here today for follow-up of HTN, HLD, GERD, fibromyalgia, RA, OP, history of breast cancer, depression.  HTN/HLD-  Her BP is well controlled today.  Heart rate is slightly elevated today.  No recent CP, SOA, edema.  She did have palpitations one night that lasted for about 15 minutes.      GERD- she takes protonix and does have some GERD sxs.  Takes TUMS on and off  FM/RA/OP-she has had total knee replacement per Dr. Hernandez-she still has pain and swelling in knee. She fell 3 times after her surgery.  She sees Dr Tomas for her FM/RA.  She remains on gabapentin for her FM and feels it may help some.  She takes her vit D regularly.  She tries to get plenty of calcium in her diet.   She sees Dr Tellez for her pain management of DDD in her back.   Depression-she remains on duloxetine and BuSpar.  Her dose of buspar was increased to 30 mg twice a day and abilify was added today.  Her mood has not been so good lately.  She is taking trazodone 100 mg and two benadryl at night and still does not sleep well.    History of breast cancer-she saw Dr. Pepper last year and was referred to plastic surgeon for nipple reconstruction.  Dr. Pepper did not feel that she needed any further follow-up at that time  HCM-colonoscopy 2019.  Pap 2020.  DEXA 2021.  She has had her hepatitis A vaccines, COVID vaccine but no shingles vaccine.       The following portions of the patient's history were reviewed and updated as appropriate: allergies, current medications, past family history, past medical history, past social history, past surgical history and problem list.    Review of Systems   Constitutional: Negative for activity change, appetite change and unexpected weight change.   Eyes: Negative for visual disturbance.   Respiratory: Negative for shortness of breath.    Cardiovascular: Negative for  "chest pain, palpitations and leg swelling.   Gastrointestinal: Negative for abdominal pain.        Intermittent GERD symptoms   Genitourinary: Negative for hematuria.   Musculoskeletal: Positive for arthralgias. Negative for back pain.   Neurological: Negative for headaches.   Psychiatric/Behavioral: Positive for dysphoric mood and sleep disturbance. The patient is nervous/anxious.        Objective   /68   Pulse 107   Temp 97 °F (36.1 °C)   Ht 165.1 cm (65\")   Wt 124 kg (274 lb)   LMP  (LMP Unknown)   SpO2 98%   BMI 45.60 kg/m²   Body mass index is 45.6 kg/m².  Physical Exam  Vitals and nursing note reviewed.   Constitutional:       General: She is not in acute distress.     Appearance: Normal appearance. She is well-developed. She is obese. She is not ill-appearing.      Comments: Kind and pleasant female, appears stated age and in NAD today   HENT:      Head: Normocephalic and atraumatic.      Right Ear: External ear normal.      Left Ear: External ear normal.   Eyes:      General:         Right eye: No discharge.         Left eye: No discharge.      Extraocular Movements: Extraocular movements intact.      Conjunctiva/sclera: Conjunctivae normal.      Pupils: Pupils are equal, round, and reactive to light.   Neck:      Thyroid: No thyromegaly.      Vascular: No carotid bruit.      Comments: No thyromegaly or mass  Cardiovascular:      Rate and Rhythm: Normal rate and regular rhythm.      Pulses: Normal pulses.      Heart sounds: Normal heart sounds. No murmur heard.     Pulmonary:      Effort: Pulmonary effort is normal. No respiratory distress.      Breath sounds: Normal breath sounds. No wheezing.   Abdominal:      General: Bowel sounds are normal. There is no distension.      Palpations: Abdomen is soft.      Tenderness: There is no abdominal tenderness.   Musculoskeletal:         General: Normal range of motion.      Cervical back: Normal range of motion and neck supple.      Right lower leg: No " edema.      Left lower leg: No edema.   Lymphadenopathy:      Cervical: No cervical adenopathy.   Skin:     General: Skin is warm.      Findings: No rash.   Neurological:      General: No focal deficit present.      Mental Status: She is alert and oriented to person, place, and time. Mental status is at baseline.      Cranial Nerves: No cranial nerve deficit.      Motor: No weakness.      Coordination: Coordination normal.      Gait: Gait normal.   Psychiatric:         Mood and Affect: Mood normal.         Behavior: Behavior normal.         Thought Content: Thought content normal.         Judgment: Judgment normal.         Assessment/Plan   Zhane Burnham is here today and the following problems have been addressed:      Diagnoses and all orders for this visit:    1. Essential hypertension (Primary)  -     CBC & Differential  -     Comprehensive Metabolic Panel    2. Mixed hyperlipidemia  -     Comprehensive Metabolic Panel  -     Lipid Panel With / Chol / HDL Ratio    3. Gastroesophageal reflux disease without esophagitis  -     CBC & Differential    4. Current moderate episode of major depressive disorder without prior episode (CMS/HCC)    5. Age-related osteoporosis without current pathological fracture    6. Rheumatoid arthritis involving multiple sites, unspecified whether rheumatoid factor present (CMS/HCC)    7. Fibromyalgia    8. Encounter for hepatitis C screening test for low risk patient  -     Hepatitis C Antibody    Other orders  -     pantoprazole (PROTONIX) 40 MG EC tablet; Take 1 tablet by mouth Daily.  Dispense: 90 tablet; Refill: 1  -     simvastatin (ZOCOR) 40 MG tablet; Take 1 tablet by mouth Every Night.  Dispense: 90 tablet; Refill: 1        Follow heart healthy/low salt diet  Avoid processed foods  Monitor blood pressure as discussed  Exercise as tolerated up to 150 minutes per week  Take all medications as prescribed  Labs as noted  Depression and anxiety medications handled by psychiatrist,  Abilify added today to help with worsening depression and anxiety  Patient currently on trazodone for sleep issues, she also takes Flexeril at night  Follow-up with rheumatologist in regard to fibromyalgia and rheumatoid arthritis, she states that he will be resuming medication for her rheumatoid arthritis on next visit  Continue Protonix for GERD symptoms, avoid late night meals and spicy foods that trigger symptoms  Encouraged daily vitamin D due to underlying osteoporosis  Continue simvastatin for hyperlipidemia  Encourage shingles vaccine at local pharmacy    Return in about 6 months (around 2/9/2022) for Medicare Wellness.      Marilyn K. Vermeesch, MD      Please note that portions of this note were completed with a voice recognition program.  Efforts were made to edit dictation, but occasionally words are mistranscribed.

## 2021-08-23 ENCOUNTER — OFFICE VISIT (OUTPATIENT)
Dept: BEHAVIORAL HEALTH | Facility: CLINIC | Age: 56
End: 2021-08-23

## 2021-08-23 VITALS
SYSTOLIC BLOOD PRESSURE: 128 MMHG | WEIGHT: 279 LBS | BODY MASS INDEX: 46.48 KG/M2 | DIASTOLIC BLOOD PRESSURE: 80 MMHG | HEIGHT: 65 IN

## 2021-08-23 DIAGNOSIS — F33.0 MILD EPISODE OF RECURRENT MAJOR DEPRESSIVE DISORDER (HCC): Primary | ICD-10-CM

## 2021-08-23 PROCEDURE — 90832 PSYTX W PT 30 MINUTES: CPT | Performed by: COUNSELOR
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
## 2021-09-08 ENCOUNTER — OFFICE VISIT (OUTPATIENT)
Dept: BEHAVIORAL HEALTH | Facility: CLINIC | Age: 56
End: 2021-09-08

## 2021-09-08 VITALS
SYSTOLIC BLOOD PRESSURE: 110 MMHG | BODY MASS INDEX: 46.98 KG/M2 | DIASTOLIC BLOOD PRESSURE: 70 MMHG | HEIGHT: 65 IN | WEIGHT: 282 LBS

## 2021-09-08 DIAGNOSIS — F41.1 GAD (GENERALIZED ANXIETY DISORDER): ICD-10-CM

## 2021-09-08 DIAGNOSIS — F33.1 MAJOR DEPRESSIVE DISORDER, RECURRENT EPISODE, MODERATE (HCC): Primary | ICD-10-CM

## 2021-09-08 DIAGNOSIS — F51.04 PSYCHOPHYSIOLOGICAL INSOMNIA: ICD-10-CM

## 2021-09-08 PROCEDURE — 99213 OFFICE O/P EST LOW 20 MIN: CPT | Performed by: NURSE PRACTITIONER

## 2021-09-08 RX ORDER — ARIPIPRAZOLE 10 MG/1
10 TABLET ORAL DAILY
Qty: 30 TABLET | Refills: 1 | Status: SHIPPED | OUTPATIENT
Start: 2021-09-08 | End: 2021-11-08

## 2021-09-08 NOTE — PROGRESS NOTES
Patient Name: Zhane Burnham  MRN: [id number]   :  1965     Chief Complaint:      ICD-10-CM ICD-9-CM   1. Major depressive disorder, recurrent episode, moderate (CMS/HCC)  F33.1 296.32   2. Psychophysiological insomnia  F51.04 307.42   3. MANISHA (generalized anxiety disorder)  F41.1 300.02       History of Present Illness: Zhane Burnham is a 56 y.o. female is here today for medication management follow up.  Patient states she noticed more so initially there was an improvement in her mood and anxiety.  Patient states she does not notice it as much as initially.  Patient states she is not sure how much the Cymbalta is doing for her fibromyalgia either.  Patient sleeps well with her trazodone and at times she takes Benadryl with it.    The following portions of the patient's history were reviewed and updated as appropriate: allergies, current medications, past family history, past medical history, past social history, past surgical history and problem list.    Review of Systems;;  Review of Systems   Constitutional: Negative for activity change, appetite change, fatigue, unexpected weight gain and unexpected weight loss.   Respiratory: Negative for shortness of breath and wheezing.    Gastrointestinal: Negative for constipation, diarrhea, nausea and vomiting.   Musculoskeletal: Negative for gait problem.   Skin: Negative for dry skin and rash.   Neurological: Negative for dizziness, speech difficulty, weakness, light-headedness, headache, memory problem and confusion.   Psychiatric/Behavioral: Positive for depressed mood and stress. Negative for agitation, behavioral problems, decreased concentration, dysphoric mood, hallucinations, self-injury, sleep disturbance, suicidal ideas and negative for hyperactivity. The patient is nervous/anxious.        Physical Exam;;  Physical Exam  Vitals and nursing note reviewed.   Constitutional:       General: She is not in acute distress.     Appearance: She is well-developed.  "She is not diaphoretic.   HENT:      Head: Normocephalic and atraumatic.   Eyes:      Conjunctiva/sclera: Conjunctivae normal.   Cardiovascular:      Rate and Rhythm: Normal rate.   Pulmonary:      Effort: Pulmonary effort is normal. No respiratory distress.   Musculoskeletal:         General: Normal range of motion.      Cervical back: Full passive range of motion without pain and normal range of motion.   Skin:     General: Skin is warm and dry.   Neurological:      Mental Status: She is alert and oriented to person, place, and time.   Psychiatric:         Mood and Affect: Mood is anxious and depressed. Affect is not labile, blunt, angry or inappropriate.         Speech: Speech is not rapid and pressured or tangential.         Behavior: Behavior normal. Behavior is not agitated, slowed, aggressive, withdrawn, hyperactive or combative. Behavior is cooperative.         Thought Content: Thought content normal. Thought content is not paranoid or delusional. Thought content does not include homicidal or suicidal ideation. Thought content does not include homicidal or suicidal plan.         Judgment: Judgment normal.       Blood pressure 110/70, height 165.1 cm (65\"), weight 128 kg (282 lb), not currently breastfeeding.  Body mass index is 46.93 kg/m².    Current Medications;;    Current Outpatient Medications:   •  ARIPiprazole (ABILIFY) 10 MG tablet, Take 1 tablet by mouth Daily., Disp: 30 tablet, Rfl: 1  •  busPIRone (BUSPAR) 30 MG tablet, Take 1 tablet by mouth 2 (two) times a day., Disp: 180 tablet, Rfl: 2  •  cyclobenzaprine (FLEXERIL) 10 MG tablet, Take 1/2-1 tablet three times daily as needed for muscle spasms., Disp: 90 tablet, Rfl: 3  •  DULoxetine (Cymbalta) 60 MG capsule, Take 2 capsules PO daily. MUST HAVE APPOINTMENT FOR FURTHER REFILLS., Disp: 180 capsule, Rfl: 3  •  etodolac (LODINE) 400 MG tablet, TAKE 1/2 TO 1 TABLET BY MOUTH EVERY 8 HOURS, Disp: , Rfl:   •  gabapentin (NEURONTIN) 600 MG tablet, Take " 600 mg by mouth 4 (Four) Times a Day. 600mg every morning, 600mg with evening meal and 1200mg HS , Disp: , Rfl:   •  hydroCHLOROthiazide (HYDRODIURIL) 25 MG tablet, Take 1 tablet by mouth Daily. MUST HAVE APPOINTMENT FOR FURTHER REFILLS., Disp: 30 tablet, Rfl: 0  •  loratadine (CLARITIN) 10 MG tablet, Take 1 tablet by mouth Daily., Disp: 90 tablet, Rfl: 3  •  Multiple Vitamin (MULTI-VITAMIN PO), Take  by mouth., Disp: , Rfl:   •  oxyCODONE-acetaminophen (PERCOCET)  MG per tablet, Take 1 tablet by mouth Every 6 (Six) Hours As Needed for Moderate Pain ., Disp: , Rfl:   •  pantoprazole (PROTONIX) 40 MG EC tablet, Take 1 tablet by mouth Daily., Disp: 90 tablet, Rfl: 1  •  simvastatin (ZOCOR) 40 MG tablet, Take 1 tablet by mouth Every Night., Disp: 90 tablet, Rfl: 1  •  traZODone (DESYREL) 100 MG tablet, Take 1 tablet by mouth Every Night., Disp: 90 tablet, Rfl: 1    Lab Results:   No visits with results within 3 Month(s) from this visit.   Latest known visit with results is:   Office Visit on 12/01/2020   Component Date Value Ref Range Status   • WBC 12/01/2020 6.00  3.40 - 10.80 10*3/mm3 Final   • RBC 12/01/2020 5.26  3.77 - 5.28 10*6/mm3 Final   • Hemoglobin 12/01/2020 14.7  12.0 - 15.9 g/dL Final   • Hematocrit 12/01/2020 44.2  34.0 - 46.6 % Final   • MCV 12/01/2020 84.0  79.0 - 97.0 fL Final   • MCH 12/01/2020 27.9  26.6 - 33.0 pg Final   • MCHC 12/01/2020 33.3  31.5 - 35.7 g/dL Final   • RDW 12/01/2020 14.2  12.3 - 15.4 % Final   • Platelets 12/01/2020 323  140 - 450 10*3/mm3 Final   • Neutrophil Rel % 12/01/2020 71.7  42.7 - 76.0 % Final   • Lymphocyte Rel % 12/01/2020 20.7  19.6 - 45.3 % Final   • Monocyte Rel % 12/01/2020 5.3  5.0 - 12.0 % Final   • Eosinophil Rel % 12/01/2020 1.3  0.3 - 6.2 % Final   • Basophil Rel % 12/01/2020 0.7  0.0 - 1.5 % Final   • Neutrophils Absolute 12/01/2020 4.30  1.70 - 7.00 10*3/mm3 Final   • Lymphocytes Absolute 12/01/2020 1.24  0.70 - 3.10 10*3/mm3 Final   • Monocytes  Absolute 12/01/2020 0.32  0.10 - 0.90 10*3/mm3 Final   • Eosinophils Absolute 12/01/2020 0.08  0.00 - 0.40 10*3/mm3 Final   • Basophils Absolute 12/01/2020 0.04  0.00 - 0.20 10*3/mm3 Final   • Immature Granulocyte Rel % 12/01/2020 0.3  0.0 - 0.5 % Final   • Immature Grans Absolute 12/01/2020 0.02  0.00 - 0.05 10*3/mm3 Final   • nRBC 12/01/2020 0.0  0.0 - 0.2 /100 WBC Final   • Glucose 12/01/2020 100* 65 - 99 mg/dL Final   • BUN 12/01/2020 11  6 - 20 mg/dL Final   • Creatinine 12/01/2020 1.01* 0.57 - 1.00 mg/dL Final   • eGFR Non African Am 12/01/2020 57* >60 mL/min/1.73 Final   • eGFR African Am 12/01/2020 69  >60 mL/min/1.73 Final   • BUN/Creatinine Ratio 12/01/2020 10.9  7.0 - 25.0 Final   • Sodium 12/01/2020 138  136 - 145 mmol/L Final   • Potassium 12/01/2020 4.4  3.5 - 5.2 mmol/L Final   • Chloride 12/01/2020 101  98 - 107 mmol/L Final   • Total CO2 12/01/2020 25.5  22.0 - 29.0 mmol/L Final   • Calcium 12/01/2020 9.9  8.6 - 10.5 mg/dL Final   • Total Protein 12/01/2020 7.0  6.0 - 8.5 g/dL Final   • Albumin 12/01/2020 4.60  3.50 - 5.20 g/dL Final   • Globulin 12/01/2020 2.4  gm/dL Final   • A/G Ratio 12/01/2020 1.9  g/dL Final   • Total Bilirubin 12/01/2020 0.4  0.0 - 1.2 mg/dL Final   • Alkaline Phosphatase 12/01/2020 90  39 - 117 U/L Final   • AST (SGOT) 12/01/2020 21  1 - 32 U/L Final   • ALT (SGPT) 12/01/2020 29  1 - 33 U/L Final   • Hemoglobin A1C 12/01/2020 5.50  4.80 - 5.60 % Final    Comment: Hemoglobin A1C Ranges:  Increased Risk for Diabetes  5.7% to 6.4%  Diabetes                     >= 6.5%  Diabetic Goal                < 7.0%     • aPTT 12/01/2020 27.1  24.5 - 37.2 seconds Final   • INR 12/01/2020 0.95  0.90 - 1.10 Final    Comment: Suggested INR therapeutic range for stable oral  anticoagulant therapy:  Low Intensity therapy:   1.5-2.0  Moderate Intensity therapy:   2.0-3.0  High Intensity therapy:   2.5-4.0     • Protime 12/01/2020 13.1  12.0 - 15.1 Seconds Final   • Prealbumin 12/01/2020 30  10 -  36 mg/dL Final   • Color 12/01/2020 Yellow  Yellow, Straw, Dark Yellow, [place] Final   • Clarity, UA 12/01/2020 Clear  Clear Final   • Specific Gravity  12/01/2020 1.015  1.005 - 1.030 Final   • pH, Urine 12/01/2020 7.0  5.0 - 8.0 Final   • Leukocytes 12/01/2020 Negative  Negative Final   • Nitrite, UA 12/01/2020 Negative  Negative Final   • Protein, POC 12/01/2020 1+* Negative mg/dL Final   • Glucose, UA 12/01/2020 Negative  Negative, 1000 mg/dL (3+) mg/dL Final   • Ketones, UA 12/01/2020 Negative  Negative Final   • Urobilinogen, UA 12/01/2020 Normal  Normal Final   • Bilirubin 12/01/2020 Large (3+)* Negative Final   • Blood, UA 12/01/2020 Negative  Negative Final       Mental Status Exam:   Hygiene:   good  Cooperation:  Cooperative  Eye Contact:  Good  Psychomotor Behavior:  Appropriate  Mood:anxious and depressed  Affect:  Appropriate  Hopelessness: Denies  Speech:  Normal  Thought Process:  Goal directed  Thought Content:  Normal  Suicidal:  None  Homicidal:  None  Hallucinations:  None  Delusion:  None  Memory:  Intact  Orientation:  Person, Place, Time and Situation  Reliability:  good  Insight:  Good  Judgement:  Good  Impulse Control:  Good  Physical/Medical Issues:  No     PHQ-9 Depression Screening  Little interest or pleasure in doing things? 3   Feeling down, depressed, or hopeless? 2   Trouble falling or staying asleep, or sleeping too much? 1   Feeling tired or having little energy? 3   Poor appetite or overeating? 0   Feeling bad about yourself - or that you are a failure or have let yourself or your family down? 1   Trouble concentrating on things, such as reading the newspaper or watching television? 2   Moving or speaking so slowly that other people could have noticed? Or the opposite - being so fidgety or restless that you have been moving around a lot more than usual? 0   Thoughts that you would be better off dead, or of hurting yourself in some way? 0   PHQ-9 Total Score 12   If you checked off  any problems, how difficult have these problems made it for you to do your work, take care of things at home, or get along with other people? Extremely dIfficult        Assessment/Plan:  Diagnoses and all orders for this visit:    1. Major depressive disorder, recurrent episode, moderate (CMS/HCC) (Primary)  -     ARIPiprazole (ABILIFY) 10 MG tablet; Take 1 tablet by mouth Daily.  Dispense: 30 tablet; Refill: 1    2. Psychophysiological insomnia    3. MANISHA (generalized anxiety disorder)      We will increase Abilify to 10 mg daily to see if improvement will continue.  Due to finances we will follow-up in 2 months.    A psychological evaluation was conducted in order to assess past and current level of functioning. Areas assessed included, but were not limited to: perception of social support, perception of ability to face and deal with challenges in life (positive functioning), anxiety symptoms, depressive symptoms, perspective on beliefs/belief system, coping skills for stress, intelligence level,  Therapeutic rapport was established. Interventions conducted today were geared towards incorporating medication management along with support for continued therapy. Education was also provided as to the med management with this provider and what to expect in subsequent sessions.    We discussed risks, benefits,goals and side effects of the above medication and the patient was agreeable with the plan.Patient was educated on the importance of compliance with treatment and follow-up appointments. Patient is aware to contact the [facility] with any worsening of symptoms. To call for questions or concerns and return early if necessary. Patent is agreeable to go to the Emergency Department or call 911 should they begin SI/HI.     Treatment Plan:   Discussed risks, benefits, and alternatives of medication. Encouraged healthy habits (eating, exercise and sleep). Call if any questions or problems arise. Medication reconciled.  Controlled substance monitoring report reviewed. Provided psychoeducation.. Discussed coping strategies and current stressors. Set appropriate boundaries and limits for patient's well-being. Use distraction techniques to improve symptoms. Access support networks.      Return in about 2 months (around 11/8/2021) for Follow Up 15 min.    Freya Padilla, APRN

## 2021-09-09 ENCOUNTER — TELEPHONE (OUTPATIENT)
Dept: INTERNAL MEDICINE | Facility: CLINIC | Age: 56
End: 2021-09-09

## 2021-09-09 RX ORDER — ETODOLAC 400 MG/1
TABLET, FILM COATED ORAL
Qty: 90 TABLET | Refills: 1 | Status: SHIPPED | OUTPATIENT
Start: 2021-09-09

## 2021-09-09 NOTE — TELEPHONE ENCOUNTER
Caller: Zhane Burnham    Relationship: Self    Best call back number: [phone]    Medication needed:   Requested Prescriptions     Pending Prescriptions Disp Refills   • etodolac (LODINE) 400 MG tablet         When do you need the refill by: ASAP    What additional details did the patient provide when requesting the medication: PATIENT STATED THAT SHE WAS COMPLETELY OUT OF MEDICATION AND WOULD NEED REFILL ASAP    PLEASE ADVISE    Does the patient have less than a 3 day supply:  [x] Yes  [] No    What is the patient's preferred pharmacy: [facility] [address] [phone] [facility] [phone]

## 2021-09-29 ENCOUNTER — OFFICE VISIT (OUTPATIENT)
Dept: BEHAVIORAL HEALTH | Facility: CLINIC | Age: 56
End: 2021-09-29

## 2021-09-29 VITALS — HEIGHT: 65 IN | BODY MASS INDEX: 47.82 KG/M2 | WEIGHT: 287 LBS

## 2021-09-29 DIAGNOSIS — F33.0 MILD EPISODE OF RECURRENT MAJOR DEPRESSIVE DISORDER (HCC): Primary | ICD-10-CM

## 2021-09-29 DIAGNOSIS — M79.7 FIBROMYALGIA: ICD-10-CM

## 2021-09-29 PROCEDURE — 90834 PSYTX W PT 45 MINUTES: CPT | Performed by: COUNSELOR
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
## 2021-10-25 ENCOUNTER — TELEPHONE (OUTPATIENT)
Dept: BEHAVIORAL HEALTH | Facility: CLINIC | Age: 56
End: 2021-10-25

## 2021-10-25 ENCOUNTER — OFFICE VISIT (OUTPATIENT)
Dept: BEHAVIORAL HEALTH | Facility: CLINIC | Age: 56
End: 2021-10-25

## 2021-10-25 VITALS — BODY MASS INDEX: 45.65 KG/M2 | WEIGHT: 274 LBS | HEIGHT: 65 IN

## 2021-10-25 DIAGNOSIS — F33.1 MAJOR DEPRESSIVE DISORDER, RECURRENT EPISODE, MODERATE (HCC): Primary | ICD-10-CM

## 2021-10-25 DIAGNOSIS — M79.7 FIBROMYALGIA: ICD-10-CM

## 2021-10-25 PROCEDURE — 90834 PSYTX W PT 45 MINUTES: CPT | Performed by: COUNSELOR

## 2021-10-25 NOTE — TELEPHONE ENCOUNTER
Patient in for appointment with Magda today and requested I send message to see if the dose can be increased, states she is stressing out so much about getting things done that she is not getting them done because of the stress.

## 2021-11-02 ENCOUNTER — TELEPHONE (OUTPATIENT)
Dept: INTERNAL MEDICINE | Facility: CLINIC | Age: 56
End: 2021-11-02
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
## 2021-11-08 ENCOUNTER — OFFICE VISIT (OUTPATIENT)
Dept: BEHAVIORAL HEALTH | Facility: CLINIC | Age: 56
End: 2021-11-08

## 2021-11-08 VITALS — WEIGHT: 277 LBS | BODY MASS INDEX: 46.15 KG/M2 | HEIGHT: 65 IN

## 2021-11-08 DIAGNOSIS — F33.1 MAJOR DEPRESSIVE DISORDER, RECURRENT EPISODE, MODERATE (HCC): Primary | ICD-10-CM

## 2021-11-08 PROCEDURE — 99213 OFFICE O/P EST LOW 20 MIN: CPT | Performed by: NURSE PRACTITIONER

## 2021-11-09 ENCOUNTER — TELEPHONE (OUTPATIENT)
Dept: FAMILY MEDICINE CLINIC | Facility: CLINIC | Age: 56
End: 2021-11-09

## 2021-11-10 ENCOUNTER — PRIOR AUTHORIZATION (OUTPATIENT)
Dept: BEHAVIORAL HEALTH | Facility: CLINIC | Age: 56
End: 2021-11-10

## 2021-11-20 NOTE — PROGRESS NOTES
Patient Name: Zhane Burnham  MRN: [id number]   :  1965     Chief Complaint:      ICD-10-CM ICD-9-CM   1. Major depressive disorder, recurrent episode, moderate ([facility])  F33.1 296.32       History of Present Illness: Zhane Burnham is a 56 y.o. female is here today for medication management follow up. Pt states again it worked initially however stopped working. No energy. No desire to do anything.   The following portions of the patient's history were reviewed and updated as appropriate: allergies, current medications, past family history, past medical history, past social history, past surgical history and problem list.    Review of Systems;;  Review of Systems   Constitutional: Negative for activity change, appetite change, fatigue, unexpected weight gain and unexpected weight loss.   Respiratory: Negative for shortness of breath and wheezing.    Gastrointestinal: Negative for constipation, diarrhea, nausea and vomiting.   Musculoskeletal: Negative for gait problem.   Skin: Negative for dry skin and rash.   Neurological: Negative for dizziness, speech difficulty, weakness, light-headedness, headache, memory problem and confusion.   Psychiatric/Behavioral: Positive for depressed mood and stress. Negative for agitation, behavioral problems, decreased concentration, dysphoric mood, hallucinations, self-injury, sleep disturbance, suicidal ideas and negative for hyperactivity. The patient is nervous/anxious.        Physical Exam;;  Physical Exam  Vitals and nursing note reviewed.   Constitutional:       General: She is not in acute distress.     Appearance: She is well-developed. She is not diaphoretic.   HENT:      Head: Normocephalic and atraumatic.   Eyes:      Conjunctiva/sclera: Conjunctivae normal.   Cardiovascular:      Rate and Rhythm: Normal rate.   Pulmonary:      Effort: Pulmonary effort is normal. No respiratory distress.   Musculoskeletal:         General: Normal range of motion.      Cervical back:  "Full passive range of motion without pain and normal range of motion.   Skin:     General: Skin is warm and dry.   Neurological:      Mental Status: She is alert and oriented to person, place, and time.   Psychiatric:         Mood and Affect: Mood is anxious and depressed. Affect is not labile, blunt, angry or inappropriate.         Speech: Speech is not rapid and pressured or tangential.         Behavior: Behavior normal. Behavior is not agitated, slowed, aggressive, withdrawn, hyperactive or combative. Behavior is cooperative.         Thought Content: Thought content normal. Thought content is not paranoid or delusional. Thought content does not include homicidal or suicidal ideation. Thought content does not include homicidal or suicidal plan.         Judgment: Judgment normal.       Height 165.1 cm (65\"), weight 126 kg (277 lb), not currently breastfeeding.  Body mass index is 46.1 kg/m².    Current Medications;;    Current Outpatient Medications:   •  busPIRone (BUSPAR) 30 MG tablet, Take 1 tablet by mouth 2 (two) times a day., Disp: 180 tablet, Rfl: 2  •  cyclobenzaprine (FLEXERIL) 10 MG tablet, Take 1/2-1 tablet three times daily as needed for muscle spasms., Disp: 90 tablet, Rfl: 3  •  DULoxetine (Cymbalta) 60 MG capsule, Take 2 capsules PO daily. MUST HAVE APPOINTMENT FOR FURTHER REFILLS., Disp: 180 capsule, Rfl: 3  •  etodolac (LODINE) 400 MG tablet, Take 1/2-1 tablet by mouth every 8 hours as needed., Disp: 90 tablet, Rfl: 1  •  gabapentin (NEURONTIN) 600 MG tablet, Take 600 mg by mouth 4 (Four) Times a Day. 600mg every morning, 600mg with evening meal and 1200mg HS , Disp: , Rfl:   •  hydroCHLOROthiazide (HYDRODIURIL) 25 MG tablet, Take 1 tablet by mouth Daily. MUST HAVE APPOINTMENT FOR FURTHER REFILLS., Disp: 30 tablet, Rfl: 0  •  loratadine (CLARITIN) 10 MG tablet, Take 1 tablet by mouth Daily., Disp: 90 tablet, Rfl: 3  •  Multiple Vitamin (MULTI-VITAMIN PO), Take  by mouth., Disp: , Rfl:   •  " oxyCODONE-acetaminophen (PERCOCET)  MG per tablet, Take 1 tablet by mouth Every 6 (Six) Hours As Needed for Moderate Pain ., Disp: , Rfl:   •  pantoprazole (PROTONIX) 40 MG EC tablet, Take 1 tablet by mouth Daily., Disp: 90 tablet, Rfl: 1  •  simvastatin (ZOCOR) 40 MG tablet, Take 1 tablet by mouth Every Night., Disp: 90 tablet, Rfl: 1  •  traZODone (DESYREL) 100 MG tablet, Take 1 tablet by mouth Every Night., Disp: 90 tablet, Rfl: 1  •  Cariprazine HCl (Vraylar) 1.5 MG capsule capsule, Take 1 capsule by mouth Daily., Disp: 30 capsule, Rfl: 2    Lab Results:   No visits with results within 3 Month(s) from this visit.   Latest known visit with results is:   Office Visit on 12/01/2020   Component Date Value Ref Range Status   • WBC 12/01/2020 6.00  3.40 - 10.80 10*3/mm3 Final   • RBC 12/01/2020 5.26  3.77 - 5.28 10*6/mm3 Final   • Hemoglobin 12/01/2020 14.7  12.0 - 15.9 g/dL Final   • Hematocrit 12/01/2020 44.2  34.0 - 46.6 % Final   • MCV 12/01/2020 84.0  79.0 - 97.0 fL Final   • MCH 12/01/2020 27.9  26.6 - 33.0 pg Final   • MCHC 12/01/2020 33.3  31.5 - 35.7 g/dL Final   • RDW 12/01/2020 14.2  12.3 - 15.4 % Final   • Platelets 12/01/2020 323  140 - 450 10*3/mm3 Final   • Neutrophil Rel % 12/01/2020 71.7  42.7 - 76.0 % Final   • Lymphocyte Rel % 12/01/2020 20.7  19.6 - 45.3 % Final   • Monocyte Rel % 12/01/2020 5.3  5.0 - 12.0 % Final   • Eosinophil Rel % 12/01/2020 1.3  0.3 - 6.2 % Final   • Basophil Rel % 12/01/2020 0.7  0.0 - 1.5 % Final   • Neutrophils Absolute 12/01/2020 4.30  1.70 - 7.00 10*3/mm3 Final   • Lymphocytes Absolute 12/01/2020 1.24  0.70 - 3.10 10*3/mm3 Final   • Monocytes Absolute 12/01/2020 0.32  0.10 - 0.90 10*3/mm3 Final   • Eosinophils Absolute 12/01/2020 0.08  0.00 - 0.40 10*3/mm3 Final   • Basophils Absolute 12/01/2020 0.04  0.00 - 0.20 10*3/mm3 Final   • Immature Granulocyte Rel % 12/01/2020 0.3  0.0 - 0.5 % Final   • Immature Grans Absolute 12/01/2020 0.02  0.00 - 0.05 10*3/mm3 Final    • nRBC 12/01/2020 0.0  0.0 - 0.2 /100 WBC Final   • Glucose 12/01/2020 100* 65 - 99 mg/dL Final   • BUN 12/01/2020 11  6 - 20 mg/dL Final   • Creatinine 12/01/2020 1.01* 0.57 - 1.00 mg/dL Final   • eGFR Non African Am 12/01/2020 57* >60 mL/min/1.73 Final   • eGFR African Am 12/01/2020 69  >60 mL/min/1.73 Final   • BUN/Creatinine Ratio 12/01/2020 10.9  7.0 - 25.0 Final   • Sodium 12/01/2020 138  136 - 145 mmol/L Final   • Potassium 12/01/2020 4.4  3.5 - 5.2 mmol/L Final   • Chloride 12/01/2020 101  98 - 107 mmol/L Final   • Total CO2 12/01/2020 25.5  22.0 - 29.0 mmol/L Final   • Calcium 12/01/2020 9.9  8.6 - 10.5 mg/dL Final   • Total Protein 12/01/2020 7.0  6.0 - 8.5 g/dL Final   • Albumin 12/01/2020 4.60  3.50 - 5.20 g/dL Final   • Globulin 12/01/2020 2.4  gm/dL Final   • A/G Ratio 12/01/2020 1.9  g/dL Final   • Total Bilirubin 12/01/2020 0.4  0.0 - 1.2 mg/dL Final   • Alkaline Phosphatase 12/01/2020 90  39 - 117 U/L Final   • AST (SGOT) 12/01/2020 21  1 - 32 U/L Final   • ALT (SGPT) 12/01/2020 29  1 - 33 U/L Final   • Hemoglobin A1C 12/01/2020 5.50  4.80 - 5.60 % Final    Comment: Hemoglobin A1C Ranges:  Increased Risk for Diabetes  5.7% to 6.4%  Diabetes                     >= 6.5%  Diabetic Goal                < 7.0%     • aPTT 12/01/2020 27.1  24.5 - 37.2 seconds Final   • INR 12/01/2020 0.95  0.90 - 1.10 Final    Comment: Suggested INR therapeutic range for stable oral  anticoagulant therapy:  Low Intensity therapy:   1.5-2.0  Moderate Intensity therapy:   2.0-3.0  High Intensity therapy:   2.5-4.0     • Protime 12/01/2020 13.1  12.0 - 15.1 Seconds Final   • Prealbumin 12/01/2020 30  10 - 36 mg/dL Final   • Color 12/01/2020 Yellow  Yellow, Straw, Dark Yellow, [place] Final   • Clarity, UA 12/01/2020 Clear  Clear Final   • Specific Gravity  12/01/2020 1.015  1.005 - 1.030 Final   • pH, Urine 12/01/2020 7.0  5.0 - 8.0 Final   • Leukocytes 12/01/2020 Negative  Negative Final   • Nitrite, UA 12/01/2020 Negative   Negative Final   • Protein, POC 12/01/2020 1+* Negative mg/dL Final   • Glucose, UA 12/01/2020 Negative  Negative, 1000 mg/dL (3+) mg/dL Final   • Ketones, UA 12/01/2020 Negative  Negative Final   • Urobilinogen, UA 12/01/2020 Normal  Normal Final   • Bilirubin 12/01/2020 Large (3+)* Negative Final   • Blood, UA 12/01/2020 Negative  Negative Final       Mental Status Exam:   Hygiene:   good  Cooperation:  Cooperative  Eye Contact:  Good  Psychomotor Behavior:  Appropriate  Mood:anxious and depressed  Affect:  Appropriate  Hopelessness: Denies  Speech:  Normal  Thought Process:  Goal directed  Thought Content:  Normal  Suicidal:  None  Homicidal:  None  Hallucinations:  None  Delusion:  None  Memory:  Intact  Orientation:  Person, Place, Time and Situation  Reliability:  good  Insight:  Good  Judgement:  Good  Impulse Control:  Good  Physical/Medical Issues:  No     PHQ-9 Depression Screening  Little interest or pleasure in doing things? 3   Feeling down, depressed, or hopeless? 3   Trouble falling or staying asleep, or sleeping too much? 3 (Trouble staying asleep)   Feeling tired or having little energy? 3   Poor appetite or overeating? 2 (Bad eating habbits at times)   Feeling bad about yourself - or that you are a failure or have let yourself or your family down? 2   Trouble concentrating on things, such as reading the newspaper or watching television? 3   Moving or speaking so slowly that other people could have noticed? Or the opposite - being so fidgety or restless that you have been moving around a lot more than usual? 0   Thoughts that you would be better off dead, or of hurting yourself in some way? 0   PHQ-9 Total Score 19   If you checked off any problems, how difficult have these problems made it for you to do your work, take care of things at home, or get along with other people? Very difficult        Assessment/Plan:  Diagnoses and all orders for this visit:    1. Major depressive disorder, recurrent  episode, moderate (HCC) (Primary)  -     Cariprazine HCl (Vraylar) 1.5 MG capsule capsule; Take 1 capsule by mouth Daily.  Dispense: 30 capsule; Refill: 2      Will trial vraylar off label for depression.     A psychological evaluation was conducted in order to assess past and current level of functioning. Areas assessed included, but were not limited to: perception of social support, perception of ability to face and deal with challenges in life (positive functioning), anxiety symptoms, depressive symptoms, perspective on beliefs/belief system, coping skills for stress, intelligence level,  Therapeutic rapport was established. Interventions conducted today were geared towards incorporating medication management along with support for continued therapy. Education was also provided as to the med management with this provider and what to expect in subsequent sessions.    We discussed risks, benefits,goals and side effects of the above medication and the patient was agreeable with the plan.Patient was educated on the importance of compliance with treatment and follow-up appointments. Patient is aware to contact the [facility] with any worsening of symptoms. To call for questions or concerns and return early if necessary. Patent is agreeable to go to the Emergency Department or call 911 should they begin SI/HI.     Treatment Plan:   Discussed risks, benefits, and alternatives of medication. Encouraged healthy habits (eating, exercise and sleep). Call if any questions or problems arise. Medication reconciled. Controlled substance monitoring report reviewed. Provided psychoeducation.. Discussed coping strategies and current stressors. Set appropriate boundaries and limits for patient's well-being. Use distraction techniques to improve symptoms. Access support networks.      Return in about 4 weeks (around 12/6/2021) for Follow Up 15 min.    JENNIE Reyes

## 2021-11-23 RX ORDER — CYCLOBENZAPRINE HCL 10 MG
TABLET ORAL
Qty: 90 TABLET | Refills: 3 | Status: SHIPPED | OUTPATIENT
Start: 2021-11-23 | End: 2022-04-06 | Stop reason: SDUPTHER

## 2021-11-23 NOTE — TELEPHONE ENCOUNTER
Caller: Tej Gurinderkristi    Relationship: Self    Best call back number:  [phone]    Requested Prescriptions:   Requested Prescriptions     Pending Prescriptions Disp Refills   • cyclobenzaprine (FLEXERIL) 10 MG tablet 90 tablet 3     Sig: Take 1/2-1 tablet three times daily as needed for muscle spasms.        Pharmacy where request should be sent: [facility] [address] [phone] [facility] [phone] FX     Additional details provided by patient: PATIENT OUT OF MEDICATION    Does the patient have less than a 3 day supply:  [x] Yes  [] No    Isaura Steward   11/23/21 15:36 EST

## 2022-02-21 ENCOUNTER — OFFICE VISIT (OUTPATIENT)
Dept: INTERNAL MEDICINE | Facility: CLINIC | Age: 57
End: 2022-02-21

## 2022-02-21 VITALS
OXYGEN SATURATION: 96 % | HEART RATE: 91 BPM | SYSTOLIC BLOOD PRESSURE: 128 MMHG | DIASTOLIC BLOOD PRESSURE: 80 MMHG | TEMPERATURE: 97 F | HEIGHT: 65 IN | WEIGHT: 280 LBS | BODY MASS INDEX: 46.65 KG/M2

## 2022-02-21 DIAGNOSIS — Z11.59 NEED FOR HEPATITIS C SCREENING TEST: ICD-10-CM

## 2022-02-21 DIAGNOSIS — J06.9 VIRAL UPPER RESPIRATORY TRACT INFECTION: ICD-10-CM

## 2022-02-21 DIAGNOSIS — K21.9 GASTROESOPHAGEAL REFLUX DISEASE WITHOUT ESOPHAGITIS: ICD-10-CM

## 2022-02-21 DIAGNOSIS — F32.1 CURRENT MODERATE EPISODE OF MAJOR DEPRESSIVE DISORDER WITHOUT PRIOR EPISODE: ICD-10-CM

## 2022-02-21 DIAGNOSIS — E66.01 CLASS 3 SEVERE OBESITY DUE TO EXCESS CALORIES WITH SERIOUS COMORBIDITY AND BODY MASS INDEX (BMI) OF 45.0 TO 49.9 IN ADULT: ICD-10-CM

## 2022-02-21 DIAGNOSIS — I10 PRIMARY HYPERTENSION: ICD-10-CM

## 2022-02-21 DIAGNOSIS — Z00.00 ENCOUNTER FOR MEDICARE ANNUAL WELLNESS EXAM: Primary | ICD-10-CM

## 2022-02-21 DIAGNOSIS — M06.9 RHEUMATOID ARTHRITIS INVOLVING MULTIPLE SITES, UNSPECIFIED WHETHER RHEUMATOID FACTOR PRESENT: ICD-10-CM

## 2022-02-21 DIAGNOSIS — E78.2 MIXED HYPERLIPIDEMIA: ICD-10-CM

## 2022-02-21 PROBLEM — E66.813 CLASS 3 SEVERE OBESITY DUE TO EXCESS CALORIES WITH SERIOUS COMORBIDITY AND BODY MASS INDEX (BMI) OF 45.0 TO 49.9 IN ADULT: Status: ACTIVE | Noted: 2022-02-21

## 2022-02-21 PROCEDURE — 1159F MED LIST DOCD IN RCRD: CPT | Performed by: INTERNAL MEDICINE

## 2022-02-21 PROCEDURE — 1170F FXNL STATUS ASSESSED: CPT | Performed by: INTERNAL MEDICINE

## 2022-02-21 PROCEDURE — U0004 COV-19 TEST NON-CDC HGH THRU: HCPCS | Performed by: INTERNAL MEDICINE

## 2022-02-21 PROCEDURE — 99396 PREV VISIT EST AGE 40-64: CPT | Performed by: INTERNAL MEDICINE

## 2022-02-21 PROCEDURE — 1125F AMNT PAIN NOTED PAIN PRSNT: CPT | Performed by: INTERNAL MEDICINE

## 2022-02-21 PROCEDURE — 96160 PT-FOCUSED HLTH RISK ASSMT: CPT | Performed by: INTERNAL MEDICINE

## 2022-02-21 RX ORDER — FLUCONAZOLE 150 MG/1
150 TABLET ORAL ONCE
Qty: 1 TABLET | Refills: 0 | Status: SHIPPED | OUTPATIENT
Start: 2022-02-21 | End: 2022-02-21

## 2022-02-21 NOTE — PROGRESS NOTES
The ABCs of the Annual Wellness Visit  Subsequent Medicare Wellness Visit    Chief Complaint   Patient presents with   • Medicare Wellness-subsequent      Subjective    History of Present Illness:  Zhane Burnham is a 56 y.o. female who presents for a Subsequent Medicare Wellness Visit.  PMH of HTN, HLD, GERD, depression, rheumatoid arthritis, fibromyalgia, osteoporosis and history of breast cancer.  All vaccines are currently up-to-date, mammogram, colonoscopy and DEXA scan up-to-date. Pt states she was exposed to a person with COVID for 3 days last week for several hours each day.  She is not feeling well today, she is achy and weak.  No cough, CP or SOA.  Her RA is in fair control, she sees Dr Tomas for this.  She is on a new med, plaquenil.  Her mood fluctuates.  She does visit with friends and this helps her feel better.  Her sleep is just fair also, she is on trazodone and benadryl.  Her pain medications are helpful.        The following portions of the patient's history were reviewed and   updated as appropriate: allergies, current medications, past family history, past medical history, past social history, past surgical history and problem list.    Compared to one year ago, the patient feels her physical   health is the same.    Compared to one year ago, the patient feels her mental   health is the same.    Recent Hospitalizations:  She was not admitted to the hospital during the last year.       Current Medical Providers:  Patient Care Team:  Vermeesch, Marilyn K, MD as PCP - General (Internal Medicine & Pediatrics)    Outpatient Medications Prior to Visit   Medication Sig Dispense Refill   • busPIRone (BUSPAR) 30 MG tablet Take 1 tablet by mouth 2 (two) times a day. 180 tablet 2   • Cariprazine HCl (Vraylar) 1.5 MG capsule capsule Take 1 capsule by mouth Daily. 30 capsule 2   • cyclobenzaprine (FLEXERIL) 10 MG tablet Take 1/2-1 tablet three times daily as needed for muscle spasms. 90 tablet 3   •  DULoxetine (Cymbalta) 60 MG capsule Take 2 capsules PO daily. MUST HAVE APPOINTMENT FOR FURTHER REFILLS. 180 capsule 3   • etodolac (LODINE) 400 MG tablet Take 1/2-1 tablet by mouth every 8 hours as needed. 90 tablet 1   • gabapentin (NEURONTIN) 600 MG tablet Take 600 mg by mouth 4 (Four) Times a Day. 600mg every morning, 600mg with evening meal and 1200mg HS      • hydroCHLOROthiazide (HYDRODIURIL) 25 MG tablet Take 1 tablet by mouth Daily. MUST HAVE APPOINTMENT FOR FURTHER REFILLS. 30 tablet 0   • loratadine (CLARITIN) 10 MG tablet Take 1 tablet by mouth Daily. 90 tablet 3   • Multiple Vitamin (MULTI-VITAMIN PO) Take  by mouth.     • oxyCODONE-acetaminophen (PERCOCET)  MG per tablet Take 1 tablet by mouth Every 6 (Six) Hours As Needed for Moderate Pain .     • pantoprazole (PROTONIX) 40 MG EC tablet Take 1 tablet by mouth Daily. 90 tablet 1   • simvastatin (ZOCOR) 40 MG tablet Take 1 tablet by mouth Every Night. 90 tablet 1   • traZODone (DESYREL) 100 MG tablet Take 1 tablet by mouth Every Night. 90 tablet 1     No facility-administered medications prior to visit.       Opioid medication/s are on active medication list.  and I have evaluated her active treatment plan and pain score trends (see table).  Vitals:    02/21/22 1332   PainSc:   6     I have reviewed the chart for potential of high risk medication and harmful drug interactions in the elderly.            Aspirin is not on active medication list.  Aspirin use is not indicated based on review of current medical condition/s. Risk of harm outweighs potential benefits.  .    Patient Active Problem List   Diagnosis   • Hypertension   • Hyperlipidemia   • Abnormal ECG   • Lower extremity edema   • Breast cancer, left (HCC)   • Rheumatoid arthritis (HCC)   • Chronic pain syndrome   • Gastroesophageal reflux disease without esophagitis   • Current moderate episode of major depressive disorder without prior episode (HCC)   • Fibromyalgia   • Age-related  "osteoporosis without current pathological fracture   • Encounter for screening colonoscopy   • Screening for colon cancer   • Arthritis of left knee   • Chronic fatigue   • Encounter for Medicare annual wellness exam   • Snoring   • Need for hepatitis C screening test   • Viral upper respiratory tract infection   • Class 3 severe obesity due to excess calories with serious comorbidity and body mass index (BMI) of 45.0 to 49.9 in adult (HCC)     Advance Care Planning  Advance Directive is not on file.  ACP discussion was held with the patient during this visit. Patient does not have an advance directive, information provided.    Review of Systems   Constitutional: Positive for fatigue.   HENT: Positive for hearing loss.    Eyes: Negative.    Respiratory: Negative.    Cardiovascular: Negative.    Gastrointestinal: Positive for constipation.   Endocrine: Negative.    Genitourinary:        Mild incontinence   Musculoskeletal: Positive for arthralgias, back pain and neck pain.   Skin: Negative.    Allergic/Immunologic: Positive for environmental allergies.   Hematological: Bruises/bleeds easily.   Psychiatric/Behavioral: Positive for dysphoric mood and sleep disturbance.        Objective    Vitals:    02/21/22 1332   BP: 128/80   Pulse: 91   Temp: 97 °F (36.1 °C)   SpO2: 96%   Weight: 127 kg (280 lb)   Height: 165.1 cm (65\")   PainSc:   6     BMI Readings from Last 1 Encounters:   02/21/22 46.59 kg/m²   BMI is above normal parameters. Recommendations include: exercise counseling and nutrition counseling    Does the patient have evidence of cognitive impairment? No    Physical Exam  Vitals and nursing note reviewed.   Constitutional:       General: She is not in acute distress.     Appearance: Normal appearance. She is well-developed. She is obese. She is not ill-appearing.      Comments: Kind and pleasant female, appears stated age and in NAD today   HENT:      Head: Normocephalic and atraumatic.      Right Ear: Tympanic " membrane, ear canal and external ear normal.      Left Ear: Tympanic membrane, ear canal and external ear normal.      Nose: No congestion.      Mouth/Throat:      Pharynx: No posterior oropharyngeal erythema.   Eyes:      General:         Right eye: No discharge.         Left eye: No discharge.      Extraocular Movements: Extraocular movements intact.      Conjunctiva/sclera: Conjunctivae normal.      Pupils: Pupils are equal, round, and reactive to light.   Neck:      Thyroid: No thyromegaly.      Vascular: No carotid bruit.      Comments: No thyromegaly or mass  Cardiovascular:      Rate and Rhythm: Normal rate and regular rhythm.      Pulses: Normal pulses.      Heart sounds: Normal heart sounds. No murmur heard.      Pulmonary:      Effort: Pulmonary effort is normal. No respiratory distress.      Breath sounds: Normal breath sounds. No wheezing.   Abdominal:      General: Bowel sounds are normal. There is no distension.      Palpations: Abdomen is soft.      Tenderness: There is no abdominal tenderness.      Comments: Obesity limits exam   Musculoskeletal:         General: Normal range of motion.      Cervical back: Normal range of motion and neck supple.      Right lower leg: No edema.      Left lower leg: No edema.   Lymphadenopathy:      Cervical: No cervical adenopathy.   Skin:     General: Skin is warm.      Findings: No rash.   Neurological:      General: No focal deficit present.      Mental Status: She is alert and oriented to person, place, and time. Mental status is at baseline.      Cranial Nerves: No cranial nerve deficit.      Motor: No weakness.      Coordination: Coordination normal.      Gait: Gait normal.   Psychiatric:         Behavior: Behavior normal.         Thought Content: Thought content normal.         Judgment: Judgment normal.      Comments: Flat affect with depressed mood                 HEALTH RISK ASSESSMENT    Smoking Status:  Social History     Tobacco Use   Smoking Status Never  Smoker   Smokeless Tobacco Never Used     Alcohol Consumption:  Social History     Substance and Sexual Activity   Alcohol Use Yes    Comment: light occasional use, no history of abuse reported     Fall Risk Screen:    STEADI Fall Risk Assessment was completed, and patient is at HIGH risk for falls. Assessment completed on:2/21/2022    Depression Screening:  PHQ-2/PHQ-9 Depression Screening 2/21/2022   Little interest or pleasure in doing things 2   Feeling down, depressed, or hopeless 3   Trouble falling or staying asleep, or sleeping too much 0   Feeling tired or having little energy 3   Poor appetite or overeating 0   Feeling bad about yourself - or that you are a failure or have let yourself or your family down 0   Trouble concentrating on things, such as reading the newspaper or watching television 3   Moving or speaking so slowly that other people could have noticed. Or the opposite - being so fidgety or restless that you have been moving around a lot more than usual 0   Thoughts that you would be better off dead, or of hurting yourself in some way 0   Total Score 11   If you checked off any problems, how difficult have these problems made it for you to do your work, take care of things at home, or get along with other people? Very difficult       Health Habits and Functional and Cognitive Screening:  Functional & Cognitive Status 2/21/2022   Do you have difficulty preparing food and eating? No   Do you have difficulty bathing yourself, getting dressed or grooming yourself? No   Do you have difficulty using the toilet? No   Do you have difficulty moving around from place to place? No   Do you have trouble with steps or getting out of a bed or a chair? Yes   Current Diet Unhealthy Diet   Dental Exam Up to date   Eye Exam Up to date   Exercise (times per week) 0 times per week   Current Exercises Include No Regular Exercise   Current Exercise Activities Include -   Do you need help using the phone?  No   Are you  deaf or do you have serious difficulty hearing?  No   Do you need help with transportation? No   Do you need help shopping? No   Do you need help preparing meals?  No   Do you need help with housework?  Yes   Do you need help with laundry? No   Do you need help taking your medications? No   Do you need help managing money? No   Do you ever drive or ride in a car without wearing a seat belt? No   Have you felt unusual stress, anger or loneliness in the last month? Yes   Who do you live with? Alone   If you need help, do you have trouble finding someone available to you? No   Have you been bothered in the last four weeks by sexual problems? No   Do you have difficulty concentrating, remembering or making decisions? Yes       Age-appropriate Screening Schedule:  Refer to the list below for future screening recommendations based on patient's age, sex and/or medical conditions. Orders for these recommended tests are listed in the plan section. The patient has been provided with a written plan.    Health Maintenance   Topic Date Due   • LIPID PANEL  08/17/2021   • ZOSTER VACCINE (2 of 2) 10/05/2021   • PAP SMEAR  02/13/2023   • DXA SCAN  03/09/2023   • INFLUENZA VACCINE  Completed   • MAMMOGRAM  Discontinued   • TDAP/TD VACCINES  Discontinued              Assessment/Plan   CMS Preventative Services Quick Reference  Risk Factors Identified During Encounter  Immunizations Discussed/Encouraged (specific Immunizations; Shingrix  Inactivity/Sedentary  Obesity/Overweight   The above risks/problems have been discussed with the patient.  Follow up actions/plans if indicated are seen below in the Assessment/Plan Section.  Pertinent information has been shared with the patient in the After Visit Summary.    Diagnoses and all orders for this visit:    1. Encounter for Medicare annual wellness exam (Primary)  -     CBC & Differential  -     Comprehensive Metabolic Panel  -     Hepatitis C Antibody  -     Lipid Panel With / Chol / HDL  Ratio    2. Primary hypertension  -     CBC & Differential  -     Comprehensive Metabolic Panel    3. Mixed hyperlipidemia  -     Comprehensive Metabolic Panel  -     Lipid Panel With / Chol / HDL Ratio    4. Gastroesophageal reflux disease without esophagitis    5. Rheumatoid arthritis involving multiple sites, unspecified whether rheumatoid factor present ([facility])    6. Need for hepatitis C screening test  -     Hepatitis C Antibody    7. Viral upper respiratory tract infection  -     COVID-19 PCR, [facility], NP SWAB IN [facility]/ORAL SWISH 24-30 HR TAT - Swab, Nasopharynx    8. Class 3 severe obesity due to excess calories with serious comorbidity and body mass index (BMI) of 45.0 to 49.9 in adult ([facility])    9. Current moderate episode of major depressive disorder without prior episode ([facility])    Other orders  -     fluconazole (Diflucan) 150 MG tablet; Take 1 tablet by mouth 1 (One) Time for 1 dose.  Dispense: 1 tablet; Refill: 0    Follow heart healthy/low salt/low cholesterol diet  Avoid processed foods  Monitor blood pressure as discussed  Exercise as tolerated up to 30 minutes 5 days per week  Take all medications as prescribed  Labs as noted  Continue Protonix for GERD symptoms  Follow-up with rheumatologist regarding rheumatoid arthritis, recently started on Plaquenil  Continue Lodine for arthritis, take with food  Follow-up with pain clinic regarding chronic pain from underlying rheumatoid arthritis  She is seeing psychiatrist for underlying depression, continue counseling also  Recently exposed to Covid virus, Covid test obtained today  Increase fluids and rest  Maintain quarantine for 10 days from symptom onset, will contact patient with test results and further plan of care  Patient complains of vaginal itching today Will provide 1 tablet of Diflucan  Encourage shingles vaccine at local pharmacy  Patient's Body mass index is 46.59 kg/m². indicating that she is morbidly obese (BMI > 40 or >  35 with obesity - related health condition). Obesity-related health conditions include the following: hypertension, dyslipidemias, GERD and osteoarthritis. Obesity is worsening. BMI is is above average; BMI management plan is completed. We discussed portion control and increasing exercise..        Follow Up:   Return in about 6 months (around 8/21/2022) for Next scheduled follow up.     An After Visit Summary and PPPS were made available to the patient.

## 2022-02-22 LAB — SARS-COV-2 RNA NOSE QL NAA+PROBE: NOT DETECTED

## 2022-02-23 NOTE — PROGRESS NOTES
Please tell patient her COVID test is negative.  Find out how she is feeling.  I recommend she take Coricidin HBP for cold and flu symptoms 2 tablets a.m. and p.m. as needed.  Please let me know if she is having any significant wheezing or shortness of breath.  She was not having any of these symptoms when I saw her a few days ago.  If so I will send in albuterol and Medrol Dosepak, you may pend me these orders if needed.  Please remind patient to get her labs done when she is feeling better.  Thank you

## 2022-03-14 RX ORDER — HYDROCHLOROTHIAZIDE 25 MG/1
25 TABLET ORAL DAILY
Qty: 30 TABLET | Refills: 0 | Status: SHIPPED | OUTPATIENT
Start: 2022-03-14 | End: 2022-04-06

## 2022-03-21 ENCOUNTER — OFFICE VISIT (OUTPATIENT)
Dept: BEHAVIORAL HEALTH | Facility: CLINIC | Age: 57
End: 2022-03-21

## 2022-03-21 VITALS
HEIGHT: 65 IN | BODY MASS INDEX: 45.82 KG/M2 | DIASTOLIC BLOOD PRESSURE: 78 MMHG | SYSTOLIC BLOOD PRESSURE: 112 MMHG | WEIGHT: 275 LBS

## 2022-03-21 DIAGNOSIS — F51.04 PSYCHOPHYSIOLOGICAL INSOMNIA: ICD-10-CM

## 2022-03-21 DIAGNOSIS — F33.1 MAJOR DEPRESSIVE DISORDER, RECURRENT EPISODE, MODERATE: Primary | ICD-10-CM

## 2022-03-21 DIAGNOSIS — F41.1 GAD (GENERALIZED ANXIETY DISORDER): ICD-10-CM

## 2022-03-21 PROCEDURE — 99213 OFFICE O/P EST LOW 20 MIN: CPT | Performed by: NURSE PRACTITIONER

## 2022-03-21 RX ORDER — BUSPIRONE HYDROCHLORIDE 30 MG/1
30 TABLET ORAL 2 TIMES DAILY
Qty: 180 TABLET | Refills: 2 | Status: SHIPPED | OUTPATIENT
Start: 2022-03-21 | End: 2022-09-21 | Stop reason: SINTOL

## 2022-03-21 RX ORDER — TRAZODONE HYDROCHLORIDE 100 MG/1
100 TABLET ORAL NIGHTLY
Qty: 90 TABLET | Refills: 1 | Status: SHIPPED | OUTPATIENT
Start: 2022-03-21 | End: 2022-04-06 | Stop reason: SDUPTHER

## 2022-03-21 NOTE — PROGRESS NOTES
Patient Name: Zhane Burnham  MRN: [id number]   :  1965     Chief Complaint:      ICD-10-CM ICD-9-CM   1. Major depressive disorder, recurrent episode, moderate (HCC)  F33.1 296.32   2. Psychophysiological insomnia  F51.04 307.42   3. MANISHA (generalized anxiety disorder)  F41.1 300.02       History of Present Illness: Zhane Burnham is a 56 y.o. female is here today for medication management follow up.  Patient moved out of her mold infested house.  Now is in a new house that has lots of windows and plenty of sunshine.    The following portions of the patient's history were reviewed and updated as appropriate: allergies, current medications, past family history, past medical history, past social history, past surgical history and problem list.    Review of Systems;;  Review of Systems   Constitutional: Negative for activity change, appetite change, fatigue, unexpected weight gain and unexpected weight loss.   Respiratory: Negative for shortness of breath and wheezing.    Gastrointestinal: Negative for constipation, diarrhea, nausea and vomiting.   Musculoskeletal: Negative for gait problem.   Skin: Negative for dry skin and rash.   Neurological: Negative for dizziness, speech difficulty, weakness, light-headedness, headache, memory problem and confusion.   Psychiatric/Behavioral: Negative for agitation, behavioral problems, decreased concentration, dysphoric mood, hallucinations, self-injury, sleep disturbance, suicidal ideas, negative for hyperactivity, depressed mood and stress. The patient is not nervous/anxious.        Physical Exam;;  Physical Exam  Vitals and nursing note reviewed.   Constitutional:       General: She is not in acute distress.     Appearance: She is well-developed. She is not diaphoretic.   HENT:      Head: Normocephalic and atraumatic.   Eyes:      Conjunctiva/sclera: Conjunctivae normal.   Cardiovascular:      Rate and Rhythm: Normal rate.   Pulmonary:      Effort: Pulmonary effort  "is normal. No respiratory distress.   Musculoskeletal:         General: Normal range of motion.      Cervical back: Full passive range of motion without pain and normal range of motion.   Skin:     General: Skin is warm and dry.   Neurological:      Mental Status: She is alert and oriented to person, place, and time.   Psychiatric:         Mood and Affect: Mood is not anxious or depressed. Affect is not labile, blunt, angry or inappropriate.         Speech: Speech is not rapid and pressured or tangential.         Behavior: Behavior normal. Behavior is not agitated, slowed, aggressive, withdrawn, hyperactive or combative. Behavior is cooperative.         Thought Content: Thought content normal. Thought content is not paranoid or delusional. Thought content does not include homicidal or suicidal ideation. Thought content does not include homicidal or suicidal plan.         Judgment: Judgment normal.       Blood pressure 112/78, height 165.1 cm (65\"), weight 125 kg (275 lb), not currently breastfeeding.  Body mass index is 45.76 kg/m².    Current Medications;;    Current Outpatient Medications:   •  busPIRone (BUSPAR) 30 MG tablet, Take 1 tablet by mouth 2 (Two) Times a Day., Disp: 180 tablet, Rfl: 2  •  Cariprazine HCl (Vraylar) 1.5 MG capsule capsule, Take 1 capsule by mouth Daily., Disp: 28 capsule, Rfl: 0  •  cyclobenzaprine (FLEXERIL) 10 MG tablet, Take 1/2-1 tablet three times daily as needed for muscle spasms., Disp: 90 tablet, Rfl: 3  •  DULoxetine (Cymbalta) 60 MG capsule, Take 2 capsules PO daily. MUST HAVE APPOINTMENT FOR FURTHER REFILLS., Disp: 180 capsule, Rfl: 3  •  etodolac (LODINE) 400 MG tablet, Take 1/2-1 tablet by mouth every 8 hours as needed., Disp: 90 tablet, Rfl: 1  •  gabapentin (NEURONTIN) 600 MG tablet, Take 600 mg by mouth 4 (Four) Times a Day. 600mg every morning, 600mg with evening meal and 1200mg HS , Disp: , Rfl:   •  hydroCHLOROthiazide (HYDRODIURIL) 25 MG tablet, Take 1 tablet by mouth " Daily., Disp: 30 tablet, Rfl: 0  •  loratadine (CLARITIN) 10 MG tablet, Take 1 tablet by mouth Daily., Disp: 90 tablet, Rfl: 3  •  Multiple Vitamin (MULTI-VITAMIN PO), Take  by mouth., Disp: , Rfl:   •  oxyCODONE-acetaminophen (PERCOCET)  MG per tablet, Take 1 tablet by mouth Every 6 (Six) Hours As Needed for Moderate Pain ., Disp: , Rfl:   •  pantoprazole (PROTONIX) 40 MG EC tablet, Take 1 tablet by mouth Daily., Disp: 90 tablet, Rfl: 1  •  simvastatin (ZOCOR) 40 MG tablet, Take 1 tablet by mouth Every Night., Disp: 90 tablet, Rfl: 1  •  traZODone (DESYREL) 100 MG tablet, Take 1 tablet by mouth Every Night., Disp: 90 tablet, Rfl: 1    Lab Results:   Office Visit on 02/21/2022   Component Date Value Ref Range Status   • SARS-CoV-2 VIVIAN 02/21/2022 Not Detected  Not Detected Final       Mental Status Exam:   Hygiene:   good  Cooperation:  Cooperative  Eye Contact:  Good  Psychomotor Behavior:  Appropriate  Mood:within normal limits  Affect:  Appropriate  Hopelessness: Denies  Speech:  Normal  Thought Process:  Goal directed  Thought Content:  Normal  Suicidal:  None  Homicidal:  None  Hallucinations:  None  Delusion:  None  Memory:  Intact  Orientation:  Person, Place, Time and Situation  Reliability:  good  Insight:  Good  Judgement:  Good  Impulse Control:  Good  Physical/Medical Issues:  No     PHQ-9 Depression Screening  Little interest or pleasure in doing things?     Feeling down, depressed, or hopeless? 2-->more than half the days   Trouble falling or staying asleep, or sleeping too much? 2-->more than half the days   Feeling tired or having little energy? 3-->nearly every day   Poor appetite or overeating? 2-->more than half the days   Feeling bad about yourself - or that you are a failure or have let yourself or your family down? 2-->more than half the days   Trouble concentrating on things, such as reading the newspaper or watching television? 3-->nearly every day   Moving or speaking so slowly that  other people could have noticed? Or the opposite - being so fidgety or restless that you have been moving around a lot more than usual? 1-->several days   Thoughts that you would be better off dead, or of hurting yourself in some way? 0-->not at all   PHQ-9 Total Score 15   If you checked off any problems, how difficult have these problems made it for you to do your work, take care of things at home, or get along with other people? extremely difficult        Assessment/Plan:  Diagnoses and all orders for this visit:    1. Major depressive disorder, recurrent episode, moderate (HCC) (Primary)  -     Cariprazine HCl (Vraylar) 1.5 MG capsule capsule; Take 1 capsule by mouth Daily.  Dispense: 28 capsule; Refill: 0    2. Psychophysiological insomnia  -     traZODone (DESYREL) 100 MG tablet; Take 1 tablet by mouth Every Night.  Dispense: 90 tablet; Refill: 1    3. MANISHA (generalized anxiety disorder)  -     busPIRone (BUSPAR) 30 MG tablet; Take 1 tablet by mouth 2 (Two) Times a Day.  Dispense: 180 tablet; Refill: 2      Patient feeling some better since moved out of the mold infested house.  We will reevaluate in a month to make sure her mood continues to be stable.    A psychological evaluation was conducted in order to assess past and current level of functioning. Areas assessed included, but were not limited to: perception of social support, perception of ability to face and deal with challenges in life (positive functioning), anxiety symptoms, depressive symptoms, perspective on beliefs/belief system, coping skills for stress, intelligence level,  Therapeutic rapport was established. Interventions conducted today were geared towards incorporating medication management along with support for continued therapy. Education was also provided as to the med management with this provider and what to expect in subsequent sessions.    We discussed risks, benefits,goals and side effects of the above medication and the patient was  agreeable with the plan.Patient was educated on the importance of compliance with treatment and follow-up appointments. Patient is aware to contact the [facility] with any worsening of symptoms. To call for questions or concerns and return early if necessary. Patent is agreeable to go to the Emergency Department or call 911 should they begin SI/HI.     Treatment Plan:   Discussed risks, benefits, and alternatives of medication. Encouraged healthy habits (eating, exercise and sleep). Call if any questions or problems arise. Medication reconciled. Controlled substance monitoring report reviewed. Provided psychoeducation.. Discussed coping strategies and current stressors. Set appropriate boundaries and limits for patient's well-being. Use distraction techniques to improve symptoms. Access support networks.      Return in about 4 weeks (around 4/18/2022) for Follow Up 15 min.    Freya Padilla, JENNIE

## 2022-04-06 DIAGNOSIS — F51.04 PSYCHOPHYSIOLOGICAL INSOMNIA: ICD-10-CM

## 2022-04-06 RX ORDER — HYDROCHLOROTHIAZIDE 25 MG/1
TABLET ORAL
Qty: 30 TABLET | Refills: 0 | Status: SHIPPED | OUTPATIENT
Start: 2022-04-06 | End: 2022-04-29

## 2022-04-06 RX ORDER — LORATADINE 10 MG/1
10 TABLET ORAL DAILY
Qty: 90 TABLET | Refills: 3 | Status: SHIPPED | OUTPATIENT
Start: 2022-04-06 | End: 2023-03-23

## 2022-04-06 RX ORDER — TRAZODONE HYDROCHLORIDE 100 MG/1
100 TABLET ORAL NIGHTLY
Qty: 90 TABLET | Refills: 1 | Status: SHIPPED | OUTPATIENT
Start: 2022-04-06 | End: 2022-10-19

## 2022-04-06 RX ORDER — CYCLOBENZAPRINE HCL 10 MG
TABLET ORAL
Qty: 90 TABLET | Refills: 3 | Status: SHIPPED | OUTPATIENT
Start: 2022-04-06 | End: 2022-07-26 | Stop reason: SDUPTHER

## 2022-04-06 NOTE — TELEPHONE ENCOUNTER
Caller: Zhane Burnham BOGDAN    Relationship: Self    Best call back number: [phone]    Requested Prescriptions:   Requested Prescriptions     Pending Prescriptions Disp Refills   • hydroCHLOROthiazide (HYDRODIURIL) 25 MG tablet [Pharmacy Med Name: hydrochlorothiazide 25 mg tablet] 30 tablet 0     Sig: TAKE ONE TABLET BY MOUTH EVERY DAY   • loratadine (CLARITIN) 10 MG tablet 90 tablet 3     Sig: Take 1 tablet by mouth Daily.   • cyclobenzaprine (FLEXERIL) 10 MG tablet 90 tablet 3     Sig: Take 1/2-1 tablet three times daily as needed for muscle spasms.   • traZODone (DESYREL) 100 MG tablet 90 tablet 1     Sig: Take 1 tablet by mouth Every Night.        Pharmacy where request should be sent: [facility] [address] [phone] [facility] [phone] FX     Additional details provided by patient: PATIENT STATED THAT SHE HAS LESS THAN 3 DAYS LEFT     Does the patient have less than a 3 day supply:  [x] Yes  [] No    Emanuel Pugh Rep   04/06/22 09:47 EDT

## 2022-04-06 NOTE — TELEPHONE ENCOUNTER
Rx Refill Note  Requested Prescriptions     Pending Prescriptions Disp Refills   • hydroCHLOROthiazide (HYDRODIURIL) 25 MG tablet [Pharmacy Med Name: hydrochlorothiazide 25 mg tablet] 30 tablet 0     Sig: TAKE ONE TABLET BY MOUTH EVERY DAY   • loratadine (CLARITIN) 10 MG tablet 90 tablet 3     Sig: Take 1 tablet by mouth Daily.   • cyclobenzaprine (FLEXERIL) 10 MG tablet 90 tablet 3     Sig: Take 1/2-1 tablet three times daily as needed for muscle spasms.   • traZODone (DESYREL) 100 MG tablet 90 tablet 1     Sig: Take 1 tablet by mouth Every Night.      Last office visit with prescribing clinician: Visit date not found      Next office visit with prescribing clinician: Visit date not found            Shira Bradford LPN  04/06/22, 10:44 EDT

## 2022-04-18 ENCOUNTER — OFFICE VISIT (OUTPATIENT)
Dept: BEHAVIORAL HEALTH | Facility: CLINIC | Age: 57
End: 2022-04-18

## 2022-04-18 VITALS — BODY MASS INDEX: 46.65 KG/M2 | HEIGHT: 65 IN | WEIGHT: 280 LBS

## 2022-04-18 DIAGNOSIS — M79.7 FIBROMYALGIA: ICD-10-CM

## 2022-04-18 DIAGNOSIS — F33.1 MAJOR DEPRESSIVE DISORDER, RECURRENT EPISODE, MODERATE: Primary | ICD-10-CM

## 2022-04-18 PROCEDURE — 90834 PSYTX W PT 45 MINUTES: CPT | Performed by: COUNSELOR
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
## 2022-04-26 NOTE — TELEPHONE ENCOUNTER
Rx Refill Note  Requested Prescriptions     Pending Prescriptions Disp Refills   • hydroCHLOROthiazide (HYDRODIURIL) 25 MG tablet [Pharmacy Med Name: hydrochlorothiazide 25 mg tablet] 30 tablet 0     Sig: Take 1 tablet by mouth Daily.      Last office visit with prescribing clinician: Visit date not found      Next office visit with prescribing clinician: Visit date not found            Shira Bradford LPN  03/14/22, 11:09 EDT  
Coronavirus/COVID19

## 2022-04-27 ENCOUNTER — OFFICE VISIT (OUTPATIENT)
Dept: BEHAVIORAL HEALTH | Facility: CLINIC | Age: 57
End: 2022-04-27

## 2022-04-27 VITALS
SYSTOLIC BLOOD PRESSURE: 124 MMHG | WEIGHT: 276 LBS | BODY MASS INDEX: 45.98 KG/M2 | DIASTOLIC BLOOD PRESSURE: 80 MMHG | HEIGHT: 65 IN

## 2022-04-27 DIAGNOSIS — F43.9 TRAUMA AND STRESSOR-RELATED DISORDER: ICD-10-CM

## 2022-04-27 DIAGNOSIS — F41.1 GAD (GENERALIZED ANXIETY DISORDER): ICD-10-CM

## 2022-04-27 DIAGNOSIS — F51.04 PSYCHOPHYSIOLOGICAL INSOMNIA: ICD-10-CM

## 2022-04-27 DIAGNOSIS — F33.1 MAJOR DEPRESSIVE DISORDER, RECURRENT EPISODE, MODERATE: Primary | ICD-10-CM

## 2022-04-27 PROCEDURE — 99214 OFFICE O/P EST MOD 30 MIN: CPT | Performed by: NURSE PRACTITIONER

## 2022-04-27 RX ORDER — HYDROXYCHLOROQUINE SULFATE 200 MG/1
TABLET, FILM COATED ORAL
COMMUNITY
Start: 2022-04-21 | End: 2023-02-23

## 2022-04-27 RX ORDER — BUPROPION HYDROCHLORIDE 150 MG/1
150 TABLET ORAL EVERY MORNING
Qty: 30 TABLET | Refills: 3 | Status: SHIPPED | OUTPATIENT
Start: 2022-04-27 | End: 2022-09-21 | Stop reason: SINTOL

## 2022-04-27 NOTE — PROGRESS NOTES
Patient Name: Zhane Burnham  MRN: [id number]   :  1965     Chief Complaint:      ICD-10-CM ICD-9-CM   1. Major depressive disorder, recurrent episode, moderate (HCC)  F33.1 296.32   2. Psychophysiological insomnia  F51.04 307.42   3. MANISHA (generalized anxiety disorder)  F41.1 300.02   4. Trauma and stressor-related disorder  F43.9 309.81     308.9       History of Present Illness: Zhane Burnham is a 57 y.o. female is here today for medication management follow up.  Patient states that Vraylar has caused her to be forgetful and not to be able to finish sentences.  Having difficulty remembering things in general.  Did not help mood or anxiety either.    The following portions of the patient's history were reviewed and updated as appropriate: allergies, current medications, past family history, past medical history, past social history, past surgical history and problem list.    Review of Systems;;  Review of Systems   Constitutional: Negative for activity change, appetite change, fatigue, unexpected weight gain and unexpected weight loss.   Respiratory: Negative for shortness of breath and wheezing.    Gastrointestinal: Negative for constipation, diarrhea, nausea and vomiting.   Musculoskeletal: Negative for gait problem.   Skin: Negative for dry skin and rash.   Neurological: Negative for dizziness, speech difficulty, weakness, light-headedness, headache, memory problem and confusion.   Psychiatric/Behavioral: Positive for depressed mood and stress. Negative for agitation, behavioral problems, decreased concentration, dysphoric mood, hallucinations, self-injury, sleep disturbance, suicidal ideas and negative for hyperactivity. The patient is nervous/anxious.        Physical Exam;;  Physical Exam  Vitals and nursing note reviewed.   Constitutional:       General: She is not in acute distress.     Appearance: She is well-developed. She is not diaphoretic.   HENT:      Head: Normocephalic and atraumatic.  "  Eyes:      Conjunctiva/sclera: Conjunctivae normal.   Cardiovascular:      Rate and Rhythm: Normal rate.   Pulmonary:      Effort: Pulmonary effort is normal. No respiratory distress.   Musculoskeletal:         General: Normal range of motion.      Cervical back: Full passive range of motion without pain and normal range of motion.   Skin:     General: Skin is warm and dry.   Neurological:      Mental Status: She is alert and oriented to person, place, and time.   Psychiatric:         Mood and Affect: Mood is anxious and depressed. Affect is not labile, blunt, angry or inappropriate.         Speech: Speech is not rapid and pressured or tangential.         Behavior: Behavior normal. Behavior is not agitated, slowed, aggressive, withdrawn, hyperactive or combative. Behavior is cooperative.         Thought Content: Thought content normal. Thought content is not paranoid or delusional. Thought content does not include homicidal or suicidal ideation. Thought content does not include homicidal or suicidal plan.         Judgment: Judgment normal.       Blood pressure 124/80, height 165.1 cm (65\"), weight 125 kg (276 lb), not currently breastfeeding.  Body mass index is 45.93 kg/m².    Current Medications;;    Current Outpatient Medications:   •  busPIRone (BUSPAR) 30 MG tablet, Take 1 tablet by mouth 2 (Two) Times a Day., Disp: 180 tablet, Rfl: 2  •  cyclobenzaprine (FLEXERIL) 10 MG tablet, Take 1/2-1 tablet three times daily as needed for muscle spasms., Disp: 90 tablet, Rfl: 3  •  DULoxetine (Cymbalta) 60 MG capsule, Take 2 capsules PO daily. MUST HAVE APPOINTMENT FOR FURTHER REFILLS., Disp: 180 capsule, Rfl: 3  •  etodolac (LODINE) 400 MG tablet, Take 1/2-1 tablet by mouth every 8 hours as needed., Disp: 90 tablet, Rfl: 1  •  gabapentin (NEURONTIN) 600 MG tablet, Take 600 mg by mouth 4 (Four) Times a Day. 600mg every morning, 600mg with evening meal and 1200mg HS , Disp: , Rfl:   •  hydroCHLOROthiazide (HYDRODIURIL) " 25 MG tablet, TAKE ONE TABLET BY MOUTH EVERY DAY, Disp: 30 tablet, Rfl: 0  •  hydroxychloroquine (PLAQUENIL) 200 MG tablet, , Disp: , Rfl:   •  loratadine (CLARITIN) 10 MG tablet, Take 1 tablet by mouth Daily., Disp: 90 tablet, Rfl: 3  •  Multiple Vitamin (MULTI-VITAMIN PO), Take  by mouth., Disp: , Rfl:   •  oxyCODONE-acetaminophen (PERCOCET)  MG per tablet, Take 1 tablet by mouth Every 6 (Six) Hours As Needed for Moderate Pain ., Disp: , Rfl:   •  pantoprazole (PROTONIX) 40 MG EC tablet, Take 1 tablet by mouth Daily., Disp: 90 tablet, Rfl: 1  •  simvastatin (ZOCOR) 40 MG tablet, Take 1 tablet by mouth Every Night., Disp: 90 tablet, Rfl: 1  •  traZODone (DESYREL) 100 MG tablet, Take 1 tablet by mouth Every Night., Disp: 90 tablet, Rfl: 1  •  buPROPion XL (Wellbutrin XL) 150 MG 24 hr tablet, Take 1 tablet by mouth Every Morning., Disp: 30 tablet, Rfl: 3    Lab Results:   Office Visit on 02/21/2022   Component Date Value Ref Range Status   • SARS-CoV-2 VIVIAN 02/21/2022 Not Detected  Not Detected Final       Mental Status Exam:   Hygiene:   good  Cooperation:  Cooperative  Eye Contact:  Good  Psychomotor Behavior:  Appropriate  Mood:anxious and depressed  Affect:  Appropriate  Hopelessness: Denies  Speech:  Normal  Thought Process:  Goal directed  Thought Content:  Normal  Suicidal:  None  Homicidal:  None  Hallucinations:  None  Delusion:  None  Memory:  Intact  Orientation:  Person, Place, Time and Situation  Reliability:  good  Insight:  Good  Judgement:  Good  Impulse Control:  Good  Physical/Medical Issues:  No     PHQ-9 Depression Screening  Little interest or pleasure in doing things? 3-->nearly every day   Feeling down, depressed, or hopeless? 2-->more than half the days   Trouble falling or staying asleep, or sleeping too much? 1-->several days   Feeling tired or having little energy? 3-->nearly every day   Poor appetite or overeating? 2-->more than half the days   Feeling bad about yourself - or that you  are a failure or have let yourself or your family down? 2-->more than half the days   Trouble concentrating on things, such as reading the newspaper or watching television? 3-->nearly every day   Moving or speaking so slowly that other people could have noticed? Or the opposite - being so fidgety or restless that you have been moving around a lot more than usual? 0-->not at all   Thoughts that you would be better off dead, or of hurting yourself in some way? 0-->not at all   PHQ-9 Total Score 16   If you checked off any problems, how difficult have these problems made it for you to do your work, take care of things at home, or get along with other people? very difficult        Assessment/Plan:  Diagnoses and all orders for this visit:    1. Major depressive disorder, recurrent episode, moderate (HCC) (Primary)  -     buPROPion XL (Wellbutrin XL) 150 MG 24 hr tablet; Take 1 tablet by mouth Every Morning.  Dispense: 30 tablet; Refill: 3    2. Psychophysiological insomnia    3. MANISHA (generalized anxiety disorder)    4. Trauma and stressor-related disorder      We will add Wellbutrin  mg daily.  At next appointment we may increase to 300 mg.  Discontinue Vraylar.    A psychological evaluation was conducted in order to assess past and current level of functioning. Areas assessed included, but were not limited to: perception of social support, perception of ability to face and deal with challenges in life (positive functioning), anxiety symptoms, depressive symptoms, perspective on beliefs/belief system, coping skills for stress, intelligence level,  Therapeutic rapport was established. Interventions conducted today were geared towards incorporating medication management along with support for continued therapy. Education was also provided as to the med management with this provider and what to expect in subsequent sessions.    We discussed risks, benefits,goals and side effects of the above medication and the  patient was agreeable with the plan.Patient was educated on the importance of compliance with treatment and follow-up appointments. Patient is aware to contact the [facility] with any worsening of symptoms. To call for questions or concerns and return early if necessary. Patent is agreeable to go to the Emergency Department or call 911 should they begin SI/HI.     Treatment Plan:   Discussed risks, benefits, and alternatives of medication. Encouraged healthy habits (eating, exercise and sleep). Call if any questions or problems arise. Medication reconciled. Controlled substance monitoring report reviewed. Provided psychoeducation.. Discussed coping strategies and current stressors. Set appropriate boundaries and limits for patient's well-being. Use distraction techniques to improve symptoms. Access support networks.      Return in about 4 weeks (around 5/25/2022) for Follow Up 30 min.    Freya Padilla, APRN

## 2022-04-29 DIAGNOSIS — I10 PRIMARY HYPERTENSION: Primary | ICD-10-CM

## 2022-04-29 RX ORDER — HYDROCHLOROTHIAZIDE 25 MG/1
TABLET ORAL
Qty: 30 TABLET | Refills: 0 | Status: SHIPPED | OUTPATIENT
Start: 2022-04-29 | End: 2022-06-08 | Stop reason: SDUPTHER

## 2022-04-29 RX ORDER — HYDROCHLOROTHIAZIDE 25 MG/1
25 TABLET ORAL DAILY
Qty: 30 TABLET | Refills: 0 | OUTPATIENT
Start: 2022-04-29

## 2022-04-29 NOTE — TELEPHONE ENCOUNTER
Caller: Burnham Gurindera L    Relationship: Self    Best call back number: [phone]    Requested Prescriptions:   Requested Prescriptions     Pending Prescriptions Disp Refills   • hydroCHLOROthiazide (HYDRODIURIL) 25 MG tablet 30 tablet 0     Sig: Take 1 tablet by mouth Daily.        Pharmacy where request should be sent: Pending sale to [facility] [address] [phone] [facility] [phone] FX     Additional details provided by patient: SHE NEEDS THIS SENT IN TODAY. HER PHARMACY IS IN [place] AND SHE WILL BE IN [facility] TODAY.PLEASE CALL WHEN THIS IS SENT IN. YOU HAVE PERMISSION TO LEAVE A DETAILED VM IF THERE IS NO ANSWER    Does the patient have less than a 3 day supply:  [x] Yes  [] No    Daly Aguero, PCT   04/29/22 09:48 EDT

## 2022-04-29 NOTE — TELEPHONE ENCOUNTER
Rx Refill Note  Requested Prescriptions     Pending Prescriptions Disp Refills   • hydroCHLOROthiazide (HYDRODIURIL) 25 MG tablet [Pharmacy Med Name: hydrochlorothiazide 25 mg tablet] 30 tablet 0     Sig: TAKE ONE TABLET BY MOUTH EVERY DAY      Last office visit with prescribing clinician: 2/21/2022    Next office visit with prescribing clinician: Visit date not found            FAINA OLIVERA MA  04/29/22, 09:51 EDT

## 2022-05-26 DIAGNOSIS — E78.2 MIXED HYPERLIPIDEMIA: Primary | ICD-10-CM

## 2022-05-26 RX ORDER — SIMVASTATIN 40 MG
40 TABLET ORAL NIGHTLY
Qty: 90 TABLET | Refills: 1 | Status: SHIPPED | OUTPATIENT
Start: 2022-05-26 | End: 2022-11-14 | Stop reason: SDUPTHER

## 2022-05-26 NOTE — TELEPHONE ENCOUNTER
Caller: Zhane Burnham    Relationship: Self    Best call back number: [phone]    Requested Prescriptions:   Requested Prescriptions     Pending Prescriptions Disp Refills   • simvastatin (ZOCOR) 40 MG tablet 90 tablet 1     Sig: Take 1 tablet by mouth Every Night.        Pharmacy where request should be sent: [facility] [address] [phone] [facility] [phone] FX     Additional details provided by patient:   PATIENT ONLY HAS ONE PILL LEFT OF MEDICATION     Does the patient have less than a 3 day supply:  [x] Yes  [] No    Emanuel Coombs Rep   05/26/22 09:21 EDT

## 2022-05-26 NOTE — TELEPHONE ENCOUNTER
Rx Refill Note  Requested Prescriptions     Pending Prescriptions Disp Refills   • simvastatin (ZOCOR) 40 MG tablet 90 tablet 1     Sig: Take 1 tablet by mouth Every Night.      Last office visit with prescribing clinician: 2/21/2022      Next office visit with prescribing clinician: Visit date not found            FAINA OLIVERA MA  05/26/22, 09:50 EDT

## 2022-06-08 ENCOUNTER — TELEPHONE (OUTPATIENT)
Dept: INTERNAL MEDICINE | Facility: CLINIC | Age: 57
End: 2022-06-08

## 2022-06-08 DIAGNOSIS — I10 PRIMARY HYPERTENSION: ICD-10-CM

## 2022-06-08 RX ORDER — HYDROCHLOROTHIAZIDE 25 MG/1
25 TABLET ORAL DAILY
Qty: 90 TABLET | Refills: 1 | Status: SHIPPED | OUTPATIENT
Start: 2022-06-08 | End: 2022-11-14 | Stop reason: SDUPTHER

## 2022-06-08 NOTE — TELEPHONE ENCOUNTER
Incoming Refill Request      Medication requested (name and dose): HYDRODIURIL 25MG    Pharmacy where request should be sent: [facility]     Additional details provided by patient: PT HAS ONE PILL LEFT    Best call back number: [phone]    Does the patient have less than a 3 day supply:  [x] Yes  [] No    Emanuel Connors Rep  06/08/22, 13:15 EDT

## 2022-06-08 NOTE — TELEPHONE ENCOUNTER
Rx Refill Note  Requested Prescriptions     Pending Prescriptions Disp Refills   • hydroCHLOROthiazide (HYDRODIURIL) 25 MG tablet 90 tablet 1     Sig: Take 1 tablet by mouth Daily.      Last office visit with prescribing clinician: 2/21/2022      Next office visit with prescribing clinician: Visit date not found            Karena Jacob LPN  06/08/22, 13:26 EDT

## 2022-07-25 ENCOUNTER — OFFICE VISIT (OUTPATIENT)
Dept: BEHAVIORAL HEALTH | Facility: CLINIC | Age: 57
End: 2022-07-25

## 2022-07-25 VITALS — HEIGHT: 65 IN | BODY MASS INDEX: 46.15 KG/M2 | WEIGHT: 277 LBS

## 2022-07-25 DIAGNOSIS — F33.1 MAJOR DEPRESSIVE DISORDER, RECURRENT EPISODE, MODERATE: Primary | ICD-10-CM

## 2022-07-25 DIAGNOSIS — M79.7 FIBROMYALGIA: ICD-10-CM

## 2022-07-25 PROCEDURE — 90834 PSYTX W PT 45 MINUTES: CPT | Performed by: COUNSELOR
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
## 2022-07-26 DIAGNOSIS — M62.838 MUSCLE SPASM: Primary | ICD-10-CM

## 2022-07-26 RX ORDER — CYCLOBENZAPRINE HCL 10 MG
TABLET ORAL
Qty: 90 TABLET | Refills: 3 | Status: SHIPPED | OUTPATIENT
Start: 2022-07-26 | End: 2022-11-14 | Stop reason: SDUPTHER

## 2022-07-27 ENCOUNTER — OFFICE VISIT (OUTPATIENT)
Dept: BEHAVIORAL HEALTH | Facility: CLINIC | Age: 57
End: 2022-07-27

## 2022-07-27 VITALS — WEIGHT: 279 LBS | HEIGHT: 65 IN | BODY MASS INDEX: 46.48 KG/M2

## 2022-07-27 DIAGNOSIS — F41.1 GAD (GENERALIZED ANXIETY DISORDER): ICD-10-CM

## 2022-07-27 DIAGNOSIS — F33.1 MAJOR DEPRESSIVE DISORDER, RECURRENT EPISODE, MODERATE: Primary | ICD-10-CM

## 2022-07-27 DIAGNOSIS — F51.04 PSYCHOPHYSIOLOGICAL INSOMNIA: ICD-10-CM

## 2022-07-27 PROCEDURE — 99214 OFFICE O/P EST MOD 30 MIN: CPT

## 2022-07-27 RX ORDER — DESVENLAFAXINE 25 MG/1
25 TABLET, EXTENDED RELEASE ORAL DAILY
Qty: 30 TABLET | Refills: 1 | Status: SHIPPED | OUTPATIENT
Start: 2022-07-27 | End: 2022-09-15

## 2022-07-27 RX ORDER — DULOXETIN HYDROCHLORIDE 60 MG/1
CAPSULE, DELAYED RELEASE ORAL
Qty: 60 CAPSULE | Refills: 1 | Status: SHIPPED | OUTPATIENT
Start: 2022-07-27 | End: 2022-09-21 | Stop reason: SDUPTHER

## 2022-07-27 RX ORDER — SAW/PYGEUM/BETA/HERB/D3/B6/ZN 30 MG-25MG
10 CAPSULE ORAL NIGHTLY
Qty: 30 TABLET | Refills: 1 | Status: SHIPPED | OUTPATIENT
Start: 2022-07-27 | End: 2022-09-15

## 2022-07-27 NOTE — TELEPHONE ENCOUNTER
Rx Refill Note  Requested Prescriptions     Signed Prescriptions Disp Refills   • cyclobenzaprine (FLEXERIL) 10 MG tablet 90 tablet 3     Sig: Take 1/2-1 tablet three times daily as needed for muscle spasms.     Authorizing Provider: VERMEESCH, MARILYN K     Ordering User: DIRK CASTILLO      Last office visit with prescribing clinician: 2/21/2022      Next office visit with prescribing clinician: Visit date not found            Dirk Castillo MA  07/27/22, 11:37 EDT

## 2022-07-27 NOTE — PROGRESS NOTES
Follow Up Office Visit    Patient Name: Zhane Burnham  : 1965   MRN: [id number]   Care Team: Patient Care Team:  Vermeesch, Marilyn K, MD as PCP - General (Internal Medicine & Pediatrics)  Mag[facility] as Counselor (Behavioral Health)        Chief Complaint:    Chief Complaint   Patient presents with   • Anxiety   • Depression   • Med Management   • Sleeping Problem       History of Present Illness: Zhane Burnham is a 57 y.o. female who is here today for medication management. Patient states that she stopped taking her Wellbutrin due to increased hair loss. She is currently only taking the Cymbalta which helps some with her mood but mostly with her fibromyalgia. Patient states that she continues to be depressed most of the time. She does state that she is sleeping okay when she takes Trazodone with 2 Benadryl.     Subjective   Review of Systems:    Review of Systems   Psychiatric/Behavioral: Positive for sleep disturbance and depressed mood.   All other systems reviewed and are negative.      Current Medications:   Current Outpatient Medications   Medication Sig Dispense Refill   • cyclobenzaprine (FLEXERIL) 10 MG tablet Take 1/2-1 tablet three times daily as needed for muscle spasms. 90 tablet 3   • DULoxetine (Cymbalta) 60 MG capsule Take 2 capsules PO daily. 60 capsule 1   • etodolac (LODINE) 400 MG tablet Take 1/2-1 tablet by mouth every 8 hours as needed. 90 tablet 1   • gabapentin (NEURONTIN) 600 MG tablet Take 800 mg by mouth 2 (Two) Times a Day.     • hydroCHLOROthiazide (HYDRODIURIL) 25 MG tablet Take 1 tablet by mouth Daily. 90 tablet 1   • hydroxychloroquine (PLAQUENIL) 200 MG tablet      • loratadine (CLARITIN) 10 MG tablet Take 1 tablet by mouth Daily. 90 tablet 3   • Multiple Vitamin (MULTI-VITAMIN PO) Take  by mouth.     • oxyCODONE-acetaminophen (PERCOCET)  MG per tablet Take 1 tablet by mouth Every 6 (Six) Hours As Needed for Moderate Pain .     • pantoprazole (PROTONIX)  "40 MG EC tablet Take 1 tablet by mouth Daily. 90 tablet 1   • simvastatin (ZOCOR) 40 MG tablet Take 1 tablet by mouth Every Night. 90 tablet 1   • traZODone (DESYREL) 100 MG tablet Take 1 tablet by mouth Every Night. 90 tablet 1   • buPROPion XL (Wellbutrin XL) 150 MG 24 hr tablet Take 1 tablet by mouth Every Morning. 30 tablet 3   • busPIRone (BUSPAR) 30 MG tablet Take 1 tablet by mouth 2 (Two) Times a Day. 180 tablet 2   • Desvenlafaxine Succinate ER (Pristiq) 25 MG tablet sustained-release 24 hour Take 1 tablet by mouth Daily. 30 tablet 1   • Melatonin ER 10 MG tablet controlled-release Take 1 tablet by mouth Every Night. 30 tablet 1     No current facility-administered medications for this visit.       Mental Status Exam:   Hygiene:   good  Cooperation:  Cooperative  Eye Contact:  Good  Psychomotor Behavior:  Appropriate  Affect:  Appropriate  Mood: normal  Speech:  Normal  Thought Process:  Goal directed and Linear  Thought Content:  Normal  Suicidal:  None  Homicidal:  None  Hallucinations:  None  Delusion:  None  Memory:  Intact  Orientation:  Person, Place, Time and Situation  Reliability:  good  Insight:  Good  Judgement:  Good  Impulse Control:  Good  Physical/Medical Issues:  Yes see chart     Objective   Vital Signs:   Ht 165.1 cm (65\")   Wt 127 kg (279 lb)   BMI 46.43 kg/m²       Assessment / Plan    Diagnoses and all orders for this visit:    1. Major depressive disorder, recurrent episode, moderate (HCC) (Primary)  -     Desvenlafaxine Succinate ER (Pristiq) 25 MG tablet sustained-release 24 hour; Take 1 tablet by mouth Daily.  Dispense: 30 tablet; Refill: 1  -     DULoxetine (Cymbalta) 60 MG capsule; Take 2 capsules PO daily.  Dispense: 60 capsule; Refill: 1    2. Psychophysiological insomnia  -     Melatonin ER 10 MG tablet controlled-release; Take 1 tablet by mouth Every Night.  Dispense: 30 tablet; Refill: 1    3. MANISHA (generalized anxiety disorder)  -     Desvenlafaxine Succinate ER (Pristiq) 25 " MG tablet sustained-release 24 hour; Take 1 tablet by mouth Daily.  Dispense: 30 tablet; Refill: 1  -     DULoxetine (Cymbalta) 60 MG capsule; Take 2 capsules PO daily.  Dispense: 60 capsule; Refill: 1     Will add low dose of Pristiq 25mg daily. Continue Cymbalta as ordered. Might need to add mood stabilizer at follow up. Will add Melatonin ER to replace Benadryl at night.     A psychological evaluation was conducted in order to assess past and current level of functioning. Areas assessed included, but were not limited to: perception of social support, perception of ability to face and deal with challenges in life (positive functioning), anxiety symptoms, depressive symptoms, perspective on beliefs/belief system, coping skills for stress, intelligence level,  Therapeutic rapport was established. Interventions conducted today were geared towards incorporating medication management along with support for continued therapy. Education was also provided as to the med management with this provider and what to expect in subsequent sessions.      We discussed risks, benefits, goals and side effects of the above medication and the patient was agreeable with the plan. Patient was educated on the importance of compliance with treatment and follow-up appointments. Patient is aware to contact the [facility] with any worsening of symptoms. To call for questions or concerns and return early if necessary. Patent is agreeable to go to the Emergency Department or call 911 should they begin SI/HI.    PHQ-2/PHQ-9: Depression Screening  Little Interest or Pleasure in Doing Things: 3-->nearly every day  Feeling Down, Depressed or Hopeless: 3-->nearly every day  PHQ-2 Total Score: 6  Trouble Falling or Staying Asleep, or Sleeping Too Much: 3-->nearly every day  Feeling Tired or Having Little Energy: 3-->nearly every day  Poor Appetite or Overeating: 3-->nearly every day  Feeling Bad about Yourself - or that You are a Failure or Have  Let Yourself or Your Family Down: 2-->more than half the days  Trouble Concentrating on Things, Such as Reading the Newspaper or Watching Television: 2-->more than half the days  Moving or Speaking So Slowly that Other People Could Have Noticed? Or the Opposite - Being So Fidgety: 0-->not at all  Thoughts that You Would be Better Off Dead or of Hurting Yourself in Some Way: 0-->not at all  PHQ-9: Brief Depression Severity Measure Score: 19  If You Checked Off Any Problems, How Difficult Have These Problems Made It For You to Do Your Work, Take Care of Things at Home, or Get Along with Other People?: very difficult    PHQ-9 Score:   PHQ-9 Total Score: 19    Depression Screening:  Patient screened positive for depression based on a PHQ-9 score of 19 on 7/27/2022. Follow-up recommendations include: Suicide Risk Assessment performed.    Over the last two weeks, how often have you been bothered by the following problems?  Feeling nervous, anxious or on edge: More than half the days  Not being able to stop or control worrying: More than half the days  Worrying too much about different things: Nearly every day  Trouble Relaxing: Several days  Being so restless that it is hard to sit still: Not at all  Becoming easily annoyed or irritable: More than half the days  Feeling afraid as if something awful might happen: Not at all  MANISHA 7 Total Score: 10  If you checked any problems, how difficult have these problems made it for you to do your work, take care of things at home, or get along with other people: Very difficult            MEDS ORDERED DURING VISIT:  New Medications Ordered This Visit   Medications   • Desvenlafaxine Succinate ER (Pristiq) 25 MG tablet sustained-release 24 hour     Sig: Take 1 tablet by mouth Daily.     Dispense:  30 tablet     Refill:  1   • DULoxetine (Cymbalta) 60 MG capsule     Sig: Take 2 capsules PO daily.     Dispense:  60 capsule     Refill:  1   • Melatonin ER 10 MG tablet controlled-release      Sig: Take 1 tablet by mouth Every Night.     Dispense:  30 tablet     Refill:  1         Follow Up   Return in about 8 weeks (around 9/21/2022).  Patient was given instructions and counseling regarding her condition or for health maintenance advice. Please see specific information pulled into the AVS if appropriate.     TREATMENT PLAN/GOALS: Continue supportive psychotherapy efforts and medications as indicated. Treatment and medication options discussed during today's visit. Patient acknowledged and verbally consented to continue with current treatment plan and was educated on the importance of compliance with treatment and follow-up appointments.    MEDICATION ISSUES:  Discussed medication options and treatment plan of prescribed medication as well as the risks, benefits, and side effects including potential falls, possible impaired driving and metabolic adversities among others. Patient is agreeable to call the office with any worsening of symptoms or onset of side effects. Patient is agreeable to call 911 or go to the nearest ER should he/she begin having SI/HI.      JENNIE [facility]  [address] [place] [address] [address] [place] [place] [address] [phone]    July 27, 2022 09:47 EDT

## 2022-09-15 DIAGNOSIS — F51.04 PSYCHOPHYSIOLOGICAL INSOMNIA: ICD-10-CM

## 2022-09-15 DIAGNOSIS — F41.1 GAD (GENERALIZED ANXIETY DISORDER): ICD-10-CM

## 2022-09-15 DIAGNOSIS — F33.1 MAJOR DEPRESSIVE DISORDER, RECURRENT EPISODE, MODERATE: ICD-10-CM

## 2022-09-15 RX ORDER — SAW/PYGEUM/BETA/HERB/D3/B6/ZN 30 MG-25MG
CAPSULE ORAL
Qty: 30 TABLET | Refills: 1 | Status: SHIPPED | OUTPATIENT
Start: 2022-09-15 | End: 2022-09-21

## 2022-09-15 RX ORDER — DESVENLAFAXINE 25 MG/1
TABLET, EXTENDED RELEASE ORAL
Qty: 30 TABLET | Refills: 1 | Status: SHIPPED | OUTPATIENT
Start: 2022-09-15 | End: 2022-09-21 | Stop reason: DRUGHIGH

## 2022-09-15 NOTE — TELEPHONE ENCOUNTER
Rx Refill Note  Requested Prescriptions     Pending Prescriptions Disp Refills   • Desvenlafaxine Succinate ER 25 MG tablet sustained-release 24 hour [Pharmacy Med Name: desvenlafaxine succinate ER 25 mg tablet,extended release 24 hr] 30 tablet 1     Sig: TAKE ONE TABLET BY MOUTH DAILY   • Melatonin ER 10 MG tablet controlled-release [Pharmacy Med Name: melatonin ER 10 mg tablet,extended release,multiphase] 30 tablet 1     Sig: TAKE ONE TABLET BY MOUTH every night.      Last office visit with prescribing clinician: 7/27/2022      Next office visit with prescribing clinician: 9/21/2022          {TIP  Is Refill Pharmacy correct?:23}  Felipe Zhang MA  09/15/22, 16:20 EDT

## 2022-09-21 ENCOUNTER — OFFICE VISIT (OUTPATIENT)
Dept: BEHAVIORAL HEALTH | Facility: CLINIC | Age: 57
End: 2022-09-21

## 2022-09-21 VITALS — BODY MASS INDEX: 47.09 KG/M2 | HEIGHT: 65 IN | WEIGHT: 282.6 LBS

## 2022-09-21 DIAGNOSIS — F90.2 ATTENTION DEFICIT HYPERACTIVITY DISORDER, COMBINED TYPE: ICD-10-CM

## 2022-09-21 DIAGNOSIS — F33.1 MAJOR DEPRESSIVE DISORDER, RECURRENT EPISODE, MODERATE: Primary | ICD-10-CM

## 2022-09-21 DIAGNOSIS — F51.04 PSYCHOPHYSIOLOGICAL INSOMNIA: ICD-10-CM

## 2022-09-21 DIAGNOSIS — F41.1 GAD (GENERALIZED ANXIETY DISORDER): ICD-10-CM

## 2022-09-21 DIAGNOSIS — Z79.899 ENCOUNTER FOR LONG-TERM (CURRENT) USE OF MEDICATIONS: ICD-10-CM

## 2022-09-21 PROCEDURE — 99214 OFFICE O/P EST MOD 30 MIN: CPT

## 2022-09-21 RX ORDER — DEXTROAMPHETAMINE SACCHARATE, AMPHETAMINE ASPARTATE, DEXTROAMPHETAMINE SULFATE AND AMPHETAMINE SULFATE 2.5; 2.5; 2.5; 2.5 MG/1; MG/1; MG/1; MG/1
10 TABLET ORAL 2 TIMES DAILY
Qty: 60 TABLET | Refills: 0 | Status: SHIPPED | OUTPATIENT
Start: 2022-09-21 | End: 2022-10-19

## 2022-09-21 RX ORDER — PREGABALIN 150 MG/1
150 CAPSULE ORAL 2 TIMES DAILY
COMMUNITY

## 2022-09-21 RX ORDER — SAW/PYGEUM/BETA/HERB/D3/B6/ZN 30 MG-25MG
2 CAPSULE ORAL
Qty: 60 TABLET | Refills: 1 | Status: SHIPPED | OUTPATIENT
Start: 2022-09-21 | End: 2022-10-19

## 2022-09-21 RX ORDER — DIPHENHYDRAMINE HCL 25 MG
50 CAPSULE ORAL NIGHTLY PRN
COMMUNITY
End: 2023-02-23

## 2022-09-21 RX ORDER — DESVENLAFAXINE SUCCINATE 50 MG/1
50 TABLET, EXTENDED RELEASE ORAL DAILY
Qty: 30 TABLET | Refills: 1 | Status: SHIPPED | OUTPATIENT
Start: 2022-09-21 | End: 2022-10-19 | Stop reason: SDUPTHER

## 2022-09-21 RX ORDER — DULOXETIN HYDROCHLORIDE 60 MG/1
CAPSULE, DELAYED RELEASE ORAL
Qty: 60 CAPSULE | Refills: 1 | Status: SHIPPED | OUTPATIENT
Start: 2022-09-21 | End: 2022-10-19 | Stop reason: SDUPTHER

## 2022-09-21 NOTE — PROGRESS NOTES
Follow Up Office Visit    Patient Name: Zhane Burnham  : 1965   MRN: [id number]   Care Team: Patient Care Team:  Vermeesch, Marilyn K, MD as PCP - General (Internal Medicine & Pediatrics)  Magda Philip LPCC as Counselor (Behavioral Health)  Komal Aguiar APRN as Nurse Practitioner (Behavioral Health)        Chief Complaint:    Chief Complaint   Patient presents with   • Anxiety   • Depression   • Sleeping Problem   • Med Management       History of Present Illness: Zhane Burnham is a 57 y.o. female who is here today for a medication management follow up. Patient reports that she continues to have difficulty with sleep. She states her mood is still poor. Her hair continues to thin and fall out which she attributes a lot of that to her medication. Patient also endorses many different ADHD symptoms. She states she feels that she has always had ADHD but had not been diagnosed.She states she is easily distracted, has difficulty focusing on tasks, she procrastinates and is fidgety. She is often times misplacing things and endorses significant forgetfulness. Overall patient states she feels that she can't get motivate to get anything done. For example, she still has unpacked boxes from when she moved in March.     Subjective   Review of Systems:    Review of Systems   Psychiatric/Behavioral: Positive for decreased concentration, sleep disturbance, depressed mood and stress. The patient is nervous/anxious.    All other systems reviewed and are negative.      Current Medications:   Current Outpatient Medications   Medication Sig Dispense Refill   • cyclobenzaprine (FLEXERIL) 10 MG tablet Take 1/2-1 tablet three times daily as needed for muscle spasms. 90 tablet 3   • diphenhydrAMINE (BENADRYL) 25 mg capsule Take 50 mg by mouth At Night As Needed for Itching.     • DULoxetine (Cymbalta) 60 MG capsule Take 2 capsules PO daily. 60 capsule 1   • etodolac (LODINE) 400 MG tablet Take 1/2-1 tablet by  "mouth every 8 hours as needed. 90 tablet 1   • hydroCHLOROthiazide (HYDRODIURIL) 25 MG tablet Take 1 tablet by mouth Daily. 90 tablet 1   • hydroxychloroquine (PLAQUENIL) 200 MG tablet      • loratadine (CLARITIN) 10 MG tablet Take 1 tablet by mouth Daily. 90 tablet 3   • Melatonin ER 10 MG tablet controlled-release Take 2 tablets by mouth every night at bedtime. 60 tablet 1   • Multiple Vitamin (MULTI-VITAMIN PO) Take  by mouth.     • oxyCODONE-acetaminophen (PERCOCET)  MG per tablet Take 1 tablet by mouth Every 6 (Six) Hours As Needed for Moderate Pain .     • pantoprazole (PROTONIX) 40 MG EC tablet Take 1 tablet by mouth Daily. 90 tablet 1   • pregabalin (Lyrica) 150 MG capsule Take 150 mg by mouth 2 (Two) Times a Day.     • simvastatin (ZOCOR) 40 MG tablet Take 1 tablet by mouth Every Night. 90 tablet 1   • traZODone (DESYREL) 100 MG tablet Take 1 tablet by mouth Every Night. 90 tablet 1   • amphetamine-dextroamphetamine (Adderall) 10 MG tablet Take 1 tablet by mouth 2 (Two) Times a Day. 60 tablet 0   • desvenlafaxine (Pristiq) 50 MG 24 hr tablet Take 1 tablet by mouth Daily. 30 tablet 1   • gabapentin (NEURONTIN) 600 MG tablet Take 800 mg by mouth 2 (Two) Times a Day.       No current facility-administered medications for this visit.       Mental Status Exam:   Hygiene:   good  Cooperation:  Cooperative  Eye Contact:  Good  Psychomotor Behavior:  Appropriate  Affect:  Appropriate  Mood: normal, depressed, anxious and fluctates  Speech:  Normal  Thought Process:  Goal directed and Linear  Thought Content:  Mood congruent  Suicidal:  None  Homicidal:  None  Hallucinations:  None  Delusion:  None  Memory:  Intact  Orientation:  Person, Place, Time and Situation  Reliability:  good  Insight:  Good  Judgement:  Good  Impulse Control:  Good  Physical/Medical Issues:  Yes see chart     Objective   Vital Signs:   Ht 165.1 cm (65\")   Wt 128 kg (282 lb 9.6 oz)   BMI 47.03 kg/m²       Assessment / Plan  "   Diagnoses and all orders for this visit:    1. Major depressive disorder, recurrent episode, moderate (HCC) (Primary)  -     desvenlafaxine (Pristiq) 50 MG 24 hr tablet; Take 1 tablet by mouth Daily.  Dispense: 30 tablet; Refill: 1  -     DULoxetine (Cymbalta) 60 MG capsule; Take 2 capsules PO daily.  Dispense: 60 capsule; Refill: 1    2. MANISHA (generalized anxiety disorder)  -     desvenlafaxine (Pristiq) 50 MG 24 hr tablet; Take 1 tablet by mouth Daily.  Dispense: 30 tablet; Refill: 1  -     DULoxetine (Cymbalta) 60 MG capsule; Take 2 capsules PO daily.  Dispense: 60 capsule; Refill: 1  -     amphetamine-dextroamphetamine (Adderall) 10 MG tablet; Take 1 tablet by mouth 2 (Two) Times a Day.  Dispense: 60 tablet; Refill: 0    3. Psychophysiological insomnia  -     Melatonin ER 10 MG tablet controlled-release; Take 2 tablets by mouth every night at bedtime.  Dispense: 60 tablet; Refill: 1    4. Attention deficit hyperactivity disorder, combined type  -     amphetamine-dextroamphetamine (Adderall) 10 MG tablet; Take 1 tablet by mouth 2 (Two) Times a Day.  Dispense: 60 tablet; Refill: 0    5. Encounter for long-term (current) use of medications  -     Compliance Drug Analysis, Ur - Urine, Clean Catch       Will increase Pristiq to 50 mg. Will add Adderall 10mg BID. Continue all other medication as ordered. Obtained urine drug screen and controlled substance agreement signed.     A psychological evaluation was conducted in order to assess past and current level of functioning. Areas assessed included, but were not limited to: perception of social support, perception of ability to face and deal with challenges in life (positive functioning), anxiety symptoms, depressive symptoms, perspective on beliefs/belief system, coping skills for stress, intelligence level,  Therapeutic rapport was established. Interventions conducted today were geared towards incorporating medication management along with support for continued  therapy. Education was also provided as to the med management with this provider and what to expect in subsequent sessions.      We discussed risks, benefits, goals and side effects of the above medication and the patient was agreeable with the plan. Patient was educated on the importance of compliance with treatment and follow-up appointments. Patient is aware to contact the [facility] with any worsening of symptoms. To call for questions or concerns and return early if necessary. Patent is agreeable to go to the Emergency Department or call 911 should they begin SI/HI.    PHQ-2/PHQ-9: Depression Screening  Little Interest or Pleasure in Doing Things: 3-->nearly every day  PHQ-2 Total Score: 3  Trouble Falling or Staying Asleep, or Sleeping Too Much: 3-->nearly every day  Feeling Tired or Having Little Energy: 3-->nearly every day  Poor Appetite or Overeating: 3-->nearly every day  Feeling Bad about Yourself - or that You are a Failure or Have Let Yourself or Your Family Down: 2-->more than half the days  Trouble Concentrating on Things, Such as Reading the Newspaper or Watching Television: 3-->nearly every day  Moving or Speaking So Slowly that Other People Could Have Noticed? Or the Opposite - Being So Fidgety: 2-->more than half the days  Thoughts that You Would be Better Off Dead or of Hurting Yourself in Some Way: 0-->not at all  PHQ-9: Brief Depression Severity Measure Score: 19  If You Checked Off Any Problems, How Difficult Have These Problems Made It For You to Do Your Work, Take Care of Things at Home, or Get Along with Other People?: extremely difficult      PHQ-9 Score:   PHQ-9 Total Score: 19    Depression Screening:  Patient screened positive for depression based on a PHQ-9 score of 19 on 9/21/2022. Follow-up recommendations include: Prescribed antidepressant medication treatment and Suicide Risk Assessment performed.    Over the last two weeks, how often have you been bothered by the following  problems?  Feeling nervous, anxious or on edge: Nearly every day  Not being able to stop or control worrying: Nearly every day  Worrying too much about different things: Nearly every day  Trouble Relaxing: More than half the days  Being so restless that it is hard to sit still: More than half the days  Becoming easily annoyed or irritable: Not at all  Feeling afraid as if something awful might happen: Not at all  MANISHA 7 Total Score: 13  If you checked any problems, how difficult have these problems made it for you to do your work, take care of things at home, or get along with other people: Extremely difficult    Screening for Adults With ADHD - (1-6)  1. How often do you have trouble wrapping up the final details of a project, once the challenging parts have been done?: Very Often  2. How often do you have difficulty getting things in order when you have to do a task that requires organization?: Very Often  3. How often do you have problems remembering appointments or obligations : Sometimes  4. When you have a task that requires a lot of thought, how often do you avoid or delay getting started ?: Very Often  5. How often do you fidget or squirm with your hands or feet when you have to sit down for a long time?: Very Often  6. How often do you feel overly active and compelled to do things, like you were driven by a motor?: Never  7. How often do you make careless mistakes when you have to work on a boring or difficult project?: Rarely  8. How often do have difficulty keeping your attention when you are doing boring or repetitive work?: Often  9. How often do you have difficulty concentrating on what people say to you, even when they are speaking to you: Often  10.How often do you misplace or have difficulty finding things at home or at work?: Very Often  11.How often are you distracted by activity or noise around you?: Very Often  12.How often do you leave your seat in meetings or other situations in which you are  expected to remain seated?: Often  13.How often do you feel restless or fidgety?: Often  14.How often do you have difficulty unwinding and relaxing when you have time to yourself?: Sometimes  15.How often do you find yourself talking too much when you are in social situations?: Rarely  16.When you’re in a conversation, how often do you find yourself finishing the sentences of the people you are talking to, before they can finish them themselves?: Very Often  17.How often do you have difficulty waiting your turn in situations when turn taking is required?: Sometimes  18.How often do you interrupt others when they are busy?: Very Often          MEDS ORDERED DURING VISIT:  New Medications Ordered This Visit   Medications   • desvenlafaxine (Pristiq) 50 MG 24 hr tablet     Sig: Take 1 tablet by mouth Daily.     Dispense:  30 tablet     Refill:  1   • DULoxetine (Cymbalta) 60 MG capsule     Sig: Take 2 capsules PO daily.     Dispense:  60 capsule     Refill:  1   • Melatonin ER 10 MG tablet controlled-release     Sig: Take 2 tablets by mouth every night at bedtime.     Dispense:  60 tablet     Refill:  1     This prescription was filled on 8/23/2022. Any refills authorized will be placed on file.   • amphetamine-dextroamphetamine (Adderall) 10 MG tablet     Sig: Take 1 tablet by mouth 2 (Two) Times a Day.     Dispense:  60 tablet     Refill:  0         Follow Up   Return in about 4 weeks (around 10/19/2022).  Patient was given instructions and counseling regarding her condition or for health maintenance advice. Please see specific information pulled into the AVS if appropriate.     TREATMENT PLAN/GOALS: Continue supportive psychotherapy efforts and medications as indicated. Treatment and medication options discussed during today's visit. Patient acknowledged and verbally consented to continue with current treatment plan and was educated on the importance of compliance with treatment and follow-up  appointments.    MEDICATION ISSUES:  Discussed medication options and treatment plan of prescribed medication as well as the risks, benefits, and side effects including potential falls, possible impaired driving and metabolic adversities among others. Patient is agreeable to call the office with any worsening of symptoms or onset of side effects. Patient is agreeable to call 911 or go to the nearest ER should he/she begin having SI/HI.      JENNIE Chance [facility] [place] [id number] [phone]    September 21, 2022 13:51 EDT

## 2022-09-27 LAB — DRUGS UR: NORMAL

## 2022-10-19 ENCOUNTER — OFFICE VISIT (OUTPATIENT)
Dept: BEHAVIORAL HEALTH | Facility: CLINIC | Age: 57
End: 2022-10-19

## 2022-10-19 ENCOUNTER — TELEPHONE (OUTPATIENT)
Dept: FAMILY MEDICINE CLINIC | Facility: CLINIC | Age: 57
End: 2022-10-19

## 2022-10-19 VITALS — BODY MASS INDEX: 45.92 KG/M2 | WEIGHT: 275.6 LBS | HEIGHT: 65 IN

## 2022-10-19 DIAGNOSIS — F33.1 MAJOR DEPRESSIVE DISORDER, RECURRENT EPISODE, MODERATE: Primary | ICD-10-CM

## 2022-10-19 DIAGNOSIS — F51.04 PSYCHOPHYSIOLOGICAL INSOMNIA: ICD-10-CM

## 2022-10-19 DIAGNOSIS — F41.1 GAD (GENERALIZED ANXIETY DISORDER): ICD-10-CM

## 2022-10-19 DIAGNOSIS — F90.2 ATTENTION DEFICIT HYPERACTIVITY DISORDER, COMBINED TYPE: ICD-10-CM

## 2022-10-19 PROCEDURE — 99214 OFFICE O/P EST MOD 30 MIN: CPT

## 2022-10-19 RX ORDER — DULOXETIN HYDROCHLORIDE 60 MG/1
CAPSULE, DELAYED RELEASE ORAL
Qty: 60 CAPSULE | Refills: 0 | Status: SHIPPED | OUTPATIENT
Start: 2022-10-19 | End: 2022-11-28

## 2022-10-19 RX ORDER — TEMAZEPAM 7.5 MG/1
7.5 CAPSULE ORAL NIGHTLY PRN
Qty: 30 CAPSULE | Refills: 0 | Status: SHIPPED | OUTPATIENT
Start: 2022-10-19 | End: 2022-12-14

## 2022-10-19 RX ORDER — DESVENLAFAXINE SUCCINATE 50 MG/1
50 TABLET, EXTENDED RELEASE ORAL DAILY
Qty: 30 TABLET | Refills: 0 | Status: SHIPPED | OUTPATIENT
Start: 2022-10-19 | End: 2022-12-14 | Stop reason: SDUPTHER

## 2022-10-19 NOTE — PROGRESS NOTES
Follow Up Office Visit    Patient Name: Zhane Burnham  : 1965   MRN: [id number]   Care Team: Patient Care Team:  Vermeesch, Marilyn K, MD as PCP - General (Internal Medicine & Pediatrics)  Magda Philip LPCC as Counselor (Behavioral Health)  Komal Aguiar APRN as Nurse Practitioner (Behavioral Health)        Chief Complaint:    Chief Complaint   Patient presents with   • ADHD   • Anxiety   • Depression   • Sleeping Problem   • Med Management       History of Present Illness: Zhane Burnham is a 57 y.o. female who is here today for a medication management follow up. Patient states that she did not notice any difference with the Adderall at all. She tried taking two at once and still did not feel any different. She still cannot get anything done. Focus and task completion are poor. Motivation to get anything done is also poor. She still struggles with sleep. Her hair is still falling out and she is not sure what is causing it. She does state that she has an appt with her dermatologist scheduled.     Subjective   Review of Systems:    Review of Systems   Psychiatric/Behavioral: Positive for decreased concentration, sleep disturbance, depressed mood and stress. The patient is nervous/anxious.    All other systems reviewed and are negative.      Current Medications:   Current Outpatient Medications   Medication Sig Dispense Refill   • cyclobenzaprine (FLEXERIL) 10 MG tablet Take 1/2-1 tablet three times daily as needed for muscle spasms. 90 tablet 3   • diphenhydrAMINE (BENADRYL) 25 mg capsule Take 50 mg by mouth At Night As Needed for Itching.     • etodolac (LODINE) 400 MG tablet Take 1/2-1 tablet by mouth every 8 hours as needed. 90 tablet 1   • gabapentin (NEURONTIN) 600 MG tablet Take 800 mg by mouth 2 (Two) Times a Day.     • hydroCHLOROthiazide (HYDRODIURIL) 25 MG tablet Take 1 tablet by mouth Daily. 90 tablet 1   • hydroxychloroquine (PLAQUENIL) 200 MG tablet      • loratadine  "(CLARITIN) 10 MG tablet Take 1 tablet by mouth Daily. 90 tablet 3   • Multiple Vitamin (MULTI-VITAMIN PO) Take  by mouth.     • oxyCODONE-acetaminophen (PERCOCET)  MG per tablet Take 1 tablet by mouth Every 6 (Six) Hours As Needed for Moderate Pain .     • pantoprazole (PROTONIX) 40 MG EC tablet Take 1 tablet by mouth Daily. 90 tablet 1   • pregabalin (LYRICA) 150 MG capsule Take 150 mg by mouth 2 (Two) Times a Day.     • simvastatin (ZOCOR) 40 MG tablet Take 1 tablet by mouth Every Night. 90 tablet 1   • desvenlafaxine (Pristiq) 50 MG 24 hr tablet Take 1 tablet by mouth Daily. 30 tablet 0   • DULoxetine (Cymbalta) 60 MG capsule Take 2 capsules PO daily. 60 capsule 0   • lisdexamfetamine (Vyvanse) 30 MG capsule Take 1 capsule by mouth Every Morning 30 capsule 0   • temazepam (Restoril) 7.5 MG capsule Take 1 capsule by mouth At Night As Needed for Sleep. 30 capsule 0     No current facility-administered medications for this visit.       Mental Status Exam:   Hygiene:   good  Cooperation:  Cooperative  Eye Contact:  Good  Psychomotor Behavior:  Appropriate  Affect:  Appropriate  Mood: normal, sad, depressed, anxious and fluctates  Speech:  Normal  Thought Process:  Goal directed and Linear  Thought Content:  Normal and Mood congruent  Suicidal:  None  Homicidal:  None  Hallucinations:  None  Delusion:  None  Memory:  Intact  Orientation:  Person, Place, Time and Situation  Reliability:  good  Insight:  Good  Judgement:  Good  Impulse Control:  Good  Physical/Medical Issues:  Yes see chart     Objective   Vital Signs:   Ht 165.1 cm (65\")   Wt 125 kg (275 lb 9.6 oz)   BMI 45.86 kg/m²       Assessment / Plan    Diagnoses and all orders for this visit:    1. Major depressive disorder, recurrent episode, moderate (HCC) (Primary)  -     desvenlafaxine (Pristiq) 50 MG 24 hr tablet; Take 1 tablet by mouth Daily.  Dispense: 30 tablet; Refill: 0  -     DULoxetine (Cymbalta) 60 MG capsule; Take 2 capsules PO daily.  " Dispense: 60 capsule; Refill: 0    2. MANISHA (generalized anxiety disorder)  -     desvenlafaxine (Pristiq) 50 MG 24 hr tablet; Take 1 tablet by mouth Daily.  Dispense: 30 tablet; Refill: 0  -     DULoxetine (Cymbalta) 60 MG capsule; Take 2 capsules PO daily.  Dispense: 60 capsule; Refill: 0    3. Psychophysiological insomnia  -     temazepam (Restoril) 7.5 MG capsule; Take 1 capsule by mouth At Night As Needed for Sleep.  Dispense: 30 capsule; Refill: 0    4. Attention deficit hyperactivity disorder, combined type  -     lisdexamfetamine (Vyvanse) 30 MG capsule; Take 1 capsule by mouth Every Morning  Dispense: 30 capsule; Refill: 0       Will discontinue Adderall and switch to Vyvanse. Discontinue Trazodone and Melatonin and try Restoril. Continue all other medication as ordered.     A psychological evaluation was conducted in order to assess past and current level of functioning. Areas assessed included, but were not limited to: perception of social support, perception of ability to face and deal with challenges in life (positive functioning), anxiety symptoms, depressive symptoms, perspective on beliefs/belief system, coping skills for stress, intelligence level,  Therapeutic rapport was established. Interventions conducted today were geared towards incorporating medication management along with support for continued therapy. Education was also provided as to the med management with this provider and what to expect in subsequent sessions.      We discussed risks, benefits, goals and side effects of the above medication and the patient was agreeable with the plan. Patient was educated on the importance of compliance with treatment and follow-up appointments. Patient is aware to contact the [facility] with any worsening of symptoms. To call for questions or concerns and return early if necessary. Patent is agreeable to go to the Emergency Department or call 911 should they begin SI/HI.    PHQ-2/PHQ-9: Depression  Screening  Little Interest or Pleasure in Doing Things: 3-->nearly every day  Feeling Down, Depressed or Hopeless: 3-->nearly every day  PHQ-2 Total Score: 6  Trouble Falling or Staying Asleep, or Sleeping Too Much: 3-->nearly every day  Feeling Tired or Having Little Energy: 3-->nearly every day  Poor Appetite or Overeatin-->more than half the days  Feeling Bad about Yourself - or that You are a Failure or Have Let Yourself or Your Family Down: 2-->more than half the days  Trouble Concentrating on Things, Such as Reading the Newspaper or Watching Television: 3-->nearly every day  Moving or Speaking So Slowly that Other People Could Have Noticed? Or the Opposite - Being So Fidgety: 1-->several days  Thoughts that You Would be Better Off Dead or of Hurting Yourself in Some Way: 0-->not at all  PHQ-9: Brief Depression Severity Measure Score: 20    PHQ-9 Score:   PHQ-9 Total Score: 20    Depression Screening:  Patient screened positive for depression based on a PHQ-9 score of 20 on 10/19/2022. Follow-up recommendations include: Prescribed antidepressant medication treatment and Suicide Risk Assessment performed.    Over the last two weeks, how often have you been bothered by the following problems?  Feeling nervous, anxious or on edge: Nearly every day  Not being able to stop or control worrying: More than half the days  Worrying too much about different things: More than half the days  Trouble Relaxing: More than half the days  Being so restless that it is hard to sit still: Several days  Becoming easily annoyed or irritable: Not at all  Feeling afraid as if something awful might happen: Not at all  MANISHA 7 Total Score: 10  If you checked any problems, how difficult have these problems made it for you to do your work, take care of things at home, or get along with other people: Extremely difficult    Screening for Adults With ADHD - (1-6)  1. How often do you have trouble wrapping up the final details of a project,  once the challenging parts have been done?: Often  2. How often do you have difficulty getting things in order when you have to do a task that requires organization?: Often  3. How often do you have problems remembering appointments or obligations : Often  4. When you have a task that requires a lot of thought, how often do you avoid or delay getting started ?: Very Often  5. How often do you fidget or squirm with your hands or feet when you have to sit down for a long time?: Often  6. How often do you feel overly active and compelled to do things, like you were driven by a motor?: Never  7. How often do you make careless mistakes when you have to work on a boring or difficult project?: Sometimes  8. How often do have difficulty keeping your attention when you are doing boring or repetitive work?: Very Often  9. How often do you have difficulty concentrating on what people say to you, even when they are speaking to you: Often  10.How often do you misplace or have difficulty finding things at home or at work?: Often  11.How often are you distracted by activity or noise around you?: Often  12.How often do you leave your seat in meetings or other situations in which you are expected to remain seated?: Often  13.How often do you feel restless or fidgety?: Often  14.How often do you have difficulty unwinding and relaxing when you have time to yourself?: Sometimes  15.How often do you find yourself talking too much when you are in social situations?: Sometimes  16.When you’re in a conversation, how often do you find yourself finishing the sentences of the people you are talking to, before they can finish them themselves?: Often  17.How often do you have difficulty waiting your turn in situations when turn taking is required?: Rarely  18.How often do you interrupt others when they are busy?: Sometimes        MEDS ORDERED DURING VISIT:  New Medications Ordered This Visit   Medications   • lisdexamfetamine (Vyvanse) 30 MG  capsule     Sig: Take 1 capsule by mouth Every Morning     Dispense:  30 capsule     Refill:  0   • desvenlafaxine (Pristiq) 50 MG 24 hr tablet     Sig: Take 1 tablet by mouth Daily.     Dispense:  30 tablet     Refill:  0   • DULoxetine (Cymbalta) 60 MG capsule     Sig: Take 2 capsules PO daily.     Dispense:  60 capsule     Refill:  0   • temazepam (Restoril) 7.5 MG capsule     Sig: Take 1 capsule by mouth At Night As Needed for Sleep.     Dispense:  30 capsule     Refill:  0         Follow Up   Return in about 4 weeks (around 11/16/2022).  Patient was given instructions and counseling regarding her condition or for health maintenance advice. Please see specific information pulled into the AVS if appropriate.     TREATMENT PLAN/GOALS: Continue supportive psychotherapy efforts and medications as indicated. Treatment and medication options discussed during today's visit. Patient acknowledged and verbally consented to continue with current treatment plan and was educated on the importance of compliance with treatment and follow-up appointments.    MEDICATION ISSUES:  Discussed medication options and treatment plan of prescribed medication as well as the risks, benefits, and side effects including potential falls, possible impaired driving and metabolic adversities among others. Patient is agreeable to call the office with any worsening of symptoms or onset of side effects. Patient is agreeable to call 911 or go to the nearest ER should he/she begin having SI/HI.      [facility]ENNIE [facility] [place]  [address] [place] [id number]  [phone]    October 19, 2022 15:31 EDT

## 2022-10-20 ENCOUNTER — PRIOR AUTHORIZATION (OUTPATIENT)
Dept: BEHAVIORAL HEALTH | Facility: CLINIC | Age: 57
End: 2022-10-20

## 2022-10-21 ENCOUNTER — TELEPHONE (OUTPATIENT)
Dept: FAMILY MEDICINE CLINIC | Facility: CLINIC | Age: 57
End: 2022-10-21

## 2022-10-24 ENCOUNTER — PATIENT MESSAGE (OUTPATIENT)
Dept: BEHAVIORAL HEALTH | Facility: CLINIC | Age: 57
End: 2022-10-24

## 2022-10-25 ENCOUNTER — PRIOR AUTHORIZATION (OUTPATIENT)
Dept: INTERNAL MEDICINE | Facility: CLINIC | Age: 57
End: 2022-10-25

## 2022-11-07 DIAGNOSIS — F90.2 ATTENTION DEFICIT HYPERACTIVITY DISORDER, COMBINED TYPE: Primary | ICD-10-CM

## 2022-11-07 DIAGNOSIS — F51.04 PSYCHOPHYSIOLOGICAL INSOMNIA: ICD-10-CM

## 2022-11-07 RX ORDER — SUVOREXANT 10 MG/1
10 TABLET, FILM COATED ORAL NIGHTLY
Qty: 30 TABLET | Refills: 1 | Status: SHIPPED | OUTPATIENT
Start: 2022-11-07 | End: 2022-12-14 | Stop reason: SDUPTHER

## 2022-11-07 RX ORDER — METHYLPHENIDATE HYDROCHLORIDE 10 MG/1
10 TABLET ORAL 2 TIMES DAILY
Qty: 60 TABLET | Refills: 0 | Status: SHIPPED | OUTPATIENT
Start: 2022-11-07 | End: 2022-12-14 | Stop reason: DRUGHIGH

## 2022-11-14 DIAGNOSIS — I10 PRIMARY HYPERTENSION: ICD-10-CM

## 2022-11-14 DIAGNOSIS — M62.838 MUSCLE SPASM: ICD-10-CM

## 2022-11-14 DIAGNOSIS — F41.1 GAD (GENERALIZED ANXIETY DISORDER): ICD-10-CM

## 2022-11-14 DIAGNOSIS — F33.1 MAJOR DEPRESSIVE DISORDER, RECURRENT EPISODE, MODERATE: ICD-10-CM

## 2022-11-14 DIAGNOSIS — E78.2 MIXED HYPERLIPIDEMIA: ICD-10-CM

## 2022-11-14 RX ORDER — SIMVASTATIN 40 MG
40 TABLET ORAL NIGHTLY
Qty: 90 TABLET | Refills: 1 | Status: SHIPPED | OUTPATIENT
Start: 2022-11-14 | End: 2023-03-02

## 2022-11-14 RX ORDER — PANTOPRAZOLE SODIUM 40 MG/1
40 TABLET, DELAYED RELEASE ORAL DAILY
Qty: 90 TABLET | Refills: 1 | Status: SHIPPED | OUTPATIENT
Start: 2022-11-14

## 2022-11-14 RX ORDER — CYCLOBENZAPRINE HCL 10 MG
TABLET ORAL
Qty: 90 TABLET | Refills: 1 | Status: SHIPPED | OUTPATIENT
Start: 2022-11-14 | End: 2023-02-22 | Stop reason: SDUPTHER

## 2022-11-14 RX ORDER — HYDROCHLOROTHIAZIDE 25 MG/1
25 TABLET ORAL DAILY
Qty: 90 TABLET | Refills: 1 | Status: SHIPPED | OUTPATIENT
Start: 2022-11-14

## 2022-11-14 RX ORDER — DESVENLAFAXINE 25 MG/1
TABLET, EXTENDED RELEASE ORAL
Qty: 30 TABLET | Refills: 1 | OUTPATIENT
Start: 2022-11-14

## 2022-11-26 DIAGNOSIS — F33.1 MAJOR DEPRESSIVE DISORDER, RECURRENT EPISODE, MODERATE: ICD-10-CM

## 2022-11-26 DIAGNOSIS — F41.1 GAD (GENERALIZED ANXIETY DISORDER): ICD-10-CM

## 2022-11-28 RX ORDER — DULOXETIN HYDROCHLORIDE 60 MG/1
CAPSULE, DELAYED RELEASE ORAL
Qty: 60 CAPSULE | Refills: 1 | Status: SHIPPED | OUTPATIENT
Start: 2022-11-28 | End: 2022-12-14 | Stop reason: SDUPTHER

## 2022-12-14 ENCOUNTER — TELEMEDICINE (OUTPATIENT)
Dept: BEHAVIORAL HEALTH | Facility: CLINIC | Age: 57
End: 2022-12-14

## 2022-12-14 DIAGNOSIS — F41.1 GAD (GENERALIZED ANXIETY DISORDER): ICD-10-CM

## 2022-12-14 DIAGNOSIS — F90.2 ATTENTION DEFICIT HYPERACTIVITY DISORDER, COMBINED TYPE: Primary | ICD-10-CM

## 2022-12-14 DIAGNOSIS — F51.04 PSYCHOPHYSIOLOGICAL INSOMNIA: ICD-10-CM

## 2022-12-14 DIAGNOSIS — F33.1 MAJOR DEPRESSIVE DISORDER, RECURRENT EPISODE, MODERATE: ICD-10-CM

## 2022-12-14 PROCEDURE — 99214 OFFICE O/P EST MOD 30 MIN: CPT

## 2022-12-14 RX ORDER — DULOXETIN HYDROCHLORIDE 60 MG/1
CAPSULE, DELAYED RELEASE ORAL
Qty: 60 CAPSULE | Refills: 1 | Status: SHIPPED | OUTPATIENT
Start: 2022-12-14 | End: 2023-02-20 | Stop reason: SDUPTHER

## 2022-12-14 RX ORDER — METHYLPHENIDATE HYDROCHLORIDE 20 MG/1
20 TABLET ORAL 3 TIMES DAILY
Qty: 90 TABLET | Refills: 0 | Status: SHIPPED | OUTPATIENT
Start: 2022-12-14 | End: 2023-01-18 | Stop reason: SDUPTHER

## 2022-12-14 RX ORDER — SUVOREXANT 10 MG/1
10 TABLET, FILM COATED ORAL NIGHTLY
Qty: 30 TABLET | Refills: 1 | Status: SHIPPED | OUTPATIENT
Start: 2022-12-14 | End: 2023-02-20

## 2022-12-14 RX ORDER — DESVENLAFAXINE SUCCINATE 50 MG/1
50 TABLET, EXTENDED RELEASE ORAL DAILY
Qty: 30 TABLET | Refills: 1 | Status: SHIPPED | OUTPATIENT
Start: 2022-12-14 | End: 2023-02-02

## 2022-12-14 NOTE — PROGRESS NOTES
This provider is located at [facility], Behavioral Health ,[address], [address] in [place], [place],using a secure MyChart Video Visit through [employer]. Patient is being seen remotely via telehealth at their home address in [place], and stated they are in a secure environment for this session. The patient's condition being diagnosed/treated is appropriate for telemedicine. The provider identified herself as well as her credentials.   The patient, and/or patients guardian, consent to be seen remotely, and when consent is given they understand that the consent allows for patient identifiable information to be sent to a third party as needed.   They may refuse to be seen remotely at any time. The electronic data is encrypted and password protected, and the patient and/or guardian has been advised of the potential risks to privacy not withstanding such measures.    Video Visit    Patient Name: Zhane Burnham  : 1965   MRN: [id number]   Care Team: Patient Care Team:  Vermeesch, Marilyn K, MD as PCP - General (Internal Medicine & Pediatrics)  Magda Philip LPCC as Counselor (Behavioral Health)  Komal Aguiar APRN as Nurse Practitioner (Behavioral Health)        Chief Complaint:    Chief Complaint   Patient presents with   • ADHD   • Anxiety   • Depression   • Sleeping Problem   • Med Management       History of Present Illness: Zhane Burnham is a 57 y.o. female who presents via video visit for a medication management follow up. Patient states overall medication has been effective. She states sleep has improved. She states the Ritalin has been helpful but doesn't last long enough. She feels that the Pristiq continues to manage her mood and anxiety well.     Subjective   Review of Systems:    Review of Systems   Psychiatric/Behavioral: Positive for decreased concentration.   All other systems reviewed and are negative.      Current Medications:   Current Outpatient  Medications   Medication Sig Dispense Refill   • desvenlafaxine (Pristiq) 50 MG 24 hr tablet Take 1 tablet by mouth Daily. 30 tablet 1   • DULoxetine (CYMBALTA) 60 MG capsule TAKE TWO CAPSULES BY MOUTH EVERY DAY 60 capsule 1   • Suvorexant (Belsomra) 10 MG tablet Take 10 mg by mouth Every Night. 30 tablet 1   • cyclobenzaprine (FLEXERIL) 10 MG tablet Take 1/2-1 tablet three times daily as needed for muscle spasms. 90 tablet 1   • diphenhydrAMINE (BENADRYL) 25 mg capsule Take 50 mg by mouth At Night As Needed for Itching.     • etodolac (LODINE) 400 MG tablet Take 1/2-1 tablet by mouth every 8 hours as needed. 90 tablet 1   • gabapentin (NEURONTIN) 600 MG tablet Take 800 mg by mouth 2 (Two) Times a Day.     • hydroCHLOROthiazide (HYDRODIURIL) 25 MG tablet Take 1 tablet by mouth Daily. 90 tablet 1   • hydroxychloroquine (PLAQUENIL) 200 MG tablet      • loratadine (CLARITIN) 10 MG tablet Take 1 tablet by mouth Daily. 90 tablet 3   • methylphenidate (Ritalin) 20 MG tablet Take 1 tablet by mouth 3 (Three) Times a Day. 90 tablet 0   • Multiple Vitamin (MULTI-VITAMIN PO) Take  by mouth.     • oxyCODONE-acetaminophen (PERCOCET)  MG per tablet Take 1 tablet by mouth Every 6 (Six) Hours As Needed for Moderate Pain .     • pantoprazole (PROTONIX) 40 MG EC tablet Take 1 tablet by mouth Daily. 90 tablet 1   • pregabalin (LYRICA) 150 MG capsule Take 150 mg by mouth 2 (Two) Times a Day.     • simvastatin (ZOCOR) 40 MG tablet Take 1 tablet by mouth Every Night. 90 tablet 1     No current facility-administered medications for this visit.       Mental Status Exam:   Hygiene:   unable to assess  Cooperation:  Cooperative  Eye Contact:  Good  Psychomotor Behavior:  unable to assess  Affect:  Appropriate  Mood: normal  Speech:  Normal  Thought Process:  Goal directed and Linear  Thought Content:  Normal and Mood congruent  Suicidal:  None  Homicidal:  None  Hallucinations:  None  Delusion:  None  Memory:  Intact  Orientation:   Person, Place, Time and Situation  Reliability:  good  Insight:  Good  Judgement:  Good  Impulse Control:  Good  Physical/Medical Issues:  Yes see chart     Objective   Vital Signs:   There were no vitals taken for this visit.      Assessment / Plan    Diagnoses and all orders for this visit:    1. Attention deficit hyperactivity disorder, combined type (Primary)  -     methylphenidate (Ritalin) 20 MG tablet; Take 1 tablet by mouth 3 (Three) Times a Day.  Dispense: 90 tablet; Refill: 0    2. Major depressive disorder, recurrent episode, moderate (HCC)  -     desvenlafaxine (Pristiq) 50 MG 24 hr tablet; Take 1 tablet by mouth Daily.  Dispense: 30 tablet; Refill: 1  -     DULoxetine (CYMBALTA) 60 MG capsule; TAKE TWO CAPSULES BY MOUTH EVERY DAY  Dispense: 60 capsule; Refill: 1    3. MANISHA (generalized anxiety disorder)  -     desvenlafaxine (Pristiq) 50 MG 24 hr tablet; Take 1 tablet by mouth Daily.  Dispense: 30 tablet; Refill: 1  -     DULoxetine (CYMBALTA) 60 MG capsule; TAKE TWO CAPSULES BY MOUTH EVERY DAY  Dispense: 60 capsule; Refill: 1    4. Psychophysiological insomnia  -     Suvorexant (Belsomra) 10 MG tablet; Take 10 mg by mouth Every Night.  Dispense: 30 tablet; Refill: 1        Will increase Ritalin to three times a day. Continue all other medication as ordered.     A psychological evaluation was conducted in order to assess past and current level of functioning. Areas assessed included, but were not limited to: perception of social support, perception of ability to face and deal with challenges in life (positive functioning), anxiety symptoms, depressive symptoms, perspective on beliefs/belief system, coping skills for stress, intelligence level,  Therapeutic rapport was established. Interventions conducted today were geared towards incorporating medication management along with support for continued therapy. Education was also provided as to the med management with this provider and what to expect in  subsequent sessions.      We discussed risks, benefits, goals and side effects of the above medication and the patient was agreeable with the plan. Patient was educated on the importance of compliance with treatment and follow-up appointments. Patient is aware to contact the [facility] with any worsening of symptoms. To call for questions or concerns and return early if necessary. Patent is agreeable to go to the Emergency Department or call 911 should they begin SI/HI.      MEDS ORDERED DURING VISIT:  New Medications Ordered This Visit   Medications   • desvenlafaxine (Pristiq) 50 MG 24 hr tablet     Sig: Take 1 tablet by mouth Daily.     Dispense:  30 tablet     Refill:  1   • DULoxetine (CYMBALTA) 60 MG capsule     Sig: TAKE TWO CAPSULES BY MOUTH EVERY DAY     Dispense:  60 capsule     Refill:  1     This prescription was filled on 11/3/2022. Any refills authorized will be placed on file.   • Suvorexant (Belsomra) 10 MG tablet     Sig: Take 10 mg by mouth Every Night.     Dispense:  30 tablet     Refill:  1   • methylphenidate (Ritalin) 20 MG tablet     Sig: Take 1 tablet by mouth 3 (Three) Times a Day.     Dispense:  90 tablet     Refill:  0         Follow Up   Return in about 8 weeks (around 2/8/2023).  Patient was given instructions and counseling regarding her condition or for health maintenance advice. Please see specific information pulled into the AVS if appropriate.     TREATMENT PLAN/GOALS: Continue supportive psychotherapy efforts and medications as indicated. Treatment and medication options discussed during today's visit. Patient acknowledged and verbally consented to continue with current treatment plan and was educated on the importance of compliance with treatment and follow-up appointments.    MEDICATION ISSUES:  Discussed medication options and treatment plan of prescribed medication as well as the risks, benefits, and side effects including potential falls, possible impaired driving and  metabolic adversities among others. Patient is agreeable to call the office with any worsening of symptoms or onset of side effects. Patient is agreeable to call 911 or go to the nearest ER should he/she begin having SI/HI.    [facility] [address] [address] [place] [address]  [phone]    December 15, 2022 08:31 EST

## 2022-12-16 ENCOUNTER — TELEPHONE (OUTPATIENT)
Dept: BEHAVIORAL HEALTH | Facility: CLINIC | Age: 57
End: 2022-12-16

## 2022-12-16 NOTE — TELEPHONE ENCOUNTER
Patient called in regards in the change of her Ritalin. Thought she was taking 20 mg 2 three times daily and just wanted to verify if she is suppose to just take 1 three times a day that was sent in. She wants a call back just to verify everything is correct

## 2022-12-19 ENCOUNTER — TELEPHONE (OUTPATIENT)
Dept: FAMILY MEDICINE CLINIC | Facility: CLINIC | Age: 57
End: 2022-12-19

## 2022-12-23 DIAGNOSIS — F51.04 PSYCHOPHYSIOLOGICAL INSOMNIA: ICD-10-CM

## 2022-12-23 RX ORDER — SAW/PYGEUM/BETA/HERB/D3/B6/ZN 30 MG-25MG
CAPSULE ORAL
Qty: 30 TABLET | Refills: 1 | OUTPATIENT
Start: 2022-12-23

## 2023-01-18 DIAGNOSIS — F90.2 ATTENTION DEFICIT HYPERACTIVITY DISORDER, COMBINED TYPE: ICD-10-CM

## 2023-01-18 RX ORDER — METHYLPHENIDATE HYDROCHLORIDE 20 MG/1
TABLET ORAL
Qty: 90 TABLET | Refills: 0 | OUTPATIENT
Start: 2023-01-18

## 2023-01-18 RX ORDER — METHYLPHENIDATE HYDROCHLORIDE 20 MG/1
20 TABLET ORAL 3 TIMES DAILY
Qty: 90 TABLET | Refills: 0 | Status: SHIPPED | OUTPATIENT
Start: 2023-01-18 | End: 2023-02-20 | Stop reason: SDUPTHER

## 2023-01-18 NOTE — TELEPHONE ENCOUNTER
SHE THOUGHT PJ SAID 2 PILLS, 3X DAY. SHE ASKED FOR A CALL BACK TO CONFIRM THE DOSAGE IS CORRECT. PLEASE GIVE HER A CALL BACK.

## 2023-01-25 DIAGNOSIS — F33.1 MAJOR DEPRESSIVE DISORDER, RECURRENT EPISODE, MODERATE: ICD-10-CM

## 2023-01-25 DIAGNOSIS — F41.1 GAD (GENERALIZED ANXIETY DISORDER): ICD-10-CM

## 2023-01-25 RX ORDER — DULOXETIN HYDROCHLORIDE 60 MG/1
CAPSULE, DELAYED RELEASE ORAL
Qty: 60 CAPSULE | Refills: 1 | OUTPATIENT
Start: 2023-01-25

## 2023-02-02 DIAGNOSIS — F41.1 GAD (GENERALIZED ANXIETY DISORDER): ICD-10-CM

## 2023-02-02 DIAGNOSIS — F33.1 MAJOR DEPRESSIVE DISORDER, RECURRENT EPISODE, MODERATE: ICD-10-CM

## 2023-02-02 RX ORDER — DESVENLAFAXINE SUCCINATE 50 MG/1
TABLET, EXTENDED RELEASE ORAL
Qty: 30 TABLET | Refills: 1 | Status: SHIPPED | OUTPATIENT
Start: 2023-02-02 | End: 2023-02-20 | Stop reason: SDUPTHER

## 2023-02-10 DIAGNOSIS — F51.04 PSYCHOPHYSIOLOGICAL INSOMNIA: ICD-10-CM

## 2023-02-10 RX ORDER — SAW/PYGEUM/BETA/HERB/D3/B6/ZN 30 MG-25MG
CAPSULE ORAL
Qty: 60 TABLET | Refills: 1 | OUTPATIENT
Start: 2023-02-10

## 2023-02-20 ENCOUNTER — OFFICE VISIT (OUTPATIENT)
Dept: BEHAVIORAL HEALTH | Facility: CLINIC | Age: 58
End: 2023-02-20
Payer: MEDICARE

## 2023-02-20 DIAGNOSIS — F51.04 PSYCHOPHYSIOLOGICAL INSOMNIA: ICD-10-CM

## 2023-02-20 DIAGNOSIS — F90.2 ATTENTION DEFICIT HYPERACTIVITY DISORDER, COMBINED TYPE: Primary | ICD-10-CM

## 2023-02-20 DIAGNOSIS — F41.1 GAD (GENERALIZED ANXIETY DISORDER): ICD-10-CM

## 2023-02-20 DIAGNOSIS — F33.1 MAJOR DEPRESSIVE DISORDER, RECURRENT EPISODE, MODERATE: ICD-10-CM

## 2023-02-20 PROCEDURE — 99214 OFFICE O/P EST MOD 30 MIN: CPT

## 2023-02-20 RX ORDER — DULOXETIN HYDROCHLORIDE 60 MG/1
CAPSULE, DELAYED RELEASE ORAL
Qty: 60 CAPSULE | Refills: 1 | Status: SHIPPED | OUTPATIENT
Start: 2023-02-20

## 2023-02-20 RX ORDER — METHYLPHENIDATE HYDROCHLORIDE 20 MG/1
20 TABLET ORAL 3 TIMES DAILY
Qty: 90 TABLET | Refills: 0 | Status: SHIPPED | OUTPATIENT
Start: 2023-02-20 | End: 2024-02-20

## 2023-02-20 RX ORDER — SAW/PYGEUM/BETA/HERB/D3/B6/ZN 30 MG-25MG
2 CAPSULE ORAL
COMMUNITY
Start: 2023-01-18 | End: 2023-02-20

## 2023-02-20 RX ORDER — TRAZODONE HYDROCHLORIDE 100 MG/1
100 TABLET ORAL
COMMUNITY
Start: 2022-12-31 | End: 2023-02-20 | Stop reason: SDUPTHER

## 2023-02-20 RX ORDER — TRAZODONE HYDROCHLORIDE 100 MG/1
100 TABLET ORAL NIGHTLY
Qty: 30 TABLET | Refills: 1 | Status: SHIPPED | OUTPATIENT
Start: 2023-02-20 | End: 2023-03-27

## 2023-02-20 RX ORDER — DUTASTERIDE 0.5 MG/1
CAPSULE, LIQUID FILLED ORAL
COMMUNITY
Start: 2023-02-13

## 2023-02-20 RX ORDER — DESVENLAFAXINE SUCCINATE 50 MG/1
50 TABLET, EXTENDED RELEASE ORAL DAILY
Qty: 30 TABLET | Refills: 1 | Status: SHIPPED | OUTPATIENT
Start: 2023-02-20 | End: 2023-04-03

## 2023-02-20 NOTE — PROGRESS NOTES
Follow Up Office Visit    Patient Name: Zhane Burnham  : 1965   MRN: [id number]   Care Team: Patient Care Team:  Vermeesch, Marilyn K, MD as PCP - General (Internal Medicine & Pediatrics)  Magda Philip LPCC as Counselor (Behavioral Health)  Komal Aguiar APRN as Nurse Practitioner (Behavioral Health)        Chief Complaint:    Chief Complaint   Patient presents with   • ADHD   • Anxiety   • Depression   • Sleeping Problem   • Med Management       History of Present Illness: Zhane Burnham is a 57 y.o. female who is here today for a medication management follow up. Patient endorse a tremendous amount of situational stressors going on since our last visit. She has taken her niece in to live with her. Her niece has been through significant amounts of trauma and patient is working with her through all of that. Considering everything that has gone on, patient feels that her medication has helped make things managed. She does wonder about getting back into therapy to deal with everything she has taken on.     Subjective   Review of Systems:    Review of Systems   Psychiatric/Behavioral: Positive for depressed mood and stress. The patient is nervous/anxious.    All other systems reviewed and are negative.      Current Medications:   Current Outpatient Medications   Medication Sig Dispense Refill   • cyclobenzaprine (FLEXERIL) 10 MG tablet Take 1/2-1 tablet three times daily as needed for muscle spasms. 90 tablet 1   • desvenlafaxine (PRISTIQ) 50 MG 24 hr tablet Take 1 tablet by mouth Daily. 30 tablet 1   • diphenhydrAMINE (BENADRYL) 25 mg capsule Take 50 mg by mouth At Night As Needed for Itching.     • DULoxetine (CYMBALTA) 60 MG capsule TAKE TWO CAPSULES BY MOUTH EVERY DAY 60 capsule 1   • etodolac (LODINE) 400 MG tablet Take 1/2-1 tablet by mouth every 8 hours as needed. 90 tablet 1   • gabapentin (NEURONTIN) 600 MG tablet Take 800 mg by mouth 2 (Two) Times a Day.     • hydroCHLOROthiazide  (HYDRODIURIL) 25 MG tablet Take 1 tablet by mouth Daily. 90 tablet 1   • hydroxychloroquine (PLAQUENIL) 200 MG tablet      • loratadine (CLARITIN) 10 MG tablet Take 1 tablet by mouth Daily. 90 tablet 3   • methylphenidate (Ritalin) 20 MG tablet Take 1 tablet by mouth 3 (Three) Times a Day. 90 tablet 0   • Multiple Vitamin (MULTI-VITAMIN PO) Take  by mouth.     • oxyCODONE-acetaminophen (PERCOCET)  MG per tablet Take 1 tablet by mouth Every 6 (Six) Hours As Needed for Moderate Pain .     • pantoprazole (PROTONIX) 40 MG EC tablet Take 1 tablet by mouth Daily. 90 tablet 1   • pregabalin (LYRICA) 150 MG capsule Take 150 mg by mouth 2 (Two) Times a Day.     • simvastatin (ZOCOR) 40 MG tablet Take 1 tablet by mouth Every Night. 90 tablet 1   • traZODone (DESYREL) 100 MG tablet Take 1 tablet by mouth Every Night. 30 tablet 1   • dutasteride (AVODART) 0.5 MG capsule        No current facility-administered medications for this visit.       Mental Status Exam:   Hygiene:   good  Cooperation:  Cooperative  Eye Contact:  Good  Psychomotor Behavior:  Appropriate  Affect:  Appropriate  Mood: normal, anxious and stressed  Speech:  Normal  Thought Process:  Goal directed and Linear  Thought Content:  Normal and Mood congruent  Suicidal:  None  Homicidal:  None  Hallucinations:  None  Delusion:  None  Memory:  Intact  Orientation:  Person, Place, Time and Situation  Reliability:  good  Insight:  Good  Judgement:  Good  Impulse Control:  Good  Physical/Medical Issues:  Yes see chart     Objective   Vital Signs:   There were no vitals taken for this visit.      Assessment / Plan    Diagnoses and all orders for this visit:    1. Attention deficit hyperactivity disorder, combined type (Primary)  -     methylphenidate (Ritalin) 20 MG tablet; Take 1 tablet by mouth 3 (Three) Times a Day.  Dispense: 90 tablet; Refill: 0  -     Ambulatory Referral to Behavioral Health    2. Major depressive disorder, recurrent episode, moderate  (HCC)  -     desvenlafaxine (PRISTIQ) 50 MG 24 hr tablet; Take 1 tablet by mouth Daily.  Dispense: 30 tablet; Refill: 1  -     DULoxetine (CYMBALTA) 60 MG capsule; TAKE TWO CAPSULES BY MOUTH EVERY DAY  Dispense: 60 capsule; Refill: 1  -     Ambulatory Referral to Behavioral Health    3. MANISHA (generalized anxiety disorder)  -     desvenlafaxine (PRISTIQ) 50 MG 24 hr tablet; Take 1 tablet by mouth Daily.  Dispense: 30 tablet; Refill: 1  -     DULoxetine (CYMBALTA) 60 MG capsule; TAKE TWO CAPSULES BY MOUTH EVERY DAY  Dispense: 60 capsule; Refill: 1  -     Ambulatory Referral to Behavioral Health    4. Psychophysiological insomnia  -     traZODone (DESYREL) 100 MG tablet; Take 1 tablet by mouth Every Night.  Dispense: 30 tablet; Refill: 1  -     Ambulatory Referral to Behavioral Health       Considering situational stressors, patient appears to be doing well overall. No medication issues and no indications for change. Will continue medication as ordered.     A psychological evaluation was conducted in order to assess past and current level of functioning. Areas assessed included, but were not limited to: perception of social support, perception of ability to face and deal with challenges in life (positive functioning), anxiety symptoms, depressive symptoms, perspective on beliefs/belief system, coping skills for stress, intelligence level,  Therapeutic rapport was established. Interventions conducted today were geared towards incorporating medication management along with support for continued therapy. Education was also provided as to the med management with this provider and what to expect in subsequent sessions.      We discussed risks, benefits, goals and side effects of the above medication and the patient was agreeable with the plan. Patient was educated on the importance of compliance with treatment and follow-up appointments. Patient is aware to contact the [facility] with any worsening of symptoms. To call  for questions or concerns and return early if necessary. Patent is agreeable to go to the Emergency Department or call 911 should they begin SI/HI.      PHQ-2/PHQ-9: Depression Screening  Little Interest or Pleasure in Doing Things: 2-->more than half the days  Feeling Down, Depressed or Hopeless: 2-->more than half the days  PHQ-2 Total Score: 4  Trouble Falling or Staying Asleep, or Sleeping Too Much: 2-->more than half the days  Feeling Tired or Having Little Energy: 3-->nearly every day  Poor Appetite or Overeatin-->more than half the days  Feeling Bad about Yourself - or that You are a Failure or Have Let Yourself or Your Family Down: 2-->more than half the days  Trouble Concentrating on Things, Such as Reading the Newspaper or Watching Television: 3-->nearly every day  Moving or Speaking So Slowly that Other People Could Have Noticed? Or the Opposite - Being So Fidgety: 2-->more than half the days  Thoughts that You Would be Better Off Dead or of Hurting Yourself in Some Way: 0-->not at all  PHQ-9: Brief Depression Severity Measure Score: 18  If You Checked Off Any Problems, How Difficult Have These Problems Made It For You to Do Your Work, Take Care of Things at Home, or Get Along with Other People?: very difficult      PHQ-9 Score:   PHQ-9 Total Score: 18    Depression Screening:  Patient screened positive for depression based on a PHQ-9 score of 18 on 2023. Follow-up recommendations include: Prescribed antidepressant medication treatment and Suicide Risk Assessment performed.      Over the last two weeks, how often have you been bothered by the following problems?  Feeling nervous, anxious or on edge: Nearly every day  Not being able to stop or control worrying: Nearly every day  Worrying too much about different things: Nearly every day  Trouble Relaxing: More than half the days  Being so restless that it is hard to sit still: Several days  Becoming easily annoyed or irritable: Several  days  Feeling afraid as if something awful might happen: Several days  MANISHA 7 Total Score: 14  If you checked any problems, how difficult have these problems made it for you to do your work, take care of things at home, or get along with other people: Very difficult (ON GOING)                 MEDS ORDERED DURING VISIT:  New Medications Ordered This Visit   Medications   • desvenlafaxine (PRISTIQ) 50 MG 24 hr tablet     Sig: Take 1 tablet by mouth Daily.     Dispense:  30 tablet     Refill:  1     This prescription was filled on 1/10/2023. Any refills authorized will be placed on file.   • DULoxetine (CYMBALTA) 60 MG capsule     Sig: TAKE TWO CAPSULES BY MOUTH EVERY DAY     Dispense:  60 capsule     Refill:  1     This prescription was filled on 11/3/2022. Any refills authorized will be placed on file.   • methylphenidate (Ritalin) 20 MG tablet     Sig: Take 1 tablet by mouth 3 (Three) Times a Day.     Dispense:  90 tablet     Refill:  0   • traZODone (DESYREL) 100 MG tablet     Sig: Take 1 tablet by mouth Every Night.     Dispense:  30 tablet     Refill:  1         Follow Up   Return in about 8 weeks (around 4/17/2023).  Patient was given instructions and counseling regarding her condition or for health maintenance advice. Please see specific information pulled into the AVS if appropriate.     TREATMENT PLAN/GOALS: Continue supportive psychotherapy efforts and medications as indicated. Treatment and medication options discussed during today's visit. Patient acknowledged and verbally consented to continue with current treatment plan and was educated on the importance of compliance with treatment and follow-up appointments.    MEDICATION ISSUES:  Discussed medication options and treatment plan of prescribed medication as well as the risks, benefits, and side effects including potential falls, possible impaired driving and metabolic adversities among others. Patient is agreeable to call the office with any worsening of  symptoms or onset of side effects. Patient is agreeable to call 911 or go to the nearest ER should he/she begin having SI/HI.        [facility] [place]  [address] [place] [address]  [phone]    February 21, 2023 15:54 EST

## 2023-02-22 DIAGNOSIS — M62.838 MUSCLE SPASM: ICD-10-CM

## 2023-02-22 NOTE — TELEPHONE ENCOUNTER
Caller: Tej Gurindera L    Relationship: Self    Best call back number: [phone]    Requested Prescriptions:   Requested Prescriptions     Pending Prescriptions Disp Refills   • cyclobenzaprine (FLEXERIL) 10 MG tablet 90 tablet 1     Sig: Take 1/2-1 tablet three times daily as needed for muscle spasms.        Pharmacy where request should be sent: [facility] [address] [phone] [facility] [phone] FX     Additional details provided by patient: COMPLETELY OUT OF MEDICATION AND WOULD LIKE TO HAVE ASAP THANKS    Does the patient have less than a 3 day supply:  [x] Yes  [] No    Would you like a call back once the refill request has been completed: [x] Yes [] No    If the office needs to give you a call back, can they leave a voicemail: [x] Yes [] No    Emanuel Pugh Rep   02/22/23 16:57 EST

## 2023-02-23 ENCOUNTER — OFFICE VISIT (OUTPATIENT)
Dept: INTERNAL MEDICINE | Facility: CLINIC | Age: 58
End: 2023-02-23
Payer: MEDICARE

## 2023-02-23 VITALS
OXYGEN SATURATION: 94 % | SYSTOLIC BLOOD PRESSURE: 132 MMHG | TEMPERATURE: 97 F | HEART RATE: 104 BPM | WEIGHT: 270 LBS | BODY MASS INDEX: 44.98 KG/M2 | HEIGHT: 65 IN | DIASTOLIC BLOOD PRESSURE: 80 MMHG

## 2023-02-23 DIAGNOSIS — E78.2 MIXED HYPERLIPIDEMIA: ICD-10-CM

## 2023-02-23 DIAGNOSIS — F32.1 CURRENT MODERATE EPISODE OF MAJOR DEPRESSIVE DISORDER WITHOUT PRIOR EPISODE: ICD-10-CM

## 2023-02-23 DIAGNOSIS — Z11.59 NEED FOR HEPATITIS C SCREENING TEST: ICD-10-CM

## 2023-02-23 DIAGNOSIS — I10 PRIMARY HYPERTENSION: ICD-10-CM

## 2023-02-23 DIAGNOSIS — Z00.00 ENCOUNTER FOR MEDICARE ANNUAL WELLNESS EXAM: Primary | ICD-10-CM

## 2023-02-23 PROBLEM — J06.9 VIRAL UPPER RESPIRATORY TRACT INFECTION: Status: RESOLVED | Noted: 2022-02-21 | Resolved: 2023-02-23

## 2023-02-23 PROCEDURE — 96160 PT-FOCUSED HLTH RISK ASSMT: CPT | Performed by: INTERNAL MEDICINE

## 2023-02-23 PROCEDURE — 99396 PREV VISIT EST AGE 40-64: CPT | Performed by: INTERNAL MEDICINE

## 2023-02-23 PROCEDURE — 1159F MED LIST DOCD IN RCRD: CPT | Performed by: INTERNAL MEDICINE

## 2023-02-23 PROCEDURE — 1125F AMNT PAIN NOTED PAIN PRSNT: CPT | Performed by: INTERNAL MEDICINE

## 2023-02-23 PROCEDURE — G0439 PPPS, SUBSEQ VISIT: HCPCS | Performed by: INTERNAL MEDICINE

## 2023-02-23 PROCEDURE — 1170F FXNL STATUS ASSESSED: CPT | Performed by: INTERNAL MEDICINE

## 2023-02-23 RX ORDER — PHENOL 1.4 %
AEROSOL, SPRAY (ML) MUCOUS MEMBRANE
COMMUNITY
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
## 2023-02-24 RX ORDER — CYCLOBENZAPRINE HCL 10 MG
TABLET ORAL
Qty: 90 TABLET | Refills: 1 | Status: SHIPPED | OUTPATIENT
Start: 2023-02-24

## 2023-02-28 LAB
ALBUMIN SERPL-MCNC: 4.8 G/DL (ref 3.5–5.2)
ALBUMIN/GLOB SERPL: 1.8 G/DL
ALP SERPL-CCNC: 87 U/L (ref 39–117)
ALT SERPL-CCNC: 13 U/L (ref 1–33)
AST SERPL-CCNC: 15 U/L (ref 1–32)
BASOPHILS # BLD AUTO: 0.02 10*3/MM3 (ref 0–0.2)
BASOPHILS NFR BLD AUTO: 0.2 % (ref 0–1.5)
BILIRUB SERPL-MCNC: 0.5 MG/DL (ref 0–1.2)
BUN SERPL-MCNC: 14 MG/DL (ref 6–20)
BUN/CREAT SERPL: 12.5 (ref 7–25)
CALCIUM SERPL-MCNC: 10 MG/DL (ref 8.6–10.5)
CHLORIDE SERPL-SCNC: 99 MMOL/L (ref 98–107)
CHOLEST SERPL-MCNC: 236 MG/DL (ref 0–200)
CHOLEST/HDLC SERPL: 3.15 {RATIO}
CO2 SERPL-SCNC: 26.1 MMOL/L (ref 22–29)
CREAT SERPL-MCNC: 1.12 MG/DL (ref 0.57–1)
EGFRCR SERPLBLD CKD-EPI 2021: 58.2 ML/MIN/1.73
EOSINOPHIL # BLD AUTO: 0.01 10*3/MM3 (ref 0–0.4)
EOSINOPHIL NFR BLD AUTO: 0.1 % (ref 0.3–6.2)
ERYTHROCYTE [DISTWIDTH] IN BLOOD BY AUTOMATED COUNT: 13.7 % (ref 12.3–15.4)
GLOBULIN SER CALC-MCNC: 2.7 GM/DL
GLUCOSE SERPL-MCNC: 100 MG/DL (ref 65–99)
HCT VFR BLD AUTO: 42.4 % (ref 34–46.6)
HCV IGG SERPL QL IA: NON REACTIVE
HDLC SERPL-MCNC: 75 MG/DL (ref 40–60)
HGB BLD-MCNC: 14 G/DL (ref 12–15.9)
IMM GRANULOCYTES # BLD AUTO: 0.03 10*3/MM3 (ref 0–0.05)
IMM GRANULOCYTES NFR BLD AUTO: 0.3 % (ref 0–0.5)
LDLC SERPL CALC-MCNC: 140 MG/DL (ref 0–100)
LYMPHOCYTES # BLD AUTO: 1.44 10*3/MM3 (ref 0.7–3.1)
LYMPHOCYTES NFR BLD AUTO: 16.6 % (ref 19.6–45.3)
MCH RBC QN AUTO: 27.5 PG (ref 26.6–33)
MCHC RBC AUTO-ENTMCNC: 33 G/DL (ref 31.5–35.7)
MCV RBC AUTO: 83.3 FL (ref 79–97)
MONOCYTES # BLD AUTO: 0.56 10*3/MM3 (ref 0.1–0.9)
MONOCYTES NFR BLD AUTO: 6.5 % (ref 5–12)
NEUTROPHILS # BLD AUTO: 6.59 10*3/MM3 (ref 1.7–7)
NEUTROPHILS NFR BLD AUTO: 76.3 % (ref 42.7–76)
NRBC BLD AUTO-RTO: 0 /100 WBC (ref 0–0.2)
PLATELET # BLD AUTO: 310 10*3/MM3 (ref 140–450)
POTASSIUM SERPL-SCNC: 3.2 MMOL/L (ref 3.5–5.2)
PROT SERPL-MCNC: 7.5 G/DL (ref 6–8.5)
RBC # BLD AUTO: 5.09 10*6/MM3 (ref 3.77–5.28)
SODIUM SERPL-SCNC: 138 MMOL/L (ref 136–145)
TRIGL SERPL-MCNC: 120 MG/DL (ref 0–150)
VLDLC SERPL CALC-MCNC: 21 MG/DL (ref 5–40)
WBC # BLD AUTO: 8.65 10*3/MM3 (ref 3.4–10.8)

## 2023-02-28 RX ORDER — POTASSIUM CHLORIDE 750 MG/1
10 TABLET, FILM COATED, EXTENDED RELEASE ORAL DAILY
Qty: 30 TABLET | Refills: 5 | Status: SHIPPED | OUTPATIENT
Start: 2023-02-28

## 2023-02-28 NOTE — PROGRESS NOTES
Please contact patient with lab results.  Complete blood count reveals no evidence of anemia.  Her creatinine or kidney function is slightly elevated and abnormal most likely due to longstanding high blood pressure and mild dehydration from fasting.  It is important that she maintain good hydration with primarily water.  Cholesterol panel has increased compared to 1 year ago.  Please ask her if she is taking her simvastatin or Zocor every night.  It is important that she take this medication daily to reduce risk of heart disease.  If she is taking her Zocor every night then I would like to discontinue this medication and change her to Crestor 40 mg nightly number 90 tablets with 1 refill and I will sign this order.  Please let her know that potassium level was also slightly low due to use of hydrochlorothiazide and I have sent in potassium to her local pharmacy to take once daily every morning.  Encouraged her to do her best to eat a healthy diet and try to walk daily for exercise as tolerated.  Thank you

## 2023-03-01 NOTE — TELEPHONE ENCOUNTER
Rx Refill Note  Requested Prescriptions     Pending Prescriptions Disp Refills   • rosuvastatin (Crestor) 10 MG tablet 90 tablet 3     Sig: Take 4 tablets by mouth Daily.      Last office visit with prescribing clinician: 2/23/2023   Last telemedicine visit with prescribing clinician: Visit date not found   Next office visit with prescribing clinician: Visit date not found                         Would you like a call back once the refill request has been completed: [] Yes [] No    If the office needs to give you a call back, can they leave a voicemail: [] Yes [] No    Karena Jacob LPN  03/01/23, 16:23 EST

## 2023-03-02 DIAGNOSIS — E78.2 MIXED HYPERLIPIDEMIA: ICD-10-CM

## 2023-03-02 RX ORDER — ROSUVASTATIN CALCIUM 10 MG/1
40 TABLET, COATED ORAL DAILY
Qty: 90 TABLET | Refills: 1 | OUTPATIENT
Start: 2023-03-02

## 2023-03-02 RX ORDER — EZETIMIBE 10 MG/1
10 TABLET ORAL DAILY
Qty: 90 TABLET | Refills: 1 | Status: SHIPPED | OUTPATIENT
Start: 2023-03-02

## 2023-03-02 RX ORDER — SIMVASTATIN 40 MG
40 TABLET ORAL NIGHTLY
Qty: 90 TABLET | Refills: 1
Start: 2023-03-02

## 2023-03-14 ENCOUNTER — TELEPHONE (OUTPATIENT)
Dept: INTERNAL MEDICINE | Facility: CLINIC | Age: 58
End: 2023-03-14
Payer: MEDICARE

## 2023-03-14 ENCOUNTER — TELEPHONE (OUTPATIENT)
Dept: FAMILY MEDICINE CLINIC | Facility: CLINIC | Age: 58
End: 2023-03-14

## 2023-03-22 NOTE — TELEPHONE ENCOUNTER
Rx Refill Note  Requested Prescriptions     Pending Prescriptions Disp Refills   • loratadine (CLARITIN) 10 MG tablet [Pharmacy Med Name: loratadine 10 mg tablet] 90 tablet 3     Sig: TAKE ONE TABLET BY MOUTH EVERY DAY      Last office visit with prescribing clinician: 02/23/2023  Last telemedicine visit with prescribing clinician:  Next office visit with prescribing clinician: Visit date not found  Last labs 02/2023       Sumaya Castillo MA  03/22/23, 09:01 EDT

## 2023-03-23 RX ORDER — LORATADINE 10 MG/1
TABLET ORAL
Qty: 90 TABLET | Refills: 1 | Status: SHIPPED | OUTPATIENT
Start: 2023-03-23

## 2023-03-24 DIAGNOSIS — F51.04 PSYCHOPHYSIOLOGICAL INSOMNIA: ICD-10-CM

## 2023-03-24 NOTE — TELEPHONE ENCOUNTER
Rx Refill Note  Requested Prescriptions     Pending Prescriptions Disp Refills   • traZODone (DESYREL) 100 MG tablet [Pharmacy Med Name: trazodone 100 mg tablet] 90 tablet 1     Sig: TAKE ONE TABLET BY MOUTH EVERY NIGHT      Last office visit with prescribing clinician:02/20/2023  Last telemedicine visit with prescribing clinician:   Next office visit with prescribing clinician: 04/19/2023  \    Sumaya Castillo MA  03/24/23, 10:01 EDT

## 2023-03-27 RX ORDER — TRAZODONE HYDROCHLORIDE 100 MG/1
TABLET ORAL
Qty: 90 TABLET | Refills: 1 | Status: SHIPPED | OUTPATIENT
Start: 2023-03-27

## 2023-04-03 DIAGNOSIS — F33.1 MAJOR DEPRESSIVE DISORDER, RECURRENT EPISODE, MODERATE: ICD-10-CM

## 2023-04-03 DIAGNOSIS — F41.1 GAD (GENERALIZED ANXIETY DISORDER): ICD-10-CM

## 2023-04-03 RX ORDER — DESVENLAFAXINE SUCCINATE 50 MG/1
TABLET, EXTENDED RELEASE ORAL
Qty: 30 TABLET | Refills: 1 | Status: SHIPPED | OUTPATIENT
Start: 2023-04-03 | End: 2023-04-19 | Stop reason: SDUPTHER

## 2023-04-03 NOTE — TELEPHONE ENCOUNTER
Rx Refill Note  Requested Prescriptions     Pending Prescriptions Disp Refills   • desvenlafaxine (PRISTIQ) 50 MG 24 hr tablet [Pharmacy Med Name: desvenlafaxine succinate ER 50 mg tablet,extended release 24 hr] 30 tablet 1     Sig: TAKE ONE TABLET BY MOUTH EVERY DAY      Last office visit with prescribing clinician: 2/20/2023   Last telemedicine visit with prescribing clinician: 4/19/2023   Next office visit with prescribing clinician: 4/19/2023                         Would you like a call back once the refill request has been completed: [] Yes [] No    If the office needs to give you a call back, can they leave a voicemail: [] Yes [] No    Rosemary Agudelo MA  04/03/23, 10:30 EDT

## 2023-04-12 DIAGNOSIS — F41.1 GAD (GENERALIZED ANXIETY DISORDER): ICD-10-CM

## 2023-04-12 DIAGNOSIS — F33.1 MAJOR DEPRESSIVE DISORDER, RECURRENT EPISODE, MODERATE: ICD-10-CM

## 2023-04-12 RX ORDER — DULOXETIN HYDROCHLORIDE 60 MG/1
CAPSULE, DELAYED RELEASE ORAL
Qty: 60 CAPSULE | Refills: 1 | Status: SHIPPED | OUTPATIENT
Start: 2023-04-12 | End: 2023-04-19 | Stop reason: SDUPTHER

## 2023-04-12 NOTE — TELEPHONE ENCOUNTER
Rx Refill Note  Requested Prescriptions     Pending Prescriptions Disp Refills   • DULoxetine (CYMBALTA) 60 MG capsule [Pharmacy Med Name: duloxetine 60 mg capsule,delayed release] 60 capsule 1     Sig: TAKE TWO CAPSULES BY MOUTH EVERY DAY      Last office visit with prescribing clinician: 2/20/2023   Last telemedicine visit with prescribing clinician: 4/19/2023   Next office visit with prescribing clinician: 4/19/2023                         Would you like a call back once the refill request has been completed: [] Yes [] No    If the office needs to give you a call back, can they leave a voicemail: [] Yes [] No    Rosemary Agudelo MA  04/12/23, 10:07 EDT

## 2023-04-19 ENCOUNTER — OFFICE VISIT (OUTPATIENT)
Dept: BEHAVIORAL HEALTH | Facility: CLINIC | Age: 58
End: 2023-04-19
Payer: MEDICARE

## 2023-04-19 VITALS — BODY MASS INDEX: 42.99 KG/M2 | WEIGHT: 258 LBS | HEIGHT: 65 IN

## 2023-04-19 DIAGNOSIS — F41.1 GAD (GENERALIZED ANXIETY DISORDER): ICD-10-CM

## 2023-04-19 DIAGNOSIS — F90.2 ATTENTION DEFICIT HYPERACTIVITY DISORDER, COMBINED TYPE: ICD-10-CM

## 2023-04-19 DIAGNOSIS — F51.04 PSYCHOPHYSIOLOGICAL INSOMNIA: ICD-10-CM

## 2023-04-19 DIAGNOSIS — F33.1 MAJOR DEPRESSIVE DISORDER, RECURRENT EPISODE, MODERATE: Primary | ICD-10-CM

## 2023-04-19 PROCEDURE — 99214 OFFICE O/P EST MOD 30 MIN: CPT

## 2023-04-19 RX ORDER — TRAZODONE HYDROCHLORIDE 100 MG/1
100 TABLET ORAL
Qty: 90 TABLET | Refills: 0 | Status: SHIPPED | OUTPATIENT
Start: 2023-04-19

## 2023-04-19 RX ORDER — DESVENLAFAXINE SUCCINATE 50 MG/1
50 TABLET, EXTENDED RELEASE ORAL DAILY
Qty: 90 TABLET | Refills: 0 | Status: SHIPPED | OUTPATIENT
Start: 2023-04-19

## 2023-04-19 RX ORDER — DULOXETIN HYDROCHLORIDE 60 MG/1
120 CAPSULE, DELAYED RELEASE ORAL DAILY
Qty: 180 CAPSULE | Refills: 0 | Status: SHIPPED | OUTPATIENT
Start: 2023-04-19

## 2023-04-19 RX ORDER — METHYLPHENIDATE HYDROCHLORIDE 20 MG/1
20 TABLET ORAL 3 TIMES DAILY
Qty: 90 TABLET | Refills: 0 | Status: SHIPPED | OUTPATIENT
Start: 2023-04-19 | End: 2024-04-18

## 2023-04-19 NOTE — PROGRESS NOTES
Follow Up Office Visit    Patient Name: Zhane Burnham  : 1965   MRN: [id number]   Care Team: Patient Care Team:  Vermeesch, Marilyn K, MD as PCP - General (Internal Medicine & Pediatrics)  Magda Philip LPCC as Counselor (Behavioral Health)  Komal Aguiar APRN as Nurse Practitioner (Behavioral Health)        Chief Complaint:    Chief Complaint   Patient presents with   • ADHD   • Anxiety   • Depression   • Sleeping Problem   • Med Management       History of Present Illness: Zhane Burnham is a 57 y.o. female who is here today for a medication management follow up. Patient reports that medication continues to be effective. She endorses a lot of continue situational stressors with her niece whom she recently took in, but does feel that things are getting a little better and medication has helped make things manageable. Overall she feels that her mood is okay and her anxiety is managed well. She is starting therapy next week and is looking forward to that. She denies SI/HI today.     The following portion of the patient's history were reviewed and updated appropriately: allergies, current and past medications, family history, medical history and social history.  Subjective   Review of Systems:    Review of Systems   Psychiatric/Behavioral: Positive for sleep disturbance, depressed mood and stress. The patient is nervous/anxious.    All other systems reviewed and are negative.      Current Medications:   Current Outpatient Medications   Medication Sig Dispense Refill   • BIOTIN PO Take  by mouth.     • cyclobenzaprine (FLEXERIL) 10 MG tablet Take 1/2-1 tablet three times daily as needed for muscle spasms. 90 tablet 1   • desvenlafaxine (PRISTIQ) 50 MG 24 hr tablet Take 1 tablet by mouth Daily. 90 tablet 0   • DULoxetine (CYMBALTA) 60 MG capsule Take 2 capsules by mouth Daily. 180 capsule 0   • dutasteride (AVODART) 0.5 MG capsule      • etodolac (LODINE) 400 MG tablet Take 1/2-1 tablet by  "mouth every 8 hours as needed. 90 tablet 1   • ezetimibe (Zetia) 10 MG tablet Take 1 tablet by mouth Daily. 90 tablet 1   • hydroCHLOROthiazide (HYDRODIURIL) 25 MG tablet Take 1 tablet by mouth Daily. 90 tablet 1   • loratadine (CLARITIN) 10 MG tablet TAKE ONE TABLET BY MOUTH EVERY DAY 90 tablet 1   • Melatonin 10 MG tablet Take  by mouth.     • methylphenidate (Ritalin) 20 MG tablet Take 1 tablet by mouth 3 (Three) Times a Day. 90 tablet 0   • Multiple Vitamins-Minerals (WOMENS 50+ MULTI VITAMIN PO) Take  by mouth.     • oxyCODONE-acetaminophen (PERCOCET)  MG per tablet Take 1 tablet by mouth Every 6 (Six) Hours As Needed for Moderate Pain.     • pantoprazole (PROTONIX) 40 MG EC tablet Take 1 tablet by mouth Daily. 90 tablet 1   • potassium chloride 10 MEQ CR tablet Take 1 tablet by mouth Daily. 30 tablet 5   • pregabalin (LYRICA) 150 MG capsule Take 1 capsule by mouth 2 (Two) Times a Day.     • simvastatin (ZOCOR) 40 MG tablet Take 1 tablet by mouth Every Night. 90 tablet 1   • traZODone (DESYREL) 100 MG tablet Take 1 tablet by mouth every night at bedtime. 90 tablet 0     No current facility-administered medications for this visit.       Mental Status Exam:   Hygiene:   good  Cooperation:  Cooperative  Eye Contact:  Good  Psychomotor Behavior:  Appropriate  Affect:  Appropriate  Mood: sad, depressed, anxious and stressed  Speech:  Normal  Thought Process:  Goal directed and Linear  Thought Content:  Normal and Mood congruent  Suicidal:  None  Homicidal:  None  Hallucinations:  None  Delusion:  None  Memory:  Intact  Orientation:  Person, Place, Time and Situation  Reliability:  good  Insight:  Good  Judgement:  Good  Impulse Control:  Good  Physical/Medical Issues:  No      Objective   Vital Signs:   Ht 165.1 cm (65\")   Wt 117 kg (258 lb)   BMI 42.93 kg/m²       Assessment / Plan    Diagnoses and all orders for this visit:    1. Major depressive disorder, recurrent episode, moderate (Primary)  -     " desvenlafaxine (PRISTIQ) 50 MG 24 hr tablet; Take 1 tablet by mouth Daily.  Dispense: 90 tablet; Refill: 0  -     DULoxetine (CYMBALTA) 60 MG capsule; Take 2 capsules by mouth Daily.  Dispense: 180 capsule; Refill: 0    2. MANISHA (generalized anxiety disorder)  -     desvenlafaxine (PRISTIQ) 50 MG 24 hr tablet; Take 1 tablet by mouth Daily.  Dispense: 90 tablet; Refill: 0  -     DULoxetine (CYMBALTA) 60 MG capsule; Take 2 capsules by mouth Daily.  Dispense: 180 capsule; Refill: 0    3. Attention deficit hyperactivity disorder, combined type  -     methylphenidate (Ritalin) 20 MG tablet; Take 1 tablet by mouth 3 (Three) Times a Day.  Dispense: 90 tablet; Refill: 0    4. Psychophysiological insomnia  -     traZODone (DESYREL) 100 MG tablet; Take 1 tablet by mouth every night at bedtime.  Dispense: 90 tablet; Refill: 0       From a medication stand point, patient appears to be doing well with medication. No issues reported and no indication for change. Will continue medication as ordered.     A psychological evaluation was conducted in order to assess past and current level of functioning. Areas assessed included, but were not limited to: perception of social support, perception of ability to face and deal with challenges in life (positive functioning), anxiety symptoms, depressive symptoms, perspective on beliefs/belief system, coping skills for stress, intelligence level,  Therapeutic rapport was established. Interventions conducted today were geared towards incorporating medication management along with support for continued therapy. Education was also provided as to the med management with this provider and what to expect in subsequent sessions.      We discussed risks, benefits, goals and side effects of the above medication and the patient was agreeable with the plan. Patient was educated on the importance of compliance with treatment and follow-up appointments. Patient is aware to contact the [facility] with  any worsening of symptoms. To call for questions or concerns and return early if necessary. Patent is agreeable to go to the Emergency Department or call 911 should they begin SI/HI.      PHQ-2/PHQ-9: Depression Screening  Little Interest or Pleasure in Doing Things: 3-->nearly every day  Feeling Down, Depressed or Hopeless: 2-->more than half the days  PHQ-2 Total Score: 5  Trouble Falling or Staying Asleep, or Sleeping Too Much: 2-->more than half the days  Feeling Tired or Having Little Energy: 3-->nearly every day  Poor Appetite or Overeatin-->more than half the days  Feeling Bad about Yourself - or that You are a Failure or Have Let Yourself or Your Family Down: 2-->more than half the days  Trouble Concentrating on Things, Such as Reading the Newspaper or Watching Television: 3-->nearly every day  Moving or Speaking So Slowly that Other People Could Have Noticed? Or the Opposite - Being So Fidgety: 1-->several days  Thoughts that You Would be Better Off Dead or of Hurting Yourself in Some Way: 0-->not at all  PHQ-9: Brief Depression Severity Measure Score: 18  If You Checked Off Any Problems, How Difficult Have These Problems Made It For You to Do Your Work, Take Care of Things at Home, or Get Along with Other People?: extremely difficult      PHQ-9 Score:   PHQ-9 Total Score: 18    Depression Screening:  Patient screened positive for depression based on a PHQ-9 score of 18 on 2023. Follow-up recommendations include: Prescribed antidepressant medication treatment and Suicide Risk Assessment performed.      Over the last two weeks, how often have you been bothered by the following problems?  Feeling nervous, anxious or on edge: Nearly every day  Not being able to stop or control worrying: More than half the days  Worrying too much about different things: More than half the days  Trouble Relaxing: More than half the days  Being so restless that it is hard to sit still: More than half the days  Becoming  easily annoyed or irritable: More than half the days  Feeling afraid as if something awful might happen: More than half the days  MAINSHA 7 Total Score: 15  If you checked any problems, how difficult have these problems made it for you to do your work, take care of things at home, or get along with other people: Extremely difficult        Screening for Adults With ADHD - (1-6)  1. How often do you have trouble wrapping up the final details of a project, once the challenging parts have been done?: Very Often  2. How often do you have difficulty getting things in order when you have to do a task that requires organization?: Often  3. How often do you have problems remembering appointments or obligations : Often  4. When you have a task that requires a lot of thought, how often do you avoid or delay getting started ?: Often  5. How often do you fidget or squirm with your hands or feet when you have to sit down for a long time?: Often  6. How often do you feel overly active and compelled to do things, like you were driven by a motor?: Never  7. How often do you make careless mistakes when you have to work on a boring or difficult project?: Very Often  8. How often do have difficulty keeping your attention when you are doing boring or repetitive work?: Very Often  9. How often do you have difficulty concentrating on what people say to you, even when they are speaking to you: Very Often  10.How often do you misplace or have difficulty finding things at home or at work?: Often  11.How often are you distracted by activity or noise around you?: Very Often  12.How often do you leave your seat in meetings or other situations in which you are expected to remain seated?: Often  13.How often do you feel restless or fidgety?: Often  14.How often do you have difficulty unwinding and relaxing when you have time to yourself?: Often  15.How often do you find yourself talking too much when you are in social situations?: Very Often  16.When  you’re in a conversation, how often do you find yourself finishing the sentences of the people you are talking to, before they can finish them themselves?: Often  17.How often do you have difficulty waiting your turn in situations when turn taking is required?: Sometimes  18.How often do you interrupt others when they are busy?: Often        MEDS ORDERED DURING VISIT:  New Medications Ordered This Visit   Medications   • desvenlafaxine (PRISTIQ) 50 MG 24 hr tablet     Sig: Take 1 tablet by mouth Daily.     Dispense:  90 tablet     Refill:  0     This prescription was filled on 3/11/2023. Any refills authorized will be placed on file.   • DULoxetine (CYMBALTA) 60 MG capsule     Sig: Take 2 capsules by mouth Daily.     Dispense:  180 capsule     Refill:  0     This prescription was filled on 3/20/2023. Any refills authorized will be placed on file.   • methylphenidate (Ritalin) 20 MG tablet     Sig: Take 1 tablet by mouth 3 (Three) Times a Day.     Dispense:  90 tablet     Refill:  0   • traZODone (DESYREL) 100 MG tablet     Sig: Take 1 tablet by mouth every night at bedtime.     Dispense:  90 tablet     Refill:  0     This prescription was filled on 12/31/2022. Any refills authorized will be placed on file.         Follow Up   Return in about 3 months (around 7/19/2023).  Patient was given instructions and counseling regarding her condition or for health maintenance advice. Please see specific information pulled into the AVS if appropriate.     TREATMENT PLAN/GOALS: Continue supportive psychotherapy efforts and medications as indicated. Treatment and medication options discussed during today's visit. Patient acknowledged and verbally consented to continue with current treatment plan and was educated on the importance of compliance with treatment and follow-up appointments.    MEDICATION ISSUES:  Discussed medication options and treatment plan of prescribed medication as well as the risks, benefits, and side effects  including potential falls, possible impaired driving and metabolic adversities among others. Patient is agreeable to call the office with any worsening of symptoms or onset of side effects. Patient is agreeable to call 911 or go to the nearest ER should he/she begin having SI/HI.        [facility]ENN[facility] [address] [place] [address]  [phone]    April 19, 2023 14:33 EDT

## 2023-04-27 ENCOUNTER — OFFICE VISIT (OUTPATIENT)
Dept: PSYCHIATRY | Facility: CLINIC | Age: 58
End: 2023-04-27
Payer: MEDICARE

## 2023-04-27 DIAGNOSIS — F41.1 GENERALIZED ANXIETY DISORDER: ICD-10-CM

## 2023-04-27 DIAGNOSIS — F33.2 SEVERE EPISODE OF RECURRENT MAJOR DEPRESSIVE DISORDER, WITHOUT PSYCHOTIC FEATURES: Primary | ICD-10-CM
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
## 2023-04-28 DIAGNOSIS — F51.04 PSYCHOPHYSIOLOGICAL INSOMNIA: ICD-10-CM

## 2023-05-01 RX ORDER — TRAZODONE HYDROCHLORIDE 100 MG/1
TABLET ORAL
Qty: 30 TABLET | Refills: 1 | OUTPATIENT
Start: 2023-05-01

## 2023-05-01 NOTE — TELEPHONE ENCOUNTER
traZODone (DESYREL) 100 MG tablet was sent into pharmacy 4/19/2023 for a 90 day supply. To soon to refill.    Patient will need to speak with pharmacy.

## 2023-05-02 ENCOUNTER — TELEPHONE (OUTPATIENT)
Dept: BEHAVIORAL HEALTH | Facility: CLINIC | Age: 58
End: 2023-05-02
Payer: MEDICARE

## 2023-05-02 DIAGNOSIS — F51.04 PSYCHOPHYSIOLOGICAL INSOMNIA: ICD-10-CM

## 2023-05-02 DIAGNOSIS — F90.2 ATTENTION DEFICIT HYPERACTIVITY DISORDER, COMBINED TYPE: ICD-10-CM

## 2023-05-02 DIAGNOSIS — F33.1 MAJOR DEPRESSIVE DISORDER, RECURRENT EPISODE, MODERATE: ICD-10-CM

## 2023-05-02 DIAGNOSIS — F41.1 GAD (GENERALIZED ANXIETY DISORDER): ICD-10-CM

## 2023-05-02 NOTE — TELEPHONE ENCOUNTER
Incoming Refill Request      Medication requested (name and dose): RITALIN    Pharmacy where request should be sent: [facility]    Additional details provided by patient:     Best call back number: [phone]  Does the patient have less than a 3 day supply:  [] Yes  [] No    Emanuel Norton Rep  05/02/23, 14:31 EDT

## 2023-05-02 NOTE — TELEPHONE ENCOUNTER
"  Caller: Zhane Burnham \"Shemar\"    Relationship: Self    Best call back number: [phone]    Requested Prescriptions:   Requested Prescriptions     Pending Prescriptions Disp Refills   • pantoprazole (PROTONIX) 40 MG EC tablet 90 tablet 1     Sig: Take 1 tablet by mouth Daily.   • methylphenidate (Ritalin) 20 MG tablet 90 tablet 0     Sig: Take 1 tablet by mouth 3 (Three) Times a Day.   • DULoxetine (CYMBALTA) 60 MG capsule 180 capsule 0     Sig: Take 2 capsules by mouth Daily.   • desvenlafaxine (PRISTIQ) 50 MG 24 hr tablet 90 tablet 0     Sig: Take 1 tablet by mouth Daily.   • traZODone (DESYREL) 100 MG tablet 90 tablet 0     Sig: Take 1 tablet by mouth every night at bedtime.   • pregabalin (LYRICA) 150 MG capsule       Sig: Take 1 capsule by mouth 2 (Two) Times a Day.        Pharmacy where request should be sent: [address] [phone] [facility] [phone]      Last office visit with prescribing clinician: Visit date not found   Last telemedicine visit with prescribing clinician: 5/16/2023   Next office visit with prescribing clinician: 5/16/2023     Additional details provided by patient: PATIENT AT THE PHARMACY NOW. DOES NOT COME TO [place] OFTEN. ASKING TO BE TRANSFERRED TO HAVE MEDS FILLED NOW. PLEASE REFILL 90 DAY SUPPLY WITH REFILLS    Does the patient have less than a 3 day supply:  [x] Yes  [] No  Emanuel Kovacs Rep   05/02/23 14:24 EDT       "

## 2023-05-03 RX ORDER — DULOXETIN HYDROCHLORIDE 60 MG/1
120 CAPSULE, DELAYED RELEASE ORAL DAILY
Qty: 180 CAPSULE | Refills: 0 | OUTPATIENT
Start: 2023-05-03

## 2023-05-03 RX ORDER — METHYLPHENIDATE HYDROCHLORIDE 20 MG/1
20 TABLET ORAL 3 TIMES DAILY
Qty: 90 TABLET | Refills: 0 | OUTPATIENT
Start: 2023-05-03 | End: 2024-05-02

## 2023-05-03 RX ORDER — PANTOPRAZOLE SODIUM 40 MG/1
40 TABLET, DELAYED RELEASE ORAL DAILY
Qty: 90 TABLET | Refills: 1 | Status: SHIPPED | OUTPATIENT
Start: 2023-05-03

## 2023-05-03 RX ORDER — TRAZODONE HYDROCHLORIDE 100 MG/1
100 TABLET ORAL
Qty: 90 TABLET | Refills: 0 | OUTPATIENT
Start: 2023-05-03

## 2023-05-03 RX ORDER — PREGABALIN 150 MG/1
150 CAPSULE ORAL 2 TIMES DAILY
OUTPATIENT
Start: 2023-05-03

## 2023-05-03 RX ORDER — DESVENLAFAXINE SUCCINATE 50 MG/1
50 TABLET, EXTENDED RELEASE ORAL DAILY
Qty: 90 TABLET | Refills: 0 | OUTPATIENT
Start: 2023-05-03

## 2023-05-11 ENCOUNTER — OFFICE VISIT (OUTPATIENT)
Dept: FAMILY MEDICINE CLINIC | Facility: CLINIC | Age: 58
End: 2023-05-11
Payer: MEDICARE

## 2023-05-11 VITALS
WEIGHT: 263 LBS | HEART RATE: 96 BPM | RESPIRATION RATE: 16 BRPM | BODY MASS INDEX: 43.82 KG/M2 | DIASTOLIC BLOOD PRESSURE: 78 MMHG | TEMPERATURE: 97.3 F | SYSTOLIC BLOOD PRESSURE: 118 MMHG | OXYGEN SATURATION: 98 % | HEIGHT: 65 IN

## 2023-05-11 DIAGNOSIS — J01.41 ACUTE RECURRENT PANSINUSITIS: ICD-10-CM

## 2023-05-11 DIAGNOSIS — F90.2 ATTENTION DEFICIT HYPERACTIVITY DISORDER, COMBINED TYPE: ICD-10-CM

## 2023-05-11 DIAGNOSIS — M62.838 MUSCLE SPASM: ICD-10-CM

## 2023-05-11 DIAGNOSIS — J30.2 SEASONAL ALLERGIES: Primary | ICD-10-CM

## 2023-05-11 RX ORDER — CYCLOBENZAPRINE HCL 10 MG
TABLET ORAL
Qty: 90 TABLET | Refills: 1 | Status: SHIPPED | OUTPATIENT
Start: 2023-05-11

## 2023-05-11 RX ORDER — DOXYCYCLINE HYCLATE 100 MG/1
100 CAPSULE ORAL 2 TIMES DAILY
Qty: 14 CAPSULE | Refills: 0 | Status: SHIPPED | OUTPATIENT
Start: 2023-05-11 | End: 2023-05-18

## 2023-05-11 RX ORDER — METHYLPHENIDATE HYDROCHLORIDE 20 MG/1
20 TABLET ORAL 3 TIMES DAILY
Qty: 90 TABLET | Refills: 0 | Status: SHIPPED | OUTPATIENT
Start: 2023-05-11

## 2023-05-11 RX ORDER — LEVOCETIRIZINE DIHYDROCHLORIDE 5 MG/1
5 TABLET, FILM COATED ORAL EVERY EVENING
Qty: 30 TABLET | Refills: 2 | Status: SHIPPED | OUTPATIENT
Start: 2023-05-11

## 2023-05-11 NOTE — PROGRESS NOTES
"       New Patient History and Physical      Referring Physician: No ref. provider found    Chief Complaint:    Chief Complaint   Patient presents with   • Cough   • Sinusitis       History of Present Illness:   Zhane Burnham is a 58 y.o. female who presents today as a new patient.     Severe sinus pressure--started suddenly about 3 weeks ago  Was treated for strep throat, double ear infection, sinus infection  History of RA, fibromyalgia  Has been on augmentin--finished about 10 days ago  Takes claritin every day for years    Subjective     Review of Systems   Constitutional: Negative for fatigue and unexpected weight change.   HENT: Positive for congestion, postnasal drip, sinus pressure and sore throat.    Respiratory: Negative for cough and shortness of breath.    Cardiovascular: Negative for chest pain and palpitations.   Gastrointestinal: Negative for abdominal pain, constipation, diarrhea and nausea.   Neurological: Negative for dizziness and weakness.   Psychiatric/Behavioral: Negative for confusion and sleep disturbance.        The following portions of the patient's history were reviewed and updated as appropriate: allergies, current medications, past family history, past medical history, past social history, past surgical history and problem list.    Past Medical History:   Past Medical History:   Diagnosis Date   • Abnormal ECG    • Acid reflux    • Anemia    • Anxiety    • Asthma     reported \"childhood\"   • Body piercing     ears   • Breast cancer     Cancer of left breast, patient had a bilateral mastectomy   • Chronic bronchitis    • Colon polyp    • Constipation    • Depression    • Dysphagia     Reported difficulty swallowing pills   • Elevated cholesterol    • Fatigue    • Fibromyalgia    • Fractures     Bilateral arms and left ankle, multiple toes   • H/O cardiovascular stress test     Patient reported done apx. 2004 and that all was wnl's at that time   • Headache    • Hearing loss     " "Patient reported difficulty only when there is a lot of back ground noise. No use of hearing devices.    • Heart murmur    • History of bronchitis    • History of pneumonia    • History of transfusion     Reported no reaction to transfusion   • Hyperlipidemia    • Hypertension    • Lower extremity edema    • Migraines    • Osteoarthritis    • Osteoporosis    • Ovarian cyst    • Peptic ulceration    • PONV (postoperative nausea and vomiting)    • Restless leg    • Seasonal allergies    • Urinary tract infection    • Wears glasses        Past Surgical History:  Past Surgical History:   Procedure Laterality Date   • BREAST BIOPSY Left 12/27/2012   • BREAST RECONSTRUCTION Bilateral    • COLONOSCOPY     • COLONOSCOPY N/A 10/16/2019    Procedure: COLONOSCOPY;  Surgeon: Ck Mejia MD;  Location: [facility] ENDOSCOPY;  Service: Gastroenterology   • ENDOSCOPY     • EXTERNAL EAR SURGERY Left     Extraction of an extra lobe    • HYSTERECTOMY     • KNEE ARTHROSCOPY Left 06/2016    Patient reported \"bone tumor removed\"   • MASTECTOMY Bilateral 01/31/2013    bilat   • TONSILLECTOMY     • WISDOM TOOTH EXTRACTION         Family History: family history includes Arthritis in her mother; Breast cancer in her paternal aunt; Diabetes in her mother; Heart attack in her father and mother; Heart disease in her father and mother; Heart failure in her mother; Hyperlipidemia in her father and mother; Hypertension in her father and another family member; Mental illness in her father and mother; No Known Problems in her brother and sister; Obesity in her mother; Osteoporosis in her father and mother; Ovarian cancer in her paternal aunt; Stroke in her father and mother; Thyroid disease in her mother; Tuberculosis in her paternal grandmother.    Social History:  reports that she has never smoked. She has never been exposed to tobacco smoke. She has never used smokeless tobacco. She reports current alcohol use. She reports that she does not use " drugs.    Medications:    Current Outpatient Medications:   •  BIOTIN PO, Take  by mouth., Disp: , Rfl:   •  cyclobenzaprine (FLEXERIL) 10 MG tablet, Take 1/2-1 tablet three times daily as needed for muscle spasms., Disp: 90 tablet, Rfl: 1  •  desvenlafaxine (PRISTIQ) 50 MG 24 hr tablet, Take 1 tablet by mouth Daily., Disp: 90 tablet, Rfl: 0  •  DULoxetine (CYMBALTA) 60 MG capsule, Take 2 capsules by mouth Daily., Disp: 180 capsule, Rfl: 0  •  dutasteride (AVODART) 0.5 MG capsule, , Disp: , Rfl:   •  etodolac (LODINE) 400 MG tablet, Take 1/2-1 tablet by mouth every 8 hours as needed., Disp: 90 tablet, Rfl: 1  •  ezetimibe (Zetia) 10 MG tablet, Take 1 tablet by mouth Daily., Disp: 90 tablet, Rfl: 1  •  hydroCHLOROthiazide (HYDRODIURIL) 25 MG tablet, Take 1 tablet by mouth Daily., Disp: 90 tablet, Rfl: 1  •  Melatonin 10 MG tablet, Take  by mouth., Disp: , Rfl:   •  Multiple Vitamins-Minerals (WOMENS 50+ MULTI VITAMIN PO), Take  by mouth., Disp: , Rfl:   •  oxyCODONE-acetaminophen (PERCOCET)  MG per tablet, Take 1 tablet by mouth Every 6 (Six) Hours As Needed for Moderate Pain., Disp: , Rfl:   •  pantoprazole (PROTONIX) 40 MG EC tablet, Take 1 tablet by mouth Daily., Disp: 90 tablet, Rfl: 1  •  potassium chloride 10 MEQ CR tablet, Take 1 tablet by mouth Daily., Disp: 30 tablet, Rfl: 5  •  pregabalin (LYRICA) 150 MG capsule, Take 1 capsule by mouth 2 (Two) Times a Day., Disp: , Rfl:   •  simvastatin (ZOCOR) 40 MG tablet, Take 1 tablet by mouth Every Night., Disp: 90 tablet, Rfl: 1  •  traZODone (DESYREL) 100 MG tablet, Take 1 tablet by mouth every night at bedtime., Disp: 90 tablet, Rfl: 0  •  doxycycline (VIBRAMYCIN) 100 MG capsule, Take 1 capsule by mouth 2 (Two) Times a Day for 7 days., Disp: 14 capsule, Rfl: 0  •  levocetirizine (XYZAL) 5 MG tablet, Take 1 tablet by mouth Every Evening., Disp: 30 tablet, Rfl: 2  •  methylphenidate (Ritalin) 20 MG tablet, Take 1 tablet by mouth 3 (Three) Times a Day., Disp: 90  "tablet, Rfl: 0    Allergies:  Allergies   Allergen Reactions   • Celebrex [Celecoxib] Swelling   • Red Dye Anaphylaxis     Reported reaction started with rash/itching and progressed to swelling    • Shellfish-Derived Products Anaphylaxis   • Other Other (See Comments)     STOOL SOFTENERS - tingly feeling/does not feel right       Objective     Vital Signs:   /78   Pulse 96   Temp 97.3 °F (36.3 °C)   Resp 16   Ht 165.1 cm (65\")   Wt 119 kg (263 lb)   LMP  (LMP Unknown)   SpO2 98%   BMI 43.77 kg/m²      Class 3 Severe Obesity (BMI >=40). Obesity-related health conditions include the following: none. Obesity is unchanged. BMI is is above average; BMI management plan is completed. We discussed portion control and increasing exercise.       Physical Exam:  Physical Exam  Vitals and nursing note reviewed.   HENT:      Nose: Mucosal edema and congestion present.      Right Turbinates: Swollen.      Left Turbinates: Swollen.      Mouth/Throat:      Lips: Pink.      Comments: cobblestoning  Eyes:      General: Lids are normal.      Pupils: Pupils are equal, round, and reactive to light.   Cardiovascular:      Rate and Rhythm: Normal rate and regular rhythm.      Heart sounds: Normal heart sounds.   Pulmonary:      Effort: Pulmonary effort is normal.      Breath sounds: Normal breath sounds.   Musculoskeletal:         General: Normal range of motion.   Neurological:      Mental Status: She is alert and oriented to person, place, and time.      Gait: Gait is intact.   Psychiatric:         Attention and Perception: Attention normal.         Mood and Affect: Mood and affect normal.         Speech: Speech normal.         Behavior: Behavior normal. Behavior is cooperative.         Assessment / Plan     Assessment/Plan:   Diagnoses and all orders for this visit:    1. Seasonal allergies (Primary)  -     levocetirizine (XYZAL) 5 MG tablet; Take 1 tablet by mouth Every Evening.  Dispense: 30 tablet; Refill: 2    2. " Acute recurrent pansinusitis  -     doxycycline (VIBRAMYCIN) 100 MG capsule; Take 1 capsule by mouth 2 (Two) Times a Day for 7 days.  Dispense: 14 capsule; Refill: 0    3. Muscle spasm  -     cyclobenzaprine (FLEXERIL) 10 MG tablet; Take 1/2-1 tablet three times daily as needed for muscle spasms.  Dispense: 90 tablet; Refill: 1         Discussion Summary:  Discussed plan of care in detail with pt today; pt verb understanding and agrees.      I spent 30 minutes caring for Zhane on this date of service. This time includes time spent by me in the following activities:preparing for the visit, obtaining and/or reviewing a separately obtained history, performing a medically appropriate examination and/or evaluation , counseling and educating the patient/family/caregiver, ordering medications, tests, or procedures and documenting information in the medical record      I have reviewed and updated all copied forward information, as appropriate.  I attest to the accuracy and relevance of any unchanged information.        Follow up:  Return if symptoms worsen or fail to improve.     Patient Education:  There are no Patient Instructions on file for this visit.    JENNIE Aponte  05/17/23  15:15 EDT    Please note that portions of this note may have been completed with a voice recognition program.

## 2023-05-24 DIAGNOSIS — I10 PRIMARY HYPERTENSION: ICD-10-CM

## 2023-05-24 DIAGNOSIS — E78.2 MIXED HYPERLIPIDEMIA: ICD-10-CM

## 2023-05-26 RX ORDER — SIMVASTATIN 40 MG
TABLET ORAL
Qty: 90 TABLET | Refills: 1 | OUTPATIENT
Start: 2023-05-26

## 2023-05-26 RX ORDER — HYDROCHLOROTHIAZIDE 25 MG/1
TABLET ORAL
Qty: 90 TABLET | Refills: 1 | OUTPATIENT
Start: 2023-05-26

## 2023-05-31 DIAGNOSIS — F41.1 GAD (GENERALIZED ANXIETY DISORDER): ICD-10-CM

## 2023-05-31 DIAGNOSIS — F33.1 MAJOR DEPRESSIVE DISORDER, RECURRENT EPISODE, MODERATE: ICD-10-CM

## 2023-05-31 RX ORDER — DESVENLAFAXINE SUCCINATE 50 MG/1
TABLET, EXTENDED RELEASE ORAL
Qty: 30 TABLET | Refills: 1 | Status: SHIPPED | OUTPATIENT
Start: 2023-05-31

## 2023-06-01 DIAGNOSIS — E78.2 MIXED HYPERLIPIDEMIA: ICD-10-CM

## 2023-06-01 RX ORDER — SIMVASTATIN 40 MG
40 TABLET ORAL NIGHTLY
Qty: 90 TABLET | Refills: 1 | Status: SHIPPED | OUTPATIENT
Start: 2023-06-01

## 2023-06-10 DIAGNOSIS — I10 PRIMARY HYPERTENSION: ICD-10-CM

## 2023-06-10 DIAGNOSIS — F33.1 MAJOR DEPRESSIVE DISORDER, RECURRENT EPISODE, MODERATE: ICD-10-CM

## 2023-06-10 DIAGNOSIS — F41.1 GAD (GENERALIZED ANXIETY DISORDER): ICD-10-CM

## 2023-06-12 RX ORDER — HYDROCHLOROTHIAZIDE 25 MG/1
TABLET ORAL
Qty: 90 TABLET | Refills: 1 | OUTPATIENT
Start: 2023-06-12

## 2023-06-12 RX ORDER — DULOXETIN HYDROCHLORIDE 60 MG/1
CAPSULE, DELAYED RELEASE ORAL
Qty: 60 CAPSULE | Refills: 0 | Status: SHIPPED | OUTPATIENT
Start: 2023-06-12

## 2023-06-12 NOTE — TELEPHONE ENCOUNTER
Rx Refill Note  Requested Prescriptions     Pending Prescriptions Disp Refills    DULoxetine (CYMBALTA) 60 MG capsule [Pharmacy Med Name: duloxetine 60 mg capsule,delayed release] 60 capsule 0     Sig: TAKE TWO CAPSULES BY MOUTH EVERY DAY      Last office visit with prescribing clinician: 4/19/2023   Last telemedicine visit with prescribing clinician: 12/14/2022   Next office visit with prescribing clinician: 7/19/2023                         Would you like a call back once the refill request has been completed: [] Yes [] No    If the office needs to give you a call back, can they leave a voicemail: [] Yes [] No    Rosemary Agudelo MA  06/12/23, 08:27 EDT

## 2023-07-22 DIAGNOSIS — J30.2 SEASONAL ALLERGIES: ICD-10-CM

## 2023-07-24 RX ORDER — LEVOCETIRIZINE DIHYDROCHLORIDE 5 MG/1
TABLET, FILM COATED ORAL
Qty: 30 TABLET | Refills: 2 | Status: SHIPPED | OUTPATIENT
Start: 2023-07-24

## 2023-08-02 ENCOUNTER — OFFICE VISIT (OUTPATIENT)
Dept: PSYCHIATRY | Facility: CLINIC | Age: 58
End: 2023-08-02
Payer: MEDICARE

## 2023-08-02 DIAGNOSIS — F33.2 SEVERE EPISODE OF RECURRENT MAJOR DEPRESSIVE DISORDER, WITHOUT PSYCHOTIC FEATURES: Primary | ICD-10-CM

## 2023-08-02 DIAGNOSIS — F41.1 GENERALIZED ANXIETY DISORDER: ICD-10-CM

## 2023-08-02 RX ORDER — PANTOPRAZOLE SODIUM 40 MG/1
TABLET, DELAYED RELEASE ORAL
Qty: 90 TABLET | Refills: 1 | Status: SHIPPED | OUTPATIENT
Start: 2023-08-02

## 2023-08-14 DIAGNOSIS — I10 PRIMARY HYPERTENSION: ICD-10-CM

## 2023-08-15 RX ORDER — HYDROCHLOROTHIAZIDE 25 MG/1
TABLET ORAL
Qty: 30 TABLET | Refills: 0 | Status: SHIPPED | OUTPATIENT
Start: 2023-08-15

## 2023-08-28 RX ORDER — EZETIMIBE 10 MG/1
10 TABLET ORAL DAILY
Qty: 90 TABLET | Refills: 1 | Status: SHIPPED | OUTPATIENT
Start: 2023-08-28

## 2023-08-30 ENCOUNTER — OFFICE VISIT (OUTPATIENT)
Dept: PSYCHIATRY | Facility: CLINIC | Age: 58
End: 2023-08-30
Payer: MEDICARE

## 2023-08-30 DIAGNOSIS — F33.2 SEVERE EPISODE OF RECURRENT MAJOR DEPRESSIVE DISORDER, WITHOUT PSYCHOTIC FEATURES: Primary | ICD-10-CM

## 2023-08-30 DIAGNOSIS — M62.838 MUSCLE SPASM: ICD-10-CM

## 2023-08-30 DIAGNOSIS — F41.1 GENERALIZED ANXIETY DISORDER: ICD-10-CM

## 2023-08-30 RX ORDER — CYCLOBENZAPRINE HCL 10 MG
TABLET ORAL
Qty: 90 TABLET | Refills: 1 | Status: SHIPPED | OUTPATIENT
Start: 2023-08-30

## 2023-08-30 NOTE — PROGRESS NOTES
Date: 08/30/2023   Time In: 1330  Time Out: 1430    PROGRESS NOTE  Data:  Zhane Burnham is a 58 y.o. female who presents today for individual therapy session at [facility]. Chief Complaint: depression and anxiety    Patient reports feeling helpless and stressed. She reports feeling overwhelemed regarding her niece and the roomates staying with her. She reports her medical issues are also flaring up increasing her stress.She reports stress regarding work. She reports lack of motivation to complete tasks in the home, low energy and difficulty getting out of bed. She denies SI      Clinical Maneuvering/Intervention:    Assisted patient in processing above session content; acknowledged and normalized patient's thoughts, feelings, and concerns.  Rationalized patient thought process regarding depression and increased stressors.  Reviewed problems solving regarding stressors and healthy ways of coping.  Allowed patient to freely discuss issues without interruption or judgment. Provided safe, confidential environment to facilitate the development of positive therapeutic relationship and encourage open, honest communication. Assisted patient in identifying risk factors which would indicate the need for higher level of care including thoughts to harm self or others and/or self-harming behavior and encouraged patient to contact this office, call 911, or present to the nearest emergency room should any of these events occur. Discussed crisis intervention services and means to access. Patient adamantly and convincingly denies current suicidal or homicidal ideation or perceptual disturbance.    Assessment   Patient appears to maintain relative stability as compared to their baseline.  However, patient continues to struggle with depression which continues to cause impairment in important areas of functioning.  As a result, they can be reasonably expected to continue to benefit from treatment and would likely  be at increased risk for decompensation otherwise.    Mental Status Exam:   Hygiene:   good  Cooperation:  Cooperative  Eye Contact:  Good  Psychomotor Behavior:  Appropriate  Affect:  Appropriate  Mood: depressed and anxious  Speech:  Normal  Thought Process:  Goal directed and Linear  Thought Content:  Mood congruent  Suicidal:  None  Homicidal:  None  Hallucinations:  None  Delusion:  None  Memory:  Intact  Orientation:  Person, Place, Time, and Situation  Reliability:  good  Insight:  Good  Judgement:  Good  Impulse Control:  Good  Physical/Medical Issues:  No      Patient's Support Network Includes:   friend and niece    Functional Status: Moderate impairment     Progress toward goal: Not at goal    Prognosis: Good with Ongoing Treatment          Plan     VISIT DIAGNOSIS:     ICD-10-CM ICD-9-CM   1. Severe episode of recurrent major depressive disorder, without psychotic features  F33.2 296.33   2. Generalized anxiety disorder  F41.1 300.02        Patient will continue in individual outpatient therapy with focus on improved functioning and coping skills, maintaining stability, and avoiding decompensation and the need for higher level of care.    Patient will adhere to medication regimen as prescribed and report any side effects. Patient will contact this office, call 911 or present to the nearest emergency room should suicidal or homicidal ideations occur. Provide Cognitive Behavioral Therapy and Solution Focused Therapy to improve functioning, maintain stability, and avoid decompensation and the need for higher level of care.     Return in about Return in about 2 weeks (around 9/13/2023). or earlier if symptoms worsen or fail to improve.            This document has been electronically signed by Sydney Alegria LCSW  August 30, 2023 13:29 EDT      Part of this note may be an electronic transcription/translation of spoken language to printed text using the Dragon Dictation System.

## 2023-09-13 ENCOUNTER — OFFICE VISIT (OUTPATIENT)
Dept: PSYCHIATRY | Facility: CLINIC | Age: 58
End: 2023-09-13
Payer: MEDICARE

## 2023-09-13 DIAGNOSIS — F33.2 SEVERE EPISODE OF RECURRENT MAJOR DEPRESSIVE DISORDER, WITHOUT PSYCHOTIC FEATURES: Primary | ICD-10-CM

## 2023-09-13 DIAGNOSIS — F41.1 GENERALIZED ANXIETY DISORDER: ICD-10-CM

## 2023-09-13 NOTE — PROGRESS NOTES
"Date: 09/13/2023   Time In: 1335  Time Out: 1425    PROGRESS NOTE  Data:  Zhane Burnham is a 58 y.o. female who presents today for individual therapy session at [facility]. Chief Complaint: depression and anxiety    Patient reports feeling \"vulnerable ,stressed, depressed and somewhat hopeless\". She reports being able to give friends a date to move out of her house to help with her stress level. She reports ongoing worry regarding her niece. She discussed concerns about not getting paperwork completed that could contribute to financial stressors. She reports wanting to work on her paperwork before next session.      Clinical Maneuvering/Intervention:    Assisted patient in processing above session content; acknowledged and normalized patient’s thoughts, feelings, and concerns.  Applied cognitive behavioral strategies to assist patient in identifying maladaptive thoughts and behaviors that contribute to her depression. Reviewed boundary setting.    Allowed patient to freely discuss issues without interruption or judgment. Provided safe, confidential environment to facilitate the development of positive therapeutic relationship and encourage open, honest communication. Assisted patient in identifying risk factors which would indicate the need for higher level of care including thoughts to harm self or others and/or self-harming behavior and encouraged patient to contact this office, call 911, or present to the nearest emergency room should any of these events occur. Discussed crisis intervention services and means to access. Patient adamantly and convincingly denies current suicidal or homicidal ideation or perceptual disturbance.    Assessment   Patient appears to maintain relative stability as compared to their baseline.  However, patient continues to struggle with depression and anxiety which continues to cause impairment in important areas of functioning.  As a result, they can be reasonably " expected to continue to benefit from treatment and would likely be at increased risk for decompensation otherwise.    Mental Status Exam:   Hygiene:   good  Cooperation:  Cooperative  Eye Contact:  Good  Psychomotor Behavior:  Appropriate  Affect:  Appropriate  Mood: depressed and anxious  Speech:  Normal  Thought Process:  Goal directed and Linear  Thought Content:  Normal  Suicidal:  None  Homicidal:  None  Hallucinations:  None  Delusion:  None  Memory:  Intact  Orientation:  Person, Place, Time, and Situation  Reliability:  good  Insight:  Good  Judgement:  Good  Impulse Control:  Good  Physical/Medical Issues:  No      Patient's Support Network Includes:   friend and niece    Functional Status: Moderate impairment     Progress toward goal: Not at goal    Prognosis: Good with Ongoing Treatment          Plan     VISIT DIAGNOSIS:     ICD-10-CM ICD-9-CM   1. Severe episode of recurrent major depressive disorder, without psychotic features  F33.2 296.33   2. Generalized anxiety disorder  F41.1 300.02        Patient will continue in individual outpatient therapy with focus on improved functioning and coping skills, maintaining stability, and avoiding decompensation and the need for higher level of care.    Patient will adhere to medication regimen as prescribed and report any side effects. Patient will contact this office, call 911 or present to the nearest emergency room should suicidal or homicidal ideations occur. Provide Cognitive Behavioral Therapy and Solution Focused Therapy to improve functioning, maintain stability, and avoid decompensation and the need for higher level of care.     Return in about Return in about 2 weeks (around 9/27/2023). or earlier if symptoms worsen or fail to improve.            This document has been electronically signed by Sydney Alegria LCSW  September 13, 2023 13:34 EDT      Part of this note may be an electronic transcription/translation of spoken language to printed text using  the Dragon Dictation System.

## 2023-09-21 DIAGNOSIS — I10 PRIMARY HYPERTENSION: ICD-10-CM

## 2023-09-21 RX ORDER — HYDROCHLOROTHIAZIDE 25 MG/1
TABLET ORAL
Qty: 30 TABLET | Refills: 0 | Status: SHIPPED | OUTPATIENT
Start: 2023-09-21

## 2023-10-04 ENCOUNTER — OFFICE VISIT (OUTPATIENT)
Dept: PSYCHIATRY | Facility: CLINIC | Age: 58
End: 2023-10-04
Payer: MEDICARE

## 2023-10-04 DIAGNOSIS — F41.1 GENERALIZED ANXIETY DISORDER: ICD-10-CM

## 2023-10-04 DIAGNOSIS — F33.2 SEVERE EPISODE OF RECURRENT MAJOR DEPRESSIVE DISORDER, WITHOUT PSYCHOTIC FEATURES: Primary | ICD-10-CM

## 2023-10-04 NOTE — PROGRESS NOTES
Date: 10/04/2023   Time In: 1333  Time Out: 1420    PROGRESS NOTE  Data:  Zhane Burnham is a 58 y.o. female who presents today for individual therapy session at [facility]. Chief Complaint: depression and anxiety    Patient reports feeling tired all the time. She reports difficulty getting out of bed and low motivation. Patient reports worries about her niece. Patient reports ongoing stress regarding roommates and wanting them to move out before the end of the month. Patient discussed having a sleep study and that it was showing signs of narcolepsy.       Clinical Maneuvering/Intervention:    Assisted patient in processing above session content; acknowledged and normalized patient’s thoughts, feelings, and concerns. Assisted patient in processing her thoughts and feelings regarding fatigue, depression and worries about niece. Patient will follow up with her medical providers regarding her health concerns. Encouraged healthy ways of coping with stress.    Allowed patient to freely discuss issues without interruption or judgment. Provided safe, confidential environment to facilitate the development of positive therapeutic relationship and encourage open, honest communication. Assisted patient in identifying risk factors which would indicate the need for higher level of care including thoughts to harm self or others and/or self-harming behavior and encouraged patient to contact this office, call 911, or present to the nearest emergency room should any of these events occur. Discussed crisis intervention services and means to access. Patient adamantly and convincingly denies current suicidal or homicidal ideation or perceptual disturbance.    Assessment   Patient appears to maintain relative stability as compared to their baseline.  However, patient continues to struggle with depression and anxiety which continues to cause impairment in important areas of functioning.  As a result, they can be  reasonably expected to continue to benefit from treatment and would likely be at increased risk for decompensation otherwise.    Mental Status Exam:   Hygiene:   good  Cooperation:  Cooperative  Eye Contact:  Fair  Psychomotor Behavior:  Appropriate  Affect:  Appropriate  Mood: depressed and anxious  Speech:  Normal  Thought Process:  Goal directed and Linear  Thought Content:  Mood congruent  Suicidal:  None  Homicidal:  None  Hallucinations:  None  Delusion:  None  Memory:  Intact  Orientation:  Person, Place, Time, and Situation  Reliability:  good  Insight:  Good  Judgement:  Good  Impulse Control:  Good  Physical/Medical Issues:  No      Patient's Support Network Includes:   friends and niece    Functional Status: Moderate impairment     Progress toward goal: Not at goal    Prognosis: Good with Ongoing Treatment          Plan     VISIT DIAGNOSIS:     ICD-10-CM ICD-9-CM   1. Severe episode of recurrent major depressive disorder, without psychotic features  F33.2 296.33   2. Generalized anxiety disorder  F41.1 300.02        Patient will continue in individual outpatient therapy with focus on improved functioning and coping skills, maintaining stability, and avoiding decompensation and the need for higher level of care.    Patient will adhere to medication regimen as prescribed and report any side effects. Patient will contact this office, call 911 or present to the nearest emergency room should suicidal or homicidal ideations occur. Provide Cognitive Behavioral Therapy and Solution Focused Therapy to improve functioning, maintain stability, and avoid decompensation and the need for higher level of care.     Return in about Return in about 2 weeks (around 10/18/2023). or earlier if symptoms worsen or fail to improve.            This document has been electronically signed by Sydney Alegria LCSW  October 4, 2023 13:31 EDT      Part of this note may be an electronic transcription/translation of spoken language to  printed text using the Dragon Dictation System.

## 2023-10-18 ENCOUNTER — OFFICE VISIT (OUTPATIENT)
Dept: PSYCHIATRY | Facility: CLINIC | Age: 58
End: 2023-10-18
Payer: MEDICARE

## 2023-10-18 ENCOUNTER — OFFICE VISIT (OUTPATIENT)
Dept: FAMILY MEDICINE CLINIC | Facility: CLINIC | Age: 58
End: 2023-10-18
Payer: MEDICARE

## 2023-10-18 VITALS
TEMPERATURE: 97.9 F | BODY MASS INDEX: 44.15 KG/M2 | HEIGHT: 65 IN | WEIGHT: 265 LBS | DIASTOLIC BLOOD PRESSURE: 98 MMHG | RESPIRATION RATE: 16 BRPM | OXYGEN SATURATION: 98 % | HEART RATE: 78 BPM | SYSTOLIC BLOOD PRESSURE: 138 MMHG

## 2023-10-18 DIAGNOSIS — F33.2 SEVERE EPISODE OF RECURRENT MAJOR DEPRESSIVE DISORDER, WITHOUT PSYCHOTIC FEATURES: ICD-10-CM

## 2023-10-18 DIAGNOSIS — R40.0 HAS DAYTIME DROWSINESS: ICD-10-CM

## 2023-10-18 DIAGNOSIS — Z23 NEED FOR INFLUENZA VACCINATION: ICD-10-CM

## 2023-10-18 DIAGNOSIS — R30.0 DYSURIA: ICD-10-CM

## 2023-10-18 DIAGNOSIS — N93.9 VAGINAL SPOTTING: ICD-10-CM

## 2023-10-18 DIAGNOSIS — N76.0 ACUTE VAGINITIS: Primary | ICD-10-CM

## 2023-10-18 DIAGNOSIS — F90.2 ATTENTION DEFICIT HYPERACTIVITY DISORDER, COMBINED TYPE: ICD-10-CM

## 2023-10-18 DIAGNOSIS — F41.1 GENERALIZED ANXIETY DISORDER: ICD-10-CM

## 2023-10-18 RX ORDER — FLUCONAZOLE 150 MG/1
TABLET ORAL
Qty: 3 TABLET | Refills: 0 | Status: SHIPPED | OUTPATIENT
Start: 2023-10-18

## 2023-10-18 RX ORDER — CEFUROXIME AXETIL 250 MG/1
250 TABLET ORAL 2 TIMES DAILY
Qty: 14 TABLET | Refills: 0 | Status: SHIPPED | OUTPATIENT
Start: 2023-10-18 | End: 2023-10-25

## 2023-10-18 RX ORDER — METHYLPHENIDATE HYDROCHLORIDE 20 MG/1
20 TABLET ORAL 3 TIMES DAILY
Qty: 90 TABLET | Refills: 0 | Status: SHIPPED | OUTPATIENT
Start: 2023-10-18

## 2023-10-18 NOTE — TELEPHONE ENCOUNTER
Rx Refill Note  Requested Prescriptions     Pending Prescriptions Disp Refills    methylphenidate (RITALIN) 20 MG tablet 90 tablet 0     Sig: Take 1 tablet by mouth 3 (Three) Times a Day.      Last office visit with prescribing clinician: 7/19/2023   Last telemedicine visit with prescribing clinician: Visit date not found   Next office visit with prescribing clinician: 10/23/2023                         Would you like a call back once the refill request has been completed: [] Yes [] No    If the office needs to give you a call back, can they leave a voicemail: [] Yes [] No    Rosemary Agudelo MA  10/18/23, 16:48 EDT

## 2023-10-18 NOTE — PROGRESS NOTES
"Date: 10/18/2023   Time In: 1330  Time Out: 1427    PROGRESS NOTE  Data:  Zhane Burnham is a 58 y.o. female who presents today for individual therapy session at [facility]. Chief Complaint: depression and anxiety    Patient reports \"I'm feeling overwhelmed with everything\". Patient reports continued fatigue, low motivation and difficulty completing tasks. She reports completing things when she has to. She reports worries about her niece. She reports having thoughts of \"I'm not doing things right\". She reports planning on following  up with primary care regarding medical concerns.       Clinical Maneuvering/Intervention:      Assisted patient in processing above session content; acknowledged and normalized patient’s thoughts, feelings, and concerns.  Rationalized patient thought process regarding depression and overwhelm.  Applied cognitive behavioral techniques to assist patient in identifying maladaptive thoughts that contribute to her depression.   Allowed patient to freely discuss issues without interruption or judgment. Provided safe, confidential environment to facilitate the development of positive therapeutic relationship and encourage open, honest communication. Assisted patient in identifying risk factors which would indicate the need for higher level of care including thoughts to harm self or others and/or self-harming behavior and encouraged patient to contact this office, call 911, or present to the nearest emergency room should any of these events occur. Discussed crisis intervention services and means to access. Patient adamantly and convincingly denies current suicidal or homicidal ideation or perceptual disturbance.    Assessment   Patient appears to maintain relative stability as compared to their baseline.  However, patient continues to struggle with depression and anxiety which continues to cause impairment in important areas of functioning.  As a result, they can be " reasonably expected to continue to benefit from treatment and would likely be at increased risk for decompensation otherwise.    Mental Status Exam:   Hygiene:   good  Cooperation:  Cooperative  Eye Contact:  Fair  Psychomotor Behavior:  Appropriate  Affect:  Appropriate  Mood: depressed  Speech:  Normal  Thought Process:  Goal directed and Linear  Thought Content:  Mood congruent  Suicidal:  None  Homicidal:  None  Hallucinations:  None  Delusion:  None  Memory:  Intact  Orientation:  Person, Place, Time, and Situation  Reliability:  good  Insight:  Good  Judgement:  Good  Impulse Control:  Good  Physical/Medical Issues:  No      Patient's Support Network Includes:   friends and niece    Functional Status: Moderate impairment     Progress toward goal: Not at goal    Prognosis: Good with Ongoing Treatment          Plan     VISIT DIAGNOSIS:     ICD-10-CM ICD-9-CM   1. Severe episode of recurrent major depressive disorder, without psychotic features  F33.2 296.33   2. Generalized anxiety disorder  F41.1 300.02        Patient will continue in individual outpatient therapy with focus on improved functioning and coping skills, maintaining stability, and avoiding decompensation and the need for higher level of care.    Patient will adhere to medication regimen as prescribed and report any side effects. Patient will contact this office, call 911 or present to the nearest emergency room should suicidal or homicidal ideations occur. Provide Cognitive Behavioral Therapy and Solution Focused Therapy to improve functioning, maintain stability, and avoid decompensation and the need for higher level of care.     Return in about Return in about 2 weeks (around 11/1/2023). or earlier if symptoms worsen or fail to improve.            This document has been electronically signed by Sydney Alegria LCSW  October 18, 2023 13:29 EDT      Part of this note may be an electronic transcription/translation of spoken language to printed text  using the Dragon Dictation System.

## 2023-10-18 NOTE — PROGRESS NOTES
"                      Established Patient        Chief Complaint:   Chief Complaint   Patient presents with    Urinary Tract Infection         History of Present Illness:    Zhane Burnham is a 58 y.o. female who presents today   Vaginitis--itching, burning, \"twinges\"--intermittently x 2-3 weeks--creamy white vaginal discharge  Vagisil and AZO--this  morning    Spotting this morning    Patient reports that she has also been feeling \"puny\" and her depression has been worse. Denies SI/HI    Reports daytime drowsiness, has had sleep study in the past and states that she was told that she was \"borderline narcoleptic\" but that was years ago  Subjective     The following portions of the patient's history were reviewed and updated as appropriate: allergies, current medications, past family history, past medical history, past social history, past surgical history and problem list.    ALLERGIES  Allergies   Allergen Reactions    Celebrex [Celecoxib] Swelling    Red Dye Anaphylaxis     Reported reaction started with rash/itching and progressed to swelling     Shellfish-Derived Products Anaphylaxis    Other Other (See Comments)     STOOL SOFTENERS - tingly feeling/does not feel right       ROS  Review of Systems   Constitutional:  Negative for fever.   Genitourinary:  Positive for dysuria, vaginal bleeding and vaginal pain (burning and itching). Negative for difficulty urinating, flank pain and pelvic pain.   Psychiatric/Behavioral:  Positive for dysphoric mood and sleep disturbance. Negative for self-injury and suicidal ideas.        Objective     Vital Signs:   /98   Pulse 78   Temp 97.9 °F (36.6 °C)   Resp 16   Ht 165.1 cm (65\")   Wt 120 kg (265 lb)   LMP  (LMP Unknown)   SpO2 98%   BMI 44.10 kg/m²             Physical Exam   Physical Exam  Vitals and nursing note reviewed.   Constitutional:       Appearance: She is obese.   Cardiovascular:      Rate and Rhythm: Normal rate.   Pulmonary:      Effort: " Pulmonary effort is normal.   Genitourinary:     Comments: deferred  Neurological:      Mental Status: She is alert and oriented to person, place, and time.   Psychiatric:         Mood and Affect: Mood normal.         Behavior: Behavior normal.         Assessment and Plan      Assessment/Plan:   Diagnoses and all orders for this visit:    1. Acute vaginitis (Primary)  -     Urine Culture - Urine, Urine, Clean Catch; Future  -     fluconazole (DIFLUCAN) 150 MG tablet; Take 1 tablet by mouth every 3 days  Dispense: 3 tablet; Refill: 0    2. Need for influenza vaccination  -     Fluzone >6 Months ([id number])    3. Dysuria  -     cefuroxime (CEFTIN) 250 MG tablet; Take 1 tablet by mouth 2 (Two) Times a Day for 7 days.  Dispense: 14 tablet; Refill: 0    4. Vaginal spotting    5. Has daytime drowsiness  -     Ambulatory Referral to Sleep Medicine    Risks, benefits, and potential side effects of current/new medications reviewed with patient.  Patient voiced understanding and wished to proceed with cefdinir for empiric treatment of dysuria, vaginitis.     Differential diagnoses--candidal vaginitis, acute cystitis, bacterial vaginosis    Patient to follow up with Behavioral Health on 10/23 as scheduled.     I will contact patient regarding test results and provide instructions regarding any necessary changes in plan of care.    Patient was encouraged to keep me informed of any acute changes, lack of improvement, or any new concerning symptoms.    Patient voiced understanding of all instructions and denied further questions.        I have reviewed and updated all copied forward information, as appropriate.  I attest to the accuracy and relevance of any unchanged information.      Follow up:  Return if symptoms worsen or fail to improve.     Patient Education:  There are no Patient Instructions on file for this visit.    JENNIE Aponte  10/18/23  21:58 EDT          Please note that portions of this note may have been  completed with a voice recognition program.

## 2023-10-19 DIAGNOSIS — M62.838 MUSCLE SPASM: ICD-10-CM

## 2023-10-19 RX ORDER — CYCLOBENZAPRINE HCL 10 MG
TABLET ORAL
Qty: 90 TABLET | Refills: 1 | Status: SHIPPED | OUTPATIENT
Start: 2023-10-19

## 2023-10-19 NOTE — TELEPHONE ENCOUNTER
Rx Refill Note  Requested Prescriptions     Pending Prescriptions Disp Refills    cyclobenzaprine (FLEXERIL) 10 MG tablet [Pharmacy Med Name: cyclobenzaprine 10 mg tablet] 90 tablet 1     Sig: TAKE 1/2 TO 1 TABLET BY MOUTH THREE TIMES DAILY AS NEEDED FOR MUSCLE SPASMS      Last office visit with prescribing clinician: 10/18/2023   Last telemedicine visit with prescribing clinician: Visit date not found   Next office visit with prescribing clinician: Visit date not found     Marianne Bah MA  10/19/23, 09:11 EDT

## 2023-10-21 DIAGNOSIS — J30.2 SEASONAL ALLERGIES: ICD-10-CM

## 2023-10-23 ENCOUNTER — OFFICE VISIT (OUTPATIENT)
Dept: BEHAVIORAL HEALTH | Facility: CLINIC | Age: 58
End: 2023-10-23
Payer: MEDICARE

## 2023-10-23 VITALS
OXYGEN SATURATION: 97 % | DIASTOLIC BLOOD PRESSURE: 76 MMHG | BODY MASS INDEX: 43.68 KG/M2 | HEART RATE: 96 BPM | WEIGHT: 262.5 LBS | SYSTOLIC BLOOD PRESSURE: 138 MMHG

## 2023-10-23 DIAGNOSIS — F33.1 MAJOR DEPRESSIVE DISORDER, RECURRENT EPISODE, MODERATE: ICD-10-CM

## 2023-10-23 DIAGNOSIS — F90.2 ATTENTION DEFICIT HYPERACTIVITY DISORDER, COMBINED TYPE: Primary | ICD-10-CM

## 2023-10-23 DIAGNOSIS — F41.1 GAD (GENERALIZED ANXIETY DISORDER): ICD-10-CM

## 2023-10-23 DIAGNOSIS — F51.04 PSYCHOPHYSIOLOGICAL INSOMNIA: ICD-10-CM

## 2023-10-23 PROCEDURE — 3075F SYST BP GE 130 - 139MM HG: CPT

## 2023-10-23 PROCEDURE — 3078F DIAST BP <80 MM HG: CPT

## 2023-10-23 PROCEDURE — 99214 OFFICE O/P EST MOD 30 MIN: CPT

## 2023-10-23 RX ORDER — LEVOCETIRIZINE DIHYDROCHLORIDE 5 MG/1
TABLET, FILM COATED ORAL
Qty: 30 TABLET | Refills: 2 | Status: SHIPPED | OUTPATIENT
Start: 2023-10-23

## 2023-10-23 RX ORDER — DESVENLAFAXINE SUCCINATE 50 MG/1
50 TABLET, EXTENDED RELEASE ORAL DAILY
Qty: 90 TABLET | Refills: 0 | Status: SHIPPED | OUTPATIENT
Start: 2023-10-23

## 2023-10-23 RX ORDER — DULOXETIN HYDROCHLORIDE 60 MG/1
120 CAPSULE, DELAYED RELEASE ORAL DAILY
Qty: 180 CAPSULE | Refills: 0 | Status: SHIPPED | OUTPATIENT
Start: 2023-10-23

## 2023-10-23 RX ORDER — TRAZODONE HYDROCHLORIDE 100 MG/1
100 TABLET ORAL
Qty: 90 TABLET | Refills: 0 | Status: SHIPPED | OUTPATIENT
Start: 2023-10-23

## 2023-10-23 NOTE — PROGRESS NOTES
Follow Up Office Visit    Patient Name: Zhane Burnham  : 1965   MRN: [id number]   Care Team: Patient Care Team:  Latasha Curtis APRN as PCP - General (Family Medicine)  Magda Philip LPCC as Counselor (Behavioral Health)  Komal Aguiar APRN as Nurse Practitioner (Behavioral Health)         Chief Complaint:    Chief Complaint   Patient presents with    ADHD    Anxiety    Depression    Sleeping Problem    Med Management       History of Present Illness: Zhane Burnham is a 58 y.o. female who is here today for a medication management follow up. Patient reports that overall medication continues to be effective. She does feel that the Ritalin could do more but is hesitant to change medication because it does work well overall. She feels that her anxiety and mood are managed well. She continues to deal with a lot of situational stressors, but feels that medication helps make them manageable. She denies SI/HI today.     The following portion of the patient's history were reviewed and updated appropriately: allergies, current and past medications, family history, medical history and social history.  Subjective   Review of Systems:    Review of Systems   Psychiatric/Behavioral:  Positive for stress. The patient is nervous/anxious.    All other systems reviewed and are negative.      Current Medications:   Current Outpatient Medications   Medication Sig Dispense Refill    BIOTIN PO Take  by mouth.      cefuroxime (CEFTIN) 250 MG tablet Take 1 tablet by mouth 2 (Two) Times a Day for 7 days. 14 tablet 0    cyclobenzaprine (FLEXERIL) 10 MG tablet TAKE 1/2 TO 1 TABLET BY MOUTH THREE TIMES DAILY AS NEEDED FOR MUSCLE SPASMS 90 tablet 1    desvenlafaxine (PRISTIQ) 50 MG 24 hr tablet Take 1 tablet by mouth Daily. 90 tablet 0    DULoxetine (CYMBALTA) 60 MG capsule Take 2 capsules by mouth Daily. 180 capsule 0    dutasteride (AVODART) 0.5 MG capsule       etodolac (LODINE) 400 MG tablet Take 1/2-1 tablet  by mouth every 8 hours as needed. 90 tablet 1    ezetimibe (Zetia) 10 MG tablet Take 1 tablet by mouth Daily. 90 tablet 1    fluconazole (DIFLUCAN) 150 MG tablet Take 1 tablet by mouth every 3 days 3 tablet 0    hydroCHLOROthiazide (HYDRODIURIL) 25 MG tablet TAKE ONE TABLET BY MOUTH EVERY DAY 30 tablet 0    levocetirizine (XYZAL) 5 MG tablet TAKE ONE TABLET BY MOUTH EVERY EVENING 30 tablet 2    methylphenidate (RITALIN) 20 MG tablet Take 1 tablet by mouth 3 (Three) Times a Day. 90 tablet 0    Multiple Vitamins-Minerals (WOMENS 50+ MULTI VITAMIN PO) Take  by mouth.      oxyCODONE-acetaminophen (PERCOCET)  MG per tablet Take 1 tablet by mouth Every 6 (Six) Hours As Needed for Moderate Pain.      pantoprazole (PROTONIX) 40 MG EC tablet TAKE ONE TABLET BY MOUTH EVERY DAY 90 tablet 1    potassium chloride 10 MEQ CR tablet Take 1 tablet by mouth Daily. 30 tablet 5    pregabalin (LYRICA) 150 MG capsule Take 1 capsule by mouth 2 (Two) Times a Day.      simvastatin (ZOCOR) 40 MG tablet Take 1 tablet by mouth Every Night. 90 tablet 1    traZODone (DESYREL) 100 MG tablet Take 1 tablet by mouth every night at bedtime. 90 tablet 0     No current facility-administered medications for this visit.       Mental Status Exam:   Hygiene:   good  Cooperation:  Cooperative  Eye Contact:  Good  Psychomotor Behavior:  Appropriate  Affect:  Appropriate  Mood: anxious and stressed  Speech:  Normal  Thought Process:  Goal directed and Linear  Thought Content:  Normal and Mood congruent  Suicidal:  None  Homicidal:  None  Hallucinations:  None  Delusion:  None  Memory:  Intact  Orientation:  Person, Place, Time, and Situation  Reliability:  good  Insight:  Good  Judgement:  Good  Impulse Control:  Good  Physical/Medical Issues:  Yes see chart      Objective   Vital Signs:   /76   Pulse 96   Wt 119 kg (262 lb 8 oz)   SpO2 97%   BMI 43.68 kg/m²       Assessment / Plan    Diagnoses and all orders for this visit:    1. Attention  deficit hyperactivity disorder, combined type (Primary)   - Continue Ritalin 20 mg TID  2. Major depressive disorder, recurrent episode, moderate  -     DULoxetine (CYMBALTA) 60 MG capsule; Take 2 capsules by mouth Daily.  Dispense: 180 capsule; Refill: 0  -     desvenlafaxine (PRISTIQ) 50 MG 24 hr tablet; Take 1 tablet by mouth Daily.  Dispense: 90 tablet; Refill: 0    3. MANISHA (generalized anxiety disorder)  -     DULoxetine (CYMBALTA) 60 MG capsule; Take 2 capsules by mouth Daily.  Dispense: 180 capsule; Refill: 0  -     desvenlafaxine (PRISTIQ) 50 MG 24 hr tablet; Take 1 tablet by mouth Daily.  Dispense: 90 tablet; Refill: 0    4. Psychophysiological insomnia  -     traZODone (DESYREL) 100 MG tablet; Take 1 tablet by mouth every night at bedtime.  Dispense: 90 tablet; Refill: 0       Discussed different options for Ritalin. Patient feels that staying on current dose is the best. For now will continue all medication as ordered.     A psychological evaluation was conducted in order to assess past and current level of functioning. Areas assessed included, but were not limited to: perception of social support, perception of ability to face and deal with challenges in life (positive functioning), anxiety symptoms, depressive symptoms, perspective on beliefs/belief system, coping skills for stress, intelligence level,  Therapeutic rapport was established. Interventions conducted today were geared towards incorporating medication management along with support for continued therapy. Education was also provided as to the med management with this provider and what to expect in subsequent sessions.      We discussed risks, benefits, goals and side effects of the above medication and the patient was agreeable with the plan. Patient was educated on the importance of compliance with treatment and follow-up appointments. Patient is aware to contact the [facility] with any worsening of symptoms. To call for questions or  concerns and return early if necessary. Patent is agreeable to go to the Emergency Department or call 911 should they begin SI/HI.      PHQ-2/PHQ-9: Depression Screening  Little Interest or Pleasure in Doing Things: 3-->nearly every day  Feeling Down, Depressed or Hopeless: 2-->more than half the days  PHQ-2 Total Score: 5  Trouble Falling or Staying Asleep, or Sleeping Too Much: 1-->several days  Feeling Tired or Having Little Energy: 3-->nearly every day  Poor Appetite or Overeatin-->several days  Trouble Concentrating on Things, Such as Reading the Newspaper or Watching Television: 3-->nearly every day  Moving or Speaking So Slowly that Other People Could Have Noticed? Or the Opposite - Being So Fidgety: 1-->several days  Thoughts that You Would be Better Off Dead or of Hurting Yourself in Some Way: 0-->not at all  PHQ-9: Brief Depression Severity Measure Score: 14  If You Checked Off Any Problems, How Difficult Have These Problems Made It For You to Do Your Work, Take Care of Things at Home, or Get Along with Other People?: extremely difficult      PHQ-9 Score:   PHQ-9 Total Score: 14    Depression Screening:  Patient screened positive for depression based on a PHQ-9 score of 14 on 10/23/2023. Follow-up recommendations include: Prescribed antidepressant medication treatment and Suicide Risk Assessment performed.      Over the last two weeks, how often have you been bothered by the following problems?  Feeling nervous, anxious or on edge: Nearly every day  Not being able to stop or control worrying: Nearly every day  Worrying too much about different things: Nearly every day  Trouble Relaxing: Several days  Being so restless that it is hard to sit still: Several days  Becoming easily annoyed or irritable: Several days  Feeling afraid as if something awful might happen: Nearly every day  MANISHA 7 Total Score: 15  If you checked any problems, how difficult have these problems made it for you to do your work, take  care of things at home, or get along with other people: Extremely difficult        Screening for Adults With ADHD - (1-6)  1. How often do you have trouble wrapping up the final details of a project, once the challenging parts have been done?: Very Often  2. How often do you have difficulty getting things in order when you have to do a task that requires organization?: Very Often  3. How often do you have problems remembering appointments or obligations : Very Often  4. When you have a task that requires a lot of thought, how often do you avoid or delay getting started ?: Very Often  5. How often do you fidget or squirm with your hands or feet when you have to sit down for a long time?: Often  6. How often do you feel overly active and compelled to do things, like you were driven by a motor?: Never  7. How often do you make careless mistakes when you have to work on a boring or difficult project?: Very Often  8. How often do have difficulty keeping your attention when you are doing boring or repetitive work?: Very Often  9. How often do you have difficulty concentrating on what people say to you, even when they are speaking to you: Very Often  10.How often do you misplace or have difficulty finding things at home or at work?: Very Often  11.How often are you distracted by activity or noise around you?: Very Often  12.How often do you leave your seat in meetings or other situations in which you are expected to remain seated?: Very Often  13.How often do you feel restless or fidgety?: Often  14.How often do you have difficulty unwinding and relaxing when you have time to yourself?: Very Often  15.How often do you find yourself talking too much when you are in social situations?: Very Often  16.When you’re in a conversation, how often do you find yourself finishing the sentences of the people you are talking to, before they can finish them themselves?: Often  17.How often do you have difficulty waiting your turn in  situations when turn taking is required?: Sometimes  18.How often do you interrupt others when they are busy?: Often        MEDS ORDERED DURING VISIT:  New Medications Ordered This Visit   Medications    DULoxetine (CYMBALTA) 60 MG capsule     Sig: Take 2 capsules by mouth Daily.     Dispense:  180 capsule     Refill:  0     This prescription was filled on 5/18/2023. Any refills authorized will be placed on file.    desvenlafaxine (PRISTIQ) 50 MG 24 hr tablet     Sig: Take 1 tablet by mouth Daily.     Dispense:  90 tablet     Refill:  0     This prescription was filled on 5/8/2023. Any refills authorized will be placed on file.    traZODone (DESYREL) 100 MG tablet     Sig: Take 1 tablet by mouth every night at bedtime.     Dispense:  90 tablet     Refill:  0     This prescription was filled on 12/31/2022. Any refills authorized will be placed on file.         Follow Up   Return in about 8 weeks (around 12/18/2023).  Patient was given instructions and counseling regarding her condition or for health maintenance advice. Please see specific information pulled into the AVS if appropriate.     TREATMENT PLAN/GOALS: Continue supportive psychotherapy efforts and medications as indicated. Treatment and medication options discussed during today's visit. Patient acknowledged and verbally consented to continue with current treatment plan and was educated on the importance of compliance with treatment and follow-up appointments.    MEDICATION ISSUES:  Discussed medication options and treatment plan of prescribed medication as well as the risks, benefits, and side effects including potential falls, possible impaired driving and metabolic adversities among others. Patient is agreeable to call the office with any worsening of symptoms or onset of side effects. Patient is agreeable to call 911 or go to the nearest ER should he/she begin having SI/HI.        JENNIE Chance [facility]  [facility]  [address] [place] [address] [address] [place] [address]  [phone]    October 23, 2023 16:30 EDT

## 2023-10-31 DIAGNOSIS — I10 PRIMARY HYPERTENSION: ICD-10-CM

## 2023-10-31 DIAGNOSIS — F51.04 PSYCHOPHYSIOLOGICAL INSOMNIA: ICD-10-CM

## 2023-10-31 RX ORDER — HYDROCHLOROTHIAZIDE 25 MG/1
25 TABLET ORAL DAILY
Qty: 30 TABLET | Refills: 0 | Status: SHIPPED | OUTPATIENT
Start: 2023-10-31

## 2023-10-31 RX ORDER — TRAZODONE HYDROCHLORIDE 100 MG/1
100 TABLET ORAL
Qty: 90 TABLET | Refills: 0 | Status: SHIPPED | OUTPATIENT
Start: 2023-10-31

## 2023-11-01 ENCOUNTER — OFFICE VISIT (OUTPATIENT)
Dept: PSYCHIATRY | Facility: CLINIC | Age: 58
End: 2023-11-01
Payer: MEDICARE

## 2023-11-01 DIAGNOSIS — F41.1 GENERALIZED ANXIETY DISORDER: ICD-10-CM

## 2023-11-01 DIAGNOSIS — F33.2 SEVERE EPISODE OF RECURRENT MAJOR DEPRESSIVE DISORDER, WITHOUT PSYCHOTIC FEATURES: ICD-10-CM

## 2023-11-01 NOTE — PROGRESS NOTES
Date: 11/01/2023   Time In: 1330  Time Out: 1423    PROGRESS NOTE  Data:  Zhane Burnham is a 58 y.o. female who presents today for individual therapy session at [facility]. Chief Complaint: depression and anxiety    Patient reports increased depression and anxiety. She reports increased family stressors. She reports feeling overwhelmed. She reports difficulty completing tasks and worry about family. She reports working on boundaries with roommates. Patient denies si      Clinical Maneuvering/Intervention:      Assisted patient in processing above session content; acknowledged and normalized patient’s thoughts, feelings, and concerns.  Rationalized patient thought process regarding depression.  Applied cognitive behavioral techniques to assist patient in identifying maladaptive thoughts and behaviors that contribute to her depression. Discussed challenging negative thoughts and behavioral activation.    Allowed patient to freely discuss issues without interruption or judgment. Provided safe, confidential environment to facilitate the development of positive therapeutic relationship and encourage open, honest communication. Assisted patient in identifying risk factors which would indicate the need for higher level of care including thoughts to harm self or others and/or self-harming behavior and encouraged patient to contact this office, call 911, or present to the nearest emergency room should any of these events occur. Discussed crisis intervention services and means to access. Patient adamantly and convincingly denies current suicidal or homicidal ideation or perceptual disturbance.    Assessment   Patient appears to maintain relative stability as compared to their baseline.  However, patient continues to struggle with depression and anxiety which continues to cause impairment in important areas of functioning.  As a result, they can be reasonably expected to continue to benefit from  treatment and would likely be at increased risk for decompensation otherwise.    Mental Status Exam:   Hygiene:   good  Cooperation:  Cooperative  Eye Contact:  Good  Psychomotor Behavior:  Appropriate  Affect:  Appropriate  Mood: depressed and anxious  Speech:  Normal  Thought Process:  Goal directed and Linear  Thought Content:  Mood congruent  Suicidal:  None  Homicidal:  None  Hallucinations:  None  Delusion:  None  Memory:  Intact  Orientation:  Person, Place, Time, and Situation  Reliability:  good  Insight:  Good  Judgement:  Good  Impulse Control:  Good  Physical/Medical Issues:  Yes reports pain      Patient's Support Network Includes:   friend and niece    Functional Status: Moderate impairment     Progress toward goal: Not at goal    Prognosis: Good with Ongoing Treatment          Plan     VISIT DIAGNOSIS:     ICD-10-CM ICD-9-CM   1. Severe episode of recurrent major depressive disorder, without psychotic features  F33.2 296.33   2. Generalized anxiety disorder  F41.1 300.02        Patient will continue in individual outpatient therapy with focus on improved functioning and coping skills, maintaining stability, and avoiding decompensation and the need for higher level of care.    Patient will adhere to medication regimen as prescribed and report any side effects. Patient will contact this office, call 911 or present to the nearest emergency room should suicidal or homicidal ideations occur. Provide Cognitive Behavioral Therapy and Solution Focused Therapy to improve functioning, maintain stability, and avoid decompensation and the need for higher level of care.     Return in about Return in about 2 weeks (around 11/15/2023). or earlier if symptoms worsen or fail to improve.            This document has been electronically signed by Sydney Alegria LCSW  November 1, 2023 13:29 EDT      Part of this note may be an electronic transcription/translation of spoken language to printed text using the Dragon  Dictation System.

## 2023-11-02 DIAGNOSIS — N76.0 ACUTE VAGINITIS: ICD-10-CM

## 2023-11-02 RX ORDER — FLUCONAZOLE 150 MG/1
TABLET ORAL
Qty: 3 TABLET | Refills: 0 | Status: SHIPPED | OUTPATIENT
Start: 2023-11-02

## 2023-11-06 ENCOUNTER — TELEPHONE (OUTPATIENT)
Dept: FAMILY MEDICINE CLINIC | Facility: CLINIC | Age: 58
End: 2023-11-06

## 2023-11-06 NOTE — TELEPHONE ENCOUNTER
"  Caller: Zhane Burnham \"Shemar\"    Relationship: Self    Best call back number: [phone]     Caller requesting test results: YES    What test was performed: CULTURE    When was the test performed: 10.18.23    Where was the test performed: NA    Additional notes: PATIENT CALLED FOR HER CULTURE RESULTS.            "

## 2023-11-15 ENCOUNTER — OFFICE VISIT (OUTPATIENT)
Dept: PSYCHIATRY | Facility: CLINIC | Age: 58
End: 2023-11-15
Payer: MEDICARE

## 2023-11-15 DIAGNOSIS — F33.2 SEVERE EPISODE OF RECURRENT MAJOR DEPRESSIVE DISORDER, WITHOUT PSYCHOTIC FEATURES: ICD-10-CM

## 2023-11-15 DIAGNOSIS — F41.1 GENERALIZED ANXIETY DISORDER: ICD-10-CM

## 2023-11-15 NOTE — PROGRESS NOTES
Date: 11/15/2023   Time In: 1427  Time Out: 1512    PROGRESS NOTE  Data:  Zhane Burnham is a 58 y.o. female who presents today for individual therapy session at [facility]. Chief Complaint: depression and anxiety    Patient reports depression, low motivation, low energy and pain from health problems. Patient reports worry regarding family member. She reports less stress with roommates moving out and that she has been organizing in the home. Patient reports coping better.      Clinical Maneuvering/Intervention:    Assisted patient in processing above session content; acknowledged and normalized patient’s thoughts, feelings, and concerns.  Applied cognitive behavioral techniques to assist patient in identifying maladaptive thoughts and behaviors that contribute to depression. Also discussed coping skills for patient to implement such as participating in activities she enjoys and challenging negative thoughts.    Allowed patient to freely discuss issues without interruption or judgment. Provided safe, confidential environment to facilitate the development of positive therapeutic relationship and encourage open, honest communication. Assisted patient in identifying risk factors which would indicate the need for higher level of care including thoughts to harm self or others and/or self-harming behavior and encouraged patient to contact this office, call 911, or present to the nearest emergency room should any of these events occur. Discussed crisis intervention services and means to access. Patient adamantly and convincingly denies current suicidal or homicidal ideation or perceptual disturbance.    Assessment   Patient appears to maintain relative stability as compared to their baseline.  However, patient continues to struggle with depression which continues to cause impairment in important areas of functioning.  As a result, they can be reasonably expected to continue to benefit from treatment  and would likely be at increased risk for decompensation otherwise.    Mental Status Exam:   Hygiene:   good  Cooperation:  Cooperative  Eye Contact:  Good  Psychomotor Behavior:  Appropriate  Affect:  Appropriate  Mood: depressed  Speech:  Normal  Thought Process:  Goal directed and Linear  Thought Content:  Mood congruent  Suicidal:  None  Homicidal:  None  Hallucinations:  None  Delusion:  None  Memory:  Intact  Orientation:  Person, Place, Time, and Situation  Reliability:  good  Insight:  Good  Judgement:  Good  Impulse Control:  Good  Physical/Medical Issues:  Yes chronic pain      Patient's Support Network Includes:   friends and niece    Functional Status: Moderate impairment     Progress toward goal: Not at goal    Prognosis: Good with Ongoing Treatment          Plan     VISIT DIAGNOSIS:     ICD-10-CM ICD-9-CM   1. Severe episode of recurrent major depressive disorder, without psychotic features  F33.2 296.33   2. Generalized anxiety disorder  F41.1 300.02        Patient will continue in individual outpatient therapy with focus on improved functioning and coping skills, maintaining stability, and avoiding decompensation and the need for higher level of care.    Patient will adhere to medication regimen as prescribed and report any side effects. Patient will contact this office, call 911 or present to the nearest emergency room should suicidal or homicidal ideations occur. Provide Cognitive Behavioral Therapy and Solution Focused Therapy to improve functioning, maintain stability, and avoid decompensation and the need for higher level of care.     Return in about Return in about 2 weeks (around 11/29/2023). or earlier if symptoms worsen or fail to improve.            This document has been electronically signed by Sydney Alegria LCSW  November 15, 2023 14:30 EST      Part of this note may be an electronic transcription/translation of spoken language to printed text using the Dragon Dictation System.

## 2023-11-17 DIAGNOSIS — E78.2 MIXED HYPERLIPIDEMIA: ICD-10-CM

## 2023-11-17 RX ORDER — SIMVASTATIN 40 MG
40 TABLET ORAL
Qty: 90 TABLET | Refills: 1 | Status: SHIPPED | OUTPATIENT
Start: 2023-11-17

## 2023-11-22 DIAGNOSIS — I10 PRIMARY HYPERTENSION: ICD-10-CM

## 2023-11-22 DIAGNOSIS — J30.2 SEASONAL ALLERGIES: ICD-10-CM

## 2023-11-22 DIAGNOSIS — N76.0 ACUTE VAGINITIS: ICD-10-CM

## 2023-11-22 RX ORDER — FLUCONAZOLE 150 MG/1
TABLET ORAL
Qty: 3 TABLET | Refills: 0 | Status: SHIPPED | OUTPATIENT
Start: 2023-11-22

## 2023-11-22 RX ORDER — HYDROCHLOROTHIAZIDE 25 MG/1
25 TABLET ORAL DAILY
Qty: 30 TABLET | Refills: 0 | Status: SHIPPED | OUTPATIENT
Start: 2023-11-22

## 2023-11-22 NOTE — TELEPHONE ENCOUNTER
"    Caller: Zhane Burnham BOGDAN \"Shemar\"    Relationship: Self    Best call back number: [phone]     Requested Prescriptions:   Requested Prescriptions     Pending Prescriptions Disp Refills    fluconazole (DIFLUCAN) 150 MG tablet [Pharmacy Med Name: fluconazole 150 mg tablet] 3 tablet 0     Sig: TAKE ONE TABLET BY MOUTH EVERY 3 DAYS    hydroCHLOROthiazide (HYDRODIURIL) 25 MG tablet 30 tablet 0     Sig: Take 1 tablet by mouth Daily.        Pharmacy where request should be sent: [address] [phone] [facility] [phone]      Last office visit with prescribing clinician: 10/18/2023   Last telemedicine visit with prescribing clinician: Visit date not found   Next office visit with prescribing clinician: Visit date not found     Does the patient have less than a 3 day supply:  [x] Yes  [] No    Would you like a call back once the refill request has been completed: [] Yes [x] No    If the office needs to give you a call back, can they leave a voicemail: [] Yes [x] No    Emanuel Coombs Rep   11/22/23 10:39 EST             "

## 2023-11-29 ENCOUNTER — OFFICE VISIT (OUTPATIENT)
Dept: PSYCHIATRY | Facility: CLINIC | Age: 58
End: 2023-11-29
Payer: MEDICARE

## 2023-11-29 DIAGNOSIS — F41.1 GENERALIZED ANXIETY DISORDER: ICD-10-CM

## 2023-11-29 DIAGNOSIS — F33.2 SEVERE EPISODE OF RECURRENT MAJOR DEPRESSIVE DISORDER, WITHOUT PSYCHOTIC FEATURES: ICD-10-CM

## 2023-11-29 NOTE — PROGRESS NOTES
Date: 11/29/2023   Time In: 1330  Time Out: 1425    PROGRESS NOTE  Data:  Zhane Burnham is a 58 y.o. female who presents today for individual therapy session at [facility]. Chief Complaint: depression and anxiety    Patient reports feeling down, low motivation and feeling tired. She reports strained family dynamics. She reports worry regarding family. Patient reports difficulty completing household tasks.      Clinical Maneuvering/Intervention:    Assisted patient in processing above session content; acknowledged and normalized patient’s thoughts, feelings, and concerns.  Rationalized patient thought process regarding mood and worry.  Discussed triggers associated with patient's depression.  Also discussed coping skills for patient to implement such as challenging negative thoughts, participating in activities, using her social support.    Allowed patient to freely discuss issues without interruption or judgment. Provided safe, confidential environment to facilitate the development of positive therapeutic relationship and encourage open, honest communication. Assisted patient in identifying risk factors which would indicate the need for higher level of care including thoughts to harm self or others and/or self-harming behavior and encouraged patient to contact this office, call 911, or present to the nearest emergency room should any of these events occur. Discussed crisis intervention services and means to access. Patient adamantly and convincingly denies current suicidal or homicidal ideation or perceptual disturbance.    Assessment   Patient appears to maintain relative stability as compared to their baseline.  However, patient continues to struggle with depression and anxiety which continues to cause impairment in important areas of functioning.  As a result, they can be reasonably expected to continue to benefit from treatment and would likely be at increased risk for decompensation  otherwise.    Mental Status Exam:   Hygiene:   good  Cooperation:  Cooperative  Eye Contact:  Good  Psychomotor Behavior:  Appropriate  Affect:  Appropriate  Mood: depressed  Speech:  Normal  Thought Process:  Goal directed and Linear  Thought Content:  Mood congruent  Suicidal:  None  Homicidal:  None  Hallucinations:  None  Delusion:  None  Memory:  Intact  Orientation:  Person, Place, Time, and Situation  Reliability:  good  Insight:  Good  Judgement:  Good  Impulse Control:  Good  Physical/Medical Issues:  Yes chronic pain      Patient's Support Network Includes:   friends and niece    Functional Status: Moderate impairment     Progress toward goal: Not at goal    Prognosis: Good with Ongoing Treatment          Plan     VISIT DIAGNOSIS:     ICD-10-CM ICD-9-CM   1. Severe episode of recurrent major depressive disorder, without psychotic features  F33.2 296.33   2. Generalized anxiety disorder  F41.1 300.02        Patient will continue in individual outpatient therapy with focus on improved functioning and coping skills, maintaining stability, and avoiding decompensation and the need for higher level of care.    Patient will adhere to medication regimen as prescribed and report any side effects. Patient will contact this office, call 911 or present to the nearest emergency room should suicidal or homicidal ideations occur. Provide Cognitive Behavioral Therapy and Solution Focused Therapy to improve functioning, maintain stability, and avoid decompensation and the need for higher level of care.     Return in about Return in about 2 weeks (around 12/13/2023). or earlier if symptoms worsen or fail to improve.            This document has been electronically signed by Sydney Alegria LCSW  November 29, 2023 13:24 EST      Part of this note may be an electronic transcription/translation of spoken language to printed text using the Dragon Dictation System.

## 2023-12-06 DIAGNOSIS — F90.2 ATTENTION DEFICIT HYPERACTIVITY DISORDER, COMBINED TYPE: ICD-10-CM

## 2023-12-06 RX ORDER — METHYLPHENIDATE HYDROCHLORIDE 20 MG/1
20 TABLET ORAL 3 TIMES DAILY
Qty: 90 TABLET | Refills: 0 | Status: SHIPPED | OUTPATIENT
Start: 2023-12-06

## 2023-12-14 ENCOUNTER — OFFICE VISIT (OUTPATIENT)
Dept: PSYCHIATRY | Facility: CLINIC | Age: 58
End: 2023-12-14
Payer: MEDICARE

## 2023-12-14 DIAGNOSIS — F33.2 SEVERE EPISODE OF RECURRENT MAJOR DEPRESSIVE DISORDER, WITHOUT PSYCHOTIC FEATURES: ICD-10-CM

## 2023-12-14 DIAGNOSIS — F41.1 GENERALIZED ANXIETY DISORDER: ICD-10-CM

## 2023-12-14 NOTE — PROGRESS NOTES
Date: 12/14/2023   Time In: 1335  Time Out: 1423    PROGRESS NOTE  Data:  Zhane Burnham is a 58 y.o. female who presents today for individual therapy session at [facility]. Chief Complaint: depression and anxiety    Patient reports increase in anxiety and stress since last session. She reports feeling tired, depressed and having negative thoughts. She reports difficulty completing household tasks and ongoing worry.      Clinical Maneuvering/Intervention:    Assisted patient in processing above session content; acknowledged and normalized patient’s thoughts, feelings, and concerns.  Rationalized patient thought process regarding increased anxiety regarding recent events.  Discussed triggers associated with patient's depression.  Also discussed coping skills for patient to implement such as previously used coping, participating in activities. Discussed using coping skill of worry postponement to assist with reducing worry.    Allowed patient to freely discuss issues without interruption or judgment. Provided safe, confidential environment to facilitate the development of positive therapeutic relationship and encourage open, honest communication. Assisted patient in identifying risk factors which would indicate the need for higher level of care including thoughts to harm self or others and/or self-harming behavior and encouraged patient to contact this office, call 911, or present to the nearest emergency room should any of these events occur. Discussed crisis intervention services and means to access. Patient adamantly and convincingly denies current suicidal or homicidal ideation or perceptual disturbance.    Assessment   Patient appears to maintain relative stability as compared to their baseline.  However, patient continues to struggle with anxiety and depression which continues to cause impairment in important areas of functioning.  As a result, they can be reasonably expected to continue to  benefit from treatment and would likely be at increased risk for decompensation otherwise.    Mental Status Exam:   Hygiene:   good  Cooperation:  Cooperative  Eye Contact:  Good  Psychomotor Behavior:  Appropriate  Affect:  Appropriate  Mood: depressed and anxious  Speech:  Normal  Thought Process:  Goal directed and Linear  Thought Content:  Mood congruent  Suicidal:  None  Homicidal:  None  Hallucinations:  None  Delusion:  None  Memory:  Intact  Orientation:  Person, Place, Time, and Situation  Reliability:  good  Insight:  Good  Judgement:  Good  Impulse Control:  Good  Physical/Medical Issues:  Yes chronic pain      Patient's Support Network Includes:   friends and niece    Functional Status: Moderate impairment     Progress toward goal: Not at goal    Prognosis: Good with Ongoing Treatment          Plan     VISIT DIAGNOSIS:     ICD-10-CM ICD-9-CM   1. Severe episode of recurrent major depressive disorder, without psychotic features  F33.2 296.33   2. Generalized anxiety disorder  F41.1 300.02        Patient will continue in individual outpatient therapy with focus on improved functioning and coping skills, maintaining stability, and avoiding decompensation and the need for higher level of care.    Patient will adhere to medication regimen as prescribed and report any side effects. Patient will contact this office, call 911 or present to the nearest emergency room should suicidal or homicidal ideations occur. Provide Cognitive Behavioral Therapy and Solution Focused Therapy to improve functioning, maintain stability, and avoid decompensation and the need for higher level of care.     Return in about Return in about 2 weeks (around 12/28/2023). or earlier if symptoms worsen or fail to improve.            This document has been electronically signed by Sydney Alegria LCSW  December 14, 2023 13:35 EST      Part of this note may be an electronic transcription/translation of spoken language to printed text using the  [facility] Dictation System.

## 2023-12-15 DIAGNOSIS — M62.838 MUSCLE SPASM: ICD-10-CM

## 2023-12-15 RX ORDER — CYCLOBENZAPRINE HCL 10 MG
TABLET ORAL
Qty: 90 TABLET | Refills: 1 | Status: SHIPPED | OUTPATIENT
Start: 2023-12-15

## 2023-12-19 ENCOUNTER — OFFICE VISIT (OUTPATIENT)
Dept: NEUROLOGY | Facility: CLINIC | Age: 58
End: 2023-12-19
Payer: MEDICARE

## 2023-12-19 VITALS
WEIGHT: 266 LBS | DIASTOLIC BLOOD PRESSURE: 90 MMHG | BODY MASS INDEX: 44.32 KG/M2 | TEMPERATURE: 97.1 F | HEIGHT: 65 IN | HEART RATE: 95 BPM | OXYGEN SATURATION: 97 % | SYSTOLIC BLOOD PRESSURE: 150 MMHG

## 2023-12-19 DIAGNOSIS — R06.83 SNORING: ICD-10-CM

## 2023-12-19 DIAGNOSIS — G47.09 OTHER INSOMNIA: ICD-10-CM

## 2023-12-19 DIAGNOSIS — G47.19 EXCESSIVE DAYTIME SLEEPINESS: Primary | ICD-10-CM

## 2023-12-19 DIAGNOSIS — K21.9 GASTROESOPHAGEAL REFLUX DISEASE, UNSPECIFIED WHETHER ESOPHAGITIS PRESENT: ICD-10-CM

## 2023-12-19 PROCEDURE — 1159F MED LIST DOCD IN RCRD: CPT | Performed by: NURSE PRACTITIONER

## 2023-12-19 PROCEDURE — 3077F SYST BP >= 140 MM HG: CPT | Performed by: NURSE PRACTITIONER

## 2023-12-19 PROCEDURE — 1160F RVW MEDS BY RX/DR IN RCRD: CPT | Performed by: NURSE PRACTITIONER

## 2023-12-19 PROCEDURE — 99214 OFFICE O/P EST MOD 30 MIN: CPT | Performed by: NURSE PRACTITIONER

## 2023-12-19 PROCEDURE — 3080F DIAST BP >= 90 MM HG: CPT | Performed by: NURSE PRACTITIONER

## 2023-12-19 NOTE — PROGRESS NOTES
"     Follow up Sleep Patient Office Visit      Patient Name: Zhane Burnham  : 1965   MRN: [id number]     Referring Physician: Latasha Curtis APRN    Chief Complaint:    Chief Complaint   Patient presents with    Follow-up     Patient in office to follow up on daytime sleepiness.        History of Present Illness: Zhane Burnham is a 58 y.o. female who is here today for complaints of daytime sleepiness.  She was seen in , for a consult for LEIF.  She says she has returned today because she is struggling to stay awake during the day.  She believes she was previously told she had \"borderline narcolepsy\".  She is taking Cyclobenzaprine 2-3 times daily, Cymbalta daily, Pregabalin BID, and Oxycodone 10mg multiple times daily PRN pain.  Sleep questionnaire reviewed verbally- she has been told she snores, she has been told she stops breathing during sleep and says that was by her mother when she was younger, she wakes up with a dry mouth, she awakens with a headache, she never feels rested upon awakening in the mornings, she takes Trazodone to help with sleep, she has daytime sleepiness.  She states, \"I stay exhausted and I'm struggling so bad\".  Additional risk factors- BMI 44, dyslipidemia, ADHD, fibromyalgia, mood disorder, insomnia, HTN, OA, RA.   *PSG on 4/15/2021 showed no evidence of significant LEIF with an AHI of 0/hour and poor sleep architecture.     Following taken from previous visit note:  Zhane Burnham is a 55 y.o. female who is here today to establish care with Sleep Medicine.  Sleep questionnaire reviewed.  She has excessive daytime sleepiness, she takes naps on some days, she is sleepy when she increases her sleep time, the time it takes her to fall asleep varies, she wakes up 1-2 times during the night and some times has difficulty falling back to sleep, it takes her a while to \"get going\" after awakening, she sleeps 6-9 hours per night, she snores, she experiences disturbed or " restless sleep, she wakes up with a very dry mouth, she experiences sore throats in the mornings some times, she experiences an aching feeling/crawling sensation/urge to keep moving legs, leg movements interfere with her sleep, she sweats excessively during sleep, she grinds her teeth, she has pain that interferes with sleep.  She is taking Trazodone and Benadryl to help her fall asleep.  Additional risk factors- BMI 44, HTN, mood disorder, dyslipidemia, peripheral neuropathy, chronic back pain, insomnia.      Mathias Score: 6    Subjective      Review of Systems:   Review of Systems   Constitutional:  Positive for fatigue.   Respiratory:  Positive for apnea.    Musculoskeletal:  Positive for back pain.   Psychiatric/Behavioral:  Positive for sleep disturbance.        Medications:     Current Outpatient Medications:     BIOTIN PO, Take  by mouth., Disp: , Rfl:     cyclobenzaprine (FLEXERIL) 10 MG tablet, TAKE 1/2 TO 1 TABLET BY MOUTH THREE TIMES DAILY AS NEEDED FOR MUSCLE SPASMS, Disp: 90 tablet, Rfl: 1    desvenlafaxine (PRISTIQ) 50 MG 24 hr tablet, Take 1 tablet by mouth Daily., Disp: 90 tablet, Rfl: 0    DULoxetine (CYMBALTA) 60 MG capsule, Take 2 capsules by mouth Daily., Disp: 180 capsule, Rfl: 0    dutasteride (AVODART) 0.5 MG capsule, , Disp: , Rfl:     etodolac (LODINE) 400 MG tablet, Take 1/2-1 tablet by mouth every 8 hours as needed., Disp: 90 tablet, Rfl: 1    ezetimibe (Zetia) 10 MG tablet, Take 1 tablet by mouth Daily., Disp: 90 tablet, Rfl: 1    fluconazole (DIFLUCAN) 150 MG tablet, TAKE ONE TABLET BY MOUTH EVERY 3 DAYS, Disp: 3 tablet, Rfl: 0    hydroCHLOROthiazide (HYDRODIURIL) 25 MG tablet, Take 1 tablet by mouth Daily., Disp: 30 tablet, Rfl: 0    levocetirizine (XYZAL) 5 MG tablet, TAKE ONE TABLET BY MOUTH EVERY EVENING, Disp: 30 tablet, Rfl: 2    methylphenidate (RITALIN) 20 MG tablet, Take 1 tablet by mouth 3 (Three) Times a Day., Disp: 90 tablet, Rfl: 0    Multiple Vitamins-Minerals (WOMENS 50+  "MULTI VITAMIN PO), Take  by mouth., Disp: , Rfl:     oxyCODONE-acetaminophen (PERCOCET)  MG per tablet, Take 1 tablet by mouth Every 6 (Six) Hours As Needed for Moderate Pain., Disp: , Rfl:     pantoprazole (PROTONIX) 40 MG EC tablet, TAKE ONE TABLET BY MOUTH EVERY DAY, Disp: 90 tablet, Rfl: 1    potassium chloride 10 MEQ CR tablet, Take 1 tablet by mouth Daily., Disp: 30 tablet, Rfl: 5    pregabalin (LYRICA) 150 MG capsule, Take 1 capsule by mouth 2 (Two) Times a Day., Disp: , Rfl:     simvastatin (ZOCOR) 40 MG tablet, TAKE ONE TABLET BY MOUTH EVERY NIGHT, Disp: 90 tablet, Rfl: 1    traZODone (DESYREL) 100 MG tablet, Take 1 tablet by mouth every night at bedtime., Disp: 90 tablet, Rfl: 0    Allergies:   Allergies   Allergen Reactions    Celebrex [Celecoxib] Swelling    Red Dye Anaphylaxis     Reported reaction started with rash/itching and progressed to swelling     Shellfish-Derived Products Anaphylaxis    Other Other (See Comments)     STOOL SOFTENERS - tingly feeling/does not feel right       Objective     Physical Exam:  Vital Signs:   Vitals:    12/19/23 1401   BP: 150/90   BP Location: Right arm   Patient Position: Sitting   Cuff Size: Adult   Pulse: 95   Temp: 97.1 °F (36.2 °C)   SpO2: 97%   Weight: 121 kg (266 lb)   Height: 165.1 cm (65\")   PainSc:   8   PainLoc: Back     BMI: Body mass index is 44.26 kg/m².    Neck circumference- 15 inches    Physical Exam  Vitals and nursing note reviewed.   Constitutional:       General: She is not in acute distress.     Appearance: She is well-developed. She is obese. She is not diaphoretic.   HENT:      Head: Normocephalic and atraumatic.      Comments: Mallampati 4 with wide tongue.   Eyes:      Extraocular Movements: Extraocular movements intact.      Conjunctiva/sclera: Conjunctivae normal.   Neck:      Trachea: Trachea normal.   Pulmonary:      Effort: Pulmonary effort is normal. No respiratory distress.   Musculoskeletal:         General: Normal range of " motion.   Skin:     General: Skin is warm and dry.   Neurological:      Mental Status: She is alert and oriented to person, place, and time.   Psychiatric:         Mood and Affect: Mood normal.         Behavior: Behavior normal.         Thought Content: Thought content normal.         Judgment: Judgment normal.       Assessment / Plan      Assessment/Plan:   Diagnoses and all orders for this visit:    1. Excessive daytime sleepiness (Primary)  -     Home Sleep Study; Future    2. Other insomnia  -     Home Sleep Study; Future    3. Snoring  -     Home Sleep Study; Future    4. Gastroesophageal reflux disease, unspecified whether esophagitis present  -     Home Sleep Study; Future    5. BMI 40.0-44.9, adult  -     Home Sleep Study; Future    *Patient education on LEIF provided today.   *Advised patient to avoid driving if drowsy.   *Encouraged weight loss with a BMI goal of 24.    *I have advised patient she is on multiple medications that can cause drowsiness and sleepiness.      Follow Up:   Return in about 3 months (around 3/19/2024) for F/U Obstructive Sleep Apnea.    I have advised the patient the need to continue the use of CPAP.  Gold standard for treatment of sleep apnea includes weight loss, use of cpap and avoidance of alcohol.  Untreated LEIF may increase the risk for development of hypertension, stroke, myocardial infarction, diabetes, cardiovascular disease, work-related issues and driving accidents. I have counseled and advised the patient to avoid driving or operating heavy/dangerous equipment if feeling drowsy.     JENNIE Zuluaga, FNP-C  [facility]

## 2024-01-08 ENCOUNTER — OFFICE VISIT (OUTPATIENT)
Dept: BEHAVIORAL HEALTH | Facility: CLINIC | Age: 59
End: 2024-01-08
Payer: MEDICARE

## 2024-01-08 VITALS — BODY MASS INDEX: 44.15 KG/M2 | WEIGHT: 265 LBS | HEIGHT: 65 IN

## 2024-01-08 DIAGNOSIS — F90.2 ATTENTION DEFICIT HYPERACTIVITY DISORDER, COMBINED TYPE: Primary | ICD-10-CM

## 2024-01-08 DIAGNOSIS — F51.04 PSYCHOPHYSIOLOGICAL INSOMNIA: ICD-10-CM

## 2024-01-08 DIAGNOSIS — Z51.81 ENCOUNTER FOR THERAPEUTIC DRUG MONITORING: ICD-10-CM

## 2024-01-08 DIAGNOSIS — F33.1 MAJOR DEPRESSIVE DISORDER, RECURRENT EPISODE, MODERATE: ICD-10-CM

## 2024-01-08 DIAGNOSIS — F41.1 GAD (GENERALIZED ANXIETY DISORDER): ICD-10-CM

## 2024-01-08 PROCEDURE — 99214 OFFICE O/P EST MOD 30 MIN: CPT

## 2024-01-08 PROCEDURE — 1160F RVW MEDS BY RX/DR IN RCRD: CPT

## 2024-01-08 PROCEDURE — 1159F MED LIST DOCD IN RCRD: CPT

## 2024-01-08 RX ORDER — METHYLPHENIDATE HYDROCHLORIDE 20 MG/1
20 TABLET ORAL 3 TIMES DAILY
Qty: 90 TABLET | Refills: 0 | Status: SHIPPED | OUTPATIENT
Start: 2024-01-08

## 2024-01-08 RX ORDER — DESVENLAFAXINE SUCCINATE 50 MG/1
50 TABLET, EXTENDED RELEASE ORAL DAILY
Qty: 90 TABLET | Refills: 0 | Status: SHIPPED | OUTPATIENT
Start: 2024-01-08

## 2024-01-08 RX ORDER — DULOXETIN HYDROCHLORIDE 60 MG/1
120 CAPSULE, DELAYED RELEASE ORAL DAILY
Qty: 180 CAPSULE | Refills: 0 | Status: SHIPPED | OUTPATIENT
Start: 2024-01-08

## 2024-01-08 RX ORDER — TRAZODONE HYDROCHLORIDE 100 MG/1
100 TABLET ORAL
Qty: 90 TABLET | Refills: 0 | Status: SHIPPED | OUTPATIENT
Start: 2024-01-08

## 2024-01-08 NOTE — PROGRESS NOTES
Follow Up Office Visit    Patient Name: Zhane Burnham  : 1965   MRN: [id number]   Care Team: Patient Care Team:  Latasha Curtis APRN as PCP - General (Family Medicine)  Magda Philip LPCC as Counselor (Behavioral Health)  Komal Aguiar APRN as Nurse Practitioner (Behavioral Health)         Chief Complaint:    Chief Complaint   Patient presents with    ADHD    Anxiety    Depression    Sleeping Problem    Med Management       History of Present Illness: Zhane Burnham is a 58 y.o. female who is here today for a medication management follow up. Patient reports that overall medication continues to be effective. She feels that her focus and task completion are doing good and her anxiety and mood are managed well. She continues to deal with a tremendous amount of situational stressors, but she does feel that between therapy and medication she is managing them well. She did not see the need to make any changes and did not have any medication concerns at today's visit. She denies SI/HI today.     The following portion of the patient's history were reviewed and updated appropriately: allergies, current and past medications, family history, medical history and social history.  Subjective   Review of Systems:    Review of Systems   Psychiatric/Behavioral:  Positive for stress. The patient is nervous/anxious.    All other systems reviewed and are negative.      Current Medications:   Current Outpatient Medications   Medication Sig Dispense Refill    BIOTIN PO Take  by mouth.      cyclobenzaprine (FLEXERIL) 10 MG tablet TAKE 1/2 TO 1 TABLET BY MOUTH THREE TIMES DAILY AS NEEDED FOR MUSCLE SPASMS 90 tablet 1    desvenlafaxine (PRISTIQ) 50 MG 24 hr tablet Take 1 tablet by mouth Daily. 90 tablet 0    DULoxetine (CYMBALTA) 60 MG capsule Take 2 capsules by mouth Daily. 180 capsule 0    dutasteride (AVODART) 0.5 MG capsule       etodolac (LODINE) 400 MG tablet Take 1/2-1 tablet by mouth every 8 hours as  "needed. 90 tablet 1    ezetimibe (Zetia) 10 MG tablet Take 1 tablet by mouth Daily. 90 tablet 1    fluconazole (DIFLUCAN) 150 MG tablet TAKE ONE TABLET BY MOUTH EVERY 3 DAYS 3 tablet 0    hydroCHLOROthiazide (HYDRODIURIL) 25 MG tablet Take 1 tablet by mouth Daily. 30 tablet 0    levocetirizine (XYZAL) 5 MG tablet TAKE ONE TABLET BY MOUTH EVERY EVENING 30 tablet 2    methylphenidate (RITALIN) 20 MG tablet Take 1 tablet by mouth 3 (Three) Times a Day. 90 tablet 0    Multiple Vitamins-Minerals (WOMENS 50+ MULTI VITAMIN PO) Take  by mouth.      oxyCODONE-acetaminophen (PERCOCET)  MG per tablet Take 1 tablet by mouth Every 6 (Six) Hours As Needed for Moderate Pain.      pantoprazole (PROTONIX) 40 MG EC tablet TAKE ONE TABLET BY MOUTH EVERY DAY 90 tablet 1    potassium chloride 10 MEQ CR tablet Take 1 tablet by mouth Daily. 30 tablet 5    pregabalin (LYRICA) 150 MG capsule Take 1 capsule by mouth 2 (Two) Times a Day.      simvastatin (ZOCOR) 40 MG tablet TAKE ONE TABLET BY MOUTH EVERY NIGHT 90 tablet 1    traZODone (DESYREL) 100 MG tablet Take 1 tablet by mouth every night at bedtime. 90 tablet 0     No current facility-administered medications for this visit.       Mental Status Exam:   Hygiene:   good  Cooperation:  Cooperative  Eye Contact:  Good  Psychomotor Behavior:  Appropriate  Affect:  Appropriate  Mood: normal  Speech:  Normal  Thought Process:  Goal directed and Linear  Thought Content:  Mood congruent  Suicidal:  None  Homicidal:  None  Hallucinations:  None  Delusion:  None  Memory:  Intact  Orientation:  Person, Place, Time, and Situation  Reliability:  good  Insight:  Good  Judgement:  Good  Impulse Control:  Good  Physical/Medical Issues:  Yes see chart       Objective   Vital Signs:   Ht 165.1 cm (65\")   Wt 120 kg (265 lb)   BMI 44.10 kg/m²       Assessment / Plan    Diagnoses and all orders for this visit:    1. Attention deficit hyperactivity disorder, combined type (Primary)  -     " methylphenidate (RITALIN) 20 MG tablet; Take 1 tablet by mouth 3 (Three) Times a Day.  Dispense: 90 tablet; Refill: 0    2. Encounter for therapeutic drug monitoring  -     Compliance Drug Analysis, Ur - Urine, Clean Catch    3. Major depressive disorder, recurrent episode, moderate  -     desvenlafaxine (PRISTIQ) 50 MG 24 hr tablet; Take 1 tablet by mouth Daily.  Dispense: 90 tablet; Refill: 0  -     DULoxetine (CYMBALTA) 60 MG capsule; Take 2 capsules by mouth Daily.  Dispense: 180 capsule; Refill: 0    4. MANISHA (generalized anxiety disorder)  -     desvenlafaxine (PRISTIQ) 50 MG 24 hr tablet; Take 1 tablet by mouth Daily.  Dispense: 90 tablet; Refill: 0  -     DULoxetine (CYMBALTA) 60 MG capsule; Take 2 capsules by mouth Daily.  Dispense: 180 capsule; Refill: 0    5. Psychophysiological insomnia  -     traZODone (DESYREL) 100 MG tablet; Take 1 tablet by mouth every night at bedtime.  Dispense: 90 tablet; Refill: 0       Patient appears to be doing well on current medication. No issues reported and no indication for change. Will continue medication as ordered.     A psychological evaluation was conducted in order to assess past and current level of functioning. Areas assessed included, but were not limited to: perception of social support, perception of ability to face and deal with challenges in life (positive functioning), anxiety symptoms, depressive symptoms, perspective on beliefs/belief system, coping skills for stress, intelligence level,  Therapeutic rapport was established. Interventions conducted today were geared towards incorporating medication management along with support for continued therapy. Education was also provided as to the med management with this provider and what to expect in subsequent sessions.      We discussed risks, benefits, goals and side effects of the above medication and the patient was agreeable with the plan. Patient was educated on the importance of compliance with treatment and  follow-up appointments. Patient is aware to contact the [facility] with any worsening of symptoms. To call for questions or concerns and return early if necessary. Patent is agreeable to go to the Emergency Department or call 911 should they begin SI/HI.      PHQ-2/PHQ-9: Depression Screening  Little Interest or Pleasure in Doing Things: 3-->nearly every day  Feeling Down, Depressed or Hopeless: 2-->more than half the days  PHQ-2 Total Score: 5  Trouble Falling or Staying Asleep, or Sleeping Too Much: 2-->more than half the days  Feeling Tired or Having Little Energy: 3-->nearly every day  Poor Appetite or Overeatin-->more than half the days  Feeling Bad about Yourself - or that You are a Failure or Have Let Yourself or Your Family Down: 2-->more than half the days  Trouble Concentrating on Things, Such as Reading the Newspaper or Watching Television: 3-->nearly every day  Moving or Speaking So Slowly that Other People Could Have Noticed? Or the Opposite - Being So Fidgety: 0-->not at all  Thoughts that You Would be Better Off Dead or of Hurting Yourself in Some Way: 0-->not at all  PHQ-9: Brief Depression Severity Measure Score: 17  If You Checked Off Any Problems, How Difficult Have These Problems Made It For You to Do Your Work, Take Care of Things at Home, or Get Along with Other People?: very difficult      PHQ-9 Score:   PHQ-9 Total Score: 17    Depression Screening:  Patient screened positive for depression based on a PHQ-9 score of 17 on 2024. Follow-up recommendations include: Prescribed antidepressant medication treatment and Suicide Risk Assessment performed.      Over the last two weeks, how often have you been bothered by the following problems?  Feeling nervous, anxious or on edge: Nearly every day  Not being able to stop or control worrying: Nearly every day  Worrying too much about different things: Nearly every day  Trouble Relaxing: Nearly every day  Being so restless that it is hard to  sit still: More than half the days  Becoming easily annoyed or irritable: Several days  Feeling afraid as if something awful might happen: Several days  MANISHA 7 Total Score: 16  If you checked any problems, how difficult have these problems made it for you to do your work, take care of things at home, or get along with other people: Extremely difficult        Screening for Adults With ADHD - (1-6)  1. How often do you have trouble wrapping up the final details of a project, once the challenging parts have been done?: Very Often  2. How often do you have difficulty getting things in order when you have to do a task that requires organization?: Very Often  3. How often do you have problems remembering appointments or obligations : Sometimes  4. When you have a task that requires a lot of thought, how often do you avoid or delay getting started ?: Very Often  5. How often do you fidget or squirm with your hands or feet when you have to sit down for a long time?: Often  6. How often do you feel overly active and compelled to do things, like you were driven by a motor?: Often  7. How often do you make careless mistakes when you have to work on a boring or difficult project?: Sometimes  8. How often do have difficulty keeping your attention when you are doing boring or repetitive work?: Very Often  9. How often do you have difficulty concentrating on what people say to you, even when they are speaking to you: Sometimes  10.How often do you misplace or have difficulty finding things at home or at work?: Often  11.How often are you distracted by activity or noise around you?: Very Often  12.How often do you leave your seat in meetings or other situations in which you are expected to remain seated?: Often  13.How often do you feel restless or fidgety?: Sometimes  14.How often do you have difficulty unwinding and relaxing when you have time to yourself?: Sometimes  15.How often do you find yourself talking too much when you are  in social situations?: Sometimes  16.When you’re in a conversation, how often do you find yourself finishing the sentences of the people you are talking to, before they can finish them themselves?: Very Often  17.How often do you have difficulty waiting your turn in situations when turn taking is required?: Often  18.How often do you interrupt others when they are busy?: Often        MEDS ORDERED DURING VISIT:  New Medications Ordered This Visit   Medications    desvenlafaxine (PRISTIQ) 50 MG 24 hr tablet     Sig: Take 1 tablet by mouth Daily.     Dispense:  90 tablet     Refill:  0     This prescription was filled on 5/8/2023. Any refills authorized will be placed on file.    DULoxetine (CYMBALTA) 60 MG capsule     Sig: Take 2 capsules by mouth Daily.     Dispense:  180 capsule     Refill:  0     This prescription was filled on 5/18/2023. Any refills authorized will be placed on file.    methylphenidate (RITALIN) 20 MG tablet     Sig: Take 1 tablet by mouth 3 (Three) Times a Day.     Dispense:  90 tablet     Refill:  0    traZODone (DESYREL) 100 MG tablet     Sig: Take 1 tablet by mouth every night at bedtime.     Dispense:  90 tablet     Refill:  0     This prescription was filled on 12/31/2022. Any refills authorized will be placed on file.         Follow Up   Return in about 3 months (around 4/8/2024).  Patient was given instructions and counseling regarding her condition or for health maintenance advice. Please see specific information pulled into the AVS if appropriate.     TREATMENT PLAN/GOALS: Continue supportive psychotherapy efforts and medications as indicated. Treatment and medication options discussed during today's visit. Patient acknowledged and verbally consented to continue with current treatment plan and was educated on the importance of compliance with treatment and follow-up appointments.    MEDICATION ISSUES:  Discussed medication options and treatment plan of prescribed medication as well as  the risks, benefits, and side effects including potential falls, possible impaired driving and metabolic adversities among others. Patient is agreeable to call the office with any worsening of symptoms or onset of side effects. Patient is agreeable to call 911 or go to the nearest ER should he/she begin having SI/HI.        JENNIE [facility]  [address] [place] [address] [phone]    January 8, 2024 15:14 EST

## 2024-01-10 ENCOUNTER — OFFICE VISIT (OUTPATIENT)
Dept: PSYCHIATRY | Facility: CLINIC | Age: 59
End: 2024-01-10
Payer: MEDICARE

## 2024-01-10 DIAGNOSIS — F41.1 GENERALIZED ANXIETY DISORDER: ICD-10-CM

## 2024-01-10 DIAGNOSIS — F33.1 MAJOR DEPRESSIVE DISORDER, RECURRENT EPISODE, MODERATE: ICD-10-CM

## 2024-01-10 NOTE — PROGRESS NOTES
Date: 01/10/2024   Time In: 1440  Time Out: 1527    PROGRESS NOTE  Data:  Zhane Burnham is a 58 y.o. female who presents today for individual therapy session at [facility]. Chief Complaint: depression and anxiety    Patient reports depressed mood, difficulty completing tasks around the house, anxiety and panic. She reports continued stress and worry regarding car situation. She reports worry regarding niece.      Clinical Maneuvering/Intervention:    Assisted patient in processing above session content; acknowledged and normalized patient’s thoughts, feelings, and concerns.  Rationalized patient thought process regarding mood and increased stress.Patient is problem solving and attempting to find solutions to stressors.  Discussed triggers associated with patient's depression.  Also discussed coping skills for patient to implement such as challenging negative thoughts, using supports, completing activities and worry postponement.    Allowed patient to freely discuss issues without interruption or judgment. Provided safe, confidential environment to facilitate the development of positive therapeutic relationship and encourage open, honest communication. Assisted patient in identifying risk factors which would indicate the need for higher level of care including thoughts to harm self or others and/or self-harming behavior and encouraged patient to contact this office, call 911, or present to the nearest emergency room should any of these events occur. Discussed crisis intervention services and means to access. Patient adamantly and convincingly denies current suicidal or homicidal ideation or perceptual disturbance.    Assessment   Patient appears to maintain relative stability as compared to their baseline.  However, patient continues to struggle with depression and anxiety which continues to cause impairment in important areas of functioning.  As a result, they can be reasonably expected to  continue to benefit from treatment and would likely be at increased risk for decompensation otherwise.    Mental Status Exam:   Hygiene:   good  Cooperation:  Cooperative  Eye Contact:  Good  Psychomotor Behavior:  Appropriate  Affect:  Appropriate  Mood: normal  Speech:  Normal  Thought Process:  Goal directed and Linear  Thought Content:  Mood congruent  Suicidal:  None  Homicidal:  None  Hallucinations:  None  Delusion:  None  Memory:  Intact  Orientation:  Person, Place, Time, and Situation  Reliability:  good  Insight:  Good  Judgement:  Good  Impulse Control:  Good  Physical/Medical Issues:  Yes chronic pain,       Patient's Support Network Includes:   friends and niece    Functional Status: Moderate impairment     Progress toward goal: Not at goal    Prognosis: Good with Ongoing Treatment          Plan     VISIT DIAGNOSIS:     ICD-10-CM ICD-9-CM   1. Major depressive disorder, recurrent episode, moderate  F33.1 296.32   2. Generalized anxiety disorder  F41.1 300.02        Patient will continue in individual outpatient therapy with focus on improved functioning and coping skills, maintaining stability, and avoiding decompensation and the need for higher level of care.    Patient will adhere to medication regimen as prescribed and report any side effects. Patient will contact this office, call 911 or present to the nearest emergency room should suicidal or homicidal ideations occur. Provide Cognitive Behavioral Therapy and Solution Focused Therapy to improve functioning, maintain stability, and avoid decompensation and the need for higher level of care.     Return in about Return in about 2 weeks (around 1/24/2024). or earlier if symptoms worsen or fail to improve.            This document has been electronically signed by Sydney Alegria LCSW  January 10, 2024 14:39 EST      Part of this note may be an electronic transcription/translation of spoken language to printed text using the Dragon Dictation System.

## 2024-01-13 LAB — DRUGS UR: NORMAL

## 2024-01-19 DIAGNOSIS — I10 PRIMARY HYPERTENSION: ICD-10-CM

## 2024-01-19 RX ORDER — HYDROCHLOROTHIAZIDE 25 MG/1
25 TABLET ORAL DAILY
Qty: 30 TABLET | Refills: 0 | Status: SHIPPED | OUTPATIENT
Start: 2024-01-19

## 2024-01-24 ENCOUNTER — OFFICE VISIT (OUTPATIENT)
Dept: PSYCHIATRY | Facility: CLINIC | Age: 59
End: 2024-01-24
Payer: MEDICARE

## 2024-01-24 DIAGNOSIS — F41.1 GENERALIZED ANXIETY DISORDER: ICD-10-CM

## 2024-01-24 DIAGNOSIS — F33.1 MAJOR DEPRESSIVE DISORDER, RECURRENT EPISODE, MODERATE: ICD-10-CM

## 2024-01-24 NOTE — PROGRESS NOTES
Date: 01/24/2024   Time In: 1425  Time Out: 1503    PROGRESS NOTE  Data:  Zhane Burnham is a 58 y.o. female who presents today for individual therapy session at [facility]. Chief Complaint: depression and anxiety    Patient reports worry and feeling like she is really struggling this month. She reports ongoing stressors. She reports worry for niece.      Clinical Maneuvering/Intervention:      Assisted patient in processing above session content; acknowledged and normalized patient’s thoughts, feelings, and concerns.  Rationalized patient thought process regarding her worry for niece and financial stressors. Patient explored ways she is working through stressors. She reports improvements in relationship with niece.   Discussed triggers associated with patient's depression.  Also discussed coping skills for patient to implement such as previously discussed coping, behavioral activation and challenging negative thoughts. Patient requested to end session early today.    Allowed patient to freely discuss issues without interruption or judgment. Provided safe, confidential environment to facilitate the development of positive therapeutic relationship and encourage open, honest communication. Assisted patient in identifying risk factors which would indicate the need for higher level of care including thoughts to harm self or others and/or self-harming behavior and encouraged patient to contact this office, call 911, or present to the nearest emergency room should any of these events occur. Discussed crisis intervention services and means to access. Patient adamantly and convincingly denies current suicidal or homicidal ideation or perceptual disturbance.    Assessment   Patient appears to maintain relative stability as compared to their baseline.  However, patient continues to struggle with depression and anxiety which continues to cause impairment in important areas of functioning.  As a result,  they can be reasonably expected to continue to benefit from treatment and would likely be at increased risk for decompensation otherwise.    Mental Status Exam:   Hygiene:   good  Cooperation:  Cooperative  Eye Contact:  Fair  Psychomotor Behavior:  Appropriate  Affect:  Appropriate  Mood: depressed  Speech:  Normal  Thought Process:  Goal directed and Linear  Thought Content:  Mood congruent  Suicidal:  None  Homicidal:  None  Hallucinations:  None  Delusion:  None  Memory:  Intact  Orientation:  Person, Place, Time, and Situation  Reliability:  good  Insight:  Good  Judgement:  Good  Impulse Control:  Good  Physical/Medical Issues:  Yes chronic pain      Patient's Support Network Includes:   friends and niece    Functional Status: Moderate impairment     Progress toward goal: Not at goal    Prognosis: Good with Ongoing Treatment          Plan     VISIT DIAGNOSIS:     ICD-10-CM ICD-9-CM   1. Major depressive disorder, recurrent episode, moderate  F33.1 296.32   2. Generalized anxiety disorder  F41.1 300.02        Patient will continue in individual outpatient therapy with focus on improved functioning and coping skills, maintaining stability, and avoiding decompensation and the need for higher level of care.    Patient will adhere to medication regimen as prescribed and report any side effects. Patient will contact this office, call 911 or present to the nearest emergency room should suicidal or homicidal ideations occur. Provide Cognitive Behavioral Therapy and Solution Focused Therapy to improve functioning, maintain stability, and avoid decompensation and the need for higher level of care.     Return in about Return in about 2 weeks (around 2/7/2024). or earlier if symptoms worsen or fail to improve.            This document has been electronically signed by Sydney Alegria LCSW  January 24, 2024 14:23 EST      Part of this note may be an electronic transcription/translation of spoken language to printed text  using the Dragon Dictation System.

## 2024-01-31 DIAGNOSIS — J30.2 SEASONAL ALLERGIES: ICD-10-CM

## 2024-02-01 RX ORDER — LEVOCETIRIZINE DIHYDROCHLORIDE 5 MG/1
TABLET, FILM COATED ORAL
Qty: 30 TABLET | Refills: 2 | Status: SHIPPED | OUTPATIENT
Start: 2024-02-01

## 2024-02-06 ENCOUNTER — OFFICE VISIT (OUTPATIENT)
Dept: PSYCHIATRY | Facility: CLINIC | Age: 59
End: 2024-02-06
Payer: MEDICARE

## 2024-02-06 DIAGNOSIS — F41.1 GENERALIZED ANXIETY DISORDER: ICD-10-CM

## 2024-02-06 DIAGNOSIS — F33.1 MAJOR DEPRESSIVE DISORDER, RECURRENT EPISODE, MODERATE: ICD-10-CM

## 2024-02-06 PROCEDURE — 90837 PSYTX W PT 60 MINUTES: CPT | Performed by: SOCIAL WORKER

## 2024-02-06 NOTE — PROGRESS NOTES
Date: 02/06/2024   Time In: 1435  Time Out: 1533    PROGRESS NOTE  Data:  Zhane Burnham is a 58 y.o. female who presents today for individual therapy session at [facility]. Chief Complaint: depression and anxiety    Patient reports not feeling well. She reports physical pain from health problems. She reports difficulty with worry and completing household tasks. She reports strained relationship with friend and feeling she has lost a support.      Clinical Maneuvering/Intervention:    Assisted patient in processing above session content; acknowledged and normalized patient’s thoughts, feelings, and concerns.  Rationalized patient thought process regarding worry and changes in current friendship.  Discussed triggers associated with patient's anxiety.  Also discussed coping skills for patient to implement such as previously discussed coping,  challenging anxious thoughts. We discussed support groups such as steff.    Allowed patient to freely discuss issues without interruption or judgment. Provided safe, confidential environment to facilitate the development of positive therapeutic relationship and encourage open, honest communication. Assisted patient in identifying risk factors which would indicate the need for higher level of care including thoughts to harm self or others and/or self-harming behavior and encouraged patient to contact this office, call 911, or present to the nearest emergency room should any of these events occur. Discussed crisis intervention services and means to access. Patient adamantly and convincingly denies current suicidal or homicidal ideation or perceptual disturbance.    Assessment   Patient appears to maintain relative stability as compared to their baseline.  However, patient continues to struggle with depression and anxiety which continues to cause impairment in important areas of functioning.  As a result, they can be reasonably expected to continue to benefit  from treatment and would likely be at increased risk for decompensation otherwise.    Mental Status Exam:   Hygiene:   good  Cooperation:  Cooperative  Eye Contact:  Good  Psychomotor Behavior:  Appropriate  Affect:  Appropriate  Mood: normal  Speech:  Normal  Thought Process:  Goal directed and Linear  Thought Content:  Mood congruent  Suicidal:  None  Homicidal:  None  Hallucinations:  None  Delusion:  None  Memory:  Intact  Orientation:  Person, Place, Time, and Situation  Reliability:  good  Insight:  Good  Judgement:  Good  Impulse Control:  Good  Physical/Medical Issues:  Yes        Patient's Support Network Includes:   friends and niece    Functional Status: Moderate impairment     Progress toward goal: Not at goal    Prognosis: Good with Ongoing Treatment          Plan     VISIT DIAGNOSIS:     ICD-10-CM ICD-9-CM   1. Major depressive disorder, recurrent episode, moderate  F33.1 296.32   2. Generalized anxiety disorder  F41.1 300.02        Patient will continue in individual outpatient therapy with focus on improved functioning and coping skills, maintaining stability, and avoiding decompensation and the need for higher level of care.    Patient will adhere to medication regimen as prescribed and report any side effects. Patient will contact this office, call 911 or present to the nearest emergency room should suicidal or homicidal ideations occur. Provide Cognitive Behavioral Therapy and Solution Focused Therapy to improve functioning, maintain stability, and avoid decompensation and the need for higher level of care.     Return in about Return in about 2 weeks (around 2/20/2024). or earlier if symptoms worsen or fail to improve.            This document has been electronically signed by Sydney Alegria LCSW  February 6, 2024 14:34 EST      Part of this note may be an electronic transcription/translation of spoken language to printed text using the Dragon Dictation System.

## 2024-02-12 DIAGNOSIS — M62.838 MUSCLE SPASM: ICD-10-CM

## 2024-02-12 RX ORDER — CYCLOBENZAPRINE HCL 10 MG
TABLET ORAL
Qty: 90 TABLET | Refills: 1 | Status: SHIPPED | OUTPATIENT
Start: 2024-02-12

## 2024-02-19 RX ORDER — EZETIMIBE 10 MG/1
10 TABLET ORAL DAILY
Qty: 90 TABLET | Refills: 1 | Status: SHIPPED | OUTPATIENT
Start: 2024-02-19

## 2024-02-27 DIAGNOSIS — I10 PRIMARY HYPERTENSION: ICD-10-CM

## 2024-02-27 RX ORDER — HYDROCHLOROTHIAZIDE 25 MG/1
25 TABLET ORAL DAILY
Qty: 30 TABLET | Refills: 0 | Status: SHIPPED | OUTPATIENT
Start: 2024-02-27

## 2024-03-01 DIAGNOSIS — F51.04 PSYCHOPHYSIOLOGICAL INSOMNIA: ICD-10-CM

## 2024-03-01 DIAGNOSIS — F41.1 GAD (GENERALIZED ANXIETY DISORDER): ICD-10-CM

## 2024-03-01 DIAGNOSIS — F90.2 ATTENTION DEFICIT HYPERACTIVITY DISORDER, COMBINED TYPE: ICD-10-CM

## 2024-03-01 DIAGNOSIS — F33.1 MAJOR DEPRESSIVE DISORDER, RECURRENT EPISODE, MODERATE: ICD-10-CM

## 2024-03-01 DIAGNOSIS — I10 PRIMARY HYPERTENSION: ICD-10-CM

## 2024-03-01 DIAGNOSIS — J30.2 SEASONAL ALLERGIES: ICD-10-CM

## 2024-03-01 RX ORDER — TRAZODONE HYDROCHLORIDE 100 MG/1
100 TABLET ORAL
Qty: 90 TABLET | Refills: 0 | Status: SHIPPED | OUTPATIENT
Start: 2024-03-01

## 2024-03-01 RX ORDER — PANTOPRAZOLE SODIUM 40 MG/1
40 TABLET, DELAYED RELEASE ORAL DAILY
Qty: 90 TABLET | Refills: 1 | Status: SHIPPED | OUTPATIENT
Start: 2024-03-01

## 2024-03-01 RX ORDER — LEVOCETIRIZINE DIHYDROCHLORIDE 5 MG/1
5 TABLET, FILM COATED ORAL EVERY EVENING
Qty: 30 TABLET | Refills: 2 | Status: SHIPPED | OUTPATIENT
Start: 2024-03-01

## 2024-03-01 RX ORDER — DULOXETIN HYDROCHLORIDE 60 MG/1
120 CAPSULE, DELAYED RELEASE ORAL DAILY
Qty: 180 CAPSULE | Refills: 0 | Status: SHIPPED | OUTPATIENT
Start: 2024-03-01

## 2024-03-01 RX ORDER — HYDROCHLOROTHIAZIDE 25 MG/1
25 TABLET ORAL DAILY
Qty: 30 TABLET | Refills: 0 | Status: CANCELLED | OUTPATIENT
Start: 2024-03-01

## 2024-03-01 NOTE — TELEPHONE ENCOUNTER
"Caller: Zhane Burnham \"Shemar\"    Relationship: Self    Best call back number: [phone]     Requested Prescriptions:   Requested Prescriptions     Pending Prescriptions Disp Refills    hydroCHLOROthiazide 25 MG tablet 30 tablet 0     Sig: Take 1 tablet by mouth Daily.    pantoprazole (PROTONIX) 40 MG EC tablet 90 tablet 1     Sig: Take 1 tablet by mouth Daily.    levocetirizine (XYZAL) 5 MG tablet 30 tablet 2     Sig: Take 1 tablet by mouth Every Evening.      Pharmacy where request should be sent: [facility] [address] [phone] [facility] [phone]      Last office visit with prescribing clinician: 10/18/2023   Last telemedicine visit with prescribing clinician: Visit date not found   Next office visit with prescribing clinician: Visit date not found     Emanuel Zuñiga Rep   03/01/24 16:22 EST   "

## 2024-03-01 NOTE — TELEPHONE ENCOUNTER
Rx Refill Note  Requested Prescriptions     Pending Prescriptions Disp Refills    methylphenidate (RITALIN) 20 MG tablet 90 tablet 0     Sig: Take 1 tablet by mouth 3 (Three) Times a Day.     Signed Prescriptions Disp Refills    traZODone (DESYREL) 100 MG tablet 90 tablet 0     Sig: Take 1 tablet by mouth every night at bedtime.     Authorizing Provider: PJ SAENZ     Ordering User: MAURA ALBARADO    DULoxetine (CYMBALTA) 60 MG capsule 180 capsule 0     Sig: Take 2 capsules by mouth Daily.     Authorizing Provider: PJ SAENZ     Ordering User: MAURA ALBARADO      Last office visit with prescribing clinician: 1/8/2024   Last telemedicine visit with prescribing clinician: Visit date not found   Next office visit with prescribing clinician: 4/8/2024    Ok to fill?        Maura Albarado MA  03/01/24, 16:29 EST

## 2024-03-04 RX ORDER — METHYLPHENIDATE HYDROCHLORIDE 20 MG/1
20 TABLET ORAL 3 TIMES DAILY
Qty: 90 TABLET | Refills: 0 | Status: SHIPPED | OUTPATIENT
Start: 2024-03-04

## 2024-03-14 ENCOUNTER — OFFICE VISIT (OUTPATIENT)
Dept: PSYCHIATRY | Facility: CLINIC | Age: 59
End: 2024-03-14
Payer: MEDICARE

## 2024-03-14 DIAGNOSIS — F33.1 MAJOR DEPRESSIVE DISORDER, RECURRENT EPISODE, MODERATE: ICD-10-CM

## 2024-03-14 DIAGNOSIS — F41.1 GENERALIZED ANXIETY DISORDER: ICD-10-CM

## 2024-03-14 NOTE — PROGRESS NOTES
Date: 03/14/2024   Time In: 1440  Time Out: 1532    PROGRESS NOTE  Data:  Zhane Burnham is a 58 y.o. female who presents today for individual therapy session at [facility]. Chief Complaint: depression and anxiety    Patient reports feeling tired and not well today. She reports feeling sad regarding friend being in hospice care. She discussed continued chronic pain and how this contributes to her depression. She reports improved communication at home.      Clinical Maneuvering/Intervention:    Assisted patient in processing above session content; acknowledged and normalized patient’s thoughts, feelings, and concerns.  Rationalized patient thought process regarding strained relationships and mood  Discussed triggers associated with patient's depression.  Also discussed coping skills for patient to implement such as previously discussed coping, challenging negative thoughts, behavioral activation.    Allowed patient to freely discuss issues without interruption or judgment. Provided safe, confidential environment to facilitate the development of positive therapeutic relationship and encourage open, honest communication. Assisted patient in identifying risk factors which would indicate the need for higher level of care including thoughts to harm self or others and/or self-harming behavior and encouraged patient to contact this office, call 911, or present to the nearest emergency room should any of these events occur. Discussed crisis intervention services and means to access. Patient adamantly and convincingly denies current suicidal or homicidal ideation or perceptual disturbance.    Assessment   Patient appears to maintain relative stability as compared to their baseline.  However, patient continues to struggle with depression which continues to cause impairment in important areas of functioning.  As a result, they can be reasonably expected to continue to benefit from treatment and would  likely be at increased risk for decompensation otherwise.    Mental Status Exam:   Hygiene:   good  Cooperation:  Cooperative  Eye Contact:  Good  Psychomotor Behavior:  Appropriate  Affect:  Appropriate  Mood: normal  Speech:  Normal  Thought Process:  Goal directed and Linear  Thought Content:  Mood congruent  Suicidal:  None  Homicidal:  None  Hallucinations:  None  Delusion:  None  Memory:  Intact  Orientation:  Person, Place, Time, and Situation  Reliability:  good  Insight:  Good  Judgement:  Good  Impulse Control:  Good  Physical/Medical Issues:  Yes        Patient's Support Network Includes:   friends and niece    Functional Status: Moderate impairment     Progress toward goal: Not at goal    Prognosis: Good with Ongoing Treatment          Plan     VISIT DIAGNOSIS:     ICD-10-CM ICD-9-CM   1. Major depressive disorder, recurrent episode, moderate  F33.1 296.32   2. Generalized anxiety disorder  F41.1 300.02        Patient will continue in individual outpatient therapy with focus on improved functioning and coping skills, maintaining stability, and avoiding decompensation and the need for higher level of care.    Patient will adhere to medication regimen as prescribed and report any side effects. Patient will contact this office, call 911 or present to the nearest emergency room should suicidal or homicidal ideations occur. Provide Cognitive Behavioral Therapy and Solution Focused Therapy to improve functioning, maintain stability, and avoid decompensation and the need for higher level of care.     Return in about Return in about 2 weeks (around 3/28/2024). or earlier if symptoms worsen or fail to improve.            This document has been electronically signed by Sydney Alegria LCSW  March 14, 2024 13:40 EDT      Part of this note may be an electronic transcription/translation of spoken language to printed text using the Dragon Dictation System.

## 2024-03-29 DIAGNOSIS — E78.2 MIXED HYPERLIPIDEMIA: ICD-10-CM

## 2024-03-29 RX ORDER — SIMVASTATIN 40 MG
40 TABLET ORAL
Qty: 90 TABLET | Refills: 1 | Status: SHIPPED | OUTPATIENT
Start: 2024-03-29

## 2024-04-09 ENCOUNTER — OFFICE VISIT (OUTPATIENT)
Dept: PSYCHIATRY | Facility: CLINIC | Age: 59
End: 2024-04-09
Payer: MEDICARE

## 2024-04-09 ENCOUNTER — HOSPITAL ENCOUNTER (OUTPATIENT)
Dept: SLEEP MEDICINE | Facility: HOSPITAL | Age: 59
Discharge: HOME | End: 2024-04-09
Admitting: NURSE PRACTITIONER
Payer: MEDICARE

## 2024-04-09 DIAGNOSIS — F41.1 GENERALIZED ANXIETY DISORDER: ICD-10-CM

## 2024-04-09 DIAGNOSIS — K21.9 GASTROESOPHAGEAL REFLUX DISEASE, UNSPECIFIED WHETHER ESOPHAGITIS PRESENT: ICD-10-CM

## 2024-04-09 DIAGNOSIS — R06.83 SNORING: ICD-10-CM

## 2024-04-09 DIAGNOSIS — I10 PRIMARY HYPERTENSION: ICD-10-CM

## 2024-04-09 DIAGNOSIS — F33.1 MAJOR DEPRESSIVE DISORDER, RECURRENT EPISODE, MODERATE: ICD-10-CM

## 2024-04-09 DIAGNOSIS — F90.2 ATTENTION DEFICIT HYPERACTIVITY DISORDER, COMBINED TYPE: ICD-10-CM

## 2024-04-09 DIAGNOSIS — G47.09 OTHER INSOMNIA: ICD-10-CM

## 2024-04-09 DIAGNOSIS — G47.19 EXCESSIVE DAYTIME SLEEPINESS: ICD-10-CM

## 2024-04-09 PROCEDURE — 95806 SLEEP STUDY UNATT&RESP EFFT: CPT

## 2024-04-09 NOTE — PROGRESS NOTES
"Date: 04/09/2024   Time In: 1554  Time Out: 1642    PROGRESS NOTE  Data:  Zhane Burnham is a 58 y.o. female who presents today for individual therapy session at [facility]. Chief Complaint: depression and anxiety    Patient presents for follow up for depression and anxiety. Patient reports she is feeling exhausted and that she has not been not sleeping well. She reports she will be completing a sleep study tonight at home. She discussed thoughts of being her \"Own worst critic\". She reports difficulty completing tasks.       Clinical Maneuvering/Intervention:    Assisted patient in processing above session content; acknowledged and normalized patient’s thoughts, feelings, and concerns.  Rationalized patient thought process regarding depression.  Discussed triggers associated with patient's depression. Reviewed cognitive model.  Also discussed coping skills for patient to implement such as challenging negative thoughts about self.     Allowed patient to freely discuss issues without interruption or judgment. Provided safe, confidential environment to facilitate the development of positive therapeutic relationship and encourage open, honest communication. Assisted patient in identifying risk factors which would indicate the need for higher level of care including thoughts to harm self or others and/or self-harming behavior and encouraged patient to contact this office, call 911, or present to the nearest emergency room should any of these events occur. Discussed crisis intervention services and means to access. Patient adamantly and convincingly denies current suicidal or homicidal ideation or perceptual disturbance.    Assessment   Patient appears to maintain relative stability as compared to their baseline.  However, patient continues to struggle with depression and anxiety which continues to cause impairment in important areas of functioning.  As a result, they can be reasonably expected to " continue to benefit from treatment and would likely be at increased risk for decompensation otherwise.    Mental Status Exam:   Hygiene:   good  Cooperation:  Cooperative  Eye Contact:  Good  Psychomotor Behavior:  Appropriate  Affect:  Appropriate  Mood: normal  Speech:  Normal  Thought Process:  Goal directed and Linear  Thought Content:  Mood congruent  Suicidal:  None  Homicidal:  None  Hallucinations:  None  Delusion:  None  Memory:  Intact  Orientation:  Person, Place, Time, and Situation  Reliability:  good  Insight:  Good  Judgement:  Good  Impulse Control:  Good  Physical/Medical Issues:  Yes        Patient's Support Network Includes:   friend and niece    Functional Status: Moderate impairment     Progress toward goal: At goal    Prognosis: Good with Ongoing Treatment          Plan     VISIT DIAGNOSIS:     ICD-10-CM ICD-9-CM   1. Major depressive disorder, recurrent episode, moderate  F33.1 296.32   2. Generalized anxiety disorder  F41.1 300.02        Patient will continue in individual outpatient therapy with focus on improved functioning and coping skills, maintaining stability, and avoiding decompensation and the need for higher level of care.    Patient will adhere to medication regimen as prescribed and report any side effects. Patient will contact this office, call 911 or present to the nearest emergency room should suicidal or homicidal ideations occur. Provide Cognitive Behavioral Therapy and Solution Focused Therapy to improve functioning, maintain stability, and avoid decompensation and the need for higher level of care.     Return in about Return in about 2 weeks (around 4/23/2024). or earlier if symptoms worsen or fail to improve.            This document has been electronically signed by Sydney Alegria LCSW  April 9, 2024 15:43 EDT      Part of this note may be an electronic transcription/translation of spoken language to printed text using the Dragon Dictation System.

## 2024-04-10 DIAGNOSIS — M62.838 MUSCLE SPASM: ICD-10-CM

## 2024-04-10 RX ORDER — HYDROCHLOROTHIAZIDE 25 MG/1
25 TABLET ORAL DAILY
Qty: 30 TABLET | Refills: 0 | Status: SHIPPED | OUTPATIENT
Start: 2024-04-10

## 2024-04-10 RX ORDER — METHYLPHENIDATE HYDROCHLORIDE 20 MG/1
20 TABLET ORAL 3 TIMES DAILY
Qty: 90 TABLET | Refills: 0 | Status: SHIPPED | OUTPATIENT
Start: 2024-04-10

## 2024-04-10 RX ORDER — CYCLOBENZAPRINE HCL 10 MG
TABLET ORAL
Qty: 90 TABLET | Refills: 1 | Status: SHIPPED | OUTPATIENT
Start: 2024-04-10

## 2024-04-10 NOTE — TELEPHONE ENCOUNTER
Rx Refill Note  Requested Prescriptions     Pending Prescriptions Disp Refills    methylphenidate (RITALIN) 20 MG tablet [Pharmacy Med Name: methylphenidate 20 mg tablet] 90 tablet 0     Sig: TAKE ONE TABLET BY MOUTH THREE TIMES DAILY      Last office visit with prescribing clinician: 1/8/2024   Last telemedicine visit with prescribing clinician: Visit date not found   Next office visit with prescribing clinician: 5/21/2024                         Would you like a call back once the refill request has been completed: [] Yes [] No    If the office needs to give you a call back, can they leave a voicemail: [] Yes [] No    Rosemary Agudelo MA  04/10/24, 08:01 EDT

## 2024-04-18 DIAGNOSIS — J30.2 SEASONAL ALLERGIES: ICD-10-CM

## 2024-04-18 RX ORDER — LEVOCETIRIZINE DIHYDROCHLORIDE 5 MG/1
5 TABLET, FILM COATED ORAL EVERY EVENING
Qty: 30 TABLET | Refills: 2 | Status: SHIPPED | OUTPATIENT
Start: 2024-04-18

## 2024-04-24 PROCEDURE — 95806 SLEEP STUDY UNATT&RESP EFFT: CPT | Performed by: INTERNAL MEDICINE

## 2024-04-25 ENCOUNTER — OFFICE VISIT (OUTPATIENT)
Dept: PSYCHIATRY | Facility: CLINIC | Age: 59
End: 2024-04-25
Payer: MEDICARE

## 2024-04-25 DIAGNOSIS — F41.1 GENERALIZED ANXIETY DISORDER: ICD-10-CM

## 2024-04-25 DIAGNOSIS — F33.1 MAJOR DEPRESSIVE DISORDER, RECURRENT EPISODE, MODERATE: ICD-10-CM

## 2024-04-25 PROCEDURE — 90837 PSYTX W PT 60 MINUTES: CPT | Performed by: SOCIAL WORKER

## 2024-04-25 NOTE — PROGRESS NOTES
Date: 04/25/2024   Time In: 1430  Time Out: 1529    PROGRESS NOTE  Data:  Zhane Burnham is a 59 y.o. female who presents today for individual therapy session at [facility]. Chief Complaint: depression and anxiety    Patient presents for follow up counseling for moderate depression and generalized anxiety disorder. Patient reports she has been feeling tired. She reports negative thoughts and less depressed mood.      Clinical Maneuvering/Intervention:    Assisted patient in processing above session content; acknowledged and normalized patient’s thoughts, feelings, and concerns.  Rationalized patient thought process regarding depression.  Discussed triggers associated with patient's depression.  Also discussed coping skills for patient to implement such as previously discussed coping, challenging negative thoughts about self and scheduling activities.    Allowed patient to freely discuss issues without interruption or judgment. Provided safe, confidential environment to facilitate the development of positive therapeutic relationship and encourage open, honest communication. Assisted patient in identifying risk factors which would indicate the need for higher level of care including thoughts to harm self or others and/or self-harming behavior and encouraged patient to contact this office, call 911, or present to the nearest emergency room should any of these events occur. Discussed crisis intervention services and means to access. Patient adamantly and convincingly denies current suicidal or homicidal ideation or perceptual disturbance.    Assessment   Patient appears to maintain relative stability as compared to their baseline.  However, patient continues to struggle with depression and anxiety which continues to cause impairment in important areas of functioning.  As a result, they can be reasonably expected to continue to benefit from treatment and would likely be at increased risk for  decompensation otherwise.    Mental Status Exam:   Hygiene:   good  Cooperation:  Cooperative  Eye Contact:  Good  Psychomotor Behavior:  Appropriate  Affect:  Appropriate  Mood: normal  Speech:  Normal  Thought Process:  Goal directed and Linear  Thought Content:  Mood congruent  Suicidal:  None  Homicidal:  None  Hallucinations:  None  Delusion:  None  Memory:  Intact  Orientation:  Person, Place, Time, and Situation  Reliability:  good  Insight:  Good  Judgement:  Good  Impulse Control:  Good  Physical/Medical Issues:  Yes        Patient's Support Network Includes:   friend and niece     Functional Status: Mild impairment     Progress toward goal: At goal    Prognosis: Good with Ongoing Treatment          Plan     VISIT DIAGNOSIS:     ICD-10-CM ICD-9-CM   1. Major depressive disorder, recurrent episode, moderate  F33.1 296.32   2. Generalized anxiety disorder  F41.1 300.02        Patient will continue in individual outpatient therapy with focus on improved functioning and coping skills, maintaining stability, and avoiding decompensation and the need for higher level of care.    Patient will adhere to medication regimen as prescribed and report any side effects. Patient will contact this office, call 911 or present to the nearest emergency room should suicidal or homicidal ideations occur. Provide Cognitive Behavioral Therapy and Solution Focused Therapy to improve functioning, maintain stability, and avoid decompensation and the need for higher level of care.     Return in about Return in about 2 weeks (around 5/9/2024). or earlier if symptoms worsen or fail to improve.            This document has been electronically signed by Sydney Alegria LCSW  April 25, 2024 14:31 EDT      Part of this note may be an electronic transcription/translation of spoken language to printed text using the Dragon Dictation System.

## 2024-05-15 ENCOUNTER — OFFICE VISIT (OUTPATIENT)
Dept: PSYCHIATRY | Facility: CLINIC | Age: 59
End: 2024-05-15
Payer: MEDICARE

## 2024-05-15 DIAGNOSIS — F41.1 GENERALIZED ANXIETY DISORDER: ICD-10-CM

## 2024-05-15 DIAGNOSIS — F33.1 MAJOR DEPRESSIVE DISORDER, RECURRENT EPISODE, MODERATE: ICD-10-CM

## 2024-05-15 PROCEDURE — 90837 PSYTX W PT 60 MINUTES: CPT | Performed by: SOCIAL WORKER

## 2024-05-15 NOTE — PROGRESS NOTES
"Date: 05/15/2024   Time In: 1435  Time Out: 1539    PROGRESS NOTE  Data:  Zhane Burnham is a 59 y.o. female who presents today for individual therapy session at [facility]. Chief Complaint: depression and anxiety    Patient presents for therapy for moderate depression and generalized anxiety. Patient reports \" I am struggling\". She reports being in and out of hospitals since our last session. She reports being in hospitals to support others. She reports depressed mood from experiencing medical problems. She reports having support at this time.      Clinical Maneuvering/Intervention:    Assisted patient in processing above session content; acknowledged and normalized patient’s thoughts, feelings, and concerns.  Rationalized patient thought process regarding mood and recent stressors. Patient reports increased depression from family stressors and her own health issues.  Discussed triggers associated with patient's depression.  Also discussed coping skills for patient to implement such as previously discussed coping, using her support system, challenging negative thoughts and behavioral activation.    Allowed patient to freely discuss issues without interruption or judgment. Provided safe, confidential environment to facilitate the development of positive therapeutic relationship and encourage open, honest communication. Assisted patient in identifying risk factors which would indicate the need for higher level of care including thoughts to harm self or others and/or self-harming behavior and encouraged patient to contact this office, call 911, or present to the nearest emergency room should any of these events occur. Discussed crisis intervention services and means to access. Patient adamantly and convincingly denies current suicidal or homicidal ideation or perceptual disturbance.    Assessment   Patient appears to maintain relative stability as compared to their baseline.  However, patient " continues to struggle with depression which continues to cause impairment in important areas of functioning.  As a result, they can be reasonably expected to continue to benefit from treatment and would likely be at increased risk for decompensation otherwise.    Mental Status Exam:   Hygiene:   good  Cooperation:  Cooperative  Eye Contact:  Good  Psychomotor Behavior:  Appropriate  Affect:  Appropriate  Mood: depressed  Speech:  Normal  Thought Process:  Goal directed and Linear  Thought Content:  Mood congruent  Suicidal:  None  Homicidal:  None  Hallucinations:  None  Delusion:  None  Memory:  Intact  Orientation:  Person, Place, Time, and Situation  Reliability:  good  Insight:  Good  Judgement:  Good  Impulse Control:  Good  Physical/Medical Issues:  Yes        Patient's Support Network Includes:   friends and niece    Functional Status: Mild impairment     Progress toward goal: At goal    Prognosis: Good with Ongoing Treatment          Plan     VISIT DIAGNOSIS:     ICD-10-CM ICD-9-CM   1. Major depressive disorder, recurrent episode, moderate  F33.1 296.32   2. Generalized anxiety disorder  F41.1 300.02        Patient will continue in individual outpatient therapy with focus on improved functioning and coping skills, maintaining stability, and avoiding decompensation and the need for higher level of care.    Patient will adhere to medication regimen as prescribed and report any side effects. Patient will contact this office, call 911 or present to the nearest emergency room should suicidal or homicidal ideations occur. Provide Cognitive Behavioral Therapy and Solution Focused Therapy to improve functioning, maintain stability, and avoid decompensation and the need for higher level of care.     Return in about Return in about 2 weeks (around 5/29/2024). or earlier if symptoms worsen or fail to improve.            This document has been electronically signed by Sydney Alegria LCSW  May 15, 2024 14:35  EDT      Part of this note may be an electronic transcription/translation of spoken language to printed text using the Dragon Dictation System.

## 2024-05-23 ENCOUNTER — OFFICE VISIT (OUTPATIENT)
Dept: BEHAVIORAL HEALTH | Facility: CLINIC | Age: 59
End: 2024-05-23
Payer: MEDICARE

## 2024-05-23 VITALS
HEIGHT: 65 IN | DIASTOLIC BLOOD PRESSURE: 82 MMHG | HEART RATE: 95 BPM | WEIGHT: 263 LBS | BODY MASS INDEX: 43.82 KG/M2 | OXYGEN SATURATION: 99 % | SYSTOLIC BLOOD PRESSURE: 126 MMHG

## 2024-05-23 DIAGNOSIS — F33.1 MAJOR DEPRESSIVE DISORDER, RECURRENT EPISODE, MODERATE: Primary | ICD-10-CM

## 2024-05-23 DIAGNOSIS — F41.1 GAD (GENERALIZED ANXIETY DISORDER): ICD-10-CM

## 2024-05-23 DIAGNOSIS — F51.04 PSYCHOPHYSIOLOGICAL INSOMNIA: ICD-10-CM

## 2024-05-23 DIAGNOSIS — F90.2 ATTENTION DEFICIT HYPERACTIVITY DISORDER, COMBINED TYPE: ICD-10-CM

## 2024-05-23 PROCEDURE — 3074F SYST BP LT 130 MM HG: CPT

## 2024-05-23 PROCEDURE — 1159F MED LIST DOCD IN RCRD: CPT

## 2024-05-23 PROCEDURE — 99214 OFFICE O/P EST MOD 30 MIN: CPT

## 2024-05-23 PROCEDURE — 1160F RVW MEDS BY RX/DR IN RCRD: CPT

## 2024-05-23 PROCEDURE — 3079F DIAST BP 80-89 MM HG: CPT

## 2024-05-23 RX ORDER — DULOXETIN HYDROCHLORIDE 60 MG/1
120 CAPSULE, DELAYED RELEASE ORAL DAILY
Qty: 180 CAPSULE | Refills: 0 | Status: SHIPPED | OUTPATIENT
Start: 2024-05-23

## 2024-05-23 RX ORDER — DESVENLAFAXINE SUCCINATE 50 MG/1
50 TABLET, EXTENDED RELEASE ORAL DAILY
Qty: 90 TABLET | Refills: 0 | Status: SHIPPED | OUTPATIENT
Start: 2024-05-23

## 2024-05-23 RX ORDER — TRAZODONE HYDROCHLORIDE 100 MG/1
100 TABLET ORAL
Qty: 90 TABLET | Refills: 0 | Status: SHIPPED | OUTPATIENT
Start: 2024-05-23

## 2024-05-23 RX ORDER — METHYLPHENIDATE HYDROCHLORIDE 20 MG/1
20 TABLET ORAL 3 TIMES DAILY
Qty: 90 TABLET | Refills: 0 | Status: SHIPPED | OUTPATIENT
Start: 2024-05-23

## 2024-05-23 NOTE — PROGRESS NOTES
Follow Up Office Visit    Patient Name: Zhane Burnham  : 1965   MRN: [id number]   Care Team: Patient Care Team:  Latasha Curtis APRN as PCP - General (Family Medicine)  Magda Philip LPCC as Counselor (Behavioral Health)  Komal Aguiar APRN as Nurse Practitioner (Behavioral Health)  Sydney Alegria LCSW as  (Psychiatry)         Chief Complaint:    Chief Complaint   Patient presents with    ADHD    Anxiety    Depression    Sleeping Problem    Med Management       History of Present Illness: Zhane Burnham is a 59 y.o. female who is here today for a medication management follow up. Patient reports she has been having a hard time lately. She feels that her situational stressors keep adding up and are getting much harder to manage. She feels that her mood is struggling and she is trying to keep it all together, but it has been hard. She denies SI/HI today.     The following portion of the patient's history were reviewed and updated appropriately: allergies, current and past medications, family history, medical history and social history. STEPHANIE reviewed and appropriate.   Subjective   Review of Systems:    Review of Systems   Respiratory: Negative.     Cardiovascular: Negative.  Negative for chest pain and palpitations.   Neurological: Negative.    Psychiatric/Behavioral:  Positive for sleep disturbance, depressed mood and stress. The patient is nervous/anxious.    All other systems reviewed and are negative.      Current Medications:   Current Outpatient Medications   Medication Sig Dispense Refill    BIOTIN PO Take  by mouth.      cyclobenzaprine (FLEXERIL) 10 MG tablet TAKE 1/2 TO 1 TABLET BY MOUTH THREE TIMES DAILY AS NEEDED FOR MUSCLE SPASMS 90 tablet 1    desvenlafaxine (PRISTIQ) 50 MG 24 hr tablet Take 1 tablet by mouth Daily. 90 tablet 0    DULoxetine (CYMBALTA) 60 MG capsule Take 2 capsules by mouth Daily. 180 capsule 0    dutasteride (AVODART) 0.5 MG capsule        etodolac (LODINE) 400 MG tablet Take 1/2-1 tablet by mouth every 8 hours as needed. 90 tablet 1    ezetimibe (ZETIA) 10 MG tablet TAKE ONE TABLET BY MOUTH EVERY DAY 90 tablet 1    fluconazole (DIFLUCAN) 150 MG tablet TAKE ONE TABLET BY MOUTH EVERY 3 DAYS 3 tablet 0    hydroCHLOROthiazide 25 MG tablet TAKE ONE TABLET BY MOUTH EVERY DAY 30 tablet 0    levocetirizine (XYZAL) 5 MG tablet TAKE ONE TABLET BY MOUTH EVERY EVENING 30 tablet 2    methylphenidate (RITALIN) 20 MG tablet Take 1 tablet by mouth 3 (Three) Times a Day. 90 tablet 0    Multiple Vitamins-Minerals (WOMENS 50+ MULTI VITAMIN PO) Take  by mouth.      oxyCODONE-acetaminophen (PERCOCET)  MG per tablet Take 1 tablet by mouth Every 6 (Six) Hours As Needed for Moderate Pain.      pantoprazole (PROTONIX) 40 MG EC tablet Take 1 tablet by mouth Daily. 90 tablet 1    potassium chloride 10 MEQ CR tablet Take 1 tablet by mouth Daily. 30 tablet 5    pregabalin (LYRICA) 150 MG capsule Take 1 capsule by mouth 2 (Two) Times a Day.      simvastatin (ZOCOR) 40 MG tablet TAKE ONE TABLET BY MOUTH EVERY NIGHT 90 tablet 1    traZODone (DESYREL) 100 MG tablet Take 1 tablet by mouth every night at bedtime. 90 tablet 0    Cariprazine HCl (Vraylar) 1.5 MG capsule capsule Take 1 capsule by mouth Daily. 30 capsule 1     No current facility-administered medications for this visit.       Mental Status Exam:   Hygiene:   good  Cooperation:  Cooperative  Eye Contact:  Good  Psychomotor Behavior:  Appropriate  Affect:  Appropriate  Mood: depressed, anxious, and stressed  Speech:  Normal  Thought Process:  Goal directed and Linear  Thought Content:  Normal and Mood congruent  Suicidal:  None  Homicidal:  None  Hallucinations:  None  Delusion:  None  Memory:  Intact  Orientation:  Person, Place, Time, and Situation  Reliability:  good  Insight:  Good  Judgement:  Good  Impulse Control:  Good  Physical/Medical Issues:  Yes see chart       Objective   Vital Signs:   /82   Pulse  "95   Ht 165.1 cm (65\")   Wt 119 kg (263 lb)   SpO2 99%   BMI 43.77 kg/m²       Assessment / Plan    Diagnoses and all orders for this visit:    1. Major depressive disorder, recurrent episode, moderate (Primary)  -     Cariprazine HCl (Vraylar) 1.5 MG capsule capsule; Take 1 capsule by mouth Daily.  Dispense: 30 capsule; Refill: 1  -     desvenlafaxine (PRISTIQ) 50 MG 24 hr tablet; Take 1 tablet by mouth Daily.  Dispense: 90 tablet; Refill: 0  -     DULoxetine (CYMBALTA) 60 MG capsule; Take 2 capsules by mouth Daily.  Dispense: 180 capsule; Refill: 0    2. MANISHA (generalized anxiety disorder)  -     desvenlafaxine (PRISTIQ) 50 MG 24 hr tablet; Take 1 tablet by mouth Daily.  Dispense: 90 tablet; Refill: 0  -     DULoxetine (CYMBALTA) 60 MG capsule; Take 2 capsules by mouth Daily.  Dispense: 180 capsule; Refill: 0    3. Attention deficit hyperactivity disorder, combined type  -     methylphenidate (RITALIN) 20 MG tablet; Take 1 tablet by mouth 3 (Three) Times a Day.  Dispense: 90 tablet; Refill: 0    4. Psychophysiological insomnia  -     traZODone (DESYREL) 100 MG tablet; Take 1 tablet by mouth every night at bedtime.  Dispense: 90 tablet; Refill: 0       Will add Vraylar nightly. Continue all other medication as ordered.     History and physical exam exhibit continued safe and appropriate use of controlled substance.    As part of patient's treatment plan I am prescribing a controlled substance.  The patient has been made aware of the appropriate use of such medications and potential for dependence and/or overdose.  It has also been made clear that these medications are for use by this patient only, without concomitant use of alcohol or other substances, unless prescribed.    Patient has completed a prescribing agreement detailing terms of continued prescribing of controlled substances, including monitoring STEPHANIE reports, urine drug screening, and pill counts if necessary.  Patient is aware that inappropriate use " will result in cessation of prescribing such medications.      AIMS  Facial and Oral Movements  Muscles of Facial Expression: None, normal  Lips and Perioral Area: None, normal  Jaw: None, normal  Tongue: None, normal  Extremity Movements  Upper (arms, wrists, hands, fingers): None, normal  Lower (legs, knees, ankles, toes): None, normal  Trunk Movements  Neck, shoulders, hips: None, normal  Overall Severity  Severity of abnormal movements (max 4): 0  Incapacitation due to abnormal movements: None, normal  Patient's awareness of abnormal movements (rate only patient's report): Aware, no distress  Dental Status  Current problems with teeth and/or dentures?: No  Does patient usually wear dentures?: No      PHQ-9  PHQ-2/PHQ-9: Depression Screening  Little Interest or Pleasure in Doing Things: 3-->nearly every day  Feeling Down, Depressed or Hopeless: 3-->nearly every day  PHQ-2 Total Score: 6  Trouble Falling or Staying Asleep, or Sleeping Too Much: 3-->nearly every day  Feeling Tired or Having Little Energy: 3-->nearly every day  Poor Appetite or Overeatin-->more than half the days  Feeling Bad about Yourself - or that You are a Failure or Have Let Yourself or Your Family Down: 1-->several days  Trouble Concentrating on Things, Such as Reading the Newspaper or Watching Television: 3-->nearly every day  Moving or Speaking So Slowly that Other People Could Have Noticed? Or the Opposite - Being So Fidgety: 2-->more than half the days  Thoughts that You Would be Better Off Dead or of Hurting Yourself in Some Way: 0-->not at all  PHQ-9: Brief Depression Severity Measure Score: 20      PHQ-9 Score:   PHQ-9 Total Score: 20    Depression Screening:  Patient screened positive for depression based on a PHQ-9 score of 20 on 2024. Follow-up recommendations include: Prescribed antidepressant medication treatment and Suicide Risk Assessment performed.        MANISHA-7  Over the last two weeks, how often have you been bothered  by the following problems?  Feeling nervous, anxious or on edge: Nearly every day  Not being able to stop or control worrying: Nearly every day  Worrying too much about different things: Nearly every day  Trouble Relaxing: Nearly every day  Being so restless that it is hard to sit still: Not at all  Becoming easily annoyed or irritable: Not at all  Feeling afraid as if something awful might happen: Several days  MANISHA 7 Total Score: 13  If you checked any problems, how difficult have these problems made it for you to do your work, take care of things at home, or get along with other people: Extremely difficult      ADHD  Screening for Adults With ADHD - (1-6)  1. How often do you have trouble wrapping up the final details of a project, once the challenging parts have been done?: Very Often  2. How often do you have difficulty getting things in order when you have to do a task that requires organization?: Often  3. How often do you have problems remembering appointments or obligations : Rarely  4. When you have a task that requires a lot of thought, how often do you avoid or delay getting started ?: Very Often  5. How often do you fidget or squirm with your hands or feet when you have to sit down for a long time?: Often  6. How often do you feel overly active and compelled to do things, like you were driven by a motor?: Rarely  7. How often do you make careless mistakes when you have to work on a boring or difficult project?: Very Often  8. How often do have difficulty keeping your attention when you are doing boring or repetitive work?: Very Often  9. How often do you have difficulty concentrating on what people say to you, even when they are speaking to you: Often  10.How often do you misplace or have difficulty finding things at home or at work?: Often  11.How often are you distracted by activity or noise around you?: Very Often  12.How often do you leave your seat in meetings or other situations in which you are  expected to remain seated?: Sometimes  13.How often do you feel restless or fidgety?: Often  14.How often do you have difficulty unwinding and relaxing when you have time to yourself?: Very Often  15.How often do you find yourself talking too much when you are in social situations?: Very Often  16.When you’re in a conversation, how often do you find yourself finishing the sentences of the people you are talking to, before they can finish them themselves?: Very Often  17.How often do you have difficulty waiting your turn in situations when turn taking is required?: Very Often  18.How often do you interrupt others when they are busy?: Very Often    A psychological evaluation was conducted in order to assess past and current level of functioning. Areas assessed included, but were not limited to: perception of social support, perception of ability to face and deal with challenges in life (positive functioning), anxiety symptoms, depressive symptoms, perspective on beliefs/belief system, coping skills for stress, intelligence level,  Therapeutic rapport was established. Interventions conducted today were geared towards incorporating medication management along with support for continued therapy. Education was also provided as to the med management with this provider and what to expect in subsequent sessions.      We discussed risks, benefits, goals and side effects of the above medication and the patient was agreeable with the plan. Patient was educated on the importance of compliance with treatment and follow-up appointments. Patient is aware to contact the [facility] with any worsening of symptoms. To call for questions or concerns and return early if necessary. Patent is agreeable to go to the Emergency Department or call 911 should they begin SI/HI.    MEDS ORDERED DURING VISIT:  New Medications Ordered This Visit   Medications    Cariprazine HCl (Vraylar) 1.5 MG capsule capsule     Sig: Take 1 capsule by mouth  Daily.     Dispense:  30 capsule     Refill:  1    desvenlafaxine (PRISTIQ) 50 MG 24 hr tablet     Sig: Take 1 tablet by mouth Daily.     Dispense:  90 tablet     Refill:  0     This prescription was filled on 5/8/2023. Any refills authorized will be placed on file.    DULoxetine (CYMBALTA) 60 MG capsule     Sig: Take 2 capsules by mouth Daily.     Dispense:  180 capsule     Refill:  0     This prescription was filled on 5/18/2023. Any refills authorized will be placed on file.    methylphenidate (RITALIN) 20 MG tablet     Sig: Take 1 tablet by mouth 3 (Three) Times a Day.     Dispense:  90 tablet     Refill:  0    traZODone (DESYREL) 100 MG tablet     Sig: Take 1 tablet by mouth every night at bedtime.     Dispense:  90 tablet     Refill:  0     This prescription was filled on 12/31/2022. Any refills authorized will be placed on file.         Follow Up   Return in about 8 weeks (around 7/18/2024).  Patient was given instructions and counseling regarding her condition or for health maintenance advice. Please see specific information pulled into the AVS if appropriate.     TREATMENT PLAN/GOALS: Continue supportive psychotherapy efforts and medications as indicated. Treatment and medication options discussed during today's visit. Patient acknowledged and verbally consented to continue with current treatment plan and was educated on the importance of compliance with treatment and follow-up appointments.    MEDICATION ISSUES:  Discussed medication options and treatment plan of prescribed medication as well as the risks, benefits, and side effects including potential falls, possible impaired driving and metabolic adversities among others. Patient is agreeable to call the office with any worsening of symptoms or onset of side effects. Patient is agreeable to call 911 or go to the nearest ER should he/she begin having SI/HI.        [facility]  [address] [place] [address]  [phone]    May 23, 2024 10:34 EDT

## 2024-05-24 DIAGNOSIS — I10 PRIMARY HYPERTENSION: ICD-10-CM

## 2024-05-24 RX ORDER — HYDROCHLOROTHIAZIDE 25 MG/1
25 TABLET ORAL DAILY
Qty: 30 TABLET | Refills: 0 | Status: SHIPPED | OUTPATIENT
Start: 2024-05-24

## 2024-05-29 ENCOUNTER — OFFICE VISIT (OUTPATIENT)
Dept: PSYCHIATRY | Facility: CLINIC | Age: 59
End: 2024-05-29
Payer: MEDICARE

## 2024-05-29 DIAGNOSIS — F33.1 MAJOR DEPRESSIVE DISORDER, RECURRENT EPISODE, MODERATE: ICD-10-CM

## 2024-05-29 DIAGNOSIS — F41.1 GENERALIZED ANXIETY DISORDER: ICD-10-CM

## 2024-05-29 NOTE — PROGRESS NOTES
"Date: 05/29/2024   Time In: 1445  Time Out: 1532    PROGRESS NOTE  Data:  Zhane Burnham is a 59 y.o. female who presents today for individual therapy session at [facility]. Chief Complaint: depression and anxiety    Patient presents to therapy for moderate depression and generalized anxiety. Patient reports \"I'm struggling\". She reports continued worry regarding her niece and friend. She reports starting vraylar since our last session/      Clinical Maneuvering/Intervention:      Assisted patient in processing above session content; acknowledged and normalized patient’s thoughts, feelings, and concerns.  Rationalized patient thought process regarding worry.  Discussed triggers associated with patient's anxiety.  Also discussed coping skills for patient to implement such as previously discussed coping, challenging anxious thoughts and worry postponement.    Allowed patient to freely discuss issues without interruption or judgment. Provided safe, confidential environment to facilitate the development of positive therapeutic relationship and encourage open, honest communication. Assisted patient in identifying risk factors which would indicate the need for higher level of care including thoughts to harm self or others and/or self-harming behavior and encouraged patient to contact this office, call 911, or present to the nearest emergency room should any of these events occur. Discussed crisis intervention services and means to access. Patient adamantly and convincingly denies current suicidal or homicidal ideation or perceptual disturbance.    Assessment   Patient appears to maintain relative stability as compared to their baseline.  However, patient continues to struggle with anxiety and depression which continues to cause impairment in important areas of functioning.  As a result, they can be reasonably expected to continue to benefit from treatment and would likely be at increased risk for " decompensation otherwise.    Mental Status Exam:   Hygiene:   good  Cooperation:  Cooperative  Eye Contact:  Good  Psychomotor Behavior:  Appropriate  Affect:  Appropriate  Mood: depressed  Speech:  Normal  Thought Process:  Goal directed and Linear  Thought Content:  Mood congruent  Suicidal:  None  Homicidal:  None  Hallucinations:  None  Delusion:  None  Memory:  Intact  Orientation:  Person, Place, Time, and Situation  Reliability:  good  Insight:  Good  Judgement:  Good  Impulse Control:  Good  Physical/Medical Issues:  Yes        Patient's Support Network Includes:   friends and niece    Functional Status: Mild impairment     Progress toward goal: At goal    Prognosis: Good with Ongoing Treatment          Plan     VISIT DIAGNOSIS:     ICD-10-CM ICD-9-CM   1. Major depressive disorder, recurrent episode, moderate  F33.1 296.32   2. Generalized anxiety disorder  F41.1 300.02        Patient will continue in individual outpatient therapy with focus on improved functioning and coping skills, maintaining stability, and avoiding decompensation and the need for higher level of care.    Patient will adhere to medication regimen as prescribed and report any side effects. Patient will contact this office, call 911 or present to the nearest emergency room should suicidal or homicidal ideations occur. Provide Cognitive Behavioral Therapy and Solution Focused Therapy to improve functioning, maintain stability, and avoid decompensation and the need for higher level of care.     Return in about Return in about 2 weeks (around 6/12/2024). or earlier if symptoms worsen or fail to improve.            This document has been electronically signed by Sydney Alegria LCSW  May 29, 2024 14:43 EDT      Part of this note may be an electronic transcription/translation of spoken language to printed text using the Dragon Dictation System.

## 2024-05-30 DIAGNOSIS — M62.838 MUSCLE SPASM: ICD-10-CM

## 2024-05-30 RX ORDER — CYCLOBENZAPRINE HCL 10 MG
TABLET ORAL
Qty: 90 TABLET | Refills: 1 | OUTPATIENT
Start: 2024-05-30

## 2024-06-10 ENCOUNTER — OFFICE VISIT (OUTPATIENT)
Dept: PSYCHIATRY | Facility: CLINIC | Age: 59
End: 2024-06-10
Payer: MEDICARE

## 2024-06-10 DIAGNOSIS — F41.1 GENERALIZED ANXIETY DISORDER: ICD-10-CM

## 2024-06-10 DIAGNOSIS — F33.1 MAJOR DEPRESSIVE DISORDER, RECURRENT EPISODE, MODERATE: ICD-10-CM

## 2024-06-10 PROCEDURE — 90834 PSYTX W PT 45 MINUTES: CPT | Performed by: SOCIAL WORKER

## 2024-06-10 NOTE — PROGRESS NOTES
"Date: 06/10/2024   Time In: 1535  Time Out: 1626    PROGRESS NOTE  Data:  Zhane Burnham is a 59 y.o. female who presents today for individual therapy session at [facility]. Chief Complaint:depression and anxiety    Patient reports \"I had a really great day yesterday\", patient reports being able to reconnect with her aunt and that this has been helpful for her mood. She reports feeling grateful and that she has been able to get things done around the house.       Clinical Maneuvering/Intervention:    Assisted patient in processing above session content; acknowledged and normalized patient’s thoughts, feelings, and concerns.  Rationalized patient thought process regarding changes with mood. Patient reports reconnecting with her aunt has had a significant impact on her mood and reports feeling more supported.  Discussed triggers associated with patient's depression.  Also discussed coping skills for patient to implement such as previously discussed coping, behavioral activation, challenging negative thoughts and participating in activities she enjoys.     Allowed patient to freely discuss issues without interruption or judgment. Provided safe, confidential environment to facilitate the development of positive therapeutic relationship and encourage open, honest communication. Assisted patient in identifying risk factors which would indicate the need for higher level of care including thoughts to harm self or others and/or self-harming behavior and encouraged patient to contact this office, call 911, or present to the nearest emergency room should any of these events occur. Discussed crisis intervention services and means to access. Patient adamantly and convincingly denies current suicidal or homicidal ideation or perceptual disturbance.    Assessment   Patient appears to maintain relative stability as compared to their baseline.  However, patient continues to struggle with depression which " continues to cause impairment in important areas of functioning.  As a result, they can be reasonably expected to continue to benefit from treatment and would likely be at increased risk for decompensation otherwise.    Mental Status Exam:   Hygiene:   good  Cooperation:  Cooperative  Eye Contact:  Good  Psychomotor Behavior:  Appropriate  Affect:  Appropriate  Mood: normal  Speech:  Normal  Thought Process:  Goal directed and Linear  Thought Content:  Mood congruent  Suicidal:  None  Homicidal:  None  Hallucinations:  None  Delusion:  None  Memory:  Intact  Orientation:  Person, Place, Time, and Situation  Reliability:  good  Insight:  Good  Judgement:  Good  Impulse Control:  Good  Physical/Medical Issues:  Yes        Patient's Support Network Includes:   friends, niece and aunt    Functional Status: Mild impairment     Progress toward goal: At goal    Prognosis: Good with Ongoing Treatment          Plan     VISIT DIAGNOSIS:     ICD-10-CM ICD-9-CM   1. Major depressive disorder, recurrent episode, moderate  F33.1 296.32   2. Generalized anxiety disorder  F41.1 300.02        Patient will continue in individual outpatient therapy with focus on improved functioning and coping skills, maintaining stability, and avoiding decompensation and the need for higher level of care.    Patient will adhere to medication regimen as prescribed and report any side effects. Patient will contact this office, call 911 or present to the nearest emergency room should suicidal or homicidal ideations occur. Provide Cognitive Behavioral Therapy and Solution Focused Therapy to improve functioning, maintain stability, and avoid decompensation and the need for higher level of care.     Return in about Return in about 2 weeks (around 6/24/2024). or earlier if symptoms worsen or fail to improve.            This document has been electronically signed by Sydney Alegria LCSW  Marie 10, 2024 15:36 EDT      Part of this note may be an electronic  transcription/translation of spoken language to printed text using the Dragon Dictation System.

## 2024-06-18 DIAGNOSIS — I10 PRIMARY HYPERTENSION: ICD-10-CM

## 2024-06-18 RX ORDER — HYDROCHLOROTHIAZIDE 25 MG/1
25 TABLET ORAL DAILY
Qty: 30 TABLET | Refills: 0 | Status: SHIPPED | OUTPATIENT
Start: 2024-06-18

## 2024-06-19 DIAGNOSIS — F90.2 ATTENTION DEFICIT HYPERACTIVITY DISORDER, COMBINED TYPE: ICD-10-CM

## 2024-06-19 RX ORDER — METHYLPHENIDATE HYDROCHLORIDE 20 MG/1
20 TABLET ORAL 3 TIMES DAILY
Qty: 90 TABLET | Refills: 0 | Status: SHIPPED | OUTPATIENT
Start: 2024-06-19 | End: 2024-06-20 | Stop reason: SDUPTHER

## 2024-06-19 NOTE — TELEPHONE ENCOUNTER
Rx Refill Note  Requested Prescriptions     Pending Prescriptions Disp Refills    methylphenidate (RITALIN) 20 MG tablet 90 tablet 0     Sig: Take 1 tablet by mouth 3 (Three) Times a Day.      Last office visit with prescribing clinician: 5/23/2024   Last telemedicine visit with prescribing clinician: Visit date not found   Next office visit with prescribing clinician: 7/2/2024                         Would you like a call back once the refill request has been completed: [] Yes [] No    If the office needs to give you a call back, can they leave a voicemail: [] Yes [] No    Rosemary Agudelo MA  06/19/24, 15:54 EDT

## 2024-06-20 RX ORDER — METHYLPHENIDATE HYDROCHLORIDE 20 MG/1
20 TABLET ORAL 3 TIMES DAILY
Qty: 90 TABLET | Refills: 0 | Status: SHIPPED | OUTPATIENT
Start: 2024-06-20

## 2024-06-20 NOTE — TELEPHONE ENCOUNTER
Called pharmacy states they did not receive order.    Rx Refill Note  Requested Prescriptions     Pending Prescriptions Disp Refills    methylphenidate (RITALIN) 20 MG tablet 90 tablet 0     Sig: Take 1 tablet by mouth 3 (Three) Times a Day.     Signed Prescriptions Disp Refills    methylphenidate (RITALIN) 20 MG tablet 90 tablet 0     Sig: Take 1 tablet by mouth 3 (Three) Times a Day.     Authorizing Provider: PJ SAENZ      Last office visit with prescribing clinician: 5/23/2024   Last telemedicine visit with prescribing clinician: Visit date not found   Next office visit with prescribing clinician: 7/2/2024                         Would you like a call back once the refill request has been completed: [] Yes [] No    If the office needs to give you a call back, can they leave a voicemail: [] Yes [] No    Rosemary Agudelo MA  06/20/24, 10:11 EDT

## 2024-06-25 ENCOUNTER — OFFICE VISIT (OUTPATIENT)
Dept: PSYCHIATRY | Facility: CLINIC | Age: 59
End: 2024-06-25
Payer: MEDICARE

## 2024-06-25 DIAGNOSIS — F41.1 GENERALIZED ANXIETY DISORDER: ICD-10-CM

## 2024-06-25 DIAGNOSIS — F33.1 MAJOR DEPRESSIVE DISORDER, RECURRENT EPISODE, MODERATE: ICD-10-CM

## 2024-06-25 NOTE — PROGRESS NOTES
"Date: 06/25/2024   Time In: 1432  Time Out: 1530    PROGRESS NOTE  Data:  Zhane Burnham is a 59 y.o. female who presents today for individual therapy session at [facility]. Chief Complaint: depression and anxiety    Patient presents for therapy for moderate depression and generalized anxiety. Patient reports she has had low energy. She reports mood has been \"good\". She reports difficulty with feelings of guilt and needing space from a friend.       Clinical Maneuvering/Intervention:    Assisted patient in processing above session content; acknowledged and normalized patient’s thoughts, feelings, and concerns.  Rationalized patient thought process regarding feelings of guilt and working on boundaries with friend.  Discussed triggers associated with patient's depression.  Also discussed coping skills for patient to implement such as previously discussed coping, challenging negative thoughts, behavioral activation and reaching out to supports..    Allowed patient to freely discuss issues without interruption or judgment. Provided safe, confidential environment to facilitate the development of positive therapeutic relationship and encourage open, honest communication. Assisted patient in identifying risk factors which would indicate the need for higher level of care including thoughts to harm self or others and/or self-harming behavior and encouraged patient to contact this office, call 911, or present to the nearest emergency room should any of these events occur. Discussed crisis intervention services and means to access. Patient adamantly and convincingly denies current suicidal or homicidal ideation or perceptual disturbance.    Assessment   Patient appears to maintain relative stability as compared to their baseline.  However, patient continues to struggle with anxiety and depression which continues to cause impairment in important areas of functioning.  As a result, they can be reasonably " expected to continue to benefit from treatment and would likely be at increased risk for decompensation otherwise.    Mental Status Exam:   Hygiene:   good  Cooperation:  Cooperative  Eye Contact:  Good  Psychomotor Behavior:  Appropriate  Affect:  Appropriate  Mood: normal  Speech:  Normal  Thought Process:  Goal directed and Linear  Thought Content:  Mood congruent  Suicidal:  None  Homicidal:  None  Hallucinations:  None  Delusion:  None  Memory:  Intact  Orientation:  Person, Place, Time, and Situation  Reliability:  good  Insight:  Good  Judgement:  Good  Impulse Control:  Good  Physical/Medical Issues:  Yes        Patient's Support Network Includes:   friends, niece and aunt    Functional Status: Mild impairment     Progress toward goal: At goal    Prognosis: Good with Ongoing Treatment          Plan     VISIT DIAGNOSIS:     ICD-10-CM ICD-9-CM   1. Major depressive disorder, recurrent episode, moderate  F33.1 296.32   2. Generalized anxiety disorder  F41.1 300.02        Patient will continue in individual outpatient therapy with focus on improved functioning and coping skills, maintaining stability, and avoiding decompensation and the need for higher level of care.    Patient will adhere to medication regimen as prescribed and report any side effects. Patient will contact this office, call 911 or present to the nearest emergency room should suicidal or homicidal ideations occur. Provide Cognitive Behavioral Therapy and Solution Focused Therapy to improve functioning, maintain stability, and avoid decompensation and the need for higher level of care.     Return in about Return in about 2 weeks (around 7/9/2024). or earlier if symptoms worsen or fail to improve.            This document has been electronically signed by Sydney Alegria LCSW  June 25, 2024 14:32 EDT      Part of this note may be an electronic transcription/translation of spoken language to printed text using the Dragon Dictation System.

## 2024-07-02 ENCOUNTER — OFFICE VISIT (OUTPATIENT)
Dept: BEHAVIORAL HEALTH | Facility: CLINIC | Age: 59
End: 2024-07-02
Payer: MEDICARE

## 2024-07-02 VITALS
SYSTOLIC BLOOD PRESSURE: 118 MMHG | HEIGHT: 65 IN | WEIGHT: 265 LBS | DIASTOLIC BLOOD PRESSURE: 78 MMHG | HEART RATE: 113 BPM | BODY MASS INDEX: 44.15 KG/M2 | OXYGEN SATURATION: 99 %

## 2024-07-02 DIAGNOSIS — F41.1 GAD (GENERALIZED ANXIETY DISORDER): ICD-10-CM

## 2024-07-02 DIAGNOSIS — F51.04 PSYCHOPHYSIOLOGICAL INSOMNIA: ICD-10-CM

## 2024-07-02 DIAGNOSIS — F33.1 MAJOR DEPRESSIVE DISORDER, RECURRENT EPISODE, MODERATE: Primary | ICD-10-CM

## 2024-07-02 DIAGNOSIS — F90.2 ATTENTION DEFICIT HYPERACTIVITY DISORDER, COMBINED TYPE: ICD-10-CM

## 2024-07-02 PROCEDURE — 1159F MED LIST DOCD IN RCRD: CPT

## 2024-07-02 PROCEDURE — 99214 OFFICE O/P EST MOD 30 MIN: CPT

## 2024-07-02 PROCEDURE — 3074F SYST BP LT 130 MM HG: CPT

## 2024-07-02 PROCEDURE — 3078F DIAST BP <80 MM HG: CPT

## 2024-07-02 PROCEDURE — 1160F RVW MEDS BY RX/DR IN RCRD: CPT

## 2024-07-02 RX ORDER — DULOXETIN HYDROCHLORIDE 60 MG/1
120 CAPSULE, DELAYED RELEASE ORAL DAILY
Qty: 180 CAPSULE | Refills: 0 | Status: SHIPPED | OUTPATIENT
Start: 2024-07-02

## 2024-07-02 RX ORDER — DESVENLAFAXINE SUCCINATE 50 MG/1
50 TABLET, EXTENDED RELEASE ORAL DAILY
Qty: 90 TABLET | Refills: 0 | Status: SHIPPED | OUTPATIENT
Start: 2024-07-02

## 2024-07-02 RX ORDER — TRAZODONE HYDROCHLORIDE 100 MG/1
100 TABLET ORAL
Qty: 90 TABLET | Refills: 0 | Status: SHIPPED | OUTPATIENT
Start: 2024-07-02

## 2024-07-02 RX ORDER — METHYLNALTREXONE BROMIDE 150 MG/1
TABLET ORAL
COMMUNITY
Start: 2024-06-18

## 2024-07-02 NOTE — PROGRESS NOTES
Follow Up Office Visit    Patient Name: Zhane Burnham  : 1965   MRN: [id number]   Care Team: Patient Care Team:  Latasha Curtis APRN as PCP - General (Family Medicine)  Magda Philip LPCC as Counselor (Behavioral Health)  Komal Aguiar APRN as Nurse Practitioner (Behavioral Health)  Sydney Alegria LCSW as  (Psychiatry)         Chief Complaint:    Chief Complaint   Patient presents with    ADHD    Anxiety    Depression    Sleeping Problem    Med Management       History of Present Illness: Zhane Burnham is a 59 y.o. female who is here today for a medication management follow up. Patient reports that she has not noticed much of an improvement since starting the Vraylar. She denies any adverse effects. She states she knows it will probably take more time to get the full effect and is willing to continue to take it. She also feels that her other  medication continues to be effective. She feels that her focus and concentration are doing well. She does feel that for the most part her mood and anxiety have been managed well despite her situational stressors. She did not see the need to make any changes and did not have any medication concerns at today's visit. She denies SI/HI today.     The following portion of the patient's history were reviewed and updated appropriately: allergies, current and past medications, family history, medical history and social history. STEPHNAIE reviewed and appropriate.   Subjective   Review of Systems:    Review of Systems   Respiratory: Negative.     Cardiovascular: Negative.  Negative for chest pain and palpitations.   Neurological: Negative.    Psychiatric/Behavioral:  Positive for stress. The patient is nervous/anxious.    All other systems reviewed and are negative.      Current Medications:   Current Outpatient Medications   Medication Sig Dispense Refill    BIOTIN PO Take  by mouth.      Cariprazine HCl (Vraylar) 1.5 MG capsule capsule Take 1  capsule by mouth Daily. 30 capsule 1    cyclobenzaprine (FLEXERIL) 10 MG tablet TAKE 1/2 TO 1 TABLET BY MOUTH THREE TIMES DAILY AS NEEDED FOR MUSCLE SPASMS 90 tablet 1    desvenlafaxine (PRISTIQ) 50 MG 24 hr tablet Take 1 tablet by mouth Daily. 90 tablet 0    DULoxetine (CYMBALTA) 60 MG capsule Take 2 capsules by mouth Daily. 180 capsule 0    dutasteride (AVODART) 0.5 MG capsule       etodolac (LODINE) 400 MG tablet Take 1/2-1 tablet by mouth every 8 hours as needed. 90 tablet 1    ezetimibe (ZETIA) 10 MG tablet TAKE ONE TABLET BY MOUTH EVERY DAY 90 tablet 1    fluconazole (DIFLUCAN) 150 MG tablet TAKE ONE TABLET BY MOUTH EVERY 3 DAYS 3 tablet 0    hydroCHLOROthiazide 25 MG tablet TAKE ONE TABLET BY MOUTH EVERY DAY 30 tablet 0    levocetirizine (XYZAL) 5 MG tablet TAKE ONE TABLET BY MOUTH EVERY EVENING 30 tablet 2    methylphenidate (RITALIN) 20 MG tablet Take 1 tablet by mouth 3 (Three) Times a Day. 90 tablet 0    Multiple Vitamins-Minerals (WOMENS 50+ MULTI VITAMIN PO) Take  by mouth.      oxyCODONE-acetaminophen (PERCOCET)  MG per tablet Take 1 tablet by mouth Every 6 (Six) Hours As Needed for Moderate Pain.      pantoprazole (PROTONIX) 40 MG EC tablet Take 1 tablet by mouth Daily. 90 tablet 1    potassium chloride 10 MEQ CR tablet Take 1 tablet by mouth Daily. 30 tablet 5    pregabalin (LYRICA) 150 MG capsule Take 1 capsule by mouth 2 (Two) Times a Day.      Relistor 150 MG tablet TAKE THREE TABLETS BY MOUTH EVERY DAY IN THE MORNING      simvastatin (ZOCOR) 40 MG tablet TAKE ONE TABLET BY MOUTH EVERY NIGHT 90 tablet 1    traZODone (DESYREL) 100 MG tablet Take 1 tablet by mouth every night at bedtime. 90 tablet 0     No current facility-administered medications for this visit.       Mental Status Exam:   Hygiene:   good  Cooperation:  Cooperative  Eye Contact:  Good  Psychomotor Behavior:  Appropriate  Affect:  Appropriate  Mood: depressed, anxious, and stressed   Speech:  Normal  Thought Process:  Goal  "directed and Linear  Thought Content:  Normal  Suicidal:  None  Homicidal:  None  Hallucinations:  None  Delusion:  None  Memory:  Intact  Orientation:  Person, Place, Time, and Situation  Reliability:  good  Insight:  Good  Judgement:  Good  Impulse Control:  Good  Physical/Medical Issues:  Yes see chart       Objective   Vital Signs:   /78   Pulse 113   Ht 165.1 cm (65\")   Wt 120 kg (265 lb)   SpO2 99%   BMI 44.10 kg/m²       Assessment / Plan    Diagnoses and all orders for this visit:    1. Major depressive disorder, recurrent episode, moderate (Primary)  -     Cariprazine HCl (Vraylar) 1.5 MG capsule capsule; Take 1 capsule by mouth Daily.  Dispense: 30 capsule; Refill: 1  -     desvenlafaxine (PRISTIQ) 50 MG 24 hr tablet; Take 1 tablet by mouth Daily.  Dispense: 90 tablet; Refill: 0  -     DULoxetine (CYMBALTA) 60 MG capsule; Take 2 capsules by mouth Daily.  Dispense: 180 capsule; Refill: 0    2. MANISHA (generalized anxiety disorder)  -     desvenlafaxine (PRISTIQ) 50 MG 24 hr tablet; Take 1 tablet by mouth Daily.  Dispense: 90 tablet; Refill: 0  -     DULoxetine (CYMBALTA) 60 MG capsule; Take 2 capsules by mouth Daily.  Dispense: 180 capsule; Refill: 0    3. Psychophysiological insomnia  -     traZODone (DESYREL) 100 MG tablet; Take 1 tablet by mouth every night at bedtime.  Dispense: 90 tablet; Refill: 0    4. Attention deficit hyperactivity disorder, combined type       Patient appears to be doing well on current medication. No issues reported and no indication for change. Will continue medication as ordered.     History and physical exam exhibit continued safe and appropriate use of controlled substance.    As part of patient's treatment plan I am prescribing a controlled substance.  The patient has been made aware of the appropriate use of such medications and potential for dependence and/or overdose.  It has also been made clear that these medications are for use by this patient only, without " concomitant use of alcohol or other substances, unless prescribed.    Patient has completed a prescribing agreement detailing terms of continued prescribing of controlled substances, including monitoring STEPHANIE reports, urine drug screening, and pill counts if necessary.  Patient is aware that inappropriate use will result in cessation of prescribing such medications.      AIMS  Facial and Oral Movements  Muscles of Facial Expression: None, normal  Lips and Perioral Area: None, normal  Jaw: None, normal  Tongue: None, normal  Extremity Movements  Upper (arms, wrists, hands, fingers): None, normal  Lower (legs, knees, ankles, toes): None, normal  Trunk Movements  Neck, shoulders, hips: None, normal  Overall Severity  Severity of abnormal movements (max 4): 0  Incapacitation due to abnormal movements: None, normal  Patient's awareness of abnormal movements (rate only patient's report): Aware, no distress  Dental Status  Current problems with teeth and/or dentures?: No  Does patient usually wear dentures?: No        PHQ-9  PHQ-2/PHQ-9: Depression Screening  Little Interest or Pleasure in Doing Things: 3-->nearly every day  Feeling Down, Depressed or Hopeless: 2-->more than half the days  PHQ-2 Total Score: 5  Trouble Falling or Staying Asleep, or Sleeping Too Much: 2-->more than half the days  Feeling Tired or Having Little Energy: 3-->nearly every day  Poor Appetite or Overeatin-->several days  Feeling Bad about Yourself - or that You are a Failure or Have Let Yourself or Your Family Down: 2-->more than half the days  Trouble Concentrating on Things, Such as Reading the Newspaper or Watching Television: 3-->nearly every day  Moving or Speaking So Slowly that Other People Could Have Noticed? Or the Opposite - Being So Fidgety: 1-->several days  Thoughts that You Would be Better Off Dead or of Hurting Yourself in Some Way: 0-->not at all  PHQ-9: Brief Depression Severity Measure Score: 17  If You Checked Off Any  Problems, How Difficult Have These Problems Made It For You to Do Your Work, Take Care of Things at Home, or Get Along with Other People?: extremely difficult      PHQ-9 Score:   PHQ-9 Total Score: 17    Depression Screening:  Patient screened positive for depression based on a PHQ-9 score of 17 on 7/2/2024. Follow-up recommendations include: Prescribed antidepressant medication treatment and Suicide Risk Assessment performed.        MANISHA-7  Over the last two weeks, how often have you been bothered by the following problems?  Feeling nervous, anxious or on edge: More than half the days  Not being able to stop or control worrying: Nearly every day  Worrying too much about different things: Nearly every day  Trouble Relaxing: More than half the days  Being so restless that it is hard to sit still: Several days  Becoming easily annoyed or irritable: Several days  Feeling afraid as if something awful might happen: Several days  MANISHA 7 Total Score: 13  If you checked any problems, how difficult have these problems made it for you to do your work, take care of things at home, or get along with other people: Very difficult      ADHD  Screening for Adults With ADHD - (1-6)  1. How often do you have trouble wrapping up the final details of a project, once the challenging parts have been done?: Very Often  2. How often do you have difficulty getting things in order when you have to do a task that requires organization?: Very Often  3. How often do you have problems remembering appointments or obligations : Very Often  4. When you have a task that requires a lot of thought, how often do you avoid or delay getting started ?: Very Often  5. How often do you fidget or squirm with your hands or feet when you have to sit down for a long time?: Very Often  6. How often do you feel overly active and compelled to do things, like you were driven by a motor?: Sometimes  7. How often do you make careless mistakes when you have to work on a  boring or difficult project?: Often  8. How often do have difficulty keeping your attention when you are doing boring or repetitive work?: Very Often  9. How often do you have difficulty concentrating on what people say to you, even when they are speaking to you: Often  10.How often do you misplace or have difficulty finding things at home or at work?: Very Often  11.How often are you distracted by activity or noise around you?: Very Often  12.How often do you leave your seat in meetings or other situations in which you are expected to remain seated?: Often  13.How often do you feel restless or fidgety?: Often  14.How often do you have difficulty unwinding and relaxing when you have time to yourself?: Often  15.How often do you find yourself talking too much when you are in social situations?: Very Often  16.When you’re in a conversation, how often do you find yourself finishing the sentences of the people you are talking to, before they can finish them themselves?: Very Often  17.How often do you have difficulty waiting your turn in situations when turn taking is required?: Often  18.How often do you interrupt others when they are busy?: Often    A psychological evaluation was conducted in order to assess past and current level of functioning. Areas assessed included, but were not limited to: perception of social support, perception of ability to face and deal with challenges in life (positive functioning), anxiety symptoms, depressive symptoms, perspective on beliefs/belief system, coping skills for stress, intelligence level,  Therapeutic rapport was established. Interventions conducted today were geared towards incorporating medication management along with support for continued therapy. Education was also provided as to the med management with this provider and what to expect in subsequent sessions.      We discussed risks, benefits, goals and side effects of the above medication and the patient was agreeable  with the plan. Patient was educated on the importance of compliance with treatment and follow-up appointments. Patient is aware to contact the [facility] with any worsening of symptoms. To call for questions or concerns and return early if necessary. Patent is agreeable to go to the Emergency Department or call 911 should they begin SI/HI.    MEDS ORDERED DURING VISIT:  New Medications Ordered This Visit   Medications    Cariprazine HCl (Vraylar) 1.5 MG capsule capsule     Sig: Take 1 capsule by mouth Daily.     Dispense:  30 capsule     Refill:  1    desvenlafaxine (PRISTIQ) 50 MG 24 hr tablet     Sig: Take 1 tablet by mouth Daily.     Dispense:  90 tablet     Refill:  0     This prescription was filled on 5/8/2023. Any refills authorized will be placed on file.    DULoxetine (CYMBALTA) 60 MG capsule     Sig: Take 2 capsules by mouth Daily.     Dispense:  180 capsule     Refill:  0     This prescription was filled on 5/18/2023. Any refills authorized will be placed on file.    traZODone (DESYREL) 100 MG tablet     Sig: Take 1 tablet by mouth every night at bedtime.     Dispense:  90 tablet     Refill:  0     This prescription was filled on 12/31/2022. Any refills authorized will be placed on file.         Follow Up   Return in about 6 weeks (around 8/13/2024).  Patient was given instructions and counseling regarding her condition or for health maintenance advice. Please see specific information pulled into the AVS if appropriate.     TREATMENT PLAN/GOALS: Continue supportive psychotherapy efforts and medications as indicated. Treatment and medication options discussed during today's visit. Patient acknowledged and verbally consented to continue with current treatment plan and was educated on the importance of compliance with treatment and follow-up appointments.    MEDICATION ISSUES:  Discussed medication options and treatment plan of prescribed medication as well as the risks, benefits, and side effects  including potential falls, possible impaired driving and metabolic adversities among others. Patient is agreeable to call the office with any worsening of symptoms or onset of side effects. Patient is agreeable to call 911 or go to the nearest ER should he/she begin having SI/HI.        [facility]  [address] [place] [address]  [phone]    July 3, 2024 10:41 EDT

## 2024-07-04 DIAGNOSIS — J30.2 SEASONAL ALLERGIES: ICD-10-CM

## 2024-07-09 ENCOUNTER — OFFICE VISIT (OUTPATIENT)
Dept: PSYCHIATRY | Facility: CLINIC | Age: 59
End: 2024-07-09
Payer: MEDICARE

## 2024-07-09 DIAGNOSIS — F41.1 GENERALIZED ANXIETY DISORDER: ICD-10-CM

## 2024-07-09 DIAGNOSIS — F33.1 MAJOR DEPRESSIVE DISORDER, RECURRENT EPISODE, MODERATE: ICD-10-CM

## 2024-07-09 PROCEDURE — 90837 PSYTX W PT 60 MINUTES: CPT | Performed by: SOCIAL WORKER

## 2024-07-09 RX ORDER — LEVOCETIRIZINE DIHYDROCHLORIDE 5 MG/1
5 TABLET, FILM COATED ORAL EVERY EVENING
Qty: 30 TABLET | Refills: 2 | Status: SHIPPED | OUTPATIENT
Start: 2024-07-09

## 2024-07-09 NOTE — PROGRESS NOTES
Date: 07/09/2024   Time In: 1430  Time Out: 1530    PROGRESS NOTE  Data:  Zhane Burnham is a 59 y.o. female who presents today for individual therapy session at [facility]. Chief Complaint: depression and anxiety    Patient presents for therapy for moderate depression and generalized anxiety. Patient reports having days of feeling down, difficulty doing things and feeling overwhelmed. She reports financial and relational stress that contributes to her mood.       Clinical Maneuvering/Intervention:      Assisted patient in processing above session content; acknowledged and normalized patient’s thoughts, feelings, and concerns.  Rationalized patient thought process regarding changes with mood.  Discussed triggers associated with patient's depression.  Also discussed coping skills for patient to implement such as previously discussed coping, using behavioral activation, reaching out to supports and participating in activities she enjoys. Discussed with patient transitioning to another therapist in this clinic since this therapist is leaving the practice. .    Allowed patient to freely discuss issues without interruption or judgment. Provided safe, confidential environment to facilitate the development of positive therapeutic relationship and encourage open, honest communication. Assisted patient in identifying risk factors which would indicate the need for higher level of care including thoughts to harm self or others and/or self-harming behavior and encouraged patient to contact this office, call 911, or present to the nearest emergency room should any of these events occur. Discussed crisis intervention services and means to access. Patient adamantly and convincingly denies current suicidal or homicidal ideation or perceptual disturbance.    Assessment   Patient appears to maintain relative stability as compared to their baseline.  However, patient continues to struggle with depression which  continues to cause impairment in important areas of functioning.  As a result, they can be reasonably expected to continue to benefit from treatment and would likely be at increased risk for decompensation otherwise.    Mental Status Exam:   Hygiene:   good  Cooperation:  Cooperative  Eye Contact:  Good  Psychomotor Behavior:  Appropriate  Affect:  Appropriate  Mood: normal  Speech:  Normal  Thought Process:  Goal directed and Linear  Thought Content:  Mood congruent  Suicidal:  None  Homicidal:  None  Hallucinations:  None  Delusion:  None  Memory:  Intact  Orientation:  Person, Place, Time, and Situation  Reliability:  good  Insight:  Good  Judgement:  Good  Impulse Control:  Good  Physical/Medical Issues:  Yes        Patient's Support Network Includes:   friends, niece and aunt    Functional Status: Mild impairment     Progress toward goal: At goal    Prognosis: Good with Ongoing Treatment          Plan     VISIT DIAGNOSIS:     ICD-10-CM ICD-9-CM   1. Major depressive disorder, recurrent episode, moderate  F33.1 296.32   2. Generalized anxiety disorder  F41.1 300.02        Patient will continue in individual outpatient therapy with focus on improved functioning and coping skills, maintaining stability, and avoiding decompensation and the need for higher level of care.    Patient will adhere to medication regimen as prescribed and report any side effects. Patient will contact this office, call 911 or present to the nearest emergency room should suicidal or homicidal ideations occur. Provide Cognitive Behavioral Therapy and Solution Focused Therapy to improve functioning, maintain stability, and avoid decompensation and the need for higher level of care.     Return in about Return in about 2 weeks (around 7/23/2024). or earlier if symptoms worsen or fail to improve.            This document has been electronically signed by Sydney Alegria LCSW  July 9, 2024 14:29 EDT      Part of this note may be an electronic  transcription/translation of spoken language to printed text using the Dragon Dictation System.

## 2024-07-10 ENCOUNTER — OFFICE VISIT (OUTPATIENT)
Dept: FAMILY MEDICINE CLINIC | Facility: CLINIC | Age: 59
End: 2024-07-10
Payer: MEDICARE

## 2024-07-10 VITALS
RESPIRATION RATE: 16 BRPM | SYSTOLIC BLOOD PRESSURE: 124 MMHG | DIASTOLIC BLOOD PRESSURE: 82 MMHG | HEART RATE: 74 BPM | TEMPERATURE: 97.9 F | OXYGEN SATURATION: 98 % | WEIGHT: 267 LBS | BODY MASS INDEX: 44.48 KG/M2 | HEIGHT: 65 IN

## 2024-07-10 DIAGNOSIS — Z12.4 PAP SMEAR FOR CERVICAL CANCER SCREENING: ICD-10-CM

## 2024-07-10 DIAGNOSIS — M48.00 SPINAL STENOSIS, UNSPECIFIED SPINAL REGION: Primary | ICD-10-CM

## 2024-07-10 DIAGNOSIS — G56.03 BILATERAL CARPAL TUNNEL SYNDROME: ICD-10-CM

## 2024-07-10 DIAGNOSIS — G62.9 POLYNEUROPATHY: ICD-10-CM

## 2024-07-10 PROCEDURE — 1160F RVW MEDS BY RX/DR IN RCRD: CPT | Performed by: NURSE PRACTITIONER

## 2024-07-10 PROCEDURE — 1125F AMNT PAIN NOTED PAIN PRSNT: CPT | Performed by: NURSE PRACTITIONER

## 2024-07-10 PROCEDURE — 3074F SYST BP LT 130 MM HG: CPT | Performed by: NURSE PRACTITIONER

## 2024-07-10 PROCEDURE — 99214 OFFICE O/P EST MOD 30 MIN: CPT | Performed by: NURSE PRACTITIONER

## 2024-07-10 PROCEDURE — 1159F MED LIST DOCD IN RCRD: CPT | Performed by: NURSE PRACTITIONER

## 2024-07-10 PROCEDURE — 3079F DIAST BP 80-89 MM HG: CPT | Performed by: NURSE PRACTITIONER

## 2024-07-10 RX ORDER — PREGABALIN 100 MG/1
100 CAPSULE ORAL 3 TIMES DAILY
COMMUNITY

## 2024-07-10 NOTE — PROGRESS NOTES
"                      Established Patient        Chief Complaint:   Chief Complaint   Patient presents with    Numbness     Pt has been having some numbness in feet and hands off and on.            History of Present Illness:    Zhane Burnham is a 59 y.o. female who presents today for concerns of numbness of bilateral hands, arms, legs, feet. Patient reports history of pinched nerves in back. Patient states that she thinks that part of the issue is sciatica.     Reports that hands are numb, weak in the morning. Numbness of thumb, index finger, middle finger bilaterally.     Last MRI 2017. Evidence of spinal stenosis. Currently taking Pregabalin 100mg TID.     Subjective     The following portions of the patient's history were reviewed and updated as appropriate: allergies, current medications, past family history, past medical history, past social history, past surgical history and problem list.    ALLERGIES  Allergies   Allergen Reactions    Celebrex [Celecoxib] Swelling    Red Dye Anaphylaxis     Reported reaction started with rash/itching and progressed to swelling     Shellfish-Derived Products Anaphylaxis    Other Other (See Comments)     STOOL SOFTENERS - tingly feeling/does not feel right       ROS  Review of Systems   Musculoskeletal:  Positive for back pain and neck pain. Negative for gait problem.   Neurological:  Positive for weakness and numbness.       Objective     Vital Signs:   /82   Pulse 74   Temp 97.9 °F (36.6 °C)   Resp 16   Ht 165.1 cm (65\")   Wt 121 kg (267 lb)   LMP  (LMP Unknown)   SpO2 98%   BMI 44.43 kg/m²     Class 3 Severe Obesity (BMI >=40). Obesity-related health conditions include the following: hypertension, dyslipidemias, GERD, and osteoarthritis. Obesity is unchanged. BMI is is above average; BMI management plan is completed. We discussed Information on healthy weight added to patient's after visit summary.          Physical Exam   Physical Exam  Vitals and nursing " note reviewed.   Constitutional:       Appearance: She is obese.   Cardiovascular:      Rate and Rhythm: Normal rate.      Pulses: No decreased pulses.   Pulmonary:      Effort: Pulmonary effort is normal.   Musculoskeletal:      Right hand: No tenderness. Normal range of motion. Normal strength. Normal sensation. Normal capillary refill. Normal pulse.      Left hand: No tenderness. Normal range of motion. Normal strength. Normal sensation. Normal capillary refill. Normal pulse.   Skin:     Capillary Refill: Capillary refill takes less than 2 seconds.   Neurological:      Mental Status: She is alert and oriented to person, place, and time.   Psychiatric:         Mood and Affect: Mood normal.         Behavior: Behavior normal.         Assessment and Plan      Assessment/Plan:   Diagnoses and all orders for this visit:    1. Spinal stenosis, unspecified spinal region (Primary)  -     Ambulatory Referral to Spine Surgery    2. Bilateral carpal tunnel syndrome    3. Polyneuropathy  -     Ambulatory Referral to Spine Surgery    4. Pap smear for cervical cancer screening  -     Ambulatory Referral to Obstetrics / Gynecology    Will trial wrist splints bilaterally during sleep.     Patient was encouraged to keep me informed of any acute changes, lack of improvement, or any new concerning symptoms.    Discussion Summary:  Discussed plan of care in detail with pt today; pt verb understanding and agrees.    I spent 30 minutes caring for Zhane on this date of service. This time includes time spent by me in the following activities:preparing for the visit, reviewing tests, obtaining and/or reviewing a separately obtained history, performing a medically appropriate examination and/or evaluation , counseling and educating the patient/family/caregiver, ordering medications, tests, or procedures, and documenting information in the medical record    I confirm accuracy of unchanged data/findings which have been carried forward from  previous visit, as well as I have updated appropriately those that have changed.      I have reviewed and updated all copied forward information, as appropriate.  I attest to the accuracy and relevance of any unchanged information.      Follow up:  Return in about 2 months (around 9/10/2024) for Medicare Wellness.     Patient Education:  There are no Patient Instructions on file for this visit.    JENNIE Aponte  07/22/24  14:45 EDT          Please note that portions of this note may have been completed with a voice recognition program.

## 2024-07-17 DIAGNOSIS — M62.838 MUSCLE SPASM: ICD-10-CM

## 2024-07-17 DIAGNOSIS — I10 PRIMARY HYPERTENSION: ICD-10-CM

## 2024-07-18 RX ORDER — CYCLOBENZAPRINE HCL 10 MG
TABLET ORAL
Qty: 90 TABLET | Refills: 1 | Status: SHIPPED | OUTPATIENT
Start: 2024-07-18

## 2024-07-25 ENCOUNTER — OFFICE VISIT (OUTPATIENT)
Dept: PSYCHIATRY | Facility: CLINIC | Age: 59
End: 2024-07-25
Payer: MEDICARE

## 2024-07-25 DIAGNOSIS — F41.1 GENERALIZED ANXIETY DISORDER: ICD-10-CM

## 2024-07-25 DIAGNOSIS — F33.1 MAJOR DEPRESSIVE DISORDER, RECURRENT EPISODE, MODERATE: ICD-10-CM

## 2024-07-25 NOTE — PROGRESS NOTES
Date: 07/25/2024   Time In: 855  Time Out: 936    PROGRESS NOTE  Data:  Zhane Burnham is a 59 y.o. female who presents today for individual therapy session at [facility]. Chief Complaint: depression and anxiety    Patient reports mood has been stable since last session. She reports feeling anxious and difficulty completing tasks. She reports increased stressors that have contributed to anxiety.      Clinical Maneuvering/Intervention:      Assisted patient in processing above session content; acknowledged and normalized patient’s thoughts, feelings, and concerns.  Rationalized patient thought process regarding recent stressors.  Discussed triggers associated with patient's depression.  Also discussed coping skills for patient to implement such as previously used coping, challenging negative thoughts, behavioral activation, mindfulness and spending time with supports. Discussed with patient transitioning to another therapist in this clinic since current therapist is leaving the practice. .    Allowed patient to freely discuss issues without interruption or judgment. Provided safe, confidential environment to facilitate the development of positive therapeutic relationship and encourage open, honest communication. Assisted patient in identifying risk factors which would indicate the need for higher level of care including thoughts to harm self or others and/or self-harming behavior and encouraged patient to contact this office, call 911, or present to the nearest emergency room should any of these events occur. Discussed crisis intervention services and means to access. Patient adamantly and convincingly denies current suicidal or homicidal ideation or perceptual disturbance.    Assessment   Patient appears to maintain relative stability as compared to their baseline.  However, patient continues to struggle with depression and anxiety which continues to cause impairment in important areas of  functioning.  As a result, they can be reasonably expected to continue to benefit from treatment and would likely be at increased risk for decompensation otherwise.    Mental Status Exam:   Hygiene:   good  Cooperation:  Cooperative  Eye Contact:  Good  Psychomotor Behavior:  Appropriate  Affect:  Appropriate  Mood: normal  Speech:  Normal  Thought Process:  Goal directed and Linear  Thought Content:  Mood congruent  Suicidal:  None  Homicidal:  None  Hallucinations:  None  Delusion:  None  Memory:  Intact  Orientation:  Person, Place, Time, and Situation  Reliability:  good  Insight:  Good  Judgement:  Good  Impulse Control:  Good  Physical/Medical Issues:  Yes        Patient's Support Network Includes:   friends, niece and aunt    Functional Status: Mild impairment     Progress toward goal: At goal    Prognosis: Good with Ongoing Treatment          Plan     VISIT DIAGNOSIS:     ICD-10-CM ICD-9-CM   1. Major depressive disorder, recurrent episode, moderate  F33.1 296.32   2. Generalized anxiety disorder  F41.1 300.02        Patient will continue in individual outpatient therapy with focus on improved functioning and coping skills, maintaining stability, and avoiding decompensation and the need for higher level of care.    Patient will adhere to medication regimen as prescribed and report any side effects. Patient will contact this office, call 911 or present to the nearest emergency room should suicidal or homicidal ideations occur. Provide Cognitive Behavioral Therapy and Solution Focused Therapy to improve functioning, maintain stability, and avoid decompensation and the need for higher level of care.     Return in about Return in about 2 weeks (around 8/8/2024). or earlier if symptoms worsen or fail to improve.            This document has been electronically signed by Sydney Alegria LCSW  July 25, 2024 08:54 EDT      Part of this note may be an electronic transcription/translation of spoken language to printed  text using the Dragon Dictation System.

## 2024-07-26 RX ORDER — CYCLOBENZAPRINE HCL 10 MG
TABLET ORAL
Qty: 90 TABLET | Refills: 1 | Status: SHIPPED | OUTPATIENT
Start: 2024-07-26

## 2024-07-26 RX ORDER — HYDROCHLOROTHIAZIDE 25 MG/1
25 TABLET ORAL DAILY
Qty: 30 TABLET | Refills: 0 | Status: SHIPPED | OUTPATIENT
Start: 2024-07-26

## 2024-07-26 NOTE — TELEPHONE ENCOUNTER
Rx Refill Note  Requested Prescriptions     Pending Prescriptions Disp Refills    cyclobenzaprine (FLEXERIL) 10 MG tablet [Pharmacy Med Name: cyclobenzaprine 10 mg tablet] 90 tablet 1     Sig: TAKE 1/2 TO 1 TABLET BY MOUTH THREE TIMES DAILY AS NEEDED FOR MUSCLE SPASMS    hydroCHLOROthiazide 25 MG tablet [Pharmacy Med Name: hydrochlorothiazide 25 mg tablet] 30 tablet 0     Sig: TAKE ONE TABLET BY MOUTH EVERY DAY      Last office visit with prescribing clinician: 7/10/2024   Last telemedicine visit with prescribing clinician: Visit date not found   Next office visit with prescribing clinician: 7/26/2024     Marianne Bah MA  07/26/24, 09:29 EDT

## 2024-08-13 ENCOUNTER — OFFICE VISIT (OUTPATIENT)
Dept: NEUROLOGY | Facility: CLINIC | Age: 59
End: 2024-08-13
Payer: MEDICARE

## 2024-08-13 VITALS
HEART RATE: 93 BPM | HEIGHT: 65 IN | WEIGHT: 267 LBS | DIASTOLIC BLOOD PRESSURE: 82 MMHG | SYSTOLIC BLOOD PRESSURE: 140 MMHG | TEMPERATURE: 97.3 F | BODY MASS INDEX: 44.48 KG/M2 | OXYGEN SATURATION: 97 %

## 2024-08-13 DIAGNOSIS — G47.33 OSA ON CPAP: Primary | ICD-10-CM

## 2024-08-13 PROCEDURE — 3079F DIAST BP 80-89 MM HG: CPT | Performed by: NURSE PRACTITIONER

## 2024-08-13 PROCEDURE — 1160F RVW MEDS BY RX/DR IN RCRD: CPT | Performed by: NURSE PRACTITIONER

## 2024-08-13 PROCEDURE — 1159F MED LIST DOCD IN RCRD: CPT | Performed by: NURSE PRACTITIONER

## 2024-08-13 PROCEDURE — 99213 OFFICE O/P EST LOW 20 MIN: CPT | Performed by: NURSE PRACTITIONER

## 2024-08-13 PROCEDURE — 3077F SYST BP >= 140 MM HG: CPT | Performed by: NURSE PRACTITIONER

## 2024-08-13 RX ORDER — EZETIMIBE 10 MG/1
10 TABLET ORAL DAILY
Qty: 90 TABLET | Refills: 1 | Status: SHIPPED | OUTPATIENT
Start: 2024-08-13

## 2024-08-13 NOTE — PROGRESS NOTES
"     Follow up Sleep Patient Office Visit      Patient Name: Zhane Burnham  : 1965   MRN: [id number]     Referring Physician: No ref. provider found    Chief Complaint:    Chief Complaint   Patient presents with    Follow-up     Patient in office to follow up on chriss.          History of Present Illness: Zhane Burnham is a 59 y.o. female who is here today to follow up with CHRISS and was last seen on 2023. Currently on AutoPap 6-16cm, average P95 pressure 8.4cm, overall compliance 70%, compliance >4 hours 66%, AHI 0.4/hour.  She does say her compliance is decreased because she reads and falls asleep before putting her mask on.  She is tolerating her pressures well.    *[facility] company- [employer]   *Mask- Full face    Pertinent Medical History:   Current compliance report reviewed and has been scanned into the patient's medical record.    Following taken from previous visit note:   Zhane Burnham is a 58 y.o. female who is here today for complaints of daytime sleepiness.  She was seen in , for a consult for CHRISS.  She says she has returned today because she is struggling to stay awake during the day.  She believes she was previously told she had \"borderline narcolepsy\".  She is taking Cyclobenzaprine 2-3 times daily, Cymbalta daily, Pregabalin BID, and Oxycodone 10mg multiple times daily PRN pain.  Sleep questionnaire reviewed verbally- she has been told she snores, she has been told she stops breathing during sleep and says that was by her mother when she was younger, she wakes up with a dry mouth, she awakens with a headache, she never feels rested upon awakening in the mornings, she takes Trazodone to help with sleep, she has daytime sleepiness.  She states, \"I stay exhausted and I'm struggling so bad\".  Additional risk factors- BMI 44, dyslipidemia, ADHD, fibromyalgia, mood disorder, insomnia, HTN, OA, RA.   *PSG on 4/15/2021 showed no evidence of significant CHRISS with an AHI of 0/hour and poor " sleep architecture.     Subjective      Review of Systems:   Review of Systems    Medications:     Current Outpatient Medications:     BIOTIN PO, Take  by mouth., Disp: , Rfl:     Cariprazine HCl (Vraylar) 1.5 MG capsule capsule, Take 1 capsule by mouth Daily., Disp: 30 capsule, Rfl: 1    cyclobenzaprine (FLEXERIL) 10 MG tablet, TAKE 1/2 TO 1 TABLET BY MOUTH THREE TIMES DAILY AS NEEDED FOR MUSCLE SPASMS, Disp: 90 tablet, Rfl: 1    desvenlafaxine (PRISTIQ) 50 MG 24 hr tablet, Take 1 tablet by mouth Daily., Disp: 90 tablet, Rfl: 0    DULoxetine (CYMBALTA) 60 MG capsule, Take 2 capsules by mouth Daily., Disp: 180 capsule, Rfl: 0    dutasteride (AVODART) 0.5 MG capsule, , Disp: , Rfl:     etodolac (LODINE) 400 MG tablet, Take 1/2-1 tablet by mouth every 8 hours as needed., Disp: 90 tablet, Rfl: 1    ezetimibe (ZETIA) 10 MG tablet, TAKE ONE TABLET BY MOUTH EVERY DAY, Disp: 90 tablet, Rfl: 1    fluconazole (DIFLUCAN) 150 MG tablet, TAKE ONE TABLET BY MOUTH EVERY 3 DAYS, Disp: 3 tablet, Rfl: 0    hydroCHLOROthiazide 25 MG tablet, TAKE ONE TABLET BY MOUTH EVERY DAY, Disp: 30 tablet, Rfl: 0    levocetirizine (XYZAL) 5 MG tablet, TAKE ONE TABLET BY MOUTH EVERY EVENING, Disp: 30 tablet, Rfl: 2    methylphenidate (RITALIN) 20 MG tablet, Take 1 tablet by mouth 3 (Three) Times a Day., Disp: 90 tablet, Rfl: 0    Multiple Vitamins-Minerals (WOMENS 50+ MULTI VITAMIN PO), Take  by mouth., Disp: , Rfl:     oxyCODONE-acetaminophen (PERCOCET)  MG per tablet, Take 1 tablet by mouth Every 6 (Six) Hours As Needed for Moderate Pain., Disp: , Rfl:     pantoprazole (PROTONIX) 40 MG EC tablet, Take 1 tablet by mouth Daily., Disp: 90 tablet, Rfl: 1    potassium chloride 10 MEQ CR tablet, Take 1 tablet by mouth Daily., Disp: 30 tablet, Rfl: 5    pregabalin (LYRICA) 100 MG capsule, Take 1 capsule by mouth 3 (Three) Times a Day., Disp: , Rfl:     Relistor 150 MG tablet, TAKE THREE TABLETS BY MOUTH EVERY DAY IN THE MORNING, Disp: , Rfl:      "simvastatin (ZOCOR) 40 MG tablet, TAKE ONE TABLET BY MOUTH EVERY NIGHT, Disp: 90 tablet, Rfl: 1    traZODone (DESYREL) 100 MG tablet, Take 1 tablet by mouth every night at bedtime., Disp: 90 tablet, Rfl: 0    Allergies:   Allergies   Allergen Reactions    Celebrex [Celecoxib] Swelling    Red Dye Anaphylaxis     Reported reaction started with rash/itching and progressed to swelling     Shellfish-Derived Products Anaphylaxis    Other Other (See Comments)     STOOL SOFTENERS - tingly feeling/does not feel right       Objective     Physical Exam:  Vital Signs:   Vitals:    08/13/24 1346   BP: 140/82   BP Location: Right arm   Patient Position: Sitting   Cuff Size: Adult   Pulse: 93   Temp: 97.3 °F (36.3 °C)   SpO2: 97%   Weight: 121 kg (267 lb)   Height: 165.1 cm (65\")   PainSc: 0-No pain     BMI: Body mass index is 44.43 kg/m².    Physical Exam  Vitals and nursing note reviewed.   Constitutional:       General: She is not in acute distress.     Appearance: Normal appearance. She is well-developed. She is obese. She is not diaphoretic.   HENT:      Head: Normocephalic and atraumatic.   Eyes:      Extraocular Movements: Extraocular movements intact.      Conjunctiva/sclera: Conjunctivae normal.   Pulmonary:      Effort: Pulmonary effort is normal. No respiratory distress.   Musculoskeletal:         General: Normal range of motion.   Skin:     General: Skin is warm and dry.   Neurological:      Mental Status: She is alert and oriented to person, place, and time.   Psychiatric:         Mood and Affect: Mood normal.         Behavior: Behavior normal.         Thought Content: Thought content normal.         Judgment: Judgment normal.         Assessment / Plan      Assessment/Plan:   Diagnoses and all orders for this visit:    1. LEIF on CPAP (Primary)     *Encouraged increased compliance- she should use her machine for at least 4 hours every night.  She intends on trying to increase her compliance with AutoPap.       Follow " Up:   Return in about 1 year (around 8/13/2025) for F/U Obstructive Sleep Apnea.    *Order for PAP supplies sent to patient's DME company.     I have advised the patient the need to continue the use of CPAP.  Gold standard for treatment of sleep apnea includes weight loss, use of cpap and avoidance of alcohol.  Encouraged weight loss (if applicable) with a BMI goal of 24.  Untreated LEIF may increase the risk for development of hypertension, stroke, myocardial infarction, diabetes, cardiovascular disease, work-related issues and driving accidents. I have counseled and advised the patient to avoid driving or operating heavy/dangerous equipment if feeling drowsy.     JENNIE Zuluaga, FNP-C  [facility]

## 2024-08-22 ENCOUNTER — OFFICE VISIT (OUTPATIENT)
Dept: BEHAVIORAL HEALTH | Facility: CLINIC | Age: 59
End: 2024-08-22
Payer: MEDICARE

## 2024-08-22 VITALS
OXYGEN SATURATION: 98 % | BODY MASS INDEX: 42.82 KG/M2 | HEIGHT: 65 IN | HEART RATE: 83 BPM | WEIGHT: 257 LBS | DIASTOLIC BLOOD PRESSURE: 90 MMHG | SYSTOLIC BLOOD PRESSURE: 130 MMHG

## 2024-08-22 DIAGNOSIS — F90.2 ATTENTION DEFICIT HYPERACTIVITY DISORDER, COMBINED TYPE: ICD-10-CM

## 2024-08-22 DIAGNOSIS — F33.1 MAJOR DEPRESSIVE DISORDER, RECURRENT EPISODE, MODERATE: ICD-10-CM

## 2024-08-22 DIAGNOSIS — F51.04 PSYCHOPHYSIOLOGICAL INSOMNIA: ICD-10-CM

## 2024-08-22 DIAGNOSIS — F41.1 GAD (GENERALIZED ANXIETY DISORDER): Primary | ICD-10-CM

## 2024-08-22 PROCEDURE — 99214 OFFICE O/P EST MOD 30 MIN: CPT

## 2024-08-22 PROCEDURE — 3080F DIAST BP >= 90 MM HG: CPT

## 2024-08-22 PROCEDURE — 3075F SYST BP GE 130 - 139MM HG: CPT

## 2024-08-22 PROCEDURE — 1159F MED LIST DOCD IN RCRD: CPT

## 2024-08-22 PROCEDURE — 1160F RVW MEDS BY RX/DR IN RCRD: CPT

## 2024-08-22 RX ORDER — TRAZODONE HYDROCHLORIDE 100 MG/1
100 TABLET ORAL
Qty: 90 TABLET | Refills: 0 | Status: SHIPPED | OUTPATIENT
Start: 2024-08-22

## 2024-08-22 RX ORDER — DESVENLAFAXINE SUCCINATE 50 MG/1
50 TABLET, EXTENDED RELEASE ORAL DAILY
Qty: 90 TABLET | Refills: 0 | Status: SHIPPED | OUTPATIENT
Start: 2024-08-22

## 2024-08-22 RX ORDER — DULOXETIN HYDROCHLORIDE 60 MG/1
120 CAPSULE, DELAYED RELEASE ORAL DAILY
Qty: 180 CAPSULE | Refills: 0 | Status: SHIPPED | OUTPATIENT
Start: 2024-08-22

## 2024-08-22 RX ORDER — METHYLPHENIDATE HYDROCHLORIDE 20 MG/1
20 TABLET ORAL 3 TIMES DAILY
Qty: 90 TABLET | Refills: 0 | Status: SHIPPED | OUTPATIENT
Start: 2024-08-22

## 2024-08-22 NOTE — PROGRESS NOTES
Follow Up Office Visit    Patient Name: Zhane Burnham  : 1965   MRN: [id number]   Care Team: Patient Care Team:  Latasha Curtis APRN as PCP - General (Family Medicine)  Magda Philip LPCC as Counselor (Behavioral Health)  Komal Aguiar APRN as Nurse Practitioner (Behavioral Health)  Sydney Alegria LCSW as  (Psychiatry)         Chief Complaint:    Chief Complaint   Patient presents with    ADHD    Anxiety    Depression    Sleeping Problem    Med Management       History of Present Illness: Zhane Burnham is a 59 y.o. female who is here today for a medication management follow up. Patient reports that overall medication continues to be effective She feels that her focus and concentration are doing good and her mood and anxiety have been managed well. She continues to deal with a significant amount of situational stressors, however, she feels that she is managing them well. She did not see the need to make any changes and did not have any medication concerns at today's visit. She denies SI/HI today.     The following portion of the patient's history were reviewed and updated appropriately: allergies, current and past medications, family history, medical history and social history. STEPHANIE reviewed and appropriate.   Subjective   Review of Systems:    Review of Systems   Respiratory: Negative.     Cardiovascular: Negative.  Negative for chest pain and palpitations.   Neurological: Negative.    Psychiatric/Behavioral:  Positive for stress. The patient is nervous/anxious.    All other systems reviewed and are negative.      Current Medications:   Current Outpatient Medications   Medication Sig Dispense Refill    BIOTIN PO Take  by mouth.      Cariprazine HCl (Vraylar) 1.5 MG capsule capsule Take 1 capsule by mouth Daily. 90 capsule 0    cyclobenzaprine (FLEXERIL) 10 MG tablet TAKE 1/2 TO 1 TABLET BY MOUTH THREE TIMES DAILY AS NEEDED FOR MUSCLE SPASMS 90 tablet 1    desvenlafaxine  (PRISTIQ) 50 MG 24 hr tablet Take 1 tablet by mouth Daily. 90 tablet 0    DULoxetine (CYMBALTA) 60 MG capsule Take 2 capsules by mouth Daily. 180 capsule 0    dutasteride (AVODART) 0.5 MG capsule       etodolac (LODINE) 400 MG tablet Take 1/2-1 tablet by mouth every 8 hours as needed. 90 tablet 1    ezetimibe (ZETIA) 10 MG tablet TAKE ONE TABLET BY MOUTH EVERY DAY 90 tablet 1    hydroCHLOROthiazide 25 MG tablet TAKE ONE TABLET BY MOUTH EVERY DAY 30 tablet 0    levocetirizine (XYZAL) 5 MG tablet TAKE ONE TABLET BY MOUTH EVERY EVENING 30 tablet 2    methylphenidate (RITALIN) 20 MG tablet Take 1 tablet by mouth 3 (Three) Times a Day. 90 tablet 0    Multiple Vitamins-Minerals (WOMENS 50+ MULTI VITAMIN PO) Take  by mouth.      oxyCODONE-acetaminophen (PERCOCET)  MG per tablet Take 1 tablet by mouth Every 6 (Six) Hours As Needed for Moderate Pain.      pantoprazole (PROTONIX) 40 MG EC tablet Take 1 tablet by mouth Daily. 90 tablet 1    pregabalin (LYRICA) 100 MG capsule Take 1 capsule by mouth 3 (Three) Times a Day.      Relistor 150 MG tablet TAKE THREE TABLETS BY MOUTH EVERY DAY IN THE MORNING      simvastatin (ZOCOR) 40 MG tablet TAKE ONE TABLET BY MOUTH EVERY NIGHT 90 tablet 1    traZODone (DESYREL) 100 MG tablet Take 1 tablet by mouth every night at bedtime. 90 tablet 0    fluconazole (DIFLUCAN) 150 MG tablet TAKE ONE TABLET BY MOUTH EVERY 3 DAYS (Patient not taking: Reported on 8/22/2024) 3 tablet 0    potassium chloride 10 MEQ CR tablet Take 1 tablet by mouth Daily. (Patient not taking: Reported on 8/22/2024) 30 tablet 5     No current facility-administered medications for this visit.       Mental Status Exam:   Hygiene:   good  Cooperation:  Cooperative  Eye Contact:  Good  Psychomotor Behavior:  Appropriate  Affect:  Appropriate  Mood: normal  Speech:  Normal  Thought Process:  Goal directed and Linear  Thought Content:  Normal and Mood congruent  Suicidal:  None  Homicidal:  None  Hallucinations:   "None  Delusion:  None  Memory:  Intact  Orientation:  Person, Place, Time, and Situation  Reliability:  good  Insight:  Good  Judgement:  Good  Impulse Control:  Good  Physical/Medical Issues:  Yes see chart       Objective   Vital Signs:   /90   Pulse 83   Ht 165.1 cm (65\")   Wt 117 kg (257 lb)   SpO2 98%   BMI 42.77 kg/m²       Assessment / Plan    Diagnoses and all orders for this visit:    1. MANISHA (generalized anxiety disorder) (Primary)  -     desvenlafaxine (PRISTIQ) 50 MG 24 hr tablet; Take 1 tablet by mouth Daily.  Dispense: 90 tablet; Refill: 0  -     DULoxetine (CYMBALTA) 60 MG capsule; Take 2 capsules by mouth Daily.  Dispense: 180 capsule; Refill: 0    2. Major depressive disorder, recurrent episode, moderate  -     Cariprazine HCl (Vraylar) 1.5 MG capsule capsule; Take 1 capsule by mouth Daily.  Dispense: 90 capsule; Refill: 0  -     desvenlafaxine (PRISTIQ) 50 MG 24 hr tablet; Take 1 tablet by mouth Daily.  Dispense: 90 tablet; Refill: 0  -     DULoxetine (CYMBALTA) 60 MG capsule; Take 2 capsules by mouth Daily.  Dispense: 180 capsule; Refill: 0    3. Attention deficit hyperactivity disorder, combined type  -     methylphenidate (RITALIN) 20 MG tablet; Take 1 tablet by mouth 3 (Three) Times a Day.  Dispense: 90 tablet; Refill: 0    4. Psychophysiological insomnia  -     traZODone (DESYREL) 100 MG tablet; Take 1 tablet by mouth every night at bedtime.  Dispense: 90 tablet; Refill: 0       Overall, patient appears to be doing well on current medication. No issues reported and no indication for change. Will continue medication as ordered.     History and physical exam exhibit continued safe and appropriate use of controlled substance.    As part of patient's treatment plan I am prescribing a controlled substance.  The patient has been made aware of the appropriate use of such medications and potential for dependence and/or overdose.  It has also been made clear that these medications are for use " by this patient only, without concomitant use of alcohol or other substances, unless prescribed.    Patient has completed a prescribing agreement detailing terms of continued prescribing of controlled substances, including monitoring STEPHANIE reports, urine drug screening, and pill counts if necessary.  Patient is aware that inappropriate use will result in cessation of prescribing such medications.    AIMS  Facial and Oral Movements  Muscles of Facial Expression: None, normal  Lips and Perioral Area: None, normal  Jaw: None, normal  Tongue: None, normal  Extremity Movements  Upper (arms, wrists, hands, fingers): None, normal  Lower (legs, knees, ankles, toes): None, normal  Trunk Movements  Neck, shoulders, hips: None, normal  Overall Severity  Severity of abnormal movements (max 4): 0  Incapacitation due to abnormal movements: None, normal  Patient's awareness of abnormal movements (rate only patient's report): Aware, no distress  Dental Status  Current problems with teeth and/or dentures?: No  Does patient usually wear dentures?: No          PHQ-9  PHQ-2/PHQ-9: Depression Screening  Little Interest or Pleasure in Doing Things: 2-->more than half the days  Feeling Down, Depressed or Hopeless: 2-->more than half the days  PHQ-2 Total Score: 4  Trouble Falling or Staying Asleep, or Sleeping Too Much: 2-->more than half the days  Feeling Tired or Having Little Energy: 3-->nearly every day  Poor Appetite or Overeatin-->more than half the days  Feeling Bad about Yourself - or that You are a Failure or Have Let Yourself or Your Family Down: 1-->several days  Trouble Concentrating on Things, Such as Reading the Newspaper or Watching Television: 3-->nearly every day  Moving or Speaking So Slowly that Other People Could Have Noticed? Or the Opposite - Being So Fidgety: 1-->several days  Thoughts that You Would be Better Off Dead or of Hurting Yourself in Some Way: 0-->not at all  PHQ-9: Brief Depression Severity Measure  Score: 16  If You Checked Off Any Problems, How Difficult Have These Problems Made It For You to Do Your Work, Take Care of Things at Home, or Get Along with Other People?: very difficult      PHQ-9 Score:   PHQ-9 Total Score: 16    Depression Screening:  Patient screened positive for depression based on a PHQ-9 score of 16 on 8/22/2024. Follow-up recommendations include: Prescribed antidepressant medication treatment and Suicide Risk Assessment performed.        MANISHA-7  Over the last two weeks, how often have you been bothered by the following problems?  Feeling nervous, anxious or on edge: Nearly every day  Not being able to stop or control worrying: Nearly every day  Worrying too much about different things: Nearly every day  Trouble Relaxing: More than half the days  Being so restless that it is hard to sit still: Several days  Becoming easily annoyed or irritable: Not at all  Feeling afraid as if something awful might happen: More than half the days  MANISHA 7 Total Score: 14  If you checked any problems, how difficult have these problems made it for you to do your work, take care of things at home, or get along with other people: Very difficult      ADHD  Screening for Adults With ADHD - (1-6)  1. How often do you have trouble wrapping up the final details of a project, once the challenging parts have been done?: Very Often  2. How often do you have difficulty getting things in order when you have to do a task that requires organization?: Often  3. How often do you have problems remembering appointments or obligations : Often  4. When you have a task that requires a lot of thought, how often do you avoid or delay getting started ?: Often  5. How often do you fidget or squirm with your hands or feet when you have to sit down for a long time?: Sometimes  6. How often do you feel overly active and compelled to do things, like you were driven by a motor?: Never  7. How often do you make careless mistakes when you have  to work on a boring or difficult project?: Sometimes  8. How often do have difficulty keeping your attention when you are doing boring or repetitive work?: Often  9. How often do you have difficulty concentrating on what people say to you, even when they are speaking to you: Sometimes  10.How often do you misplace or have difficulty finding things at home or at work?: Very Often  11.How often are you distracted by activity or noise around you?: Very Often  12.How often do you leave your seat in meetings or other situations in which you are expected to remain seated?: Very Often  13.How often do you feel restless or fidgety?: Often  14.How often do you have difficulty unwinding and relaxing when you have time to yourself?: Sometimes  15.How often do you find yourself talking too much when you are in social situations?: Often  16.When you’re in a conversation, how often do you find yourself finishing the sentences of the people you are talking to, before they can finish them themselves?: Often  17.How often do you have difficulty waiting your turn in situations when turn taking is required?: Often  18.How often do you interrupt others when they are busy?: Sometimes    A psychological evaluation was conducted in order to assess past and current level of functioning. Areas assessed included, but were not limited to: perception of social support, perception of ability to face and deal with challenges in life (positive functioning), anxiety symptoms, depressive symptoms, perspective on beliefs/belief system, coping skills for stress, intelligence level,  Therapeutic rapport was established. Interventions conducted today were geared towards incorporating medication management along with support for continued therapy. Education was also provided as to the med management with this provider and what to expect in subsequent sessions.      We discussed risks, benefits, goals and side effects of the above medication and the  patient was agreeable with the plan. Patient was educated on the importance of compliance with treatment and follow-up appointments. Patient is aware to contact the [facility] with any worsening of symptoms. To call for questions or concerns and return early if necessary. Patent is agreeable to go to the Emergency Department or call 911 should they begin SI/HI.    MEDS ORDERED DURING VISIT:  New Medications Ordered This Visit   Medications    Cariprazine HCl (Vraylar) 1.5 MG capsule capsule     Sig: Take 1 capsule by mouth Daily.     Dispense:  90 capsule     Refill:  0    desvenlafaxine (PRISTIQ) 50 MG 24 hr tablet     Sig: Take 1 tablet by mouth Daily.     Dispense:  90 tablet     Refill:  0     This prescription was filled on 5/8/2023. Any refills authorized will be placed on file.    DULoxetine (CYMBALTA) 60 MG capsule     Sig: Take 2 capsules by mouth Daily.     Dispense:  180 capsule     Refill:  0     This prescription was filled on 5/18/2023. Any refills authorized will be placed on file.    methylphenidate (RITALIN) 20 MG tablet     Sig: Take 1 tablet by mouth 3 (Three) Times a Day.     Dispense:  90 tablet     Refill:  0    traZODone (DESYREL) 100 MG tablet     Sig: Take 1 tablet by mouth every night at bedtime.     Dispense:  90 tablet     Refill:  0     This prescription was filled on 12/31/2022. Any refills authorized will be placed on file.         Follow Up   Return in about 3 months (around 11/22/2024).  Patient was given instructions and counseling regarding her condition or for health maintenance advice. Please see specific information pulled into the AVS if appropriate.     TREATMENT PLAN/GOALS: Continue supportive psychotherapy efforts and medications as indicated. Treatment and medication options discussed during today's visit. Patient acknowledged and verbally consented to continue with current treatment plan and was educated on the importance of compliance with treatment and follow-up  appointments.    MEDICATION ISSUES:  Discussed medication options and treatment plan of prescribed medication as well as the risks, benefits, and side effects including potential falls, possible impaired driving and metabolic adversities among others. Patient is agreeable to call the office with any worsening of symptoms or onset of side effects. Patient is agreeable to call 911 or go to the nearest ER should he/she begin having SI/HI.        JENNIE [facility]  [address] [place] [address] [address] [place] [address] [phone]    August 25, 2024 13:42 EDT

## 2024-08-28 DIAGNOSIS — I10 PRIMARY HYPERTENSION: ICD-10-CM

## 2024-08-28 NOTE — TELEPHONE ENCOUNTER
"    Caller: Zhane Burnham \"Shemar\"    Relationship: Self    Best call back number: [phone]     Requested Prescriptions:   Requested Prescriptions     Pending Prescriptions Disp Refills    hydroCHLOROthiazide 25 MG tablet 30 tablet 0     Sig: Take 1 tablet by mouth Daily.        Pharmacy where request should be sent: [address] [phone] [facility] [phone]      Last office visit with prescribing clinician: 7/10/2024   Last telemedicine visit with prescribing clinician: Visit date not found   Next office visit with prescribing clinician: 9/10/2024     Additional details provided by patient: NO    Does the patient have less than a 3 day supply:  [x] Yes  [] No    Would you like a call back once the refill request has been completed: [] Yes [x] No    If the office needs to give you a call back, can they leave a voicemail: [] Yes [x] No    Emanuel Ramírez Rep   08/28/24 15:48 EDT           "

## 2024-09-03 RX ORDER — HYDROCHLOROTHIAZIDE 25 MG/1
25 TABLET ORAL DAILY
Qty: 30 TABLET | Refills: 0 | Status: SHIPPED | OUTPATIENT
Start: 2024-09-03

## 2024-09-10 ENCOUNTER — OFFICE VISIT (OUTPATIENT)
Dept: FAMILY MEDICINE CLINIC | Facility: CLINIC | Age: 59
End: 2024-09-10
Payer: MEDICARE

## 2024-09-10 VITALS
SYSTOLIC BLOOD PRESSURE: 142 MMHG | OXYGEN SATURATION: 99 % | DIASTOLIC BLOOD PRESSURE: 90 MMHG | RESPIRATION RATE: 16 BRPM | BODY MASS INDEX: 43.65 KG/M2 | TEMPERATURE: 97.9 F | HEART RATE: 85 BPM | WEIGHT: 262 LBS | HEIGHT: 65 IN

## 2024-09-10 DIAGNOSIS — M62.838 MUSCLE SPASM: ICD-10-CM

## 2024-09-10 DIAGNOSIS — M48.00 SPINAL STENOSIS, UNSPECIFIED SPINAL REGION: ICD-10-CM

## 2024-09-10 DIAGNOSIS — I10 PRIMARY HYPERTENSION: ICD-10-CM

## 2024-09-10 DIAGNOSIS — Z78.0 POSTMENOPAUSE: ICD-10-CM

## 2024-09-10 DIAGNOSIS — E78.2 MIXED HYPERLIPIDEMIA: ICD-10-CM

## 2024-09-10 DIAGNOSIS — Z00.00 MEDICARE ANNUAL WELLNESS VISIT, SUBSEQUENT: Primary | ICD-10-CM

## 2024-09-10 DIAGNOSIS — J30.2 SEASONAL ALLERGIES: ICD-10-CM

## 2024-09-10 DIAGNOSIS — G62.9 POLYNEUROPATHY: ICD-10-CM

## 2024-09-10 DIAGNOSIS — F90.2 ATTENTION DEFICIT HYPERACTIVITY DISORDER, COMBINED TYPE: ICD-10-CM

## 2024-09-10 DIAGNOSIS — Z85.3 HISTORY OF BREAST CANCER: ICD-10-CM

## 2024-09-10 DIAGNOSIS — F41.1 GAD (GENERALIZED ANXIETY DISORDER): ICD-10-CM

## 2024-09-10 DIAGNOSIS — F51.04 PSYCHOPHYSIOLOGICAL INSOMNIA: ICD-10-CM

## 2024-09-10 DIAGNOSIS — Z13.1 SCREENING FOR DIABETES MELLITUS: ICD-10-CM

## 2024-09-10 DIAGNOSIS — F33.1 MAJOR DEPRESSIVE DISORDER, RECURRENT EPISODE, MODERATE: ICD-10-CM

## 2024-09-10 PROCEDURE — G0439 PPPS, SUBSEQ VISIT: HCPCS | Performed by: NURSE PRACTITIONER

## 2024-09-10 PROCEDURE — 1126F AMNT PAIN NOTED NONE PRSNT: CPT | Performed by: NURSE PRACTITIONER

## 2024-09-10 PROCEDURE — 1159F MED LIST DOCD IN RCRD: CPT | Performed by: NURSE PRACTITIONER

## 2024-09-10 PROCEDURE — 1160F RVW MEDS BY RX/DR IN RCRD: CPT | Performed by: NURSE PRACTITIONER

## 2024-09-10 PROCEDURE — 3080F DIAST BP >= 90 MM HG: CPT | Performed by: NURSE PRACTITIONER

## 2024-09-10 PROCEDURE — 3077F SYST BP >= 140 MM HG: CPT | Performed by: NURSE PRACTITIONER

## 2024-09-10 PROCEDURE — 99214 OFFICE O/P EST MOD 30 MIN: CPT | Performed by: NURSE PRACTITIONER

## 2024-09-10 RX ORDER — TRAZODONE HYDROCHLORIDE 100 MG/1
100 TABLET ORAL
Qty: 90 TABLET | Refills: 0 | Status: SHIPPED | OUTPATIENT
Start: 2024-09-10

## 2024-09-10 RX ORDER — METHYLPHENIDATE HYDROCHLORIDE 20 MG/1
20 TABLET ORAL 3 TIMES DAILY
Qty: 90 TABLET | Refills: 0 | Status: CANCELLED | OUTPATIENT
Start: 2024-09-10

## 2024-09-10 RX ORDER — EZETIMIBE 10 MG/1
10 TABLET ORAL DAILY
Qty: 90 TABLET | Refills: 1 | Status: SHIPPED | OUTPATIENT
Start: 2024-09-10

## 2024-09-10 RX ORDER — LEVOCETIRIZINE DIHYDROCHLORIDE 5 MG/1
5 TABLET, FILM COATED ORAL EVERY EVENING
Qty: 30 TABLET | Refills: 2 | Status: SHIPPED | OUTPATIENT
Start: 2024-09-10

## 2024-09-10 RX ORDER — OXYCODONE AND ACETAMINOPHEN 10; 325 MG/1; MG/1
1 TABLET ORAL EVERY 6 HOURS PRN
Qty: 90 TABLET | Refills: 2 | Status: CANCELLED | OUTPATIENT
Start: 2024-09-10

## 2024-09-10 RX ORDER — METHYLNALTREXONE BROMIDE 150 MG/1
150 TABLET ORAL DAILY
Qty: 90 TABLET | Refills: 0 | Status: SHIPPED | OUTPATIENT
Start: 2024-09-10

## 2024-09-10 RX ORDER — HYDROCHLOROTHIAZIDE 25 MG/1
25 TABLET ORAL DAILY
Qty: 30 TABLET | Refills: 0 | Status: SHIPPED | OUTPATIENT
Start: 2024-09-10

## 2024-09-10 RX ORDER — DESVENLAFAXINE 50 MG/1
50 TABLET, FILM COATED, EXTENDED RELEASE ORAL DAILY
Qty: 90 TABLET | Refills: 0 | Status: SHIPPED | OUTPATIENT
Start: 2024-09-10

## 2024-09-10 RX ORDER — ETODOLAC 400 MG
TABLET ORAL
Qty: 90 TABLET | Refills: 1 | Status: SHIPPED | OUTPATIENT
Start: 2024-09-10

## 2024-09-10 RX ORDER — SIMVASTATIN 40 MG
40 TABLET ORAL
Qty: 90 TABLET | Refills: 1 | Status: SHIPPED | OUTPATIENT
Start: 2024-09-10

## 2024-09-10 RX ORDER — DUTASTERIDE 0.5 MG/1
0.5 CAPSULE, LIQUID FILLED ORAL DAILY
Qty: 90 CAPSULE | Refills: 0 | Status: SHIPPED | OUTPATIENT
Start: 2024-09-10

## 2024-09-10 RX ORDER — DULOXETIN HYDROCHLORIDE 60 MG/1
120 CAPSULE, DELAYED RELEASE ORAL DAILY
Qty: 180 CAPSULE | Refills: 0 | Status: SHIPPED | OUTPATIENT
Start: 2024-09-10

## 2024-09-10 RX ORDER — CYCLOBENZAPRINE HCL 10 MG
TABLET ORAL
Qty: 90 TABLET | Refills: 1 | Status: SHIPPED | OUTPATIENT
Start: 2024-09-10

## 2024-09-10 RX ORDER — PREGABALIN 100 MG/1
100 CAPSULE ORAL 3 TIMES DAILY
Qty: 90 CAPSULE | Refills: 2 | Status: SHIPPED | OUTPATIENT
Start: 2024-09-10

## 2024-09-10 RX ORDER — PANTOPRAZOLE SODIUM 40 MG/1
40 TABLET, DELAYED RELEASE ORAL DAILY
Qty: 90 TABLET | Refills: 1 | Status: SHIPPED | OUTPATIENT
Start: 2024-09-10

## 2024-09-11 LAB
ALBUMIN SERPL-MCNC: 4.1 G/DL (ref 3.8–4.9)
ALP SERPL-CCNC: 84 IU/L (ref 44–121)
ALT SERPL-CCNC: 16 IU/L (ref 0–32)
AST SERPL-CCNC: 17 IU/L (ref 0–40)
BASOPHILS # BLD AUTO: 0 X10E3/UL (ref 0–0.2)
BASOPHILS NFR BLD AUTO: 1 %
BILIRUB SERPL-MCNC: 0.3 MG/DL (ref 0–1.2)
BUN SERPL-MCNC: 16 MG/DL (ref 6–24)
BUN/CREAT SERPL: 19 (ref 9–23)
CALCIUM SERPL-MCNC: 9.4 MG/DL (ref 8.7–10.2)
CHLORIDE SERPL-SCNC: 103 MMOL/L (ref 96–106)
CHOLEST SERPL-MCNC: 183 MG/DL (ref 100–199)
CO2 SERPL-SCNC: 24 MMOL/L (ref 20–29)
CREAT SERPL-MCNC: 0.86 MG/DL (ref 0.57–1)
EGFRCR SERPLBLD CKD-EPI 2021: 79 ML/MIN/1.73
EOSINOPHIL # BLD AUTO: 0.2 X10E3/UL (ref 0–0.4)
EOSINOPHIL NFR BLD AUTO: 3 %
ERYTHROCYTE [DISTWIDTH] IN BLOOD BY AUTOMATED COUNT: 13.6 % (ref 11.7–15.4)
GLOBULIN SER CALC-MCNC: 2.1 G/DL (ref 1.5–4.5)
GLUCOSE SERPL-MCNC: 89 MG/DL (ref 70–99)
HBA1C MFR BLD: 5.7 % (ref 4.8–5.6)
HCT VFR BLD AUTO: 39.8 % (ref 34–46.6)
HDLC SERPL-MCNC: 73 MG/DL
HGB BLD-MCNC: 12.6 G/DL (ref 11.1–15.9)
IMM GRANULOCYTES # BLD AUTO: 0 X10E3/UL (ref 0–0.1)
IMM GRANULOCYTES NFR BLD AUTO: 0 %
LDLC SERPL CALC-MCNC: 95 MG/DL (ref 0–99)
LYMPHOCYTES # BLD AUTO: 1.9 X10E3/UL (ref 0.7–3.1)
LYMPHOCYTES NFR BLD AUTO: 33 %
MCH RBC QN AUTO: 27.6 PG (ref 26.6–33)
MCHC RBC AUTO-ENTMCNC: 31.7 G/DL (ref 31.5–35.7)
MCV RBC AUTO: 87 FL (ref 79–97)
MONOCYTES # BLD AUTO: 0.4 X10E3/UL (ref 0.1–0.9)
MONOCYTES NFR BLD AUTO: 6 %
NEUTROPHILS # BLD AUTO: 3.3 X10E3/UL (ref 1.4–7)
NEUTROPHILS NFR BLD AUTO: 57 %
PLATELET # BLD AUTO: 303 X10E3/UL (ref 150–450)
POTASSIUM SERPL-SCNC: 4 MMOL/L (ref 3.5–5.2)
PROT SERPL-MCNC: 6.2 G/DL (ref 6–8.5)
RBC # BLD AUTO: 4.56 X10E6/UL (ref 3.77–5.28)
SODIUM SERPL-SCNC: 140 MMOL/L (ref 134–144)
TRIGL SERPL-MCNC: 83 MG/DL (ref 0–149)
VLDLC SERPL CALC-MCNC: 15 MG/DL (ref 5–40)
WBC # BLD AUTO: 5.8 X10E3/UL (ref 3.4–10.8)

## 2024-09-12 ENCOUNTER — OFFICE VISIT (OUTPATIENT)
Dept: PSYCHIATRY | Facility: CLINIC | Age: 59
End: 2024-09-12
Payer: MEDICARE

## 2024-09-12 DIAGNOSIS — F33.1 MAJOR DEPRESSIVE DISORDER, RECURRENT EPISODE, MODERATE: ICD-10-CM

## 2024-09-12 DIAGNOSIS — F41.1 GENERALIZED ANXIETY DISORDER: Primary | ICD-10-CM

## 2024-09-27 RX ORDER — PANTOPRAZOLE SODIUM 40 MG/1
40 TABLET, DELAYED RELEASE ORAL DAILY
Qty: 90 TABLET | Refills: 1 | Status: SHIPPED | OUTPATIENT
Start: 2024-09-27

## 2024-10-02 DIAGNOSIS — F90.2 ATTENTION DEFICIT HYPERACTIVITY DISORDER, COMBINED TYPE: ICD-10-CM

## 2024-10-02 RX ORDER — METHYLPHENIDATE HYDROCHLORIDE 20 MG/1
20 TABLET ORAL 3 TIMES DAILY
Qty: 90 TABLET | Refills: 0 | Status: SHIPPED | OUTPATIENT
Start: 2024-10-02

## 2024-10-02 NOTE — TELEPHONE ENCOUNTER
Rx Refill Note  Requested Prescriptions     Pending Prescriptions Disp Refills    methylphenidate (RITALIN) 20 MG tablet [Pharmacy Med Name: methylphenidate 20 mg tablet] 90 tablet 0     Sig: Take 1 tablet by mouth 3 (Three) Times a Day.      Last office visit with prescribing clinician: 8/22/2024   Last telemedicine visit with prescribing clinician: Visit date not found   Next office visit with prescribing clinician: 11/21/2024                         Would you like a call back once the refill request has been completed: [] Yes [] No    If the office needs to give you a call back, can they leave a voicemail: [] Yes [] No    Rosemary Agudelo MA  10/02/24, 09:38 EDT

## 2024-11-07 DIAGNOSIS — F90.2 ATTENTION DEFICIT HYPERACTIVITY DISORDER, COMBINED TYPE: ICD-10-CM

## 2024-11-07 RX ORDER — METHYLPHENIDATE HYDROCHLORIDE 20 MG/1
20 TABLET ORAL 3 TIMES DAILY
Qty: 90 TABLET | Refills: 0 | Status: SHIPPED | OUTPATIENT
Start: 2024-11-07

## 2024-11-07 NOTE — TELEPHONE ENCOUNTER
Rx Refill Note  Requested Prescriptions     Pending Prescriptions Disp Refills    methylphenidate (RITALIN) 20 MG tablet [Pharmacy Med Name: methylphenidate 20 mg tablet] 90 tablet 0     Sig: TAKE ONE TABLET BY MOUTH THREE TIMES DAILY      Last office visit with prescribing clinician: 8/22/2024   Last telemedicine visit with prescribing clinician: Visit date not found   Next office visit with prescribing clinician: 11/21/2024                         Would you like a call back once the refill request has been completed: [] Yes [] No    If the office needs to give you a call back, can they leave a voicemail: [] Yes [] No    Rosalina Alegria MA  11/07/24, 11:35 EST

## 2024-11-13 ENCOUNTER — TELEPHONE (OUTPATIENT)
Dept: GENETICS | Facility: HOSPITAL | Age: 59
End: 2024-11-13
Payer: MEDICARE

## 2024-11-13 ENCOUNTER — TELEMEDICINE (OUTPATIENT)
Dept: PSYCHIATRY | Facility: CLINIC | Age: 59
End: 2024-11-13
Payer: MEDICARE

## 2024-11-13 DIAGNOSIS — F33.1 MODERATE EPISODE OF RECURRENT MAJOR DEPRESSIVE DISORDER: Primary | ICD-10-CM

## 2024-11-13 NOTE — PROGRESS NOTES
Follow Up Video Visit     Date: 2024   Patient Name: Zhane Burnham  : 1965   MRN: [id number]   Time In: 3:30 PM      Time Out: 4:29 PM     Referring Physician: Latasha Curtis APRN    Chief Complaint:      ICD-10-CM ICD-9-CM   1. Moderate episode of recurrent major depressive disorder  F33.1 296.32        The provider is located at [facility] [address], [address], [place], [place] using a secure [employer] Video Visit through [employer] for assessment with Neyda Kahn LCSW, MSW.  The Patient is seen remotely at their home address in [place], using a private computer, phone or tablet.  Patient is being seen remotely via telehealth at home address in [place] and stated they are in a secure environment for this session using [facility] Chart. The patient's condition being diagnosed/treated is appropriate for telemedicine. The provider identified self as well as credentials. The patient, and/or patients guardian, consent to be seen remotely, and when consent is given they understand that the consent allows for patient identifiable information to be sent to a third party as needed. They may refuse to be seen remotely at any time. The electronic data is encrypted and password protected, and the patient and/or guardian has been advised of the potential risks to privacy not withstanding such measures.     Patient has chosen to receive care through a telehealth video visit and consents to use an audio/video connection for care today?   This visit has been scheduled as a video visit to comply with patient safety concerns in accordance with CDC recommendations.    History of Present Illness: Zhane Burnham is a 59 y.o. female presents via video visit today for individual session. Pt describes being in a very difficult place emotionally. Pt describes struggling financially and having issues with her bank. Pt reports that that she received her disability check and over  half       Subjective      Review of Systems:   The following portions of the patient's history were reviewed and updated as appropriate: allergies, current medications, past family history, past medical history, past social history, past surgical history and problem list.     Interval History:   Deteriorated    Patient's Support Network Includes:  friend(s)    Functional Status: Mild impairment     Medications:     Current Outpatient Medications:     BIOTIN PO, Take  by mouth., Disp: , Rfl:     Cariprazine HCl (Vraylar) 1.5 MG capsule capsule, Take 1 capsule by mouth Daily., Disp: 90 capsule, Rfl: 0    cyclobenzaprine (FLEXERIL) 10 MG tablet, TAKE 1/2 TO 1 TABLET BY MOUTH THREE TIMES DAILY AS NEEDED FOR MUSCLE SPASMS, Disp: 90 tablet, Rfl: 1    desvenlafaxine (PRISTIQ) 50 MG 24 hr tablet, Take 1 tablet by mouth Daily., Disp: 90 tablet, Rfl: 0    DULoxetine (CYMBALTA) 60 MG capsule, Take 2 capsules by mouth Daily., Disp: 180 capsule, Rfl: 0    dutasteride (AVODART) 0.5 MG capsule, Take 1 capsule by mouth Daily., Disp: 90 capsule, Rfl: 0    etodolac (LODINE) 400 MG tablet, Take 1/2-1 tablet by mouth every 8 hours as needed., Disp: 90 tablet, Rfl: 1    ezetimibe (ZETIA) 10 MG tablet, Take 1 tablet by mouth Daily., Disp: 90 tablet, Rfl: 1    hydroCHLOROthiazide 25 MG tablet, Take 1 tablet by mouth Daily., Disp: 30 tablet, Rfl: 0    levocetirizine (XYZAL) 5 MG tablet, Take 1 tablet by mouth Every Evening., Disp: 30 tablet, Rfl: 2    methylphenidate (RITALIN) 20 MG tablet, TAKE ONE TABLET BY MOUTH THREE TIMES DAILY, Disp: 90 tablet, Rfl: 0    Multiple Vitamins-Minerals (WOMENS 50+ MULTI VITAMIN PO), Take  by mouth., Disp: , Rfl:     oxyCODONE-acetaminophen (PERCOCET)  MG per tablet, Take 1 tablet by mouth Every 6 (Six) Hours As Needed for Moderate Pain., Disp: , Rfl:     pantoprazole (PROTONIX) 40 MG EC tablet, TAKE ONE TABLET BY MOUTH DAILY, Disp: 90 tablet, Rfl: 1    pregabalin (LYRICA) 100 MG capsule, Take 1  capsule by mouth 3 (Three) Times a Day., Disp: 90 capsule, Rfl: 2    Relistor 150 MG tablet, Take 1 tablet by mouth Daily., Disp: 90 tablet, Rfl: 0    simvastatin (ZOCOR) 40 MG tablet, Take 1 tablet by mouth every night at bedtime., Disp: 90 tablet, Rfl: 1    traZODone (DESYREL) 100 MG tablet, Take 1 tablet by mouth every night at bedtime., Disp: 90 tablet, Rfl: 0      Objective     Mental Status Exam:   MENTAL STATUS EXAM   General Appearance:  Cleanly groomed and dressed  Eye Contact:  Good eye contact  Attitude:  Cooperative  Motor Activity:  Normal gait, posture  Muscle Strength:  Normal  Speech:  Normal rate, tone, volume  Mood and affect:  Normal, pleasant  Hopelessness:  Denies  Loneliness: Denies  Thought Process:  Logical  Associations/ Thought Content:  No delusions  Hallucinations:  None  Homicidal Ideation:  Not present  Sensorium:  Alert  Orientation:  Person, place, time and situation  Immediate Recall, Recent, and Remote Memory:  Intact  Attention Span/ Concentration:  Good  Fund of Knowledge:  Appropriate for age and educational level  Intellectual Functioning:  Average range  Insight:  Good  Judgement:  Good  Reliability:  Good  Impulse Control:  Good       Assessment / Plan      Visit Diagnosis/Orders Placed This Visit:    ICD-10-CM ICD-9-CM   1. Moderate episode of recurrent major depressive disorder  F33.1 296.32        PLAN:    Progress toward goal: Not at goal    Prognosis: Good with ongoing treatment    PLAN:  Safety: No acute safety concerns.  Therapist will continue to monitor.    Risk Assessment: Risk of self-harm acutely is low. Risk of self-harm chronically is also low, but could be further elevated in the event of treatment noncompliance and/or AODA.     TREATMENT PLAN/GOALS: Continue supportive psychotherapy efforts and medications as indicated. Treatment and medication options discussed during today's visit. Patient ackowledged and verbally consented to continue with current treatment  plan and was educated on the importance of compliance with treatment and follow-up appointments. Patient seems reasonably able to adhere to treatment plan.      Allowed Patient to freely discuss issues  without interruption or judgement with unconditional positive regard, active listening skills, and empathy. Therapist provided a safe, confidential environment to facilitate the development of a positive therapeutic relationship and encouraged open, honest communication. Assisted Patient in identifying risk factors which would indicate the need for higher level of care including thoughts to harm self or others and/or self-harming behavior and encouraged Patient to contact this office, call 911, or present to the nearest emergency room should any of these events occur. Discussed crisis intervention services and means to access. Patient adamantly and convincingly denies current suicidal or homicidal ideation or perceptual disturbance. Assisted Patient in processing session content; acknowledged and normalized Patient’s thoughts, feelings, and concerns by utilizing a person-centered approach in efforts to build appropriate rapport and a positive therapeutic relationship with open and honest communication.     Follow Up:   No follow-ups on file.        This document has been electronically signed by Neyda Lake LCSW  November 13, 2024 16:32 EST

## 2024-11-14 ENCOUNTER — APPOINTMENT (OUTPATIENT)
Dept: BONE DENSITY | Facility: HOSPITAL | Age: 59
End: 2024-11-14
Payer: MEDICARE

## 2024-11-14 PROCEDURE — 77080 DXA BONE DENSITY AXIAL: CPT

## 2024-11-15 ENCOUNTER — CLINICAL SUPPORT (OUTPATIENT)
Facility: HOSPITAL | Age: 59
End: 2024-11-15
Payer: MEDICARE

## 2024-11-15 ENCOUNTER — LAB (OUTPATIENT)
Facility: HOSPITAL | Age: 59
End: 2024-11-15
Payer: MEDICARE

## 2024-11-15 ENCOUNTER — TRANSCRIBE ORDERS (OUTPATIENT)
Facility: HOSPITAL | Age: 59
End: 2024-11-15
Payer: MEDICARE

## 2024-11-15 DIAGNOSIS — Z85.3 HISTORY OF LEFT BREAST CANCER: ICD-10-CM

## 2024-11-15 DIAGNOSIS — Z13.79 GENETIC TESTING: Primary | ICD-10-CM

## 2024-11-15 DIAGNOSIS — Z80.3 FAMILY HISTORY OF MALIGNANT NEOPLASM OF BREAST: ICD-10-CM

## 2024-11-15 PROCEDURE — 96040: CPT | Performed by: GENETIC COUNSELOR, MS

## 2024-11-15 NOTE — PROGRESS NOTES
"Zhane Burnham, a 59 y.o. female, was referred for genetic counseling due to a personal history of breast cancer. She was diagnosed with a left triple negative breast cancer at age 47 and underwent a bilateral mastectomy and chemotherapy treatment at that time. Ms. Brunham had a hysterectomy with BSO at age 41. Her most recent colonoscopy was in 2019 and she reports having at least two colon polyps removed on prior colonoscopies. She was interested in discussing her risk for a hereditary cancer syndrome. Ms. Burnham was interested in pursuing a multigene panel, and therefore the CancerNext panel was ordered through [facility] which analyzes BRCA1/2 and 37 additional genes associated with an increased cancer risk.     FAMILY HISTORY:  Pat. Grandmother:  \"Stomach cancer\", patient states could have possibly been ovarian cancer  Pat. Great Aunt:  Breast cancer  Pat. Great Aunt:  Breast cancer  Mat. Uncle:   Lung cancer    We do not have medical records confirming the diagnoses in Ms. Burnham's family.    RISK ASSESSMENT: Ms. Burnham's personal history of breast cancer led to concern regarding a hereditary cancer syndrome.  She meets NCCN guidelines criteria for BRCA1/2 testing based on her personal history of triple negative breast cancer diagnosed before age 50. The standard approach to genetic testing is through a multigene panel.     GENETIC COUNSELING (30 minutes):  We reviewed the family history information in detail.  Cases of cancer follow three general patterns: sporadic, familial, and hereditary.  While most cancer is sporadic, some cases appear to occur in family clusters.  These cases are said to be familial and account for 10-20% of certain cancer cases.  Familial cases may be due to a combination of shared genes and environmental factors among family members.  In even fewer families (~10%), the cancer is said to be inherited, and the genes responsible for the cancer are known.      Family histories " typical of hereditary cancer syndromes usually include multiple first- and second-degree relatives diagnosed with cancer types that define a syndrome.  These cases tend to be diagnosed at younger-than-expected ages and can be bilateral or multifocal.  The cancer in these families follows an autosomal dominant inheritance pattern, which indicates the likely presence of a mutation in a cancer susceptibility gene.  Children and siblings of an individual believed to carry this mutation have a 50% chance of inheriting that mutation, thereby inheriting the increased risk to develop cancer.  These mutations can be passed down from the maternal or the paternal lineage.    Hereditary breast cancer accounts for 5-10% of all cases of breast cancer.  A significant proportion of hereditary breast and ovarian cancer can be attributed to mutations in the BRCA1 and BRCA2 genes.  Mutations in these genes confer an increased risk for breast cancer, ovarian cancer, male breast cancer, prostate cancer, and pancreatic cancer. Women with a BRCA1 or BRCA2 mutation who have already been diagnosed with breast cancer have a 40-60% lifetime risk of a second breast cancer. Women with a BRCA1 or BRCA2 mutation have up to a 44% risk of ovarian cancer.      The standard approach to genetic testing is via a multigene panel.  Genes included on these panels have varying degrees of risk associated, and management and screening guidelines vary based on the specific gene.  Hereditary cancer syndromes can demonstrate incomplete penetrance and variable expression within families. There are genes that are evaluated that have been more recently described, and there may be less data regarding the risks and therefore may not have established management guidelines at this time. We reviewed that in some cases, the identification of a genetic mutation may impact treatment options for some types of cancer. We discussed the possibility of results that are  unexpected based on family history. We discussed these limitations at length. Based on Ms. Burnham's personal history and her desire to get more information regarding her personal risks and risks for her family, she opted to pursue testing through a panel evaluating several other genes known to increase the risk for cancer.     GENETIC TESTING:  The risks, benefits and limitations of genetic testing and implications for clinical management following testing were reviewed. DNA test results can influence decisions regarding screening and prevention.  Genetic testing can have significant psychological implications for both individuals and families. Also discussed was the possibility of employment and insurance discrimination based on genetic test results and the federal and states laws that are in place to prevent this (ETHEL), as well as the limitations of these laws.         We discussed multigene panel testing, which would involve testing several genes associated with an increased cancer risk. The benefits and limitations of genetic testing were discussed and Ms. Burnham decided to pursue testing of the genes via the panel. The implications of a positive or negative test result were discussed.  We discussed the possibility that, in some cases, genetic test results may be ambiguous due to the identification of a genetic variant of uncertain significance (VUS). These variants may or may not be associated with an increased cancer risk. With multigene panel testing, it is not uncommon for a VUS to be identified.  If a VUS is identified, testing family members is not recommended and screening recommendations are made based on the family history.  The laboratories that perform genetic testing work to reclassify the VUS and send out an amended report if and when a VUS is reclassified.  The majority of variant findings are ultimately reclassified to a negative result. Given her personal and family history, a negative test  result does not eliminate all cancer risk to her relatives, although the risk would not be as high as it would with positive genetic testing.     PLAN:  Genetic testing via the CancerNext panel through [facility] was ordered. A blood sample was collected today 11/15/2024. Results are expected in 2-3 weeks and we will contact Ms. Burnham  with her results once they are received. She can contact us at [phone] with any questions in the meantime.       Rosemary Chaudhary MS, [facility] [place] [facility], [facility]  Licensed Certified Genetic Counselor

## 2024-11-18 ENCOUNTER — TELEPHONE (OUTPATIENT)
Dept: OBSTETRICS AND GYNECOLOGY | Facility: CLINIC | Age: 59
End: 2024-11-18

## 2024-11-18 NOTE — TELEPHONE ENCOUNTER
"Caller: Zhane Burnham \"Shemar\"    Relationship:  Self    Best call back number: [phone]    PATIENT CALLED REQUESTING TO CANCEL SAME DAY APPT.    Did the patient call AFTER the start time of their scheduled appointment?  []YES  [x]NO    Was the patient's appointment rescheduled? [x]YES  []NO    Any additional information: CONFLICT      "

## 2024-11-21 ENCOUNTER — OFFICE VISIT (OUTPATIENT)
Dept: BEHAVIORAL HEALTH | Facility: CLINIC | Age: 59
End: 2024-11-21
Payer: MEDICARE

## 2024-11-21 VITALS
WEIGHT: 254 LBS | DIASTOLIC BLOOD PRESSURE: 92 MMHG | HEART RATE: 70 BPM | HEIGHT: 65 IN | BODY MASS INDEX: 42.32 KG/M2 | OXYGEN SATURATION: 98 % | SYSTOLIC BLOOD PRESSURE: 144 MMHG

## 2024-11-21 DIAGNOSIS — F90.2 ATTENTION DEFICIT HYPERACTIVITY DISORDER, COMBINED TYPE: Primary | ICD-10-CM

## 2024-11-21 DIAGNOSIS — F33.1 MAJOR DEPRESSIVE DISORDER, RECURRENT EPISODE, MODERATE: ICD-10-CM

## 2024-11-21 DIAGNOSIS — F41.1 GAD (GENERALIZED ANXIETY DISORDER): ICD-10-CM

## 2024-11-21 DIAGNOSIS — F51.04 PSYCHOPHYSIOLOGICAL INSOMNIA: ICD-10-CM

## 2024-11-21 PROCEDURE — 1160F RVW MEDS BY RX/DR IN RCRD: CPT

## 2024-11-21 PROCEDURE — 1159F MED LIST DOCD IN RCRD: CPT

## 2024-11-21 PROCEDURE — 99214 OFFICE O/P EST MOD 30 MIN: CPT

## 2024-11-21 PROCEDURE — 3080F DIAST BP >= 90 MM HG: CPT

## 2024-11-21 PROCEDURE — 3077F SYST BP >= 140 MM HG: CPT

## 2024-11-21 RX ORDER — METHYLPHENIDATE HYDROCHLORIDE 20 MG/1
20 TABLET ORAL 3 TIMES DAILY
Qty: 90 TABLET | Refills: 0 | Status: SHIPPED | OUTPATIENT
Start: 2024-11-21

## 2024-11-21 NOTE — PROGRESS NOTES
Follow Up Office Visit    Patient Name: Zhane Burnham  : 1965   MRN: [id number]   Care Team: Patient Care Team:  Latasha Curtis APRN as PCP - General (Family Medicine)  Magda Philip LPCC as Counselor (Behavioral Health)  Kmoal Aguiar APRN as Nurse Practitioner (Behavioral Health)  Sydney Alegria LCSW as  (Psychiatry)         Chief Complaint:    Chief Complaint   Patient presents with    ADHD    Anxiety    Depression    Sleeping Problem    Med Management       History of Present Illness: Zhane Burnham is a 59 y.o. female who is here today for a medication management follow up. Patient reports that overall medication continues to be effective. She feels that her focus and concentration are doing good and her mood and anxiety are managed well. She also reports that she is sleeping well. She endorses some situational stressors, however, she feels that she is managing them well. She did not see the need to make any changes and did not have any medication concerns at today's visit. She denies SI/HI today.     The following portion of the patient's history were reviewed and updated appropriately: allergies, current and past medications, family history, medical history and social history. STEPHANIE reviewed and appropriate.   Subjective   Review of Systems:    Review of Systems   Respiratory: Negative.     Cardiovascular: Negative.  Negative for chest pain and palpitations.   Neurological: Negative.    Psychiatric/Behavioral:  Positive for stress. The patient is nervous/anxious.    All other systems reviewed and are negative.      Current Medications:   Current Outpatient Medications   Medication Sig Dispense Refill    BIOTIN PO Take  by mouth.      Cariprazine HCl (Vraylar) 1.5 MG capsule capsule Take 1 capsule by mouth Daily. 90 capsule 0    cyclobenzaprine (FLEXERIL) 10 MG tablet TAKE 1/2 TO 1 TABLET BY MOUTH THREE TIMES DAILY AS NEEDED FOR MUSCLE SPASMS 90 tablet 1     desvenlafaxine (PRISTIQ) 50 MG 24 hr tablet Take 1 tablet by mouth Daily. 90 tablet 0    DULoxetine (CYMBALTA) 60 MG capsule Take 2 capsules by mouth Daily. 180 capsule 0    dutasteride (AVODART) 0.5 MG capsule Take 1 capsule by mouth Daily. 90 capsule 0    etodolac (LODINE) 400 MG tablet Take 1/2-1 tablet by mouth every 8 hours as needed. 90 tablet 1    ezetimibe (ZETIA) 10 MG tablet Take 1 tablet by mouth Daily. 90 tablet 1    hydroCHLOROthiazide 25 MG tablet Take 1 tablet by mouth Daily. 30 tablet 0    levocetirizine (XYZAL) 5 MG tablet Take 1 tablet by mouth Every Evening. 30 tablet 2    methylphenidate (RITALIN) 20 MG tablet Take 1 tablet by mouth 3 (Three) Times a Day. 90 tablet 0    Multiple Vitamins-Minerals (WOMENS 50+ MULTI VITAMIN PO) Take  by mouth.      oxyCODONE-acetaminophen (PERCOCET)  MG per tablet Take 1 tablet by mouth Every 6 (Six) Hours As Needed for Moderate Pain.      pantoprazole (PROTONIX) 40 MG EC tablet TAKE ONE TABLET BY MOUTH DAILY 90 tablet 1    pregabalin (LYRICA) 100 MG capsule Take 1 capsule by mouth 3 (Three) Times a Day. 90 capsule 2    Relistor 150 MG tablet Take 1 tablet by mouth Daily. 90 tablet 0    simvastatin (ZOCOR) 40 MG tablet Take 1 tablet by mouth every night at bedtime. 90 tablet 1    traZODone (DESYREL) 100 MG tablet Take 1 tablet by mouth every night at bedtime. 90 tablet 0     No current facility-administered medications for this visit.       Mental Status Exam:   Hygiene:   good  Cooperation:  Cooperative  Eye Contact:  Good  Psychomotor Behavior:  Appropriate  Affect:  Appropriate  Mood: normal  Speech:  Normal  Thought Process:  Goal directed and Linear  Thought Content:  Normal and Mood congruent  Suicidal:  None  Homicidal:  None  Hallucinations:  None  Delusion:  None  Memory:  Intact  Orientation:  Person, Place, Time, and Situation  Reliability:  good  Insight:  Good  Judgement:  Good  Impulse Control:  Good  Physical/Medical Issues:  Yes see chart    "    Objective   Vital Signs:   /92   Pulse 70   Ht 165.1 cm (65\")   Wt 115 kg (254 lb)   SpO2 98%   BMI 42.27 kg/m²         Lab Results:   Office Visit on 09/10/2024   Component Date Value Ref Range Status    WBC 09/10/2024 5.8  3.4 - 10.8 x10E3/uL Final    RBC 09/10/2024 4.56  3.77 - 5.28 x10E6/uL Final    Hemoglobin 09/10/2024 12.6  11.1 - 15.9 g/dL Final    Hematocrit 09/10/2024 39.8  34.0 - 46.6 % Final    MCV 09/10/2024 87  79 - 97 fL Final    MCH 09/10/2024 27.6  26.6 - 33.0 pg Final    MCHC 09/10/2024 31.7  31.5 - 35.7 g/dL Final    RDW 09/10/2024 13.6  11.7 - 15.4 % Final    Platelets 09/10/2024 303  150 - 450 x10E3/uL Final    Neutrophil Rel % 09/10/2024 57  Not Estab. % Final    Lymphocyte Rel % 09/10/2024 33  Not Estab. % Final    Monocyte Rel % 09/10/2024 6  Not Estab. % Final    Eosinophil Rel % 09/10/2024 3  Not Estab. % Final    Basophil Rel % 09/10/2024 1  Not Estab. % Final    Neutrophils Absolute 09/10/2024 3.3  1.4 - 7.0 x10E3/uL Final    Lymphocytes Absolute 09/10/2024 1.9  0.7 - 3.1 x10E3/uL Final    Monocytes Absolute 09/10/2024 0.4  0.1 - 0.9 x10E3/uL Final    Eosinophils Absolute 09/10/2024 0.2  0.0 - 0.4 x10E3/uL Final    Basophils Absolute 09/10/2024 0.0  0.0 - 0.2 x10E3/uL Final    Immature Granulocyte Rel % 09/10/2024 0  Not Estab. % Final    Immature Grans Absolute 09/10/2024 0.0  0.0 - 0.1 x10E3/uL Final    Glucose 09/10/2024 89  70 - 99 mg/dL Final    BUN 09/10/2024 16  6 - 24 mg/dL Final    Creatinine 09/10/2024 0.86  0.57 - 1.00 mg/dL Final    EGFR Result 09/10/2024 79  >59 mL/min/1.73 Final    BUN/Creatinine Ratio 09/10/2024 19  9 - 23 Final    Sodium 09/10/2024 140  134 - 144 mmol/L Final    Potassium 09/10/2024 4.0  3.5 - 5.2 mmol/L Final    Chloride 09/10/2024 103  96 - 106 mmol/L Final    Total CO2 09/10/2024 24  20 - 29 mmol/L Final    Calcium 09/10/2024 9.4  8.7 - 10.2 mg/dL Final    Total Protein 09/10/2024 6.2  6.0 - 8.5 g/dL Final    Albumin 09/10/2024 4.1  3.8 - " 4.9 g/dL Final    Globulin 09/10/2024 2.1  1.5 - 4.5 g/dL Final    Total Bilirubin 09/10/2024 0.3  0.0 - 1.2 mg/dL Final    Alkaline Phosphatase 09/10/2024 84  44 - 121 IU/L Final    AST (SGOT) 09/10/2024 17  0 - 40 IU/L Final    ALT (SGPT) 09/10/2024 16  0 - 32 IU/L Final    Total Cholesterol 09/10/2024 183  100 - 199 mg/dL Final    Triglycerides 09/10/2024 83  0 - 149 mg/dL Final    HDL Cholesterol 09/10/2024 73  >39 mg/dL Final    VLDL Cholesterol Helder 09/10/2024 15  5 - 40 mg/dL Final    LDL Chol Calc (NIH) 09/10/2024 95  0 - 99 mg/dL Final    Hemoglobin A1C 09/10/2024 5.7 (H)  4.8 - 5.6 % Final    Comment:          Prediabetes: 5.7 - 6.4           Diabetes: >6.4           Glycemic control for adults with diabetes: <7.0         Assessment / Plan    Diagnoses and all orders for this visit:    1. Attention deficit hyperactivity disorder, combined type (Primary)  -     methylphenidate (RITALIN) 20 MG tablet; Take 1 tablet by mouth 3 (Three) Times a Day.  Dispense: 90 tablet; Refill: 0    2. Major depressive disorder, recurrent episode, moderate    3. MANISHA (generalized anxiety disorder)    4. Psychophysiological insomnia     Patient appears to be doing well on current medication. No issues reported and no indication for change. Will continue medication as ordered.     History and physical exam exhibit continued safe and appropriate use of controlled substance.    As part of patient's treatment plan I am prescribing a controlled substance.  The patient has been made aware of the appropriate use of such medications and potential for dependence and/or overdose.  It has also been made clear that these medications are for use by this patient only, without concomitant use of alcohol or other substances, unless prescribed.    Patient has completed a prescribing agreement detailing terms of continued prescribing of controlled substances, including monitoring STEPHANIE reports, urine drug screening, and pill counts if necessary.   Patient is aware that inappropriate use will result in cessation of prescribing such medications.    PHQ-9 Depression Screening  Little interest or pleasure in doing things? Almost all   Feeling down, depressed, or hopeless? Almost all   Trouble falling or staying asleep, or sleeping too much? Over half   Feeling tired or having little energy? Almost all   Poor appetite or overeating? Several days   Feeling bad about yourself - or that you are a failure or have let yourself or your family down? Over half   Trouble concentrating on things, such as reading the newspaper or watching television? Almost all   Moving or speaking so slowly that other people could have noticed? Or the opposite - being so fidgety or restless that you have been moving around a lot more than usual? Several days   Thoughts that you would be better off dead, or of hurting yourself in some way? Not at all   PHQ-9 Total Score 18   If you checked off any problems, how difficult have these problems made it for you to do your work, take care of things at home, or get along with other people? Extremely difficult     PHQ-9 Score:   PHQ-9 Total Score: 18    Depression Screening:  Patient screened positive for depression based on a PHQ-9 score of 18 on 11/21/2024. Follow-up recommendations include: Prescribed antidepressant medication treatment and Suicide Risk Assessment performed.        MANISHA-7  Over the last two weeks, how often have you been bothered by the following problems?  Feeling nervous, anxious or on edge: Nearly every day  Not being able to stop or control worrying: Nearly every day  Worrying too much about different things: Nearly every day  Trouble Relaxing: More than half the days  Being so restless that it is hard to sit still: Several days  Becoming easily annoyed or irritable: Several days  Feeling afraid as if something awful might happen: Several days  MANISHA 7 Total Score: 14  If you checked any problems, how difficult have these  problems made it for you to do your work, take care of things at home, or get along with other people: Very difficult      ADHD  Screening for Adults With ADHD - (1-6)  1. How often do you have trouble wrapping up the final details of a project, once the challenging parts have been done?: Very Often  2. How often do you have difficulty getting things in order when you have to do a task that requires organization?: Often  3. How often do you have problems remembering appointments or obligations : Sometimes  4. When you have a task that requires a lot of thought, how often do you avoid or delay getting started ?: Very Often  5. How often do you fidget or squirm with your hands or feet when you have to sit down for a long time?: Very Often  6. How often do you feel overly active and compelled to do things, like you were driven by a motor?: Rarely  7. How often do you make careless mistakes when you have to work on a boring or difficult project?: Often  8. How often do have difficulty keeping your attention when you are doing boring or repetitive work?: Often  9. How often do you have difficulty concentrating on what people say to you, even when they are speaking to you: Very Often  10.How often do you misplace or have difficulty finding things at home or at work?: Very Often  11.How often are you distracted by activity or noise around you?: Very Often  12.How often do you leave your seat in meetings or other situations in which you are expected to remain seated?: Often  13.How often do you feel restless or fidgety?: Sometimes  15.How often do you find yourself talking too much when you are in social situations?: Very Often  16.When you’re in a conversation, how often do you find yourself finishing the sentences of the people you are talking to, before they can finish them themselves?: Often  17.How often do you have difficulty waiting your turn in situations when turn taking is required?: Often  18.How often do you  interrupt others when they are busy?: Sometimes    A psychological evaluation was conducted in order to assess past and current level of functioning. Areas assessed included, but were not limited to: perception of social support, perception of ability to face and deal with challenges in life (positive functioning), anxiety symptoms, depressive symptoms, perspective on beliefs/belief system, coping skills for stress, intelligence level,  Therapeutic rapport was established. Interventions conducted today were geared towards incorporating medication management along with support for continued therapy. Education was also provided as to the med management with this provider and what to expect in subsequent sessions.      We discussed risks, benefits, goals and side effects of the above medication and the patient was agreeable with the plan. Patient was educated on the importance of compliance with treatment and follow-up appointments. Patient is aware to contact the [facility] with any worsening of symptoms. To call for questions or concerns and return early if necessary. Patent is agreeable to go to the Emergency Department or call 911 should they begin SI/HI.    MEDS ORDERED DURING VISIT:  New Medications Ordered This Visit   Medications    methylphenidate (RITALIN) 20 MG tablet     Sig: Take 1 tablet by mouth 3 (Three) Times a Day.     Dispense:  90 tablet     Refill:  0         Follow Up   Return in about 3 months (around 2/21/2025).  Patient was given instructions and counseling regarding her condition or for health maintenance advice. Please see specific information pulled into the AVS if appropriate.     TREATMENT PLAN/GOALS: Continue supportive psychotherapy efforts and medications as indicated. Treatment and medication options discussed during today's visit. Patient acknowledged and verbally consented to continue with current treatment plan and was educated on the importance of compliance with treatment and  follow-up appointments.    MEDICATION ISSUES:  Discussed medication options and treatment plan of prescribed medication as well as the risks, benefits, and side effects including potential falls, possible impaired driving and metabolic adversities among others. Patient is agreeable to call the office with any worsening of symptoms or onset of side effects. Patient is agreeable to call 911 or go to the nearest ER should he/she begin having SI/HI.        [facility]  [address] [place] [address] [address] [place] [address] [phone]    November 25, 2024 12:54 EST

## 2024-12-05 ENCOUNTER — TELEPHONE (OUTPATIENT)
Dept: GENETICS | Facility: HOSPITAL | Age: 59
End: 2024-12-05
Payer: MEDICARE

## 2024-12-06 DIAGNOSIS — M62.838 MUSCLE SPASM: ICD-10-CM

## 2024-12-06 RX ORDER — CYCLOBENZAPRINE HCL 10 MG
TABLET ORAL
Qty: 90 TABLET | Refills: 1 | Status: SHIPPED | OUTPATIENT
Start: 2024-12-06

## 2024-12-09 DIAGNOSIS — I10 PRIMARY HYPERTENSION: ICD-10-CM

## 2024-12-09 RX ORDER — HYDROCHLOROTHIAZIDE 25 MG/1
25 TABLET ORAL DAILY
Qty: 30 TABLET | Refills: 0 | Status: SHIPPED | OUTPATIENT
Start: 2024-12-09

## 2024-12-24 DIAGNOSIS — J30.2 SEASONAL ALLERGIES: ICD-10-CM

## 2024-12-26 RX ORDER — LEVOCETIRIZINE DIHYDROCHLORIDE 5 MG/1
5 TABLET, FILM COATED ORAL EVERY EVENING
Qty: 30 TABLET | Refills: 2 | Status: SHIPPED | OUTPATIENT
Start: 2024-12-26

## 2025-01-10 DIAGNOSIS — I10 PRIMARY HYPERTENSION: ICD-10-CM

## 2025-01-10 RX ORDER — HYDROCHLOROTHIAZIDE 25 MG/1
25 TABLET ORAL DAILY
Qty: 30 TABLET | Refills: 0 | Status: SHIPPED | OUTPATIENT
Start: 2025-01-10

## 2025-01-17 ENCOUNTER — OFFICE VISIT (OUTPATIENT)
Dept: FAMILY MEDICINE CLINIC | Facility: CLINIC | Age: 60
End: 2025-01-17
Payer: MEDICARE

## 2025-01-17 VITALS
HEIGHT: 65 IN | HEART RATE: 101 BPM | RESPIRATION RATE: 16 BRPM | BODY MASS INDEX: 42.25 KG/M2 | DIASTOLIC BLOOD PRESSURE: 88 MMHG | OXYGEN SATURATION: 99 % | TEMPERATURE: 97.6 F | WEIGHT: 253.6 LBS | SYSTOLIC BLOOD PRESSURE: 140 MMHG

## 2025-01-17 DIAGNOSIS — I10 PRIMARY HYPERTENSION: ICD-10-CM

## 2025-01-17 DIAGNOSIS — J06.9 ACUTE URI: ICD-10-CM

## 2025-01-17 DIAGNOSIS — R05.8 PRODUCTIVE COUGH: Primary | ICD-10-CM

## 2025-01-17 LAB
EXPIRATION DATE: NORMAL
FLUAV AG UPPER RESP QL IA.RAPID: NOT DETECTED
FLUBV AG UPPER RESP QL IA.RAPID: NOT DETECTED
INTERNAL CONTROL: NORMAL
Lab: NORMAL
SARS-COV-2 AG UPPER RESP QL IA.RAPID: NOT DETECTED

## 2025-01-17 PROCEDURE — 99214 OFFICE O/P EST MOD 30 MIN: CPT | Performed by: NURSE PRACTITIONER

## 2025-01-17 PROCEDURE — 1126F AMNT PAIN NOTED NONE PRSNT: CPT | Performed by: NURSE PRACTITIONER

## 2025-01-17 PROCEDURE — 3077F SYST BP >= 140 MM HG: CPT | Performed by: NURSE PRACTITIONER

## 2025-01-17 PROCEDURE — 3079F DIAST BP 80-89 MM HG: CPT | Performed by: NURSE PRACTITIONER

## 2025-01-17 PROCEDURE — 1159F MED LIST DOCD IN RCRD: CPT | Performed by: NURSE PRACTITIONER

## 2025-01-17 PROCEDURE — 1160F RVW MEDS BY RX/DR IN RCRD: CPT | Performed by: NURSE PRACTITIONER

## 2025-01-17 PROCEDURE — 87428 SARSCOV & INF VIR A&B AG IA: CPT | Performed by: NURSE PRACTITIONER

## 2025-01-17 RX ORDER — BENZONATATE 100 MG/1
100 CAPSULE ORAL 3 TIMES DAILY PRN
Qty: 30 CAPSULE | Refills: 0 | Status: SHIPPED | OUTPATIENT
Start: 2025-01-17

## 2025-01-17 RX ORDER — DOXYCYCLINE 100 MG/1
100 CAPSULE ORAL 2 TIMES DAILY
Qty: 14 CAPSULE | Refills: 0 | Status: SHIPPED | OUTPATIENT
Start: 2025-01-17 | End: 2025-01-24

## 2025-01-17 NOTE — PROGRESS NOTES
"                      Established Patient        Chief Complaint:   Chief Complaint   Patient presents with    Nasal Congestion    Sinusitis     Pt is here for head congestion for a week. Pt has been taking OTC meds.            History of Present Illness:    Zhane Burnham is a 59 y.o. female who presents today for complaints of cough, congestion. Onset 1 week ago. Has been taking OTC medication for symptom management but symptoms not improving.     Cough is productive, thick yellow sputum/mucous from nose as well    Recent exposure to niece who lives with her and has had similar symptoms.    BP elevated in office today. Patient has been taking OTC sinus/cold medication.  Denies chest pain, SOA, palpitations, severe HA, confusion, visual disturbance, dizziness.     Subjective     The following portions of the patient's history were reviewed and updated as appropriate: allergies, current medications, past family history, past medical history, past social history, past surgical history and problem list.    ALLERGIES  Allergies   Allergen Reactions    Celebrex [Celecoxib] Swelling    Red Dye #40 (Allura Red) Anaphylaxis     Reported reaction started with rash/itching and progressed to swelling     Shellfish-Derived Products Anaphylaxis    Digital Fever Thermometer Unknown - Low Severity    Iodinated Contrast Media Unknown - Low Severity    Other Other (See Comments)     STOOL SOFTENERS - tingly feeling/does not feel right       Review of Systems  Gen- +fevers, chills  HEENT- + runny nose, + congestion, +sore throat, +ear pain/pressure  CV- No chest pain, palpitations  Resp- + cough, +dyspnea  GI- No N/V/D, abd pain  -No dysuria, flank pain, difficulty urinating  Musc-No tenderness, swelling, decreased ROM  Neuro-No dizziness, +headaches  Psych-No SI/HI, sleep disturbance    Objective     Vital Signs:   /88   Pulse 101   Temp 97.6 °F (36.4 °C) (Axillary)   Resp 16   Ht 165.1 cm (65\")   Wt 115 kg (253 lb " 9.6 oz)   LMP  (LMP Unknown)   SpO2 99%   BMI 42.20 kg/m²                Physical Exam   Physical Exam  Vitals and nursing note reviewed.   HENT:      Right Ear: Tympanic membrane normal. No middle ear effusion.      Left Ear: Tympanic membrane normal.  No middle ear effusion.      Nose: Nose normal. Mucosal edema and rhinorrhea present. No congestion.      Right Turbinates: Swollen.      Left Turbinates: Swollen.      Right Sinus: Maxillary sinus tenderness and frontal sinus tenderness present.      Left Sinus: Maxillary sinus tenderness and frontal sinus tenderness present.      Mouth/Throat:      Lips: Pink.      Pharynx: Posterior oropharyngeal erythema and postnasal drip present. No pharyngeal swelling or oropharyngeal exudate.   Eyes:      General: Lids are normal.      Conjunctiva/sclera: Conjunctivae normal.      Pupils: Pupils are equal, round, and reactive to light.   Cardiovascular:      Rate and Rhythm: Normal rate and regular rhythm.   Pulmonary:      Effort: Pulmonary effort is normal.      Breath sounds: Normal breath sounds.   Lymphadenopathy:      Head:      Right side of head: No tonsillar adenopathy.      Left side of head: No tonsillar adenopathy.   Neurological:      Mental Status: She is alert and oriented to person, place, and time.      Gait: Gait is intact.   Psychiatric:         Attention and Perception: Attention normal.         Mood and Affect: Mood and affect normal.         Speech: Speech normal.         Behavior: Behavior normal. Behavior is cooperative.         Assessment and Plan      Assessment/Plan:   Diagnoses and all orders for this visit:    1. Productive cough (Primary)  -     benzonatate (Tessalon Perles) 100 MG capsule; Take 1 capsule by mouth 3 (Three) Times a Day As Needed for Cough.  Dispense: 30 capsule; Refill: 0  -     POCT SARS-CoV-2 Antigen ROBERTO + Flu    2. Acute URI  -     doxycycline (VIBRAMYCIN) 100 MG capsule; Take 1 capsule by mouth 2 (Two) Times a Day for 7  days.  Dispense: 14 capsule; Refill: 0  -     benzonatate (Tessalon Perles) 100 MG capsule; Take 1 capsule by mouth 3 (Three) Times a Day As Needed for Cough.  Dispense: 30 capsule; Refill: 0  -     POCT SARS-CoV-2 Antigen ROBERTO + Flu    3. Primary hypertension    FLU, COVID negative in office today. Results discussed with patient at time of appointment.    Risks, benefits, and potential side effects of current/new medications reviewed with patient.  Patient voiced understanding and wished to proceed with treatment.    May alternate Acetaminophen and Ibuprofen every 4-6 hours as needed for pain, fever > 101.    Increased clear liquid intake encouraged to thin mucous/sputum.    Due to history of HTN, patient to avoid OTC decongestants as they can raise BP.    Patient was encouraged to keep me informed of any acute changes, lack of improvement, or any new concerning symptoms.      Discussion Summary:  Discussed plan of care in detail with pt today; pt verb understanding and agrees.        I have reviewed and updated all copied forward information, as appropriate.  I attest to the accuracy and relevance of any unchanged information.      Follow up:  No follow-ups on file.     Patient Education:  There are no Patient Instructions on file for this visit.    JENNIE Aponte  02/03/25  17:01 EST          Please note that portions of this note may have been completed with a voice recognition program.

## 2025-01-21 DIAGNOSIS — G89.29 OTHER CHRONIC PAIN: Primary | ICD-10-CM

## 2025-01-27 LAB — HLA-B27 QL NAA+PROBE: POSITIVE

## 2025-02-10 DIAGNOSIS — F90.2 ATTENTION DEFICIT HYPERACTIVITY DISORDER, COMBINED TYPE: ICD-10-CM

## 2025-02-10 RX ORDER — METHYLPHENIDATE HYDROCHLORIDE 20 MG/1
20 TABLET ORAL 3 TIMES DAILY
Qty: 90 TABLET | Refills: 0 | Status: SHIPPED | OUTPATIENT
Start: 2025-02-10

## 2025-02-24 ENCOUNTER — TELEPHONE (OUTPATIENT)
Dept: FAMILY MEDICINE CLINIC | Facility: CLINIC | Age: 60
End: 2025-02-24

## 2025-02-24 ENCOUNTER — DOCUMENTATION (OUTPATIENT)
Dept: GENETICS | Facility: HOSPITAL | Age: 60
End: 2025-02-24
Payer: MEDICARE

## 2025-02-24 DIAGNOSIS — I10 PRIMARY HYPERTENSION: ICD-10-CM

## 2025-02-24 NOTE — TELEPHONE ENCOUNTER
"    Caller: Zhane Burnham \"Shemar\"    Relationship: Self    Best call back number: [phone]    What medication are you requesting: RX PILLS AND TOPICAL FOR YEAST INFECTION    What are your current symptoms: BURNING AND RAW VAGINAL ALSO BETWEEN BELLY AND LEG    How long have you been experiencing symptoms: COUPLE WEEKS    If a prescription is needed, what is your preferred pharmacy and phone number:  [facility] [address] [phone] [facility] [phone] FX    PT STATED SHE JUST FINISHED AN ANTIBIOTIC          "

## 2025-02-24 NOTE — PROGRESS NOTES
Zhane Burnham, a 59 y.o. female, was referred for genetic counseling due to a personal history of breast cancer. She was diagnosed with a left triple negative breast cancer at age 47 and underwent a bilateral mastectomy and chemotherapy treatment at that time. Ms. Burnham had a hysterectomy with BSO at age 41. Her most recent colonoscopy was in 2019 and she reports having at least two colon polyps removed on prior colonoscopies. She was interested in discussing her risk for a hereditary cancer syndrome. Ms. Burnham was interested in pursuing a multigene panel, and therefore the CancerNext panel was ordered through [facility] which analyzes BRCA1/2 and 37 additional genes associated with an increased cancer risk. The genes on this panel include APC, CECILLE, AXIN2, BAP1, BARD1, BMPR1A, BRCA1, BRCA2, BRIP1, CDH1, CDKN2A, CHEK2, EPCAM, FH, FLCN, GREM1, HOXB13, MBD4, MET, MLH1, MSH2, MSH3, MSH6, MUTYH, NF1, NTHL1, PALB2, PMS2, POLD1, POLE, PTEN, RAD51C, RAD51D, SMAD4, STK11, TP53, TSC1, TSC2, and VHL. Genetic testing was negative for known pathogenic (harmful) mutations in BRCA1/2 and 37 additional genes on this panel.  While no known pathogenic mutations were identified, a variant of uncertain significance (VUS) was identified in the RAD51D gene.   VUSs are differences in DNA that may or may not affect the function of the gene. VUSs are frequently reported with multigene panel testing, given the number of genes being evaluated and the presence of genetic variation in the population.  The classification of a variant to either a benign genetic variant or pathogenic mutation depends on a number of factors.  The majority (estimated to be >90%) of VUSs are eventually reclassified as benign gene changes. It is not recommended that any unaffected relatives are tested for this VUS at this time, and this result is not considered clinically actionable at this time.  Multiple unsuccessful attempts have been made to contact Ms.  "Tej by telephone to discuss these results but her voicemail box was full each time a call was placed.      FAMILY HISTORY:  Pat. Grandmother:                  \"Stomach cancer\", patient states could have possibly been ovarian cancer  Pat. Great Aunt:                      Breast cancer  Pat. Great Aunt:                      Breast cancer  Mat. Uncle:                              Lung cancer     We do not have medical records confirming the diagnoses in Ms. Burnham's family.     RISK ASSESSMENT: Ms. Burnham's personal history of breast cancer led to concern regarding a hereditary cancer syndrome.  She meets NCCN guidelines criteria for BRCA1/2 testing based on her personal history of triple negative breast cancer diagnosed before age 50. The standard approach to genetic testing is through a multigene panel.      GENETIC COUNSELING (30 minutes):  We reviewed the family history information in detail.  Cases of cancer follow three general patterns: sporadic, familial, and hereditary.  While most cancer is sporadic, some cases appear to occur in family clusters.  These cases are said to be familial and account for 10-20% of certain cancer cases.  Familial cases may be due to a combination of shared genes and environmental factors among family members.  In even fewer families (~10%), the cancer is said to be inherited, and the genes responsible for the cancer are known.       Family histories typical of hereditary cancer syndromes usually include multiple first- and second-degree relatives diagnosed with cancer types that define a syndrome.  These cases tend to be diagnosed at younger-than-expected ages and can be bilateral or multifocal.  The cancer in these families follows an autosomal dominant inheritance pattern, which indicates the likely presence of a mutation in a cancer susceptibility gene.  Children and siblings of an individual believed to carry this mutation have a 50% chance of inheriting that mutation, " thereby inheriting the increased risk to develop cancer.  These mutations can be passed down from the maternal or the paternal lineage.     Hereditary breast cancer accounts for 5-10% of all cases of breast cancer.  A significant proportion of hereditary breast and ovarian cancer can be attributed to mutations in the BRCA1 and BRCA2 genes.  Mutations in these genes confer an increased risk for breast cancer, ovarian cancer, male breast cancer, prostate cancer, and pancreatic cancer. Women with a BRCA1 or BRCA2 mutation who have already been diagnosed with breast cancer have a 40-60% lifetime risk of a second breast cancer. Women with a BRCA1 or BRCA2 mutation have up to a 44% risk of ovarian cancer.       The standard approach to genetic testing is via a multigene panel.  Genes included on these panels have varying degrees of risk associated, and management and screening guidelines vary based on the specific gene.  Hereditary cancer syndromes can demonstrate incomplete penetrance and variable expression within families. There are genes that are evaluated that have been more recently described, and there may be less data regarding the risks and therefore may not have established management guidelines at this time. We reviewed that in some cases, the identification of a genetic mutation may impact treatment options for some types of cancer. We discussed the possibility of results that are unexpected based on family history. We discussed these limitations at length. Based on Ms. Burnham's personal history and her desire to get more information regarding her personal risks and risks for her family, she opted to pursue testing through a panel evaluating several other genes known to increase the risk for cancer.      GENETIC TESTING:  The risks, benefits and limitations of genetic testing and implications for clinical management following testing were reviewed. DNA test results can influence decisions regarding screening  and prevention.  Genetic testing can have significant psychological implications for both individuals and families. Also discussed was the possibility of employment and insurance discrimination based on genetic test results and the federal and states laws that are in place to prevent this (ETHEL), as well as the limitations of these laws.         We discussed multigene panel testing, which would involve testing several genes associated with an increased cancer risk. The benefits and limitations of genetic testing were discussed and Ms. Burnham decided to pursue testing of the genes via the panel. The implications of a positive or negative test result were discussed.  We discussed the possibility that, in some cases, genetic test results may be ambiguous due to the identification of a genetic variant of uncertain significance (VUS). These variants may or may not be associated with an increased cancer risk. With multigene panel testing, it is not uncommon for a VUS to be identified.  If a VUS is identified, testing family members is not recommended and screening recommendations are made based on the family history.  The laboratories that perform genetic testing work to reclassify the VUS and send out an amended report if and when a VUS is reclassified.  The majority of variant findings are ultimately reclassified to a negative result. Given her personal and family history, a negative test result does not eliminate all cancer risk to her relatives, although the risk would not be as high as it would with positive genetic testing.     TEST RESULTS:  Genetic testing was negative for known pathogenic mutations by sequencing, rearrangement testing, and RNA analysis for the genes on the CancerNext panel.    A variant of uncertain significance (VUS) was identified in the RAD51D gene.  A VUS is a finding that may or may not impact the function of the gene.  VUSs are not clinically actionable findings, and therefore this result  does not impact management at this time.  The majority of VUSs are ultimately reclassified to benign changes.  The identification of a VUS is common in multigene panel testing, given the number of genes being evaluated.  If this VUS is ever reclassified, a new report will be issued by the laboratory and released directly to the ordering physician and our office, and the patient would be contacted.  This assessment is based on the information provided at the time of the consultation.    CLINICAL MANAGEMENT GUIDELINES: Ms. Sniders surveillance and management should be determined by her oncology team. Despite the genetic test results that were negative for known pathogenic mutations, Ms. Burnham's female relatives may have a somewhat increased lifetime risk for breast cancer based on family history. Relatives may have a personalized risk assessment performed to quantify their breast cancer risk using a family history-based risk model, such as the Tyrer-Cuzick model. Surveillance for individuals with a high lifetime risk of breast cancer (>20%), based on NCCN guidelines, would consist of semi-annual clinical breast exams and monthly self-breast exams starting by age 18 and annual mammography starting 10 years younger than the earliest diagnosis in the family, or age 40, whichever is earliest.  According to an American Cancer Society expert panel and NCCN guidelines, annual breast MRI should be offered to women whose lifetime risk of breast cancer is 20-25 percent or more, typically beginning at the same age as mammography.    PLAN:  Genetic counseling remains available to Ms. Burnham. Multiple unsuccessful attempts were made to discuss these results with Ms. Burnham by telephone. She is active in [employer] and this is viewable to her in [employer]. She is encouraged to contact our office with any questions at [phone].         Rosemary Chaudhary, MS, [facility] [place]  Licensed Certified Genetic Counselor      Cc:  Latasha  Ever, APRN

## 2025-02-25 ENCOUNTER — OFFICE VISIT (OUTPATIENT)
Dept: BEHAVIORAL HEALTH | Facility: CLINIC | Age: 60
End: 2025-02-25
Payer: MEDICARE

## 2025-02-25 VITALS
DIASTOLIC BLOOD PRESSURE: 96 MMHG | BODY MASS INDEX: 42.32 KG/M2 | HEIGHT: 65 IN | SYSTOLIC BLOOD PRESSURE: 154 MMHG | WEIGHT: 254 LBS | HEART RATE: 103 BPM | OXYGEN SATURATION: 94 %

## 2025-02-25 DIAGNOSIS — F33.1 MAJOR DEPRESSIVE DISORDER, RECURRENT EPISODE, MODERATE: ICD-10-CM

## 2025-02-25 DIAGNOSIS — F51.04 PSYCHOPHYSIOLOGICAL INSOMNIA: ICD-10-CM

## 2025-02-25 DIAGNOSIS — F90.2 ATTENTION DEFICIT HYPERACTIVITY DISORDER, COMBINED TYPE: Primary | ICD-10-CM

## 2025-02-25 DIAGNOSIS — F41.1 GAD (GENERALIZED ANXIETY DISORDER): ICD-10-CM

## 2025-02-25 PROCEDURE — 3080F DIAST BP >= 90 MM HG: CPT

## 2025-02-25 PROCEDURE — 1160F RVW MEDS BY RX/DR IN RCRD: CPT

## 2025-02-25 PROCEDURE — 99214 OFFICE O/P EST MOD 30 MIN: CPT

## 2025-02-25 PROCEDURE — 1159F MED LIST DOCD IN RCRD: CPT

## 2025-02-25 PROCEDURE — 3077F SYST BP >= 140 MM HG: CPT

## 2025-02-25 RX ORDER — HYDROCHLOROTHIAZIDE 25 MG/1
25 TABLET ORAL DAILY
Qty: 30 TABLET | Refills: 0 | Status: SHIPPED | OUTPATIENT
Start: 2025-02-25

## 2025-02-25 RX ORDER — DULOXETIN HYDROCHLORIDE 60 MG/1
120 CAPSULE, DELAYED RELEASE ORAL DAILY
Qty: 180 CAPSULE | Refills: 0 | Status: SHIPPED | OUTPATIENT
Start: 2025-02-25

## 2025-02-25 RX ORDER — DESVENLAFAXINE 50 MG/1
50 TABLET, FILM COATED, EXTENDED RELEASE ORAL DAILY
Qty: 90 TABLET | Refills: 0 | Status: SHIPPED | OUTPATIENT
Start: 2025-02-25

## 2025-02-25 RX ORDER — TRAZODONE HYDROCHLORIDE 100 MG/1
200 TABLET ORAL
Qty: 180 TABLET | Refills: 0 | Status: SHIPPED | OUTPATIENT
Start: 2025-02-25

## 2025-02-25 NOTE — PROGRESS NOTES
Follow Up Office Visit    Patient Name: Zhane Burnham  : 1965   MRN: [id number]   Care Team: Patient Care Team:  Latasha Curtis APRN as PCP - General (Family Medicine)  Magda Philip LPCC as Counselor (Behavioral Health)  Komal Aguiar APRN as Nurse Practitioner (Behavioral Health)  Sydney Alegria LCSW as  (Psychiatry)         Chief Complaint:    Chief Complaint   Patient presents with    ADHD    Anxiety    Depression    Sleeping Problem    Med Management       History of Present Illness: Zhane Burnham is a 59 y.o. female who is here today for a medication management follow up. Patient reports that overall medication continues to be effective. She feels that her focus and concentration are doing good and her mood and anxiety are managed well. She also reports that she is sleeping as well as she can. She continues to endorse a significant amount of situational stressors, however, she feels that she is managing them well. She did not see the need to make any changes and did not have any medication concerns at today's visit. She denies SI/HI today.     The following portion of the patient's history were reviewed and updated appropriately: allergies, current and past medications, family history, medical history and social history. STEPHANIE reviewed and appropriate.   Subjective   Review of Systems:    Review of Systems   Respiratory: Negative.     Cardiovascular:  Negative for chest pain and palpitations.   Neurological: Negative.    Psychiatric/Behavioral:  Positive for stress. The patient is nervous/anxious.    All other systems reviewed and are negative.      Current Medications:   Current Outpatient Medications   Medication Sig Dispense Refill    BIOTIN PO Take  by mouth.      Cariprazine HCl (Vraylar) 1.5 MG capsule capsule Take 1 capsule by mouth Daily. 90 capsule 0    cyclobenzaprine (FLEXERIL) 10 MG tablet TAKE 1/2 TO 1 TABLET BY MOUTH THREE TIMES DAILY AS NEEDED FOR  MUSCLE SPASMS 90 tablet 1    desvenlafaxine (PRISTIQ) 50 MG 24 hr tablet Take 1 tablet by mouth Daily. 90 tablet 0    DULoxetine (CYMBALTA) 60 MG capsule Take 2 capsules by mouth Daily. 180 capsule 0    dutasteride (AVODART) 0.5 MG capsule Take 1 capsule by mouth Daily. 90 capsule 0    etodolac (LODINE) 400 MG tablet Take 1/2-1 tablet by mouth every 8 hours as needed. 90 tablet 1    ezetimibe (ZETIA) 10 MG tablet Take 1 tablet by mouth Daily. 90 tablet 1    hydroCHLOROthiazide 25 MG tablet TAKE ONE TABLET BY MOUTH EVERY DAY 30 tablet 0    levocetirizine (XYZAL) 5 MG tablet TAKE ONE TABLET BY MOUTH EVERY EVENING 30 tablet 2    methylphenidate (RITALIN) 20 MG tablet Take 1 tablet by mouth 3 (Three) Times a Day. 90 tablet 0    Multiple Vitamins-Minerals (WOMENS 50+ MULTI VITAMIN PO) Take  by mouth.      oxyCODONE-acetaminophen (PERCOCET)  MG per tablet Take 1 tablet by mouth Every 6 (Six) Hours As Needed for Moderate Pain.      pantoprazole (PROTONIX) 40 MG EC tablet TAKE ONE TABLET BY MOUTH DAILY 90 tablet 1    pregabalin (LYRICA) 100 MG capsule Take 1 capsule by mouth 3 (Three) Times a Day. 90 capsule 2    Relistor 150 MG tablet Take 1 tablet by mouth Daily. 90 tablet 0    simvastatin (ZOCOR) 40 MG tablet Take 1 tablet by mouth every night at bedtime. 90 tablet 1    traZODone (DESYREL) 100 MG tablet Take 2 tablets by mouth every night at bedtime. 180 tablet 0    benzonatate (Tessalon Perles) 100 MG capsule Take 1 capsule by mouth 3 (Three) Times a Day As Needed for Cough. (Patient not taking: Reported on 2/25/2025) 30 capsule 0     No current facility-administered medications for this visit.       Mental Status Exam:   Hygiene:   good  Cooperation:  Cooperative  Eye Contact:  Good  Psychomotor Behavior:  Appropriate  Affect:  Appropriate  Mood: normal and stressed   Speech:  Normal  Thought Process:  Goal directed and Linear  Thought Content:  Normal and Mood congruent  Suicidal:  None  Homicidal:   "None  Hallucinations:  None  Delusion:  None  Memory:  Intact  Orientation:  Person, Place, Time, and Situation  Reliability:  good  Insight:  Good  Judgement:  Good  Impulse Control:  Good  Physical/Medical Issues:  Yes see chart       Objective   Vital Signs:   /96   Pulse 103   Ht 165.1 cm (65\")   Wt 115 kg (254 lb)   SpO2 94%   BMI 42.27 kg/m²         Lab Results:   Lab Results   Component Value Date    WBC 5.8 09/10/2024    HGB 12.6 09/10/2024    HCT 39.8 09/10/2024    MCV 87 09/10/2024     09/10/2024        Lab Results   Component Value Date    GLUCOSE 89 09/10/2024    BUN 16 09/10/2024    CREATININE 0.86 09/10/2024     09/10/2024    K 4.0 09/10/2024     09/10/2024    CALCIUM 9.4 09/10/2024    PROTEINTOT 6.2 09/10/2024    ALBUMIN 4.1 09/10/2024    ALT 16 09/10/2024    AST 17 09/10/2024    ALKPHOS 84 09/10/2024    BILITOT 0.3 09/10/2024    GLOB 2.1 09/10/2024    AGRATIO 1.8 02/27/2023    BCR 19 09/10/2024    ANIONGAP 6.0 01/16/2018    EGFR 79 09/10/2024        Lab Results   Component Value Date    CHLPL 183 09/10/2024    TRIG 83 09/10/2024    HDL 73 09/10/2024    LDL 95 09/10/2024        Lab Results   Component Value Date    HGBA1C 5.7 (H) 09/10/2024        Lab Results   Component Value Date    TSH 0.360 08/17/2020            Assessment / Plan    Diagnoses and all orders for this visit:    1. Attention deficit hyperactivity disorder, combined type (Primary)    2. Psychophysiological insomnia  -     traZODone (DESYREL) 100 MG tablet; Take 2 tablets by mouth every night at bedtime.  Dispense: 180 tablet; Refill: 0    3. Major depressive disorder, recurrent episode, moderate  -     Cariprazine HCl (Vraylar) 1.5 MG capsule capsule; Take 1 capsule by mouth Daily.  Dispense: 90 capsule; Refill: 0  -     desvenlafaxine (PRISTIQ) 50 MG 24 hr tablet; Take 1 tablet by mouth Daily.  Dispense: 90 tablet; Refill: 0  -     DULoxetine (CYMBALTA) 60 MG capsule; Take 2 capsules by mouth Daily.  " Dispense: 180 capsule; Refill: 0    4. MANISHA (generalized anxiety disorder)  -     desvenlafaxine (PRISTIQ) 50 MG 24 hr tablet; Take 1 tablet by mouth Daily.  Dispense: 90 tablet; Refill: 0  -     DULoxetine (CYMBALTA) 60 MG capsule; Take 2 capsules by mouth Daily.  Dispense: 180 capsule; Refill: 0     Patient appears to be doing well on current medication. No issues reported and no indication for change. Will continue medication as ordered.     History and physical exam exhibit continued safe and appropriate use of controlled substance.    As part of patient's treatment plan I am prescribing a controlled substance.  The patient has been made aware of the appropriate use of such medications and potential for dependence and/or overdose.  It has also been made clear that these medications are for use by this patient only, without concomitant use of alcohol or other substances, unless prescribed.    Patient/guardian has completed a prescribing agreement detailing terms of continued prescribing of controlled substances, including monitoring STEPHANIE reports, urine drug screening, and pill counts if necessary.  Patient is aware that inappropriate use will result in cessation of prescribing such medications.    At this time, patient is being prescribed an antipsychotic medication.  The risks, benefits and potential side effects of these medications have been reviewed with the patient/guardian.  I have also discussed with the patient/guardian that while on these medications the following labs should be drawn yearly.  Those labs include, a CBC, a CMP, a lipid panel, A1c, and Thyroid labs.  Encouraged patient/guardian to discuss these labs with their primary care provider.      AIMS  Facial and Oral Movements  Muscles of Facial Expression: None, normal  Lips and Perioral Area: None, normal  Jaw: None, normal  Tongue: None, normal  Extremity Movements  Upper (arms, wrists, hands, fingers): None, normal  Lower (legs, knees,  ankles, toes): None, normal  Trunk Movements  Neck, shoulders, hips: None, normal  Overall Severity  Severity of abnormal movements (max 4): 0  Incapacitation due to abnormal movements: None, normal  Patient's awareness of abnormal movements (rate only patient's report): Aware, no distress  Dental Status  Current problems with teeth and/or dentures?: No  Does patient usually wear dentures?: No      PHQ-9 Depression Screening  Little interest or pleasure in doing things? Almost all   Feeling down, depressed, or hopeless? Over half   Trouble falling or staying asleep, or sleeping too much? Almost all   Feeling tired or having little energy? Almost all   Poor appetite or overeating? Over half   Feeling bad about yourself - or that you are a failure or have let yourself or your family down? Almost all   Trouble concentrating on things, such as reading the newspaper or watching television? Almost all   Moving or speaking so slowly that other people could have noticed? Or the opposite - being so fidgety or restless that you have been moving around a lot more than usual? Over half   Thoughts that you would be better off dead, or of hurting yourself in some way? Not at all   PHQ-9 Total Score 21   If you checked off any problems, how difficult have these problems made it for you to do your work, take care of things at home, or get along with other people? Extremely difficult     PHQ-9 Score:   PHQ-9 Total Score: 21    Depression Screening:  Patient screened positive for depression based on a PHQ-9 score of 21 on 2/25/2025. Follow-up recommendations include: Prescribed antidepressant medication treatment and Suicide Risk Assessment performed.        MANISHA-7  Over the last two weeks, how often have you been bothered by the following problems?  Feeling nervous, anxious or on edge: Nearly every day  Not being able to stop or control worrying: Nearly every day  Worrying too much about different things: Nearly every day  Trouble  Relaxing: More than half the days  Being so restless that it is hard to sit still: More than half the days  Becoming easily annoyed or irritable: More than half the days  Feeling afraid as if something awful might happen: Several days  MANISHA 7 Total Score: 16  If you checked any problems, how difficult have these problems made it for you to do your work, take care of things at home, or get along with other people: Extremely difficult      ADHD  Screening for Adults With ADHD - (1-6)  1. How often do you have trouble wrapping up the final details of a project, once the challenging parts have been done?: Very Often  2. How often do you have difficulty getting things in order when you have to do a task that requires organization?: Often  3. How often do you have problems remembering appointments or obligations : Very Often  4. When you have a task that requires a lot of thought, how often do you avoid or delay getting started ?: Very Often  5. How often do you fidget or squirm with your hands or feet when you have to sit down for a long time?: Often  6. How often do you feel overly active and compelled to do things, like you were driven by a motor?: Rarely  7. How often do you make careless mistakes when you have to work on a boring or difficult project?: Often  8. How often do have difficulty keeping your attention when you are doing boring or repetitive work?: Very Often  9. How often do you have difficulty concentrating on what people say to you, even when they are speaking to you: Often  10.How often do you misplace or have difficulty finding things at home or at work?: Often  11.How often are you distracted by activity or noise around you?: Often  12.How often do you leave your seat in meetings or other situations in which you are expected to remain seated?: Sometimes  14.How often do you have difficulty unwinding and relaxing when you have time to yourself?: Often  15.How often do you find yourself talking too much  when you are in social situations?: Very Often  16.When you’re in a conversation, how often do you find yourself finishing the sentences of the people you are talking to, before they can finish them themselves?: Very Often  17.How often do you have difficulty waiting your turn in situations when turn taking is required?: Often  18.How often do you interrupt others when they are busy?: Often    A psychological evaluation was conducted in order to assess past and current level of functioning. Areas assessed included, but were not limited to: perception of social support, perception of ability to face and deal with challenges in life (positive functioning), anxiety symptoms, depressive symptoms, perspective on beliefs/belief system, coping skills for stress, intelligence level,  Therapeutic rapport was established. Interventions conducted today were geared towards incorporating medication management along with support for continued therapy. Education was also provided as to the med management with this provider and what to expect in subsequent sessions.      We discussed risks, benefits, goals and side effects of the above medication and the patient was agreeable with the plan. Patient was educated on the importance of compliance with treatment and follow-up appointments. Patient is aware to contact the [facility] with any worsening of symptoms. To call for questions or concerns and return early if necessary. Patent is agreeable to go to the Emergency Department or call 911 should they begin SI/HI.    MEDS ORDERED DURING VISIT:  New Medications Ordered This Visit   Medications    traZODone (DESYREL) 100 MG tablet     Sig: Take 2 tablets by mouth every night at bedtime.     Dispense:  180 tablet     Refill:  0     This prescription was filled on 12/31/2022. Any refills authorized will be placed on file.    Cariprazine HCl (Vraylar) 1.5 MG capsule capsule     Sig: Take 1 capsule by mouth Daily.     Dispense:  90  capsule     Refill:  0    desvenlafaxine (PRISTIQ) 50 MG 24 hr tablet     Sig: Take 1 tablet by mouth Daily.     Dispense:  90 tablet     Refill:  0     This prescription was filled on 5/8/2023. Any refills authorized will be placed on file.    DULoxetine (CYMBALTA) 60 MG capsule     Sig: Take 2 capsules by mouth Daily.     Dispense:  180 capsule     Refill:  0     This prescription was filled on 5/18/2023. Any refills authorized will be placed on file.         Follow Up   Return in about 3 months (around 5/25/2025).  Patient was given instructions and counseling regarding her condition or for health maintenance advice. Please see specific information pulled into the AVS if appropriate.     TREATMENT PLAN/GOALS: Continue supportive psychotherapy efforts and medications as indicated. Treatment and medication options discussed during today's visit. Patient acknowledged and verbally consented to continue with current treatment plan and was educated on the importance of compliance with treatment and follow-up appointments.    MEDICATION ISSUES:  Discussed medication options and treatment plan of prescribed medication as well as the risks, benefits, and side effects including potential falls, possible impaired driving and metabolic adversities among others. Patient is agreeable to call the office with any worsening of symptoms or onset of side effects. Patient is agreeable to call 911 or go to the nearest ER should he/she begin having SI/HI.        [facility]  [address] [place] [address] DR QASIM [place]KEON [id number]  [phone]    March 2, 2025 12:22 EST

## 2025-03-10 ENCOUNTER — TELEPHONE (OUTPATIENT)
Dept: GENETICS | Facility: HOSPITAL | Age: 60
End: 2025-03-10
Payer: MEDICARE

## 2025-03-10 NOTE — TELEPHONE ENCOUNTER
Pt called to go over genetic test results from November 2024. Previously, multiple attempts had been made to contact pt to disclose results but none were successful. Therefore, pt returned our call to go over results. Results were disclosed. Informed the patient these results are on their [employer] account and have been sent to their provider. Full documentation from genetic counselor has been previously documented.

## 2025-03-12 DIAGNOSIS — E78.2 MIXED HYPERLIPIDEMIA: ICD-10-CM

## 2025-03-12 RX ORDER — SIMVASTATIN 40 MG
40 TABLET ORAL
Qty: 90 TABLET | Refills: 1 | Status: SHIPPED | OUTPATIENT
Start: 2025-03-12

## 2025-03-13 DIAGNOSIS — J30.2 SEASONAL ALLERGIES: ICD-10-CM

## 2025-03-13 RX ORDER — LEVOCETIRIZINE DIHYDROCHLORIDE 5 MG/1
5 TABLET, FILM COATED ORAL EVERY EVENING
Qty: 30 TABLET | Refills: 2 | Status: SHIPPED | OUTPATIENT
Start: 2025-03-13

## 2025-03-17 ENCOUNTER — OFFICE VISIT (OUTPATIENT)
Dept: OBSTETRICS AND GYNECOLOGY | Facility: CLINIC | Age: 60
End: 2025-03-17
Payer: MEDICARE

## 2025-03-17 VITALS
HEIGHT: 65 IN | WEIGHT: 248.2 LBS | SYSTOLIC BLOOD PRESSURE: 120 MMHG | DIASTOLIC BLOOD PRESSURE: 80 MMHG | BODY MASS INDEX: 41.35 KG/M2

## 2025-03-17 DIAGNOSIS — Z12.11 SCREENING FOR COLON CANCER: ICD-10-CM

## 2025-03-17 DIAGNOSIS — E66.01 CLASS 3 SEVERE OBESITY DUE TO EXCESS CALORIES WITH SERIOUS COMORBIDITY AND BODY MASS INDEX (BMI) OF 45.0 TO 49.9 IN ADULT: ICD-10-CM

## 2025-03-17 DIAGNOSIS — L29.2 VULVAR ITCHING: ICD-10-CM

## 2025-03-17 DIAGNOSIS — Z85.3 PERSONAL HISTORY OF BREAST CANCER: ICD-10-CM

## 2025-03-17 DIAGNOSIS — Z13.820 SCREENING FOR OSTEOPOROSIS: ICD-10-CM

## 2025-03-17 DIAGNOSIS — N89.8 OTHER SPECIFIED NONINFLAMMATORY DISORDERS OF VAGINA: ICD-10-CM

## 2025-03-17 DIAGNOSIS — E66.813 CLASS 3 SEVERE OBESITY DUE TO EXCESS CALORIES WITH SERIOUS COMORBIDITY AND BODY MASS INDEX (BMI) OF 45.0 TO 49.9 IN ADULT: ICD-10-CM

## 2025-03-17 DIAGNOSIS — Z12.4 SCREENING FOR CERVICAL CANCER: ICD-10-CM

## 2025-03-17 DIAGNOSIS — Z90.710 HISTORY OF HYSTERECTOMY: Primary | ICD-10-CM

## 2025-03-17 DIAGNOSIS — Z11.3 SCREENING EXAMINATION FOR STD (SEXUALLY TRANSMITTED DISEASE): ICD-10-CM

## 2025-03-17 RX ORDER — SENNOSIDES 8.6 MG
650 CAPSULE ORAL EVERY 8 HOURS PRN
COMMUNITY

## 2025-03-17 RX ORDER — ALBUTEROL SULFATE 90 UG/1
2 INHALANT RESPIRATORY (INHALATION) EVERY 4 HOURS PRN
COMMUNITY

## 2025-03-17 RX ORDER — NYSTATIN 100000 [USP'U]/G
POWDER TOPICAL 3 TIMES DAILY
Qty: 15 G | Refills: 3 | Status: SHIPPED | OUTPATIENT
Start: 2025-03-17

## 2025-03-17 RX ORDER — FLUCONAZOLE 150 MG/1
150 TABLET ORAL ONCE
Qty: 1 TABLET | Refills: 0 | Status: SHIPPED | OUTPATIENT
Start: 2025-03-17 | End: 2025-03-17

## 2025-03-17 RX ORDER — POTASSIUM CHLORIDE 750 MG/1
10 TABLET, EXTENDED RELEASE ORAL 2 TIMES DAILY
COMMUNITY

## 2025-03-17 RX ORDER — CLOTRIMAZOLE AND BETAMETHASONE DIPROPIONATE 10; .64 MG/G; MG/G
1 CREAM TOPICAL 2 TIMES DAILY
Qty: 45 G | Refills: 2 | Status: SHIPPED | OUTPATIENT
Start: 2025-03-17

## 2025-03-17 NOTE — PROGRESS NOTES
Gynecologic Annual Exam Note        GYN Annual Exam     CC - Here for annual exam.        HPI  Zhane Burnham is a 59 y.o. female, , who presents for annual well woman exam as a previous patient not seen in the last three years.  She is postmenopausal.. Denies vaginal bleeding.   There were no changes to her medical or surgical history since her last visit. Marital Status: .  She is not currently sexually active. STD testing recommendations have been explained to the patient and she request STD testing.    The patient would like to discuss the following complaints today: vaginal skin irritated, burning, and odor, mild itching. Recent antibiotic treatment in  for flu like symptoms.  Last colonoscopy  and would like to know about repeat recommendations.  5 yrs vs 10 yrs    Additional OB/GYN History   On HRT? No  Last Pap : unknown, no record in [facility] Culture Results: negative. HPV: unknown . Hysterectomy , Inflammation only and bacterial infections but no dx of HPV.     Last Completed Pap Smear            Completed or No Longer Recommended       PAP SMEAR  Discontinued      2025  Order placed for LIQUID-BASED PAP SMEAR WITH HPV GENOTYPING IF ASCUS ([facility]) by Juanpablo Kim MD    2020  Done    2014  Done                          History of abnormal Pap smear: yes - unknown  Family history of uterine, colon, breast, or ovarian cancer: yes - Breast-PGM, [facility] X4  Performs monthly Self-Breast Exam: no, hx of breast cancer  Last mammogram: . Done at [facility]. There is a copy in the chart.Hx of Breast CA 2012    Last Completed Mammogram            Completed or No Longer Recommended       MAMMOGRAM  Discontinued        Frequency changed to Never automatically (Topic No Longer Applies)    2012  Done                          Last colonoscopy: has had a colonoscopy 10/16/19 neg, Hx of polyps    Last Completed Colonoscopy            Needs Review        COLORECTAL CANCER SCREENING (COLONOSCOPY - Every 10 Years) Tentatively due on 10/16/2029      10/16/2019  COLONOSCOPY    10/16/2019  Surgical Procedure: COLONOSCOPY                            Her last bone density scan was on 11/14/24 and results were Normal- 3/9/21 Osteopenia, hx of osteoporosis  Exercises Regularly: no  Feelings of Anxiety or Depression: yes - anxiety and depression medication and hx of counseling      Tobacco Usage?: No       Current Outpatient Medications:     acetaminophen (TYLENOL) 650 MG 8 hr tablet, Take 1 tablet by mouth Every 8 (Eight) Hours As Needed for Mild Pain., Disp: , Rfl:     albuterol sulfate  (90 Base) MCG/ACT inhaler, Inhale 2 puffs Every 4 (Four) Hours As Needed for Wheezing., Disp: , Rfl:     BIOTIN PO, Take  by mouth., Disp: , Rfl:     Cariprazine HCl (Vraylar) 1.5 MG capsule capsule, Take 1 capsule by mouth Daily., Disp: 90 capsule, Rfl: 0    cyclobenzaprine (FLEXERIL) 10 MG tablet, TAKE 1/2 TO 1 TABLET BY MOUTH THREE TIMES DAILY AS NEEDED FOR MUSCLE SPASMS, Disp: 90 tablet, Rfl: 1    desvenlafaxine (PRISTIQ) 50 MG 24 hr tablet, Take 1 tablet by mouth Daily., Disp: 90 tablet, Rfl: 0    DULoxetine (CYMBALTA) 60 MG capsule, Take 2 capsules by mouth Daily., Disp: 180 capsule, Rfl: 0    dutasteride (AVODART) 0.5 MG capsule, Take 1 capsule by mouth Daily., Disp: 90 capsule, Rfl: 0    etodolac (LODINE) 400 MG tablet, Take 1/2-1 tablet by mouth every 8 hours as needed., Disp: 90 tablet, Rfl: 1    ezetimibe (ZETIA) 10 MG tablet, Take 1 tablet by mouth Daily., Disp: 90 tablet, Rfl: 1    hydroCHLOROthiazide 25 MG tablet, TAKE ONE TABLET BY MOUTH EVERY DAY, Disp: 30 tablet, Rfl: 0    levocetirizine (XYZAL) 5 MG tablet, TAKE ONE TABLET BY MOUTH EVERY EVENING, Disp: 30 tablet, Rfl: 2    methylphenidate (RITALIN) 20 MG tablet, Take 1 tablet by mouth 3 (Three) Times a Day., Disp: 90 tablet, Rfl: 0    Multiple Vitamins-Minerals (WOMENS 50+ MULTI VITAMIN PO), Take  by mouth., Disp: ,  "Rfl:     oxyCODONE-acetaminophen (PERCOCET)  MG per tablet, Take 1 tablet by mouth Every 6 (Six) Hours As Needed for Moderate Pain., Disp: , Rfl:     pantoprazole (PROTONIX) 40 MG EC tablet, TAKE ONE TABLET BY MOUTH DAILY, Disp: 90 tablet, Rfl: 1    potassium chloride 10 MEQ CR tablet, Take 1 tablet by mouth 2 (Two) Times a Day., Disp: , Rfl:     pregabalin (LYRICA) 100 MG capsule, Take 1 capsule by mouth 3 (Three) Times a Day., Disp: 90 capsule, Rfl: 2    Relistor 150 MG tablet, Take 1 tablet by mouth Daily., Disp: 90 tablet, Rfl: 0    simvastatin (ZOCOR) 40 MG tablet, TAKE ONE TABLET BY MOUTH EVERY DAY AT BEDTIME, Disp: 90 tablet, Rfl: 1    traZODone (DESYREL) 100 MG tablet, Take 2 tablets by mouth every night at bedtime., Disp: 180 tablet, Rfl: 0    clotrimazole-betamethasone (LOTRISONE) 1-0.05 % cream, Apply 1 Application topically to the appropriate area as directed 2 (Two) Times a Day., Disp: 45 g, Rfl: 2    fluconazole (Diflucan) 150 MG tablet, Take 1 tablet by mouth 1 (One) Time for 1 dose., Disp: 1 tablet, Rfl: 0    nystatin (MYCOSTATIN) 010869 UNIT/GM powder, Apply  topically to the appropriate area as directed 3 (Three) Times a Day., Disp: 15 g, Rfl: 3    Patient is requesting refills of diflucan.    OB History          1    Para        Term                AB   1    Living             SAB        IAB   1    Ectopic        Molar        Multiple        Live Births                    Past Medical History:   Diagnosis Date    Abnormal ECG     Acid reflux     Anemia     Anxiety     Asthma     reported \"childhood\"    Body piercing     ears    Breast cancer 2012    Cancer of left breast, patient had a bilateral mastectomy    Chronic bronchitis     Colon polyp     Constipation     Depression     Dysphagia     Reported difficulty swallowing pills    Elevated cholesterol     Fatigue     Fibromyalgia     Fractures     Bilateral arms and left ankle, multiple toes    H/O cardiovascular stress " "test     Patient reported done apx. 2004 and that all was wnl's at that time    Headache     Hearing loss     Patient reported difficulty only when there is a lot of back ground noise. No use of hearing devices.     Heart murmur     History of bronchitis     History of pneumonia     History of transfusion     Reported no reaction to transfusion    Hyperlipidemia     Hypertension     Lower extremity edema     Migraines     Osteoarthritis     Osteoporosis     Ovarian cyst     Peptic ulceration     PONV (postoperative nausea and vomiting)     Restless leg     Seasonal allergies     Urinary tract infection     Vaginal yeast infection         Past Surgical History:   Procedure Laterality Date    BREAST BIOPSY Left 12/27/2012    BREAST RECONSTRUCTION Bilateral 01/31/2013    COLONOSCOPY      COLONOSCOPY N/A 10/16/2019    Procedure: COLONOSCOPY;  Surgeon: Ck Mejia MD;  Location: [facility] ENDOSCOPY;  Service: Gastroenterology    ENDOSCOPY      EXTERNAL EAR SURGERY Left     Extraction of an extra lobe     HYSTERECTOMY  2008    KNEE ARTHROSCOPY Left 06/2016    Patient reported \"bone tumor removed\"    MASTECTOMY Bilateral 01/31/2013    bilat    TONSILLECTOMY      WISDOM TOOTH EXTRACTION         Health Maintenance   Topic Date Due    Pneumococcal Vaccine 50+ (1 of 1 - PCV) Never done    ZOSTER VACCINE (2 of 2) 10/05/2021    INFLUENZA VACCINE  03/31/2025 (Originally 7/1/2024)    COVID-19 Vaccine (4 - 2024-25 season) 04/08/2025 (Originally 9/1/2024)    BMI FOLLOWUP  07/10/2025    ANNUAL WELLNESS VISIT  09/10/2025    LIPID PANEL  09/10/2025    COLORECTAL CANCER SCREENING  10/16/2029    HEPATITIS C SCREENING  Completed    Annual Gynecologic Pelvic and Breast Exam  Discontinued    MAMMOGRAM  Discontinued    PAP SMEAR  Discontinued    TDAP/TD VACCINES  Discontinued       The additional following portions of the patient's history were reviewed and updated as appropriate: allergies, current medications, past family history, past " "medical history, past social history, past surgical history, and problem list.    Review of Systems   Constitutional:  Negative for chills, fever and unexpected weight loss.   Respiratory: Negative.     Cardiovascular: Negative.    Gastrointestinal:  Negative for abdominal distention and abdominal pain.   Genitourinary:  Positive for vaginal pain (skin irritation, mild itching, burning). Negative for breast pain, dysuria, genital sores, pelvic pain and urinary incontinence.   Psychiatric/Behavioral:  Negative for dysphoric mood and depressed mood.    All other systems reviewed and are negative.      I have reviewed and agree with the HPI, ROS, and historical information as entered above.   Juanpablo Kim MD      Objective   /80   Ht 165.1 cm (65\")   Wt 113 kg (248 lb 3.2 oz)   LMP  (LMP Unknown)   BMI 41.30 kg/m²     Physical Exam  Vitals and nursing note reviewed. Exam conducted with a chaperone present.   Constitutional:       Appearance: She is well-developed. She is obese.   HENT:      Head: Normocephalic and atraumatic.   Neck:      Thyroid: No thyroid mass or thyromegaly.   Cardiovascular:      Rate and Rhythm: Normal rate and regular rhythm.      Heart sounds: No murmur heard.  Pulmonary:      Effort: Pulmonary effort is normal. No retractions.      Breath sounds: Normal breath sounds. No wheezing, rhonchi or rales.   Chest:      Chest wall: No mass or tenderness.   Breasts:     Right: Normal. Absent. No skin change.      Left: Normal. Absent. No skin change.      Comments: Status post bilateral mastectomy with reconstruction.  Abdominal:      General: Bowel sounds are normal.      Palpations: Abdomen is soft. Abdomen is not rigid. There is no mass.      Tenderness: There is no abdominal tenderness. There is no guarding.      Hernia: No hernia is present. There is no hernia in the left inguinal area or right inguinal area.      Comments: Rash under pannus consistent with yeast. "   Genitourinary:     General: Normal vulva.      Exam position: Lithotomy position.      Pubic Area: No rash.       Labia:         Right: No rash, tenderness or lesion.         Left: No rash, tenderness or lesion.       Urethra: No urethral pain or urethral swelling.      Vagina: Normal. No vaginal discharge, lesions or prolapsed vaginal walls.      Uterus: Absent.       Adnexa:         Right: No mass, tenderness or fullness.          Left: No mass, tenderness or fullness.        Rectum: No external hemorrhoid.      Comments: Cervix surgically absent.  Vaginal cuff well supported.  No pelvic masses or tenderness.   Musculoskeletal:      Cervical back: Normal range of motion. No muscular tenderness.   Neurological:      Mental Status: She is alert and oriented to person, place, and time.   Psychiatric:         Behavior: Behavior normal.            Assessment and Plan    Problem List Items Addressed This Visit          Gynecologic and Obstetric Problems    Vulvar itching    Overview   3/17/2025.  NuSwab sent for yeast panel.  Rx Diflucan 150 mg.  Can try Lotrisone cream externally.            Other    Class 3 severe obesity due to excess calories with serious comorbidity and body mass index (BMI) of 45.0 to 49.9 in adult    History of hysterectomy - Primary    Overview   2008?  DARÍO and BSO; stage IV endometriosis with Dr. Marcus.         Personal history of breast cancer    Overview   2017 left breast cancer stage Ia.    History of bilateral mastectomy without radiation therapy.         Screening for cervical cancer    Overview   Last Pap smear 2/13/2020 was negative.  PCP.    3/17/2025 Pap smear obtained         Relevant Orders    LIQUID-BASED PAP SMEAR WITH HPV GENOTYPING IF ASCUS ([facility])    NuSwab VG, Candida 6sp - Swab, Vagina    Screening for colon cancer    Overview   10/16/2019 screening colonoscopy was negative.  Can repeat in 10 years.         Screening for osteoporosis    Overview   DEXA 11/14/2024  was negative.          Other Visit Diagnoses         Screening examination for STD (sexually transmitted disease)          Other specified noninflammatory disorders of vagina        Relevant Orders    NuSwab VG, Candida 6sp - Swab, Vagina            GYN annual well woman exam.  59 years of age.  History of DARÍO and BSO for stage IV endometriosis.  Never on hormone replacement therapy.  Status post bilateral mastectomy for stage I left breast cancer.  Does not need further mammography.  May be due for follow-up colonoscopy.  Will check with GI.  Vulvar itching; vaginal NuSwab sent for yeast panel.  Rx Diflucan and Lotrisone.  Will need further evaluation if does not resolve.  History of borderline diabetes.  Rx nystatin powder for rash under pannus.  Reviewed monthly self breast exams.  Instructed to call with lumps, pain, or breast discharge.  Yearly mammograms ordered.  Reviewed BMI and weight loss as preventative health measures.   Recommended Flu Vaccine in Fall of each year.  RTC in 1 year or PRN with problems.  Return in about 1 year (around 3/17/2026), or if symptoms worsen or fail to improve, for Annual physical.         Juanpablo Kim MD  03/17/2025

## 2025-03-18 LAB — REF LAB TEST METHOD: NORMAL

## 2025-03-19 LAB
A VAGINAE DNA VAG QL NAA+PROBE: NORMAL SCORE
BVAB2 DNA VAG QL NAA+PROBE: NORMAL SCORE
C ALBICANS DNA VAG QL NAA+PROBE: NEGATIVE
C GLABRATA DNA VAG QL NAA+PROBE: NEGATIVE
C KRUSEI DNA VAG QL NAA+PROBE: NEGATIVE
C LUSITANIAE DNA VAG QL NAA+PROBE: NEGATIVE
CANDIDA DNA VAG QL NAA+PROBE: NEGATIVE
MEGA1 DNA VAG QL NAA+PROBE: NORMAL SCORE
T VAGINALIS DNA VAG QL NAA+PROBE: NEGATIVE

## 2025-03-20 DIAGNOSIS — M62.838 MUSCLE SPASM: ICD-10-CM

## 2025-03-20 RX ORDER — CYCLOBENZAPRINE HCL 10 MG
TABLET ORAL
Qty: 90 TABLET | Refills: 1 | Status: SHIPPED | OUTPATIENT
Start: 2025-03-20

## 2025-03-21 ENCOUNTER — OFFICE VISIT (OUTPATIENT)
Dept: FAMILY MEDICINE CLINIC | Facility: CLINIC | Age: 60
End: 2025-03-21
Payer: MEDICARE

## 2025-03-21 VITALS
WEIGHT: 245 LBS | DIASTOLIC BLOOD PRESSURE: 84 MMHG | BODY MASS INDEX: 40.82 KG/M2 | RESPIRATION RATE: 16 BRPM | SYSTOLIC BLOOD PRESSURE: 130 MMHG | HEART RATE: 87 BPM | OXYGEN SATURATION: 99 % | HEIGHT: 65 IN

## 2025-03-21 DIAGNOSIS — J01.90 ACUTE RHINOSINUSITIS: Primary | ICD-10-CM

## 2025-03-21 RX ORDER — DOXYCYCLINE 100 MG/1
100 CAPSULE ORAL 2 TIMES DAILY
Qty: 10 CAPSULE | Refills: 0 | Status: SHIPPED | OUTPATIENT
Start: 2025-03-21 | End: 2025-03-26

## 2025-03-21 RX ORDER — FLUCONAZOLE 150 MG/1
150 TABLET ORAL
Qty: 2 TABLET | Refills: 0 | Status: SHIPPED | OUTPATIENT
Start: 2025-03-21

## 2025-03-21 NOTE — PROGRESS NOTES
"                      Established Patient        Chief Complaint:   Chief Complaint   Patient presents with    Nasal Congestion      Is here for runny nose and congestion. Pt has had this for a few weeks           History of Present Illness:    Zhane Burnham is a 59 y.o. female     Runny nose, nasal congestion, sore throat, headache, sneezing, PND, watery eyes, yellow/green nasal mucous    Sinus pressure and pain, low-grade temp 99.6    Worse at night and early in the morning    Onset about 1 week ago but has worsened over the past few days.     Subjective     The following portions of the patient's history were reviewed and updated as appropriate: allergies, current medications, past family history, past medical history, past social history, past surgical history and problem list.    ALLERGIES  Allergies   Allergen Reactions    Celebrex [Celecoxib] Swelling    Red Dye #40 (Allura Red) Anaphylaxis     Reported reaction started with rash/itching and progressed to swelling     Shellfish-Derived Products Anaphylaxis    Iodinated Contrast Media Unknown - Low Severity    Other Other (See Comments)     STOOL SOFTENERS - tingly feeling/does not feel right       Review of Systems  As above    Objective     Vital Signs:   /84   Pulse 87   Resp 16   Ht 165.1 cm (65\")   Wt 111 kg (245 lb)   LMP  (LMP Unknown)   SpO2 99%   BMI 40.77 kg/m²                Physical Exam   Physical Exam  Vitals and nursing note reviewed.   HENT:      Right Ear: A middle ear effusion is present.      Left Ear: A middle ear effusion is present.      Nose: Mucosal edema, congestion and rhinorrhea present.      Right Turbinates: Swollen.      Left Turbinates: Swollen.      Right Sinus: No maxillary sinus tenderness or frontal sinus tenderness.      Left Sinus: No maxillary sinus tenderness or frontal sinus tenderness.      Mouth/Throat:      Lips: Pink.      Pharynx: Posterior oropharyngeal erythema and postnasal drip present. No " pharyngeal swelling or oropharyngeal exudate.   Eyes:      General: Lids are normal.      Conjunctiva/sclera: Conjunctivae normal.      Pupils: Pupils are equal, round, and reactive to light.   Cardiovascular:      Rate and Rhythm: Normal rate and regular rhythm.   Pulmonary:      Effort: Pulmonary effort is normal.      Breath sounds: Normal breath sounds.   Lymphadenopathy:      Head:      Right side of head: No tonsillar adenopathy.      Left side of head: No tonsillar adenopathy.   Neurological:      Mental Status: She is alert and oriented to person, place, and time.      Gait: Gait is intact.   Psychiatric:         Attention and Perception: Attention normal.         Mood and Affect: Mood and affect normal.         Speech: Speech normal.         Behavior: Behavior normal. Behavior is cooperative.         Assessment and Plan      Assessment/Plan:   Diagnoses and all orders for this visit:    1. Acute rhinosinusitis (Primary)  -     doxycycline (VIBRAMYCIN) 100 MG capsule; Take 1 capsule by mouth 2 (Two) Times a Day for 5 days.  Dispense: 10 capsule; Refill: 0  -     fluconazole (DIFLUCAN) 150 MG tablet; Take 1 tablet by mouth Every 3 (Three) Days.  Dispense: 2 tablet; Refill: 0    Risks, benefits, and potential side effects of current/new medications reviewed with patient.  Patient voiced understanding and wished to proceed with treatment.    Discussed with patient that symptoms appear viral in presentation. Antibiotics are not necessary or appropriate at this time.    Increase clear fluid intake to thin secretions. Avoid dairy as it may thicken sputum/mucous.    May use nasal saline OTC for management of nasal congestion by irrigation/thinning of mucous.    May utilize cool mist humidifier in bedroom at night to decrease dryness and help to thin secretions.     Cough drops OTC PRN.    May alternate Acetaminophen and Ibuprofen every 4-6 hours as needed for pain, fever > 101.    Encourage hand hygiene to prevent  spread of infection.    Patient was encouraged to keep me informed of any acute changes, lack of improvement, or any new concerning symptoms.      Discussion Summary:  Discussed plan of care in detail with pt today; pt verb understanding and agrees.        I have reviewed and updated all copied forward information, as appropriate.  I attest to the accuracy and relevance of any unchanged information.      Follow up:  No follow-ups on file.     Patient Education:  There are no Patient Instructions on file for this visit.    JENNIE Aponte  04/02/25  09:53 EDT          Please note that portions of this note may have been completed with a voice recognition program.

## 2025-04-07 DIAGNOSIS — I10 PRIMARY HYPERTENSION: ICD-10-CM

## 2025-04-07 RX ORDER — HYDROCHLOROTHIAZIDE 25 MG/1
25 TABLET ORAL DAILY
Qty: 30 TABLET | Refills: 0 | Status: SHIPPED | OUTPATIENT
Start: 2025-04-07

## 2025-04-09 DIAGNOSIS — F90.2 ATTENTION DEFICIT HYPERACTIVITY DISORDER, COMBINED TYPE: ICD-10-CM

## 2025-04-09 NOTE — TELEPHONE ENCOUNTER
Rx Refill Note  Requested Prescriptions     Pending Prescriptions Disp Refills    methylphenidate (RITALIN) 20 MG tablet [Pharmacy Med Name: methylphenidate 20 mg tablet] 90 tablet 0     Sig: TAKE ONE TABLET BY MOUTH THREE TIMES DAILY      Last office visit with prescribing clinician: 2/25/2025   Last telemedicine visit with prescribing clinician: Visit date not found   Next office visit with prescribing clinician: 6/3/2025     Manuela Amato MA  04/09/25, 14:18 EDT

## 2025-04-10 RX ORDER — METHYLPHENIDATE HYDROCHLORIDE 20 MG/1
20 TABLET ORAL 3 TIMES DAILY
Qty: 90 TABLET | Refills: 0 | Status: SHIPPED | OUTPATIENT
Start: 2025-04-10

## 2025-05-05 DIAGNOSIS — F90.2 ATTENTION DEFICIT HYPERACTIVITY DISORDER, COMBINED TYPE: ICD-10-CM

## 2025-05-05 DIAGNOSIS — I10 PRIMARY HYPERTENSION: ICD-10-CM

## 2025-05-05 RX ORDER — METHYLPHENIDATE HYDROCHLORIDE 20 MG/1
20 TABLET ORAL 3 TIMES DAILY
Qty: 90 TABLET | Refills: 0 | Status: SHIPPED | OUTPATIENT
Start: 2025-05-05

## 2025-05-05 NOTE — TELEPHONE ENCOUNTER
Rx Refill Note  Requested Prescriptions     Pending Prescriptions Disp Refills    methylphenidate (RITALIN) 20 MG tablet [Pharmacy Med Name: methylphenidate 20 mg tablet] 90 tablet 0     Sig: TAKE ONE TABLET BY MOUTH THREE TIMES DAILY      Last office visit with prescribing clinician: 2/25/2025   Last telemedicine visit with prescribing clinician: Visit date not found   Next office visit with prescribing clinician: 6/3/2025                         Would you like a call back once the refill request has been completed: [] Yes [] No    If the office needs to give you a call back, can they leave a voicemail: [] Yes [] No    Manuela Amato MA  05/05/25, 15:56 EDT

## 2025-05-06 RX ORDER — HYDROCHLOROTHIAZIDE 25 MG/1
25 TABLET ORAL DAILY
Qty: 30 TABLET | Refills: 0 | Status: SHIPPED | OUTPATIENT
Start: 2025-05-06

## 2025-05-07 ENCOUNTER — TRANSCRIBE ORDERS (OUTPATIENT)
Dept: ADMINISTRATIVE | Facility: HOSPITAL | Age: 60
End: 2025-05-07
Payer: MEDICARE

## 2025-05-07 DIAGNOSIS — M47.816 LUMBAR SPONDYLOSIS: Primary | ICD-10-CM

## 2025-05-28 DIAGNOSIS — M62.838 MUSCLE SPASM: ICD-10-CM

## 2025-05-28 RX ORDER — CYCLOBENZAPRINE HCL 10 MG
5-10 TABLET ORAL 3 TIMES DAILY PRN
Qty: 90 TABLET | Refills: 1 | Status: SHIPPED | OUTPATIENT
Start: 2025-05-28

## 2025-05-31 DIAGNOSIS — I10 PRIMARY HYPERTENSION: ICD-10-CM

## 2025-06-02 RX ORDER — HYDROCHLOROTHIAZIDE 25 MG/1
25 TABLET ORAL DAILY
Qty: 30 TABLET | Refills: 0 | Status: SHIPPED | OUTPATIENT
Start: 2025-06-02

## 2025-06-03 ENCOUNTER — OFFICE VISIT (OUTPATIENT)
Dept: BEHAVIORAL HEALTH | Facility: CLINIC | Age: 60
End: 2025-06-03
Payer: MEDICARE

## 2025-06-03 VITALS
HEIGHT: 65 IN | BODY MASS INDEX: 40.82 KG/M2 | DIASTOLIC BLOOD PRESSURE: 88 MMHG | OXYGEN SATURATION: 95 % | WEIGHT: 245 LBS | HEART RATE: 99 BPM | SYSTOLIC BLOOD PRESSURE: 134 MMHG

## 2025-06-03 DIAGNOSIS — F90.2 ATTENTION DEFICIT HYPERACTIVITY DISORDER, COMBINED TYPE: Primary | ICD-10-CM

## 2025-06-03 DIAGNOSIS — F33.1 MAJOR DEPRESSIVE DISORDER, RECURRENT EPISODE, MODERATE: ICD-10-CM

## 2025-06-03 DIAGNOSIS — Z51.81 ENCOUNTER FOR THERAPEUTIC DRUG MONITORING: ICD-10-CM

## 2025-06-03 DIAGNOSIS — F41.1 GAD (GENERALIZED ANXIETY DISORDER): ICD-10-CM

## 2025-06-03 DIAGNOSIS — F51.04 PSYCHOPHYSIOLOGICAL INSOMNIA: ICD-10-CM

## 2025-06-03 RX ORDER — DULOXETIN HYDROCHLORIDE 60 MG/1
120 CAPSULE, DELAYED RELEASE ORAL DAILY
Qty: 180 CAPSULE | Refills: 0 | Status: SHIPPED | OUTPATIENT
Start: 2025-06-03

## 2025-06-03 RX ORDER — METHYLPHENIDATE HYDROCHLORIDE 20 MG/1
20 TABLET ORAL 3 TIMES DAILY
Qty: 90 TABLET | Refills: 0 | Status: SHIPPED | OUTPATIENT
Start: 2025-06-03

## 2025-06-03 RX ORDER — DESVENLAFAXINE 50 MG/1
50 TABLET, FILM COATED, EXTENDED RELEASE ORAL DAILY
Qty: 90 TABLET | Refills: 0 | Status: SHIPPED | OUTPATIENT
Start: 2025-06-03

## 2025-06-03 RX ORDER — TRAZODONE HYDROCHLORIDE 100 MG/1
200 TABLET ORAL
Qty: 180 TABLET | Refills: 0 | Status: SHIPPED | OUTPATIENT
Start: 2025-06-03

## 2025-06-03 NOTE — PROGRESS NOTES
Follow Up Office Visit    Patient Name: Zhane Burnham  : 1965   MRN: [id number]   Care Team: Patient Care Team:  Latasha Curtis APRN as PCP - General (Family Medicine)  Magda Philip LPCC as Counselor (Behavioral Health)  Komal Aguiar APRN as Nurse Practitioner (Behavioral Health)  Sydney Alegria LCSW as  (Psychiatry)         Chief Complaint:    Chief Complaint   Patient presents with    ADHD    Anxiety    Depression    Sleeping Problem    Med Management       History of Present Illness: Zhane Burnham is a 60 y.o. female who is here today for a medication management follow up.  Patient reports that overall medication continues to be effective.  She feels that her focus and concentration are doing good and her mood and anxiety have been managed well.  She also reports she is sleeping well.  She does continue to endorse some situational stressors, however, she feels that she is managing them well. She did not see the need to make any changes and did not have any medication concerns at today's visit. She denies SI/HI today.     The following portion of the patient's history were reviewed and updated appropriately: allergies, current and past medications, family history, medical history and social history. STEPHANIE reviewed and appropriate.   Subjective   Review of Systems:    Review of Systems   Respiratory: Negative.     Cardiovascular: Negative.  Negative for chest pain and palpitations.   Neurological: Negative.    Psychiatric/Behavioral: Negative.  Positive for stress.    All other systems reviewed and are negative.      Current Medications:   Current Outpatient Medications   Medication Sig Dispense Refill    acetaminophen (TYLENOL) 650 MG 8 hr tablet Take 1 tablet by mouth Every 8 (Eight) Hours As Needed for Mild Pain.      albuterol sulfate  (90 Base) MCG/ACT inhaler Inhale 2 puffs Every 4 (Four) Hours As Needed for Wheezing.      BIOTIN PO Take  by mouth.       Cariprazine HCl (Vraylar) 1.5 MG capsule capsule Take 1 capsule by mouth Daily. 90 capsule 0    clotrimazole-betamethasone (LOTRISONE) 1-0.05 % cream Apply 1 Application topically to the appropriate area as directed 2 (Two) Times a Day. 45 g 2    cyclobenzaprine (FLEXERIL) 10 MG tablet TAKE 1/2 TO 1 TABLET BY MOUTH THREE TIMES DAILY AS NEEDED FOR MUSCLE SPASMS 90 tablet 1    desvenlafaxine (PRISTIQ) 50 MG 24 hr tablet Take 1 tablet by mouth Daily. 90 tablet 0    DULoxetine (CYMBALTA) 60 MG capsule Take 2 capsules by mouth Daily. 180 capsule 0    dutasteride (AVODART) 0.5 MG capsule Take 1 capsule by mouth Daily. 90 capsule 0    etodolac (LODINE) 400 MG tablet Take 1/2-1 tablet by mouth every 8 hours as needed. 90 tablet 1    ezetimibe (ZETIA) 10 MG tablet Take 1 tablet by mouth Daily. 90 tablet 1    fluconazole (DIFLUCAN) 150 MG tablet Take 1 tablet by mouth Every 3 (Three) Days. 2 tablet 0    hydroCHLOROthiazide 25 MG tablet TAKE ONE TABLET BY MOUTH EVERY DAY 30 tablet 0    levocetirizine (XYZAL) 5 MG tablet TAKE ONE TABLET BY MOUTH EVERY EVENING 30 tablet 2    methylphenidate (RITALIN) 20 MG tablet Take 1 tablet by mouth 3 (Three) Times a Day. 90 tablet 0    Multiple Vitamins-Minerals (WOMENS 50+ MULTI VITAMIN PO) Take  by mouth.      nystatin (MYCOSTATIN) 784859 UNIT/GM powder Apply  topically to the appropriate area as directed 3 (Three) Times a Day. 15 g 3    oxyCODONE-acetaminophen (PERCOCET)  MG per tablet Take 1 tablet by mouth Every 6 (Six) Hours As Needed for Moderate Pain.      pantoprazole (PROTONIX) 40 MG EC tablet TAKE ONE TABLET BY MOUTH DAILY 90 tablet 1    potassium chloride 10 MEQ CR tablet Take 1 tablet by mouth 2 (Two) Times a Day.      pregabalin (LYRICA) 100 MG capsule Take 1 capsule by mouth 3 (Three) Times a Day. 90 capsule 2    Relistor 150 MG tablet Take 1 tablet by mouth Daily. 90 tablet 0    simvastatin (ZOCOR) 40 MG tablet TAKE ONE TABLET BY MOUTH EVERY DAY AT BEDTIME 90 tablet 1  "   traZODone (DESYREL) 100 MG tablet Take 2 tablets by mouth every night at bedtime. 180 tablet 0     No current facility-administered medications for this visit.       Mental Status Exam:   Hygiene:   good  Cooperation:  Cooperative  Eye Contact:  Good  Psychomotor Behavior:  Appropriate  Affect:  Appropriate  Mood: normal  Speech:  Normal  Thought Process:  Goal directed and Linear  Thought Content:  Normal and Mood congruent  Suicidal:  None  Homicidal:  None  Hallucinations:  None  Delusion:  None  Memory:  Intact  Orientation:  Person, Place, Time, and Situation  Reliability:  good  Insight:  Good  Judgement:  Good  Impulse Control:  Good  Physical/Medical Issues:  Yes see chart      Objective   Vital Signs:   /88   Pulse 99   Ht 165.1 cm (65\")   Wt 111 kg (245 lb)   SpO2 95%   BMI 40.77 kg/m²         Lab Results:   Lab Results   Component Value Date    WBC 5.8 09/10/2024    HGB 12.6 09/10/2024    HCT 39.8 09/10/2024    MCV 87 09/10/2024     09/10/2024        Lab Results   Component Value Date    GLUCOSE 89 09/10/2024    BUN 16 09/10/2024    CREATININE 0.86 09/10/2024     09/10/2024    K 4.0 09/10/2024     09/10/2024    CALCIUM 9.4 09/10/2024    PROTEINTOT 6.2 09/10/2024    ALBUMIN 4.1 09/10/2024    ALT 16 09/10/2024    AST 17 09/10/2024    ALKPHOS 84 09/10/2024    BILITOT 0.3 09/10/2024    GLOB 2.1 09/10/2024    AGRATIO 1.8 02/27/2023    BCR 19 09/10/2024    ANIONGAP 6.0 01/16/2018    EGFR 79 09/10/2024        Lab Results   Component Value Date    CHLPL 183 09/10/2024    TRIG 83 09/10/2024    HDL 73 09/10/2024    LDL 95 09/10/2024        Lab Results   Component Value Date    HGBA1C 5.7 (H) 09/10/2024        Lab Results   Component Value Date    TSH 0.360 08/17/2020            Assessment / Plan    Diagnoses and all orders for this visit:    1. Attention deficit hyperactivity disorder, combined type (Primary)  -     methylphenidate (RITALIN) 20 MG tablet; Take 1 tablet by mouth 3 " (Three) Times a Day.  Dispense: 90 tablet; Refill: 0    2. Encounter for therapeutic drug monitoring  -     Compliance Drug Analysis, Ur - Urine, Clean Catch    3. Major depressive disorder, recurrent episode, moderate  -     Cariprazine HCl (Vraylar) 1.5 MG capsule capsule; Take 1 capsule by mouth Daily.  Dispense: 90 capsule; Refill: 0  -     desvenlafaxine (PRISTIQ) 50 MG 24 hr tablet; Take 1 tablet by mouth Daily.  Dispense: 90 tablet; Refill: 0  -     DULoxetine (CYMBALTA) 60 MG capsule; Take 2 capsules by mouth Daily.  Dispense: 180 capsule; Refill: 0    4. MANISHA (generalized anxiety disorder)  -     desvenlafaxine (PRISTIQ) 50 MG 24 hr tablet; Take 1 tablet by mouth Daily.  Dispense: 90 tablet; Refill: 0  -     DULoxetine (CYMBALTA) 60 MG capsule; Take 2 capsules by mouth Daily.  Dispense: 180 capsule; Refill: 0    5. Psychophysiological insomnia  -     traZODone (DESYREL) 100 MG tablet; Take 2 tablets by mouth every night at bedtime.  Dispense: 180 tablet; Refill: 0    Patient appears to be doing well on current medication. No issues reported and no indication for change. Will continue medication as ordered.     History and physical exam exhibit continued safe and appropriate use of controlled substance.    Obtained yearly urine drug screen and controlled substance agreement signed.     As part of patient's treatment plan I am prescribing a controlled substance.  The patient has been made aware of the appropriate use of such medications and potential for dependence and/or overdose.  It has also been made clear that these medications are for use by this patient only, without concomitant use of alcohol or other substances, unless prescribed.    Patient/guardian has completed a prescribing agreement detailing terms of continued prescribing of controlled substances, including monitoring STEPHANIE reports, urine drug screening, and pill counts if necessary.  Patient is aware that inappropriate use will result in  cessation of prescribing such medications.    At this time, patient is being prescribed an antipsychotic medication.  The risks, benefits and potential side effects of these medications have been reviewed with the patient/guardian.  I have also discussed with the patient/guardian that while on these medications the following labs should be drawn yearly.  Those labs include, a CBC, a CMP, a lipid panel, A1c, and Thyroid labs.  Encouraged patient/guardian to discuss these labs with their primary care provider.      AIMS  Facial and Oral Movements  Muscles of Facial Expression: None, normal  Lips and Perioral Area: None, normal  Jaw: None, normal  Tongue: None, normal  Extremity Movements  Upper (arms, wrists, hands, fingers): None, normal  Lower (legs, knees, ankles, toes): None, normal  Trunk Movements  Neck, shoulders, hips: None, normal  Overall Severity  Severity of abnormal movements (max 4): 0  Incapacitation due to abnormal movements: None, normal  Patient's awareness of abnormal movements (rate only patient's report): Aware, no distress  Dental Status  Current problems with teeth and/or dentures?: No  Does patient usually wear dentures?: No       PHQ-9 Depression Screening  Little interest or pleasure in doing things? Almost all   Feeling down, depressed, or hopeless? Over half   Trouble falling or staying asleep, or sleeping too much? Several days   Feeling tired or having little energy? Almost all   Poor appetite or overeating? Several days   Feeling bad about yourself - or that you are a failure or have let yourself or your family down? Several days   Trouble concentrating on things, such as reading the newspaper or watching television? Almost all   Moving or speaking so slowly that other people could have noticed? Or the opposite - being so fidgety or restless that you have been moving around a lot more than usual? Not at all   Thoughts that you would be better off dead, or of hurting yourself in some way?  Not at all   PHQ-9 Total Score 14   If you checked off any problems, how difficult have these problems made it for you to do your work, take care of things at home, or get along with other people? Very difficult     PHQ-9 Score:   PHQ-9 Total Score: 14    Depression Screening:  Patient screened positive for depression based on a PHQ-9 score of 14 on 6/3/2025. Follow-up recommendations include: Prescribed antidepressant medication treatment, Suicide Risk Assessment performed, and Continue with medication management.        MANISHA-7  Over the last two weeks, how often have you been bothered by the following problems?  Feeling nervous, anxious or on edge: Nearly every day  Not being able to stop or control worrying: Nearly every day  Worrying too much about different things: Nearly every day  Trouble Relaxing: More than half the days  Being so restless that it is hard to sit still: Several days  Becoming easily annoyed or irritable: Several days  Feeling afraid as if something awful might happen: Several days  MANISHA 7 Total Score: 14  If you checked any problems, how difficult have these problems made it for you to do your work, take care of things at home, or get along with other people: Very difficult      ADHD  Screening for Adults With ADHD - (1-6)  1. How often do you have trouble wrapping up the final details of a project, once the challenging parts have been done?: Very Often  2. How often do you have difficulty getting things in order when you have to do a task that requires organization?: Often  3. How often do you have problems remembering appointments or obligations : Often  4. When you have a task that requires a lot of thought, how often do you avoid or delay getting started ?: Very Often  5. How often do you fidget or squirm with your hands or feet when you have to sit down for a long time?: Often  6. How often do you feel overly active and compelled to do things, like you were driven by a motor?: Rarely  7.  How often do you make careless mistakes when you have to work on a boring or difficult project?: Sometimes  8. How often do have difficulty keeping your attention when you are doing boring or repetitive work?: Very Often  9. How often do you have difficulty concentrating on what people say to you, even when they are speaking to you: Sometimes  10.How often do you misplace or have difficulty finding things at home or at work?: Very Often  11.How often are you distracted by activity or noise around you?: Very Often  12.How often do you leave your seat in meetings or other situations in which you are expected to remain seated?: Often  13.How often do you feel restless or fidgety?: Often  14.How often do you have difficulty unwinding and relaxing when you have time to yourself?: Often  15.How often do you find yourself talking too much when you are in social situations?: Often  16.When you’re in a conversation, how often do you find yourself finishing the sentences of the people you are talking to, before they can finish them themselves?: Often  17.How often do you have difficulty waiting your turn in situations when turn taking is required?: Often  18.How often do you interrupt others when they are busy?: Often    A psychological evaluation was conducted in order to assess past and current level of functioning. Areas assessed included, but were not limited to: perception of social support, perception of ability to face and deal with challenges in life (positive functioning), anxiety symptoms, depressive symptoms, perspective on beliefs/belief system, coping skills for stress, intelligence level,  Therapeutic rapport was established. Interventions conducted today were geared towards incorporating medication management along with support for continued therapy. Education was also provided as to the med management with this provider and what to expect in subsequent sessions.      We discussed risks, benefits, goals and  side effects of the above medication and the patient was agreeable with the plan. Patient was educated on the importance of compliance with treatment and follow-up appointments. Patient is aware to contact the [facility] with any worsening of symptoms. To call for questions or concerns and return early if necessary. Patent is agreeable to go to the Emergency Department or call 911 should they begin SI/HI.    MEDS ORDERED DURING VISIT:  New Medications Ordered This Visit   Medications    Cariprazine HCl (Vraylar) 1.5 MG capsule capsule     Sig: Take 1 capsule by mouth Daily.     Dispense:  90 capsule     Refill:  0    desvenlafaxine (PRISTIQ) 50 MG 24 hr tablet     Sig: Take 1 tablet by mouth Daily.     Dispense:  90 tablet     Refill:  0     This prescription was filled on 5/8/2023. Any refills authorized will be placed on file.    DULoxetine (CYMBALTA) 60 MG capsule     Sig: Take 2 capsules by mouth Daily.     Dispense:  180 capsule     Refill:  0     This prescription was filled on 5/18/2023. Any refills authorized will be placed on file.    methylphenidate (RITALIN) 20 MG tablet     Sig: Take 1 tablet by mouth 3 (Three) Times a Day.     Dispense:  90 tablet     Refill:  0    traZODone (DESYREL) 100 MG tablet     Sig: Take 2 tablets by mouth every night at bedtime.     Dispense:  180 tablet     Refill:  0     This prescription was filled on 12/31/2022. Any refills authorized will be placed on file.         Follow Up   Return in about 3 months (around 9/3/2025).  Patient was given instructions and counseling regarding her condition or for health maintenance advice. Please see specific information pulled into the AVS if appropriate.     TREATMENT PLAN/GOALS: Continue supportive psychotherapy efforts and medications as indicated. Treatment and medication options discussed during today's visit. Patient acknowledged and verbally consented to continue with current treatment plan and was educated on the importance of  compliance with treatment and follow-up appointments.    MEDICATION ISSUES:  Discussed medication options and treatment plan of prescribed medication as well as the risks, benefits, and side effects including potential falls, possible impaired driving and metabolic adversities among others. Patient is agreeable to call the office with any worsening of symptoms or onset of side effects. Patient is agreeable to call 911 or go to the nearest ER should he/she begin having SI/HI.        JENNIE [facility]E [facility]  [address] [place] [address] [address] [place] [id number] [phone]    June 4, 2025 10:07 EDT

## 2025-06-04 ENCOUNTER — HOSPITAL ENCOUNTER (OUTPATIENT)
Dept: MRI IMAGING | Facility: HOSPITAL | Age: 60
Discharge: HOME | End: 2025-06-04
Admitting: ANESTHESIOLOGY
Payer: MEDICARE

## 2025-06-04 DIAGNOSIS — N89.8 OTHER SPECIFIED NONINFLAMMATORY DISORDERS OF VAGINA: ICD-10-CM

## 2025-06-04 DIAGNOSIS — M47.816 LUMBAR SPONDYLOSIS: ICD-10-CM

## 2025-06-04 DIAGNOSIS — L29.2 VULVAR ITCHING: Primary | ICD-10-CM

## 2025-06-04 PROCEDURE — 72148 MRI LUMBAR SPINE W/O DYE: CPT

## 2025-06-04 RX ORDER — CLOTRIMAZOLE AND BETAMETHASONE DIPROPIONATE 10; .64 MG/G; MG/G
1 CREAM TOPICAL 2 TIMES DAILY
Qty: 45 G | Refills: 2 | Status: SHIPPED | OUTPATIENT
Start: 2025-06-04

## 2025-06-04 NOTE — TELEPHONE ENCOUNTER
[facility] sent Dr. Kim a refill for Clotrimazole-betamethasone. Filled 5/12/2025. At her 3/2025 annual she was prescribed 1 tube and 1 refills

## 2025-06-08 LAB — DRUGS UR: NORMAL

## 2025-06-19 DIAGNOSIS — J30.2 SEASONAL ALLERGIES: ICD-10-CM

## 2025-06-19 RX ORDER — LEVOCETIRIZINE DIHYDROCHLORIDE 5 MG/1
5 TABLET, FILM COATED ORAL EVERY EVENING
Qty: 30 TABLET | Refills: 2 | Status: SHIPPED | OUTPATIENT
Start: 2025-06-19

## 2025-07-01 NOTE — PROGRESS NOTES
"Patient: Zhane Burnham    YOB: 1965    Date: 07/02/2025    Primary Care Provider: Latasha Curtis APRN    Chief Complaint   Patient presents with    Rectal Bleeding       SUBJECTIVE:    History of present illness:  I saw the patient in the office today as a consultation for evaluation and treatment of visible rectal bleeding.  Patient's last colonoscopy was performed 10/16/2019, it was normal.    She has complained of rectal bleeding over the last several weeks.    The following portions of the patient's history were reviewed and updated as appropriate: allergies, current medications, past family history, past medical history, past social history, past surgical history and problem list.      Review of Systems:  Constitutional:  Negative for chills, fever, and unexpected weight change.  HENT: Negative for trouble swallowing and voice change.  Eyes:  Negative for visual disturbance.  Respiratory:  Negative for apnea, cough, chest tightness, shortness of breath, and wheezing.  Cardiovascular:  Negative for chest pain, palpitations, and leg swelling.  Gastrointestinal:  Negative for abdominal distention, abdominal pain, constipation, diarrhea, nausea, rectal pain, and vomiting.  There is blood per rectum  Musculoskeletal:  Negative for back pain, gait problem, and joint swelling.  Skin:  Negative for color change, rash, and wound  Neurological:  Negative for dizziness, syncope, speech difficulty, weakness, numbness, and headaches.  Hematological:  Negative for adenopathy.  Does not bruise/bleed easily.  Psychiatric/Behavioral:  Negative for confusion.  The patient is not nervous/anxious.          History:  Past Medical History:   Diagnosis Date    Abnormal ECG     Acid reflux     ADHD (attention deficit hyperactivity disorder)     Anemia     Anxiety     Asthma     reported \"childhood\"    Body piercing     ears    Breast cancer 12/27/2012    Cancer of left breast, patient had a bilateral mastectomy "    Chronic bronchitis     Chronic pain disorder     I have scoliosis & as a result, I have extra curvature of my spine & my spine is actually twisted, causing all kinds of trouble with each & every vertabra.  As a result, I'm in constant pain.  I also have severe arthritus envolving all my vertabra.    Colon polyp     Constipation     Depression     Dysphagia     Reported difficulty swallowing pills    Elevated cholesterol     Fatigue     Fibromyalgia     Fractures     Bilateral arms and left ankle, multiple toes    H/O cardiovascular stress test     Patient reported done apx. 2004 and that all was wnl's at that time    Headache     Hearing loss     Patient reported difficulty only when there is a lot of back ground noise. No use of hearing devices.     Heart murmur     History of bronchitis     History of pneumonia     History of transfusion     Reported no reaction to transfusion    Hyperlipidemia     Hypertension     Lower extremity edema     Migraines     Obsessive-compulsive disorder     Yes, but undiagnosed.    Osteoarthritis     Osteoporosis     Ovarian cyst     Panic disorder     Undiagnosed, but I stress about upcoming things to the point I can't even participate in the event & end up cancelling or worse yet, being a no show, no call.    Peptic ulceration     Peripheral neuropathy     Hands/Arms & Feet/Legs...much worse than before    PONV (postoperative nausea and vomiting)     Psychiatric illness     Depression...mom also suffered from depression & father was bipolar.    Restless leg     Seasonal allergies     Urinary tract infection     Vaginal yeast infection        Past Surgical History:   Procedure Laterality Date    BREAST BIOPSY Left 12/27/2012    BREAST RECONSTRUCTION Bilateral 01/31/2013    COLONOSCOPY      COLONOSCOPY N/A 10/16/2019    Procedure: COLONOSCOPY;  Surgeon: Ck Mejia MD;  Location: [facility];  Service: Gastroenterology    ENDOSCOPY      EXTERNAL EAR SURGERY Left      "Extraction of an extra lobe     HYSTERECTOMY  2008    KNEE ARTHROSCOPY Left 06/2016    Patient reported \"bone tumor removed\"    MASTECTOMY Bilateral 01/31/2013    bilat    TONSILLECTOMY      WISDOM TOOTH EXTRACTION         Family History   Problem Relation Age of Onset    Heart disease Mother     Heart failure Mother     Arthritis Mother     Diabetes Mother     Heart attack Mother     Hyperlipidemia Mother     Mental illness Mother     Obesity Mother     Osteoporosis Mother     Stroke Mother     Thyroid disease Mother     Depression Mother     Seizures Mother     Suicide Attempts Mother         Multiple    Heart disease Father     Heart attack Father     Hypertension Father     Hyperlipidemia Father     Mental illness Father     Osteoporosis Father     Stroke Father     Alcohol abuse Father     Bipolar disorder Father     No Known Problems Sister     Alcohol abuse Brother     Bipolar disorder Brother     Drug abuse Brother     Hypertension Other     Ovarian cancer Paternal Aunt     Breast cancer Paternal Aunt     Tuberculosis Paternal Grandmother        Social History     Tobacco Use    Smoking status: Never     Passive exposure: Never    Smokeless tobacco: Never   Vaping Use    Vaping status: Never Used   Substance Use Topics    Alcohol use: Yes     Comment: light occasional use, no history of abuse reported    Drug use: No       Allergies:  Allergies   Allergen Reactions    Celebrex [Celecoxib] Swelling    Red Dye #40 (Allura Red) Anaphylaxis     Reported reaction started with rash/itching and progressed to swelling     Shellfish-Derived Products Anaphylaxis    Iodinated Contrast Media Unknown - Low Severity    Other Other (See Comments)     STOOL SOFTENERS - tingly feeling/does not feel right       Medications:    Current Outpatient Medications:     acetaminophen (TYLENOL) 650 MG 8 hr tablet, Take 1 tablet by mouth Every 8 (Eight) Hours As Needed for Mild Pain., Disp: , Rfl:     albuterol sulfate  (90 " Base) MCG/ACT inhaler, Inhale 2 puffs Every 4 (Four) Hours As Needed for Wheezing., Disp: , Rfl:     BIOTIN PO, Take  by mouth., Disp: , Rfl:     Cariprazine HCl (Vraylar) 1.5 MG capsule capsule, Take 1 capsule by mouth Daily., Disp: 90 capsule, Rfl: 0    clotrimazole-betamethasone (LOTRISONE) 1-0.05 % cream, Apply 1 Application topically to the appropriate area as directed 2 (Two) Times a Day., Disp: 45 g, Rfl: 2    cyclobenzaprine (FLEXERIL) 10 MG tablet, TAKE 1/2 TO 1 TABLET BY MOUTH THREE TIMES DAILY AS NEEDED FOR MUSCLE SPASMS, Disp: 90 tablet, Rfl: 1    desvenlafaxine (PRISTIQ) 50 MG 24 hr tablet, Take 1 tablet by mouth Daily., Disp: 90 tablet, Rfl: 0    DULoxetine (CYMBALTA) 60 MG capsule, Take 2 capsules by mouth Daily., Disp: 180 capsule, Rfl: 0    dutasteride (AVODART) 0.5 MG capsule, Take 1 capsule by mouth Daily., Disp: 90 capsule, Rfl: 0    etodolac (LODINE) 400 MG tablet, Take 1/2-1 tablet by mouth every 8 hours as needed., Disp: 90 tablet, Rfl: 1    ezetimibe (ZETIA) 10 MG tablet, Take 1 tablet by mouth Daily., Disp: 90 tablet, Rfl: 1    fluconazole (DIFLUCAN) 150 MG tablet, Take 1 tablet by mouth Every 3 (Three) Days., Disp: 2 tablet, Rfl: 0    hydroCHLOROthiazide 25 MG tablet, TAKE ONE TABLET BY MOUTH EVERY DAY, Disp: 30 tablet, Rfl: 0    levocetirizine (XYZAL) 5 MG tablet, TAKE ONE TABLET BY MOUTH EVERY EVENING, Disp: 30 tablet, Rfl: 2    methylphenidate (RITALIN) 20 MG tablet, Take 1 tablet by mouth 3 (Three) Times a Day., Disp: 90 tablet, Rfl: 0    Multiple Vitamins-Minerals (WOMENS 50+ MULTI VITAMIN PO), Take  by mouth., Disp: , Rfl:     nystatin (MYCOSTATIN) 055386 UNIT/GM powder, Apply  topically to the appropriate area as directed 3 (Three) Times a Day., Disp: 15 g, Rfl: 3    oxyCODONE-acetaminophen (PERCOCET)  MG per tablet, Take 1 tablet by mouth Every 6 (Six) Hours As Needed for Moderate Pain., Disp: , Rfl:     pantoprazole (PROTONIX) 40 MG EC tablet, TAKE ONE TABLET BY MOUTH DAILY,  "Disp: 90 tablet, Rfl: 1    potassium chloride 10 MEQ CR tablet, Take 1 tablet by mouth 2 (Two) Times a Day., Disp: , Rfl:     pregabalin (LYRICA) 100 MG capsule, Take 1 capsule by mouth 3 (Three) Times a Day., Disp: 90 capsule, Rfl: 2    Relistor 150 MG tablet, Take 1 tablet by mouth Daily., Disp: 90 tablet, Rfl: 0    simvastatin (ZOCOR) 40 MG tablet, TAKE ONE TABLET BY MOUTH EVERY DAY AT BEDTIME, Disp: 90 tablet, Rfl: 1    traZODone (DESYREL) 100 MG tablet, Take 2 tablets by mouth every night at bedtime., Disp: 180 tablet, Rfl: 0    bisacodyl (Dulcolax) 5 MG EC tablet, Take 2 @ 12 pm, 2 @ 2 pm day prior to colonoscopy, Disp: 4 tablet, Rfl: 0    polyethylene glycol (MIRALAX) 17 GM/SCOOP powder, Take 17 g by mouth Daily. USE AS DIRECTED FOR BOWEL PREP, WRITTEN INSTRUCTIONS GIVEN, Disp: 238 g, Rfl: 0    OBJECTIVE:    Vital Signs:   Vitals:    07/02/25 1402   BP: 130/84   Pulse: 113   Temp: 98 °F (36.7 °C)   TempSrc: Infrared   SpO2: 97%   Weight: 116 kg (256 lb)   Height: 165.1 cm (65\")         Physical Exam:     General Appearance:    Alert, cooperative, in no acute distress   Head:    Normocephalic, without obvious abnormality, atraumatic   Eyes:            Lids and lashes normal, conjunctivae and sclerae normal, no   icterus, no pallor, corneas clear, PERRLA   Ears:    Ears appear intact with no abnormalities noted   Throat:   No oral lesions, no thrush, oral mucosa moist   Neck:   No adenopathy, supple, trachea midline, no thyromegaly, no   carotid bruit, no JVD   Back:     No kyphosis present, no scoliosis present, no skin lesions,      erythema or scars, no tenderness to percussion or                   palpation,   range of motion normal   Lungs:     Clear to auscultation,respirations regular, even and                  unlabored    Heart:    Regular rhythm and normal rate, normal S1 and S2, no            murmur, no gallop, no rub, no click   Chest Wall:    No abnormalities observed   Abdomen:     Normal bowel " sounds, no masses, no organomegaly, soft        non-tender, non-distended, no guarding,    Extremities:   Moves all extremities well, no edema, no cyanosis, no             redness   Pulses:   Pulses palpable and equal bilaterally   Skin:   No bleeding, bruising or rash   Lymph nodes:   No palpable adenopathy   Neurologic:   Cranial nerves 2 - 12 grossly intact, sensation intact, DTR       present and equal bilaterally        Results Review:   I reviewed the patient's new clinical results.  I reviewed the patient's new imaging results and agree with the interpretation.  I reviewed the patient's other test results and agree with the interpretation    Review of Systems was reviewed and confirmed as accurate as documented by the MA.    ASSESSMENT/PLAN:    1. Rectal bleed        I did have a detailed and extensive discussion with the patient in the office today.  The full risks and benefits of operative versus nonoperative intervention were discussed with the paient, they understand, agree, and wish to proceed with the surgical treatment plan of colonoscopy.    Electronically signed by Ck Mejia MD  07/02/25 08:36 EDT

## 2025-07-02 ENCOUNTER — OFFICE VISIT (OUTPATIENT)
Dept: SURGERY | Facility: CLINIC | Age: 60
End: 2025-07-02
Payer: MEDICARE

## 2025-07-02 VITALS
HEIGHT: 65 IN | DIASTOLIC BLOOD PRESSURE: 84 MMHG | SYSTOLIC BLOOD PRESSURE: 130 MMHG | WEIGHT: 256 LBS | BODY MASS INDEX: 42.65 KG/M2 | OXYGEN SATURATION: 97 % | TEMPERATURE: 98 F | HEART RATE: 113 BPM

## 2025-07-02 DIAGNOSIS — K62.5 RECTAL BLEED: Primary | ICD-10-CM

## 2025-07-02 DIAGNOSIS — L29.2 VULVAR ITCHING: Primary | ICD-10-CM

## 2025-07-02 DIAGNOSIS — G89.4 CHRONIC PAIN SYNDROME: ICD-10-CM

## 2025-07-02 PROCEDURE — 1160F RVW MEDS BY RX/DR IN RCRD: CPT | Performed by: SURGERY

## 2025-07-02 PROCEDURE — 99203 OFFICE O/P NEW LOW 30 MIN: CPT | Performed by: SURGERY

## 2025-07-02 PROCEDURE — 1159F MED LIST DOCD IN RCRD: CPT | Performed by: SURGERY

## 2025-07-02 PROCEDURE — 3079F DIAST BP 80-89 MM HG: CPT | Performed by: SURGERY

## 2025-07-02 PROCEDURE — 3075F SYST BP GE 130 - 139MM HG: CPT | Performed by: SURGERY

## 2025-07-02 RX ORDER — BISACODYL 5 MG
TABLET, DELAYED RELEASE (ENTERIC COATED) ORAL
Qty: 4 TABLET | Refills: 0 | Status: SHIPPED | OUTPATIENT
Start: 2025-07-02

## 2025-07-02 RX ORDER — POLYETHYLENE GLYCOL 3350 17 G/17G
17 POWDER, FOR SOLUTION ORAL DAILY
Qty: 238 G | Refills: 0 | Status: SHIPPED | OUTPATIENT
Start: 2025-07-02

## 2025-07-25 DIAGNOSIS — I10 PRIMARY HYPERTENSION: ICD-10-CM

## 2025-07-25 RX ORDER — HYDROCHLOROTHIAZIDE 25 MG/1
25 TABLET ORAL DAILY
Qty: 30 TABLET | Refills: 0 | Status: SHIPPED | OUTPATIENT
Start: 2025-07-25

## 2025-07-29 RX ORDER — EZETIMIBE 10 MG/1
10 TABLET ORAL DAILY
Qty: 90 TABLET | Refills: 1 | Status: SHIPPED | OUTPATIENT
Start: 2025-07-29

## 2025-08-13 RX ORDER — POLYETHYLENE GLYCOL 3350 17 G/17G
POWDER, FOR SOLUTION ORAL
Qty: 238 G | Refills: 0 | Status: SHIPPED | OUTPATIENT
Start: 2025-08-13

## 2025-08-13 RX ORDER — BISACODYL 5 MG
TABLET, DELAYED RELEASE (ENTERIC COATED) ORAL
Qty: 4 TABLET | Refills: 0 | Status: SHIPPED | OUTPATIENT
Start: 2025-08-13

## 2025-08-17 DIAGNOSIS — M62.838 MUSCLE SPASM: ICD-10-CM

## 2025-08-18 RX ORDER — CYCLOBENZAPRINE HCL 10 MG
5-10 TABLET ORAL 3 TIMES DAILY PRN
Qty: 90 TABLET | Refills: 1 | Status: SHIPPED | OUTPATIENT
Start: 2025-08-18

## 2025-08-20 DIAGNOSIS — E78.2 MIXED HYPERLIPIDEMIA: ICD-10-CM

## 2025-08-20 RX ORDER — SIMVASTATIN 40 MG
40 TABLET ORAL
Qty: 90 TABLET | Refills: 1 | Status: SHIPPED | OUTPATIENT
Start: 2025-08-20

## 2025-08-27 ENCOUNTER — TELEPHONE (OUTPATIENT)
Dept: NEUROSURGERY | Facility: CLINIC | Age: 60
End: 2025-08-27
Payer: MEDICARE

## 2025-08-27 DIAGNOSIS — I10 PRIMARY HYPERTENSION: ICD-10-CM

## 2025-08-27 RX ORDER — PANTOPRAZOLE SODIUM 40 MG/1
40 TABLET, DELAYED RELEASE ORAL DAILY
Qty: 90 TABLET | Refills: 1 | Status: SHIPPED | OUTPATIENT
Start: 2025-08-27

## 2025-08-27 RX ORDER — HYDROCHLOROTHIAZIDE 25 MG/1
25 TABLET ORAL DAILY
Qty: 30 TABLET | Refills: 0 | Status: SHIPPED | OUTPATIENT
Start: 2025-08-27

## (undated) DEVICE — PAD GRND REM POLYHESIVE A/ DISP

## (undated) DEVICE — GLV SURG SENSICARE W/ALOE PF LF 8.5 STRL

## (undated) DEVICE — Device

## (undated) DEVICE — ENDOSCOPY PORT CONNECTOR FOR OLYMPUS® SCOPES: Brand: ERBE

## (undated) DEVICE — HYBRID TUBING/CAP SET FOR OLYMPUS® SCOPES: Brand: ERBE

## (undated) DEVICE — LUBE JELLY PK/2.75GM STRL BX/144

## (undated) DEVICE — Device: Brand: DEFENDO AIR/WATER/SUCTION AND BIOPSY VALVE